# Patient Record
Sex: FEMALE | Race: WHITE | NOT HISPANIC OR LATINO | Employment: OTHER | URBAN - METROPOLITAN AREA
[De-identification: names, ages, dates, MRNs, and addresses within clinical notes are randomized per-mention and may not be internally consistent; named-entity substitution may affect disease eponyms.]

---

## 2017-02-03 ENCOUNTER — TELEPHONE (OUTPATIENT)
Dept: FAMILY MEDICINE | Facility: OTHER | Age: 72
End: 2017-02-03

## 2017-02-03 DIAGNOSIS — Z79.899 ON STATIN THERAPY: ICD-10-CM

## 2017-02-03 DIAGNOSIS — E03.9 ACQUIRED HYPOTHYROIDISM: ICD-10-CM

## 2017-02-03 DIAGNOSIS — E78.5 HYPERLIPIDEMIA LDL GOAL <100: Primary | ICD-10-CM

## 2017-02-03 NOTE — TELEPHONE ENCOUNTER
10:57 AM    Reason for Call: Phone Call    Description: Patient is coming in on 03/14 for follow up for thyroid and hyperlipidemia. Patient would like to get the labs done prior to the appt so she can discuss the results with the provider.    Was an appointment offered for this call? Yes    Preferred method for responding to this message: Telephone Call    If we cannot reach you directly, may we leave a detailed response at the number you provided? Yes    Can this message wait until your PCP/provider returns, if available today? Not applicable,     Hien Johnson

## 2017-03-09 DIAGNOSIS — E78.5 HYPERLIPIDEMIA LDL GOAL <100: ICD-10-CM

## 2017-03-09 DIAGNOSIS — Z79.899 ON STATIN THERAPY: ICD-10-CM

## 2017-03-09 DIAGNOSIS — E03.9 ACQUIRED HYPOTHYROIDISM: ICD-10-CM

## 2017-03-09 LAB
ALBUMIN SERPL-MCNC: 3.8 G/DL (ref 3.4–5)
ALP SERPL-CCNC: 81 U/L (ref 40–150)
ALT SERPL W P-5'-P-CCNC: 34 U/L (ref 0–50)
ANION GAP SERPL CALCULATED.3IONS-SCNC: 8 MMOL/L (ref 3–14)
AST SERPL W P-5'-P-CCNC: 28 U/L (ref 0–45)
BILIRUB DIRECT SERPL-MCNC: <0.1 MG/DL (ref 0–0.2)
BILIRUB SERPL-MCNC: 0.4 MG/DL (ref 0.2–1.3)
BUN SERPL-MCNC: 18 MG/DL (ref 7–30)
CALCIUM SERPL-MCNC: 9.5 MG/DL (ref 8.5–10.1)
CHLORIDE SERPL-SCNC: 109 MMOL/L (ref 94–109)
CHOLEST SERPL-MCNC: 176 MG/DL
CO2 SERPL-SCNC: 26 MMOL/L (ref 20–32)
CREAT SERPL-MCNC: 0.96 MG/DL (ref 0.52–1.04)
GFR SERPL CREATININE-BSD FRML MDRD: 57 ML/MIN/1.7M2
GLUCOSE SERPL-MCNC: 87 MG/DL (ref 70–99)
HDLC SERPL-MCNC: 64 MG/DL
LDLC SERPL CALC-MCNC: 96 MG/DL
NONHDLC SERPL-MCNC: 112 MG/DL
POTASSIUM SERPL-SCNC: 4.1 MMOL/L (ref 3.4–5.3)
PROT SERPL-MCNC: 7.1 G/DL (ref 6.8–8.8)
SODIUM SERPL-SCNC: 143 MMOL/L (ref 133–144)
TRIGL SERPL-MCNC: 79 MG/DL
TSH SERPL DL<=0.005 MIU/L-ACNC: 2.51 MU/L (ref 0.4–4)

## 2017-03-09 PROCEDURE — 80061 LIPID PANEL: CPT | Performed by: NURSE PRACTITIONER

## 2017-03-09 PROCEDURE — 36415 COLL VENOUS BLD VENIPUNCTURE: CPT | Performed by: NURSE PRACTITIONER

## 2017-03-09 PROCEDURE — 80076 HEPATIC FUNCTION PANEL: CPT | Performed by: NURSE PRACTITIONER

## 2017-03-09 PROCEDURE — 84443 ASSAY THYROID STIM HORMONE: CPT | Performed by: NURSE PRACTITIONER

## 2017-03-09 PROCEDURE — 80048 BASIC METABOLIC PNL TOTAL CA: CPT | Performed by: NURSE PRACTITIONER

## 2017-03-14 ENCOUNTER — OFFICE VISIT (OUTPATIENT)
Dept: FAMILY MEDICINE | Facility: OTHER | Age: 72
End: 2017-03-14
Attending: NURSE PRACTITIONER
Payer: MEDICARE

## 2017-03-14 VITALS
TEMPERATURE: 97.3 F | SYSTOLIC BLOOD PRESSURE: 138 MMHG | HEIGHT: 65 IN | HEART RATE: 68 BPM | DIASTOLIC BLOOD PRESSURE: 78 MMHG | WEIGHT: 142 LBS | BODY MASS INDEX: 23.66 KG/M2

## 2017-03-14 DIAGNOSIS — Z79.899 ON STATIN THERAPY: ICD-10-CM

## 2017-03-14 DIAGNOSIS — L81.9 CHANGE IN PIGMENTED LESION OF FACE: ICD-10-CM

## 2017-03-14 DIAGNOSIS — E03.4 HYPOTHYROIDISM DUE TO ACQUIRED ATROPHY OF THYROID: ICD-10-CM

## 2017-03-14 DIAGNOSIS — Z23 NEED FOR VACCINATION: ICD-10-CM

## 2017-03-14 DIAGNOSIS — E78.5 HYPERLIPIDEMIA WITH TARGET LDL LESS THAN 130: Primary | ICD-10-CM

## 2017-03-14 PROCEDURE — 90670 PCV13 VACCINE IM: CPT | Performed by: NURSE PRACTITIONER

## 2017-03-14 PROCEDURE — 99213 OFFICE O/P EST LOW 20 MIN: CPT | Mod: 25 | Performed by: NURSE PRACTITIONER

## 2017-03-14 PROCEDURE — 90471 IMMUNIZATION ADMIN: CPT | Performed by: NURSE PRACTITIONER

## 2017-03-14 PROCEDURE — 17110 DESTRUCTION B9 LES UP TO 14: CPT | Performed by: NURSE PRACTITIONER

## 2017-03-14 PROCEDURE — 99212 OFFICE O/P EST SF 10 MIN: CPT | Mod: 25

## 2017-03-14 PROCEDURE — G0009 ADMIN PNEUMOCOCCAL VACCINE: HCPCS | Performed by: NURSE PRACTITIONER

## 2017-03-14 RX ORDER — ATORVASTATIN CALCIUM 20 MG/1
20 TABLET, FILM COATED ORAL DAILY
Qty: 90 TABLET | Refills: 1 | Status: SHIPPED | OUTPATIENT
Start: 2017-03-14 | End: 2017-06-14

## 2017-03-14 ASSESSMENT — PAIN SCALES - GENERAL: PAINLEVEL: NO PAIN (0)

## 2017-03-14 NOTE — MR AVS SNAPSHOT
After Visit Summary   3/14/2017    Franca Angeles    MRN: [id number]           Patient Information     Date Of Birth          1945        Visit Information        Provider Department      3/14/2017 10:15 AM Brittney Coe NP [facility]        Today's Diagnoses     Hyperlipidemia with target LDL less than 130    -  1    Hypothyroidism due to acquired atrophy of thyroid        On statin therapy        Change in pigmented lesion of face        Need for vaccination          Care Instructions        ASSESSMENT / PLAN:  1. Hyperlipidemia with target LDL less than 130  chronic  - decrease atorvastatin (LIPITOR) 20 MG tablet; Take 1 tablet (20 mg) by mouth daily  Dispense: 90 tablet; Refill: 1  - Lipid Profile; Future  - Basic metabolic panel; Future    2. Hypothyroidism due to acquired atrophy of thyroid  Chronic, continue current plan  - TSH with free T4 reflex; Future    3. On statin therapy  - Hepatic panel; Future    4. Change in pigmented lesion of face  - DESTRUCT BENIGN LESION, UP TO 14  - bacitracin or Vaseline to area    Recommend shinges vaccine - wait at least one month  prevnar 13 updated today  Follow-up in 3 months or as needed for acute concerns    Brittney Coe,   Certified Adult Nurse Practitioner  [phone]        Follow-ups after your visit        Follow-up notes from your care team     Return in about 3 months (around 6/14/2017), or if symptoms worsen or fail to improve.      Your next 10 appointments already scheduled     Jun 14, 2017 11:15 AM CDT   (Arrive by 11:00 AM)   SHORT with Brittney Coe NP   [facility] ([facility] )    [address]  [facility] [address]   [phone]            Jul 25, 2017  9:00 AM CDT   (Arrive by 8:45 AM)   Office Visit with Khloe Lei MD   [facility]    [address] [place] [address]   [phone]           Bring a  current list of meds and any records pertaining to this visit.  For Physicals, please bring immunization records and any forms needing to be filled out.  Please arrive 10 minutes early to complete paperwork.              Future tests that were ordered for you today     Open Future Orders        Priority Expected Expires Ordered    Lipid Profile Routine 6/14/2017 7/14/2017 3/14/2017    TSH with free T4 reflex Routine 6/14/2017 7/14/2017 3/14/2017    Basic metabolic panel Routine 6/14/2017 7/14/2017 3/14/2017    Hepatic panel Routine 6/14/2017 7/14/2017 3/14/2017            Who to contact     If you have questions or need follow up information about today's clinic visit or your schedule please contact [facility] directly at [phone].  Normal or non-critical lab and imaging results will be communicated to you by MyChart, letter or phone within 4 business days after the clinic has received the results. If you do not hear from us within 7 days, please contact the clinic through [employer] or phone. If you have a critical or abnormal lab result, we will notify you by phone as soon as possible.  Submit refill requests through "[employer] or call your pharmacy and they will forward the refill request to us. Please allow 3 business days for your refill to be completed.          Additional Information About Your Visit        [employer] Information     "[employer] gives you secure access to your electronic health record. If you see a primary care provider, you can also send messages to your care team and make appointments. If you have questions, please call your primary care clinic.  If you do not have a primary care provider, please call [phone] and they will assist you.        Care EveryWhere ID     This is your Care EveryWhere ID. This could be used by other organizations to access your [facility] medical records  [id number]        Your Vitals Were     Pulse Temperature Height BMI (Body Mass Index)          68 97.3  " F (36.3  C) (Tympanic) 5' 4.5\" (1.638 m) 24 kg/m2         Blood Pressure from Last 3 Encounters:   03/14/17 138/78   10/05/16 174/97   09/12/16 124/70    Weight from Last 3 Encounters:   03/14/17 142 lb (64.4 kg)   09/12/16 141 lb (64 kg)   07/13/16 141 lb (64 kg)              We Performed the Following     1st  Administration  [88153]     DESTRUCT BENIGN LESION, UP TO 14     Pneumococcal vaccine 13 valent PCV13 IM (Prevnar) [03505]          Today's Medication Changes          These changes are accurate as of: 3/14/17  1:26 PM.  If you have any questions, ask your nurse or doctor.               These medicines have changed or have updated prescriptions.        Dose/Directions    atorvastatin 20 MG tablet   Commonly known as:  LIPITOR   This may have changed:    - medication strength  - how much to take   Used for:  Hyperlipidemia with target LDL less than 130   Changed by:  Brittney Coe NP        Dose:  20 mg   Take 1 tablet (20 mg) by mouth daily   Quantity:  90 tablet   Refills:  1            Where to get your medicines      These medications were sent to [facility] [address] [facility] [address] AT [facility] [place] [facility] Replaced by [facility] [address], [place] [address]     Phone:  [phone]     atorvastatin 20 MG tablet                Primary Care Provider Office Phone # Fax #    Brittney Coe [phone]       [facility] [address] [place]  [facility] [address]        Thank you!     Thank you for choosing [facility]  for your care. Our goal is always to provide you with excellent care. Hearing back from our patients is one way we can continue to improve our services. Please take a few minutes to complete the written survey that you may receive in the mail after your visit with us. Thank you!             Your Updated Medication List - Protect others around you: Learn how to safely use, store and throw away your medicines at " www.disposemymeds.org.          This list is accurate as of: 3/14/17  1:26 PM.  Always use your most recent med list.                   Brand Name Dispense Instructions for use    aspirin 81 MG tablet      Take 81 mg by mouth daily       atorvastatin 20 MG tablet    LIPITOR    90 tablet    Take 1 tablet (20 mg) by mouth daily       CALCIUM 500 PO      Take by mouth daily       cinnamon 500 MG Caps      One daily       CO-ENZYME Q-10 PO      Take by mouth daily       Fish Oil 1200 MG Caps      Take by mouth daily       GLUCOSAMINE-CHONDROITIN PO      Take  by mouth. GLUCOSAMINE HCL/CHONDR SUA NA One daily       levothyroxine 50 MCG tablet    SYNTHROID/LEVOTHROID    90 tablet    Take 1 tablet (50 mcg) by mouth daily       MULTIVITAMIN PO          Turmeric Curcumin 500 MG Caps      Take 1 capsule by mouth daily

## 2017-03-14 NOTE — PATIENT INSTRUCTIONS
ASSESSMENT / PLAN:  1. Hyperlipidemia with target LDL less than 130  chronic  - decrease atorvastatin (LIPITOR) 20 MG tablet; Take 1 tablet (20 mg) by mouth daily  Dispense: 90 tablet; Refill: 1  - Lipid Profile; Future  - Basic metabolic panel; Future    2. Hypothyroidism due to acquired atrophy of thyroid  Chronic, continue current plan  - TSH with free T4 reflex; Future    3. On statin therapy  - Hepatic panel; Future    4. Change in pigmented lesion of face  - DESTRUCT BENIGN LESION, UP TO 14  - bacitracin or Vaseline to area    Recommend shinges vaccine - wait at least one month  prevnar 13 updated today  Follow-up in 3 months or as needed for acute concerns    Brittney Coe,   Certified Adult Nurse Practitioner  [phone]

## 2017-03-14 NOTE — PROGRESS NOTES
SUBJECTIVE:  Franca Angeles, 71 year old, female presents with the following Chief Complaint(s) with HPI to follow:  Chief Complaint   Patient presents with     Lipids     Thyroid Problem          HPI:  Franca presents today with the above concern.        Hyperlipidemia Follow-Up      Watching cholesterol in diet?: Yes    Any muscle aches?: No     Migraine Follow-Up    Headaches symptoms:  Worsening in the past month    Frequency (per week or month): 4 per week    Duration of headaches: minutes to hours     Able to do normal daily activities/work with migraines: Yes    Rescue/Relief medication:massage, took aspirin once, does see the chiropractor weekly with relief.  Feels they are in part due to posture, working on Ipad and phone with head looking down              Effectiveness: moderate relief    Preventative medication: None    Neurologic complications: No new stroke-like symptoms, loss of vision or speech, numbness or weakness       Hypothyroidism Follow-up      Since last visit, patient describes the following symptoms: Weight stable, no hair loss, no skin changes, no constipation, no loose stools         Patient Active Problem List   Diagnosis     Menopause     Joint pain     Disorder of bone and cartilage     Advanced care planning/counseling discussion     Hyperlipidemia with target LDL less than 130     Routine general medical examination at a health care facility (ANNUAL)     Colon cancer screening     Hypothyroidism due to acquired atrophy of thyroid       Past Medical History   Diagnosis Date     Disorder of bone and cartilage, unspecified 5/10/2011     Esophageal reflux 5/10/2011     Pure hypercholesterolemia 5/28/2002       Past Surgical History   Procedure Laterality Date     Cystoscopy       micro hematuria neg     Wrist fx rt       Uterine fibroids       Tubal sterilization       Arthrodesis knee  [id number]     left knee, paritla medial meniscectomy, debridement medial femoral condyle and  patellofemoral debridement     Dilation and curettage       Colonoscopy  2004     [facility]     Colonoscopy  7/23/2014     Procedure: COLONOSCOPY;  Surgeon: Nghia Hernandez DO;  Location: [facility] OR       Family History   Problem Relation Age of Onset     C.A.D. Mother      Other - See Comments Mother      high platelets     Hypertension Mother      C.A.D. Paternal Aunt      DIABETES Other      aunt     Cardiovascular Father      cardiovascular disease     Colon Cancer Other      family history-paternal side     CEREBROVASCULAR DISEASE Maternal Grandmother      CEREBROVASCULAR DISEASE Maternal Grandfather        Social History   Substance Use Topics     Smoking status: Never Smoker     Smokeless tobacco: Never Used     Alcohol use 0.0 oz/week      Comment: occasionally       Current Outpatient Prescriptions   Medication Sig Dispense Refill     atorvastatin (LIPITOR) 20 MG tablet Take 1 tablet (20 mg) by mouth daily 90 tablet 1     levothyroxine (SYNTHROID, LEVOTHROID) 50 MCG tablet Take 1 tablet (50 mcg) by mouth daily 90 tablet 1     Turmeric Curcumin 500 MG CAPS Take 1 capsule by mouth daily       aspirin 81 MG tablet Take 81 mg by mouth daily       CO-ENZYME Q-10 PO Take by mouth daily       Multiple Vitamins-Minerals (MULTIVITAMIN OR)        Omega-3 Fatty Acids (FISH OIL) 1200 MG CAPS Take by mouth daily       Calcium-Magnesium-Vitamin D (CALCIUM 500 PO) Take by mouth daily       GLUCOSAMINE-CHONDROITIN PO Take  by mouth. GLUCOSAMINE HCL/CHONDR SUA NA  One daily       cinnamon 500 MG CAPS One daily       [DISCONTINUED] atorvastatin (LIPITOR) 40 MG tablet Take 1 tablet (40 mg) by mouth daily 90 tablet 1       Allergies   Allergen Reactions     Pseudoephedrine Hcl Other (See Comments)     Heart racing  Sudafed       Recent Labs   Lab Test  03/09/17   0913  09/13/16   0928  11/16/15   0823  05/13/15   1026   A1C   --    --    --   5.7   LDL  96  113*  102  134*   HDL  64  57  59  59   TRIG  79  193*  146  170*   ALT  34   28   --    --    CR  0.96  0.90   --    --    GFRESTIMATED  57*  62   --    --    GFRESTBLACK  69  75   --    --    POTASSIUM  4.1  4.4   --    --    TSH  2.51  2.33   --   3.75      BP Readings from Last 3 Encounters:   03/14/17 138/78   10/05/16 174/97   09/12/16 124/70    Wt Readings from Last 3 Encounters:   03/14/17 142 lb (64.4 kg)   09/12/16 141 lb (64 kg)   07/13/16 141 lb (64 kg)                    REVIEW OF SYSTEMS  Skin: left temple - brown scaly patch - new and getting larger.   She has appt with derm in July.  History of several lesions treated with cryotherapy.    Eyes: negative  Ears/Nose/Throat: negative  Respiratory: No shortness of breath, dyspnea on exertion, cough, or hemoptysis  Cardiovascular: negative  Gastrointestinal: negative  Genitourinary: negative  Musculoskeletal: neck pain  Neurologic: negative and headaches  Psychiatric: negative  Hematologic/Lymphatic/Immunologic: negative  Endocrine: negative and thyroid disorder    OBJECTIVE:    B/P: 138/78, T: 97.3, P: 68, R: Data Unavailable, W: 142 lbs 0 oz, BMI: Body mass index is 24 kg/(m^2).  Constitutional: healthy, alert and no distress  Head: Normocephalic. No masses, lesions, tenderness or abnormalities  ENT: ENT exam normal, no neck nodes or sinus tenderness  Neck: neck supple, no lymphadenopathy, trachea midline, thyroid symmetrical with out nodules noted.  Carotid arteries without bruit  Cardiovascular: RRR. No murmurs, clicks gallops or rub  Respiratory:  Good diaphragmatic excursion. Lungs clear  Psychiatric: mentation appears normal and affect normal/bright  Skin:  Left temple:  Cryotherapy x3 freeze thaw cycles to brown scaly patch that is oval shaped with sharp borders that mesasures 6mm.  Tolerated procedure well.     ASSESSMENT / PLAN:  1. Hyperlipidemia with target LDL less than 130  chronic  - decrease atorvastatin (LIPITOR) 20 MG tablet; Take 1 tablet (20 mg) by mouth daily  Dispense: 90 tablet; Refill: 1  - Lipid Profile;  Future  - Basic metabolic panel; Future    2. Hypothyroidism due to acquired atrophy of thyroid  Chronic, continue current plan  - TSH with free T4 reflex; Future    3. On statin therapy  - Hepatic panel; Future    4. Change in pigmented lesion of face  - DESTRUCT BENIGN LESION, UP TO 14  - bacitracin or Vaseline to area    Recommend shinges vaccine - wait at least one month  prevnar 13 updated today  Follow-up in 3 months or as needed for acute concerns    Brittney Coe,   Certified Adult Nurse Practitioner  [phone]

## 2017-03-14 NOTE — NURSING NOTE
"Chief Complaint   Patient presents with     Lipids     Thyroid Problem       Initial /78 (BP Location: Right arm, Patient Position: Chair, Cuff Size: Adult Regular)  Pulse 68  Temp 97.3  F (36.3  C) (Tympanic)  Ht 5' 4.5\" (1.638 m)  Wt 142 lb (64.4 kg)  BMI 24 kg/m2 Estimated body mass index is 24 kg/(m^2) as calculated from the following:    Height as of this encounter: 5' 4.5\" (1.638 m).    Weight as of this encounter: 142 lb (64.4 kg).  Medication Reconciliation: jacques TORRES      "

## 2017-03-22 ENCOUNTER — TELEPHONE (OUTPATIENT)
Dept: FAMILY MEDICINE | Facility: OTHER | Age: 72
End: 2017-03-22

## 2017-03-22 DIAGNOSIS — E03.4 HYPOTHYROIDISM DUE TO ACQUIRED ATROPHY OF THYROID: ICD-10-CM

## 2017-03-22 RX ORDER — LEVOTHYROXINE SODIUM 50 UG/1
50 TABLET ORAL DAILY
Qty: 90 TABLET | Refills: 3 | Status: SHIPPED | OUTPATIENT
Start: 2017-03-22 | End: 2018-03-20

## 2017-03-22 NOTE — TELEPHONE ENCOUNTER
Reason for call:  Medication    1. Medication Name? levothyroxine (SYNTHROID, LEVOTHROID) 50 MCG tablet  2. Is this request for a refill? Yes  3. What Pharmacy do you use? [facility]  4. Have you contacted your pharmacy? No    5. If yes, when?  (Please note that the turn-around-time for prescriptions is 72 business hours; I am sending your request at this time. SEND TO  [facility]  )  Description: Pt called about a medication refill. Last seen last week by PCP 03/14/2017. Nurse please advise.  Was an appointment offered for this a call? No   Preferred method for responding to this messageTelephone Call: [phone] pt's cell phone  If we cannot reach you directly, may we leave a detailed response at the number you provided? Yes  Can this message wait until your PCP/Provider returns if not available today? Not applicable, PCP Liang is in today.

## 2017-06-13 DIAGNOSIS — Z79.899 ON STATIN THERAPY: ICD-10-CM

## 2017-06-13 DIAGNOSIS — E78.5 HYPERLIPIDEMIA WITH TARGET LDL LESS THAN 130: ICD-10-CM

## 2017-06-13 DIAGNOSIS — E03.4 HYPOTHYROIDISM DUE TO ACQUIRED ATROPHY OF THYROID: ICD-10-CM

## 2017-06-13 LAB
ALBUMIN SERPL-MCNC: 4 G/DL (ref 3.4–5)
ALP SERPL-CCNC: 78 U/L (ref 40–150)
ALT SERPL W P-5'-P-CCNC: 32 U/L (ref 0–50)
ANION GAP SERPL CALCULATED.3IONS-SCNC: 8 MMOL/L (ref 3–14)
AST SERPL W P-5'-P-CCNC: 25 U/L (ref 0–45)
BILIRUB DIRECT SERPL-MCNC: <0.1 MG/DL (ref 0–0.2)
BILIRUB SERPL-MCNC: 0.3 MG/DL (ref 0.2–1.3)
BUN SERPL-MCNC: 17 MG/DL (ref 7–30)
CALCIUM SERPL-MCNC: 9.7 MG/DL (ref 8.5–10.1)
CHLORIDE SERPL-SCNC: 106 MMOL/L (ref 94–109)
CHOLEST SERPL-MCNC: 198 MG/DL
CO2 SERPL-SCNC: 28 MMOL/L (ref 20–32)
CREAT SERPL-MCNC: 0.9 MG/DL (ref 0.52–1.04)
GFR SERPL CREATININE-BSD FRML MDRD: 62 ML/MIN/1.7M2
GLUCOSE SERPL-MCNC: 94 MG/DL (ref 70–99)
HDLC SERPL-MCNC: 58 MG/DL
LDLC SERPL CALC-MCNC: 112 MG/DL
NONHDLC SERPL-MCNC: 140 MG/DL
POTASSIUM SERPL-SCNC: 4.2 MMOL/L (ref 3.4–5.3)
PROT SERPL-MCNC: 7.7 G/DL (ref 6.8–8.8)
SODIUM SERPL-SCNC: 142 MMOL/L (ref 133–144)
T4 FREE SERPL-MCNC: 0.91 NG/DL (ref 0.76–1.46)
TRIGL SERPL-MCNC: 139 MG/DL
TSH SERPL DL<=0.005 MIU/L-ACNC: 4.02 MU/L (ref 0.4–4)

## 2017-06-13 PROCEDURE — 84443 ASSAY THYROID STIM HORMONE: CPT | Mod: ZL | Performed by: NURSE PRACTITIONER

## 2017-06-13 PROCEDURE — 80048 BASIC METABOLIC PNL TOTAL CA: CPT | Mod: ZL | Performed by: NURSE PRACTITIONER

## 2017-06-13 PROCEDURE — 36415 COLL VENOUS BLD VENIPUNCTURE: CPT | Mod: ZL | Performed by: NURSE PRACTITIONER

## 2017-06-13 PROCEDURE — 84439 ASSAY OF FREE THYROXINE: CPT | Mod: ZL | Performed by: NURSE PRACTITIONER

## 2017-06-13 PROCEDURE — 80061 LIPID PANEL: CPT | Mod: ZL | Performed by: NURSE PRACTITIONER

## 2017-06-13 PROCEDURE — 80076 HEPATIC FUNCTION PANEL: CPT | Mod: ZL | Performed by: NURSE PRACTITIONER

## 2017-06-14 ENCOUNTER — OFFICE VISIT (OUTPATIENT)
Dept: FAMILY MEDICINE | Facility: OTHER | Age: 72
End: 2017-06-14
Attending: NURSE PRACTITIONER
Payer: COMMERCIAL

## 2017-06-14 VITALS
RESPIRATION RATE: 15 BRPM | SYSTOLIC BLOOD PRESSURE: 146 MMHG | WEIGHT: 141 LBS | HEIGHT: 65 IN | TEMPERATURE: 97.2 F | HEART RATE: 53 BPM | BODY MASS INDEX: 23.49 KG/M2 | DIASTOLIC BLOOD PRESSURE: 84 MMHG | OXYGEN SATURATION: 99 %

## 2017-06-14 DIAGNOSIS — I10 BENIGN ESSENTIAL HYPERTENSION: ICD-10-CM

## 2017-06-14 DIAGNOSIS — R51.9 NEW ONSET HEADACHE: ICD-10-CM

## 2017-06-14 DIAGNOSIS — Z79.899 ON STATIN THERAPY: ICD-10-CM

## 2017-06-14 DIAGNOSIS — E03.4 HYPOTHYROIDISM DUE TO ACQUIRED ATROPHY OF THYROID: ICD-10-CM

## 2017-06-14 DIAGNOSIS — E78.5 HYPERLIPIDEMIA WITH TARGET LDL LESS THAN 130: Primary | ICD-10-CM

## 2017-06-14 PROCEDURE — 99214 OFFICE O/P EST MOD 30 MIN: CPT | Performed by: NURSE PRACTITIONER

## 2017-06-14 PROCEDURE — 99212 OFFICE O/P EST SF 10 MIN: CPT

## 2017-06-14 RX ORDER — ATORVASTATIN CALCIUM 40 MG/1
40 TABLET, FILM COATED ORAL DAILY
Qty: 90 TABLET | Refills: 1 | Status: SHIPPED | OUTPATIENT
Start: 2017-06-14 | End: 2017-06-14 | Stop reason: DRUGHIGH

## 2017-06-14 RX ORDER — ATORVASTATIN CALCIUM 40 MG/1
40 TABLET, FILM COATED ORAL DAILY
Qty: 90 TABLET | Refills: 1 | Status: SHIPPED | OUTPATIENT
Start: 2017-06-14 | End: 2017-09-15

## 2017-06-14 RX ORDER — LISINOPRIL 5 MG/1
5 TABLET ORAL DAILY
Qty: 30 TABLET | Refills: 1 | Status: SHIPPED | OUTPATIENT
Start: 2017-06-14 | End: 2017-06-28

## 2017-06-14 ASSESSMENT — PAIN SCALES - GENERAL: PAINLEVEL: NO PAIN (0)

## 2017-06-14 NOTE — PROGRESS NOTES
SUBJECTIVE:                                                    Franca Angeles is a 71 year old female who presents to clinic today for the following health issues:  Chief Complaint   Patient presents with     Lipids     due for labwork     Thyroid Problem     due for labwork     Headache     sporatic pains in head, seen by chiropractor with no relief he suspects pinch nerve         Hyperlipidemia Follow-Up      Rate your low fat/cholesterol diet?: tries to    Taking statin?  Last march we decreased lipitor to 20mg, readings jumped back up with the decreased dose    Other lipid medications/supplements?:  Fish oil     Hypothyroidism Follow-up      Since last visit, patient describes the following symptoms: Weight stable, no hair loss, no skin changes, no constipation, no loose stools       Headache     Onset: 6 months    Description:   Location: right side   Character: finds it difficult to describe, worse with exertion  Frequency:  6 months ago was a few times a week now Several times a day  Duration:  Seconds to minutes    Intensity: 5/10    Progression of Symptoms:  happening more often    Accompanying Signs & Symptoms:  Stiff neck: YES  Neck or upper back pain: YES  Fever: no  Sinus pressure: no  Nausea or vomiting: no  Dizziness: no  Numbness: no  Weakness: no  Visual changes: no   History:   Head trauma: no  Family history of migraines: YES - mother  Previous tests for headaches: no  Neurologist evaluations: no  Able to do daily activities: no  Wake with a headaches: no  Do headaches wake you up: no  Daily pain medication use: no  Work/school stressors/changes: no    Precipitating factors:   Does light make it worse: no  Does sound make it worse: talking loud    Alleviating factors:  Does sleep help: no    Pain is worse with exercise, coughing and sneezing.       Therapies Tried and outcome: advil once, did help.          Problem list and histories reviewed & adjusted, as indicated.  Additional history: as  documented    Patient Active Problem List   Diagnosis     Menopause     Joint pain     Disorder of bone and cartilage     Advanced care planning/counseling discussion     Hyperlipidemia with target LDL less than 130     Routine general medical examination at a health care facility (ANNUAL)     Colon cancer screening     Hypothyroidism due to acquired atrophy of thyroid     Past Surgical History:   Procedure Laterality Date     ARTHRODESIS KNEE  [id number]    left knee, paritla medial meniscectomy, debridement medial femoral condyle and patellofemoral debridement     COLONOSCOPY  2004    [facility]     COLONOSCOPY  7/23/2014    Procedure: COLONOSCOPY;  Surgeon: Nghia Hernandez DO;  Location: [facility]     CYSTOSCOPY      micro hematuria neg     DILATION AND CURETTAGE       tubal sterilization       uterine fibroids       wrist fx RT         Social History   Substance Use Topics     Smoking status: Never Smoker     Smokeless tobacco: Never Used     Alcohol use 0.0 oz/week      Comment: occasionally     Family History   Problem Relation Age of Onset     C.A.D. Mother      Other - See Comments Mother      high platelets     Hypertension Mother      C.A.D. Paternal Aunt      DIABETES Other      aunt     Cardiovascular Father      cardiovascular disease     Colon Cancer Other      family history-paternal side     CEREBROVASCULAR DISEASE Maternal Grandmother      CEREBROVASCULAR DISEASE Maternal Grandfather          Current Outpatient Prescriptions   Medication Sig Dispense Refill     Atorvastatin Calcium (LIPITOR PO) Take 20 mg by mouth daily       levothyroxine (SYNTHROID/LEVOTHROID) 50 MCG tablet Take 1 tablet (50 mcg) by mouth daily 90 tablet 3     Turmeric Curcumin 500 MG CAPS Take 1 capsule by mouth daily       aspirin 81 MG tablet Take 81 mg by mouth daily       CO-ENZYME Q-10 PO Take by mouth daily       Multiple Vitamins-Minerals (MULTIVITAMIN OR)        Omega-3 Fatty Acids (FISH OIL) 1200 MG CAPS Take by mouth daily        "Calcium-Magnesium-Vitamin D (CALCIUM 500 PO) Take by mouth daily       GLUCOSAMINE-CHONDROITIN PO Take  by mouth. GLUCOSAMINE HCL/CHONDR SUA NA  One daily       cinnamon 500 MG CAPS One daily       [DISCONTINUED] atorvastatin (LIPITOR) 40 MG tablet Take 1 tablet (40 mg) by mouth daily 90 tablet 1     [DISCONTINUED] atorvastatin (LIPITOR) 20 MG tablet Take 1 tablet (20 mg) by mouth daily 90 tablet 1     Allergies   Allergen Reactions     Pseudoephedrine Hcl Other (See Comments)     Heart racing  Sudafed     Recent Labs   Lab Test  06/13/17   0811  03/09/17   0913  09/13/16   0928   05/13/15   1026   A1C   --    --    --    --   5.7   LDL  112*  96  113*   < >  134*   HDL  58  64  57   < >  59   TRIG  139  79  193*   < >  170*   ALT  32  34  28   --    --    CR  0.90  0.96  0.90   < >   --    GFRESTIMATED  62  57*  62   < >   --    GFRESTBLACK  74  69  75   < >   --    POTASSIUM  4.2  4.1  4.4   < >   --    TSH  4.02*  2.51  2.33   --   3.75    < > = values in this interval not displayed.      BP Readings from Last 3 Encounters:   06/14/17 146/84   03/14/17 138/78   10/05/16 174/97    Wt Readings from Last 3 Encounters:   06/14/17 141 lb (64 kg)   03/14/17 142 lb (64.4 kg)   09/12/16 141 lb (64 kg)                    Reviewed and updated as needed this visit by clinical staff  Tobacco  Allergies  Meds  Problems  Med Hx  Surg Hx  Fam Hx  Soc Hx        Reviewed and updated as needed this visit by Provider         ROS:  Constitutional, HEENT, cardiovascular, pulmonary, gi and gu systems are negative, except as otherwise noted.    OBJECTIVE:                                                    /84 (BP Location: Left arm, Patient Position: Sitting, Cuff Size: Adult Regular)  Pulse 53  Temp 97.2  F (36.2  C) (Tympanic)  Resp 15  Ht 5' 4.5\" (1.638 m)  Wt 141 lb (64 kg)  SpO2 99%  BMI 23.83 kg/m2  Body mass index is 23.83 kg/(m^2).  GENERAL: healthy, alert and no distress  NECK: no adenopathy, no asymmetry, " masses, or scars, thyroid normal to palpation and no carotid bruits  RESP: lungs clear to auscultation - no rales, rhonchi or wheezes  CV: regular rate and rhythm, normal S1 S2, no S3 or S4, no murmur, click or rub, no peripheral edema and peripheral pulses strong  MS: no gross musculoskeletal defects noted, no edema  PSYCH: mentation appears normal, affect normal/bright       ASSESSMENT/PLAN:                                                        1. Hyperlipidemia with target LDL less than 130  - increase atorvastatin (LIPITOR) 40 MG tablet; Take 1 tablet (40 mg) by mouth daily  Dispense: 90 tablet; Refill: 1    2. Hypothyroidism due to acquired atrophy of thyroid  Continue current dose, continue to take separately from all other medications    3. Benign essential hypertension  New onset  - start lisinopril (PRINIVIL/ZESTRIL) 5 MG tablet; Take 1 tablet (5 mg) by mouth daily  Dispense: 30 tablet; Refill: 1  - continue 81mg aspirin    4. New onset headache  - CT Head angio w/o & w Contrast; Future      FUTURE APPOINTMENTS:       - Follow-up visit with nurse only blood pressure check in 2 weeks, follow-up visit for chronic conditions in 3 months or as needed for acute concerns    Brittney Coe, NP  [facility]

## 2017-06-14 NOTE — PATIENT INSTRUCTIONS
ASSESSMENT/PLAN:                                                        1. Hyperlipidemia with target LDL less than 130  - increase atorvastatin (LIPITOR) 40 MG tablet; Take 1 tablet (40 mg) by mouth daily  Dispense: 90 tablet; Refill: 1    2. Hypothyroidism due to acquired atrophy of thyroid  Continue current dose, continue to take separately from all other medications    3. Benign essential hypertension  New onset  - start lisinopril (PRINIVIL/ZESTRIL) 5 MG tablet; Take 1 tablet (5 mg) by mouth daily  Dispense: 30 tablet; Refill: 1  - continue 81mg aspirin    4. New onset headache  - CT Head w/o & w Contrast; Future      FUTURE APPOINTMENTS:       - Follow-up visit with nurse only blood pressure check in 2 weeks, follow-up visit for chronic conditions in 3 months or as needed for acute concerns    Brittney Coe, NP  [facility]

## 2017-06-14 NOTE — MR AVS SNAPSHOT
After Visit Summary   6/14/2017    Franca Angeles    MRN: [id number]           Patient Information     Date Of Birth          1945        Visit Information        Provider Department      6/14/2017 11:15 AM Brittney Coe NP [facility]        Today's Diagnoses     Hyperlipidemia with target LDL less than 130    -  1    Hypothyroidism due to acquired atrophy of thyroid        Benign essential hypertension        New onset headache        On statin therapy          Care Instructions      ASSESSMENT/PLAN:                                                        1. Hyperlipidemia with target LDL less than 130  - increase atorvastatin (LIPITOR) 40 MG tablet; Take 1 tablet (40 mg) by mouth daily  Dispense: 90 tablet; Refill: 1    2. Hypothyroidism due to acquired atrophy of thyroid  Continue current dose, continue to take separately from all other medications    3. Benign essential hypertension  New onset  - start lisinopril (PRINIVIL/ZESTRIL) 5 MG tablet; Take 1 tablet (5 mg) by mouth daily  Dispense: 30 tablet; Refill: 1  - continue 81mg aspirin    4. New onset headache  - CT Head w/o & w Contrast; Future      FUTURE APPOINTMENTS:       - Follow-up visit with nurse only blood pressure check in 2 weeks, follow-up visit for chronic conditions in 3 months or as needed for acute concerns    Brittney Coe NP  [facility]            Follow-ups after your visit        Your next 10 appointments already scheduled     Jun 28, 2017  9:30 AM CDT   (Arrive by 9:15 AM)   Nurse Only with [facility] NURSE   [facility]    [address] [facility] [address]   [phone]            Jul 25, 2017  9:00 AM CDT   (Arrive by 8:45 AM)   PHYSICAL with Khloe Lei MD   [facility] ([facility] )    [address] [place] [address]   [phone]            Sep 14, 2017 10:00  AM CDT   (Arrive by 9:45 AM)   SHORT with Brittney Coe NP   [facility] ([facility] )    [address]  [place] [address]   [phone]              Future tests that were ordered for you today     Open Future Orders        Priority Expected Expires Ordered    Lipid Profile Routine 9/14/2017 11/14/2017 6/14/2017    TSH with free T4 reflex Routine 9/14/2017 11/14/2017 6/14/2017    Basic metabolic panel Routine 9/14/2017 11/14/2017 6/14/2017    Hepatic panel Routine 9/14/2017 11/14/2017 6/14/2017            Who to contact     If you have questions or need follow up information about today's clinic visit or your schedule please contact [facility] directly at [phone].  Normal or non-critical lab and imaging results will be communicated to you by MyChart, letter or phone within 4 business days after the clinic has received the results. If you do not hear from us within 7 days, please contact the clinic through ZenSuitehart or phone. If you have a critical or abnormal lab result, we will notify you by phone as soon as possible.  Submit refill requests through Pyreg or call your pharmacy and they will forward the refill request to us. Please allow 3 business days for your refill to be completed.          Additional Information About Your Visit        MyChart Information     Pyreg gives you secure access to your electronic health record. If you see a primary care provider, you can also send messages to your care team and make appointments. If you have questions, please call your primary care clinic.  If you do not have a primary care provider, please call [phone] and they will assist you.        Care EveryWhere ID     This is your Care EveryWhere ID. This could be used by other organizations to access your [place] medical records  [id number]        Your Vitals Were     Pulse Temperature Respirations Height Pulse Oximetry BMI (Body  "Mass Index)    53 97.2  F (36.2  C) (Tympanic) 15 5' 4.5\" (1.638 m) 99% 23.83 kg/m2       Blood Pressure from Last 3 Encounters:   06/14/17 146/84   03/14/17 138/78   10/05/16 174/97    Weight from Last 3 Encounters:   06/14/17 141 lb (64 kg)   03/14/17 142 lb (64.4 kg)   09/12/16 141 lb (64 kg)                 Today's Medication Changes          These changes are accurate as of: 6/14/17 11:33 AM.  If you have any questions, ask your nurse or doctor.               Start taking these medicines.        Dose/Directions    lisinopril 5 MG tablet   Commonly known as:  PRINIVIL/ZESTRIL   Used for:  Benign essential hypertension   Started by:  Brittney Coe NP        Dose:  5 mg   Take 1 tablet (5 mg) by mouth daily   Quantity:  30 tablet   Refills:  1         These medicines have changed or have updated prescriptions.        Dose/Directions    atorvastatin 40 MG tablet   Commonly known as:  LIPITOR   This may have changed:  Another medication with the same name was removed. Continue taking this medication, and follow the directions you see here.   Used for:  Hyperlipidemia with target LDL less than 130   Changed by:  Brittney Coe NP        Dose:  40 mg   Take 1 tablet (40 mg) by mouth daily   Quantity:  90 tablet   Refills:  1            Where to get your medicines      These medications were sent to [facility] [id number] [place] [address] [address] AT [facility] [address] [place] [address]     Phone:  [phone]     atorvastatin 40 MG tablet    lisinopril 5 MG tablet                Primary Care Provider Office Phone # Fax #    Brittney Coe [phone]       [facility] [address] [place] [address]        Thank you!     Thank you for choosing [facility]  for your care. Our goal is always to provide you with excellent care. Hearing back from our patients is one way we can continue to improve our " services. Please take a few minutes to complete the written survey that you may receive in the mail after your visit with us. Thank you!             Your Updated Medication List - Protect others around you: Learn how to safely use, store and throw away your medicines at www.disposemymeds.org.          This list is accurate as of: 6/14/17 11:33 AM.  Always use your most recent med list.                   Brand Name Dispense Instructions for use    aspirin 81 MG tablet      Take 81 mg by mouth daily       atorvastatin 40 MG tablet    LIPITOR    90 tablet    Take 1 tablet (40 mg) by mouth daily       CALCIUM 500 PO      Take by mouth daily       cinnamon 500 MG Caps      One daily       CO-ENZYME Q-10 PO      Take by mouth daily       Fish Oil 1200 MG Caps      Take by mouth daily       GLUCOSAMINE-CHONDROITIN PO      Take  by mouth. GLUCOSAMINE HCL/CHONDR SUA NA One daily       levothyroxine 50 MCG tablet    SYNTHROID/LEVOTHROID    90 tablet    Take 1 tablet (50 mcg) by mouth daily       lisinopril 5 MG tablet    PRINIVIL/ZESTRIL    30 tablet    Take 1 tablet (5 mg) by mouth daily       MULTIVITAMIN PO          Turmeric Curcumin 500 MG Caps      Take 1 capsule by mouth daily

## 2017-06-15 DIAGNOSIS — G44.84 EXERTIONAL HEADACHE: Primary | ICD-10-CM

## 2017-06-16 ENCOUNTER — HOSPITAL ENCOUNTER (OUTPATIENT)
Dept: CT IMAGING | Facility: HOSPITAL | Age: 72
Discharge: HOME | End: 2017-06-16
Attending: NURSE PRACTITIONER | Admitting: NURSE PRACTITIONER
Payer: MEDICARE

## 2017-06-16 PROCEDURE — 70496 CT ANGIOGRAPHY HEAD: CPT | Mod: TC | Performed by: RADIOLOGY

## 2017-06-16 PROCEDURE — 70498 CT ANGIOGRAPHY NECK: CPT | Mod: TC | Performed by: RADIOLOGY

## 2017-06-16 RX ORDER — IOPAMIDOL 755 MG/ML
50 INJECTION, SOLUTION INTRAVASCULAR ONCE
Status: COMPLETED | OUTPATIENT
Start: 2017-06-16 | End: 2017-06-16

## 2017-06-16 RX ADMIN — IOPAMIDOL 50 ML: 755 INJECTION, SOLUTION INTRAVASCULAR at 09:08

## 2017-06-21 ENCOUNTER — HOSPITAL ENCOUNTER (EMERGENCY)
Facility: HOSPITAL | Age: 72
Discharge: HOME | End: 2017-06-21
Attending: NURSE PRACTITIONER | Admitting: NURSE PRACTITIONER
Payer: MEDICARE

## 2017-06-21 VITALS
OXYGEN SATURATION: 99 % | SYSTOLIC BLOOD PRESSURE: 159 MMHG | RESPIRATION RATE: 16 BRPM | TEMPERATURE: 97.4 F | DIASTOLIC BLOOD PRESSURE: 87 MMHG
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
## 2017-06-21 PROCEDURE — 99213 OFFICE O/P EST LOW 20 MIN: CPT

## 2017-06-21 PROCEDURE — 99213 OFFICE O/P EST LOW 20 MIN: CPT | Performed by: NURSE PRACTITIONER

## 2017-06-21 RX ORDER — DOXYCYCLINE 100 MG/1
200 CAPSULE ORAL ONCE
Qty: 2 CAPSULE | Refills: 0 | Status: SHIPPED | OUTPATIENT
Start: 2017-06-21 | End: 2017-06-21

## 2017-06-21 NOTE — ED AVS SNAPSHOT
[facility]    [address] [address]    Phone:  [phone]                                       Franca Angeles   MRN: [id number]    Department:  [facility]   Date of Visit:  6/21/2017           Patient Information     Date Of Birth          1945        Your diagnoses for this visit were:     Tick bite, initial encounter        You were seen by Ilda Escamilla NP.      Discharge References/Attachments     BITES, TICK (ENGLISH)    LYME DISEASE, PREVENTING (ENGLISH)      Future Appointments        Provider Department Dept Phone Center    6/28/2017 9:30 AM [facility] NURSE [facility] [phone] [facility]    7/25/2017 9:00 AM Khloe Lei MD, MD [facility] [phone] [facility]    9/14/2017 10:00 AM Brittney Coe NP [facility] [phone] [facility]         Review of your medicines      START taking        Dose / Directions Last dose taken    doxycycline 100 MG capsule   Commonly known as:  VIBRAMYCIN   Dose:  200 mg   Quantity:  2 capsule        Take 2 capsules (200 mg) by mouth once for 1 dose   Refills:  0          Our records show that you are taking the medicines listed below. If these are incorrect, please call your family doctor or clinic.        Dose / Directions Last dose taken    aspirin 81 MG tablet   Dose:  81 mg        Take 81 mg by mouth daily   Refills:  0        atorvastatin 40 MG tablet   Commonly known as:  LIPITOR   Dose:  40 mg   Quantity:  90 tablet        Take 1 tablet (40 mg) by mouth daily   Refills:  1        CALCIUM 500 PO        Take by mouth daily   Refills:  0        cinnamon 500 MG Caps        One daily   Refills:  0        CO-ENZYME Q-10 PO        Take by mouth daily   Refills:  0        Fish Oil 1200 MG Caps        Take by mouth daily   Refills:  0        GLUCOSAMINE-CHONDROITIN PO        Take  by mouth. GLUCOSAMINE HCL/CHONDR SUA NA One daily   Refills:  0         levothyroxine 50 MCG tablet   Commonly known as:  SYNTHROID/LEVOTHROID   Dose:  50 mcg   Quantity:  90 tablet        Take 1 tablet (50 mcg) by mouth daily   Refills:  3        lisinopril 5 MG tablet   Commonly known as:  PRINIVIL/ZESTRIL   Dose:  5 mg   Quantity:  30 tablet        Take 1 tablet (5 mg) by mouth daily   Refills:  1        MULTIVITAMIN PO        Refills:  0        Turmeric Curcumin 500 MG Caps   Dose:  1 capsule        Take 1 capsule by mouth daily   Refills:  0                Prescriptions were sent or printed at these locations (1 Prescription)                   [id number] [place] [place] [facility] [address] AT [facility] [address]   [address], [place] [place] [address]    Telephone:  [phone]   Fax:  [phone]   Hours:                  E-Prescribed (1 of 1)         doxycycline (VIBRAMYCIN) 100 MG capsule                Orders Needing Specimen Collection     None      Pending Results     No orders found from 6/19/2017 to 6/22/2017.            Pending Culture Results     No orders found from 6/19/2017 to 6/22/2017.            Thank you for choosing [facility]       Thank you for choosing [facility] for your care. Our goal is always to provide you with excellent care. Hearing back from our patients is one way we can continue to improve our services. Please take a few minutes to complete the written survey that you may receive in the mail after you visit with us. Thank you!        Ooshothart Information     CapLinked gives you secure access to your electronic health record. If you see a primary care provider, you can also send messages to your care team and make appointments. If you have questions, please call your primary care clinic.  If you do not have a primary care provider, please call [phone] and they will assist you.        Care EveryWhere ID     This is your Care EveryWhere ID. This could be used by other organizations to access your [place] medical records  [id number]         Equal Access to Services     BRAD PIERCE : Leonie Durán, jonathan stevenson, ti henriquez. So [facility] [phone].    ATENCIÓN: Si habla español, tiene a marx disposición servicios gratuitos de asistencia lingüística. Llame al [phone].    We comply with applicable federal civil rights laws and [place] laws. We do not discriminate on the basis of race, color, national origin, age, disability sex, sexual orientation or gender identity.            After Visit Summary       This is your record. Keep this with you and show to your community pharmacist(s) and doctor(s) at your next visit.

## 2017-06-21 NOTE — ED PROVIDER NOTES
"  History     Chief Complaint   Patient presents with     Tick Removal     requesting tick removal     The history is provided by the patient. No  was used.     Franca Angeles is a 71 year old female who has a tick stuck under her right breast. She is uncertain how long it has been there however she does have a very small amount of erythema and irritation where the tick is so must have been for potentially a day or so.  Tick appears minimally filled with blood and it is a deer tick.  She does not believe it has been longer than 72 hours. She is\" quite concerned with getting the tick out and fearful that if she and her  tried they might leave the head in\" Very pleasant and well groomed.  I have reviewed the Medications, Allergies, Past Medical and Surgical History, and Social History in the Epic system.    Allergies:   Allergies   Allergen Reactions     Pseudoephedrine Hcl Other (See Comments)     Heart racing  Sudafed         No current facility-administered medications on file prior to encounter.   Current Outpatient Prescriptions on File Prior to Encounter:  atorvastatin (LIPITOR) 40 MG tablet Take 1 tablet (40 mg) by mouth daily   lisinopril (PRINIVIL/ZESTRIL) 5 MG tablet Take 1 tablet (5 mg) by mouth daily   levothyroxine (SYNTHROID/LEVOTHROID) 50 MCG tablet Take 1 tablet (50 mcg) by mouth daily   Turmeric Curcumin 500 MG CAPS Take 1 capsule by mouth daily   aspirin 81 MG tablet Take 81 mg by mouth daily   CO-ENZYME Q-10 PO Take by mouth daily   Multiple Vitamins-Minerals (MULTIVITAMIN OR)    Omega-3 Fatty Acids (FISH OIL) 1200 MG CAPS Take by mouth daily   Calcium-Magnesium-Vitamin D (CALCIUM 500 PO) Take by mouth daily   GLUCOSAMINE-CHONDROITIN PO Take  by mouth. GLUCOSAMINE HCL/CHONDR SUA NAOne daily   cinnamon 500 MG CAPS One daily       Patient Active Problem List   Diagnosis     Menopause     Joint pain     Disorder of bone and cartilage     Advanced care planning/counseling " "discussion     Hyperlipidemia with target LDL less than 130     Routine general medical examination at a health care facility (ANNUAL)     Colon cancer screening     Hypothyroidism due to acquired atrophy of thyroid     Benign essential hypertension       Past Surgical History:   Procedure Laterality Date     ARTHRODESIS KNEE  [id number]    left knee, paritla medial meniscectomy, debridement medial femoral condyle and patellofemoral debridement     COLONOSCOPY  2004    nml     COLONOSCOPY  7/23/2014    Procedure: COLONOSCOPY;  Surgeon: Nghia Hernandez DO;  Location: [facility]     CYSTOSCOPY      micro hematuria neg     DILATION AND CURETTAGE       tubal sterilization       uterine fibroids       wrist fx RT         Social History   Substance Use Topics     Smoking status: Never Smoker     Smokeless tobacco: Never Used     Alcohol use 0.0 oz/week      Comment: occasionally       Most Recent Immunizations   Administered Date(s) Administered     Hepatitis A Vac Ped/Adol-2 Dose 01/31/2005     Influenza (High Dose) 3 valent vaccine 11/21/2013     Influenza (IIV3) 11/29/2016     Pneumococcal (PCV 13) 03/14/2017     Pneumococcal 23 valent 02/07/2011     TD (ADULT, 7+) 07/29/2002     TDAP Vaccine (Boostrix) 04/30/2014     Tdap (Adacel,Boostrix) 04/02/2012       BMI: Estimated body mass index is 23.83 kg/(m^2) as calculated from the following:    Height as of 6/14/17: 1.638 m (5' 4.5\").    Weight as of 6/14/17: 64 kg (141 lb).      Review of Systems   Constitutional: Negative.  Negative for chills, fatigue and fever.   HENT: Negative.    Eyes: Negative.    Respiratory: Negative.    Cardiovascular: Negative.  Negative for chest pain.   Gastrointestinal: Negative.    Genitourinary: Negative.    Musculoskeletal: Negative.    Skin: Positive for rash (deer tick stuck under right breast and erythema r/t that).   Neurological: Negative.    Hematological: Negative.        Physical Exam   BP: 159/87  Heart Rate: 55  Temp: 97.4  F (36.3 "  C)  Resp: 16  SpO2: 99 %  Physical Exam   Constitutional: She is oriented to person, place, and time. She appears well-developed and well-nourished.   HENT:   Head: Normocephalic and atraumatic.   Right Ear: External ear normal.   Left Ear: External ear normal.   Nose: Nose normal.   Mouth/Throat: Oropharynx is clear and moist.   Eyes: Conjunctivae are normal. Right eye exhibits no discharge. Left eye exhibits no discharge.   Neck: Normal range of motion. Neck supple.   Cardiovascular: Normal rate, regular rhythm and normal heart sounds.  Exam reveals no gallop and no friction rub.    No murmur heard.  Pulmonary/Chest: Effort normal and breath sounds normal. She has no wheezes. She has no rales.   Abdominal: Soft. Bowel sounds are normal. She exhibits no mass. There is no tenderness. There is no rebound and no guarding.   No CVA tenderness   Musculoskeletal: Normal range of motion. She exhibits no edema or tenderness.   Lymphadenopathy:     She has no cervical adenopathy.   Neurological: She is alert and oriented to person, place, and time.   Skin: Skin is warm and dry.   Under her right breast and her right lateral side, at the anterior ribcage area, she has a small deer tick stuck on her skin.  Betadine is applied to the head of the tick for 3 swabs and waited for it to back out, it did back out some however remianed stuck so I grasped it with a pick ups just behind it 's head and pulled it straight out of her skin.  Most of the tick came out .  2 leg pieces appeared to remain stuck and I was able to get one of these easily.  The other remained.   She does have about 1.5 cm of flat erythema noted with    .5 cm of darker erythema and a tiny purple pinpoint center. All likely reaction of the skin to the tick being on    Nursing note and vitals reviewed.      ED Course     ED Course     Procedures        {     Tick removal with a suture removal set / tick removed.  PT tolerated the procedure well.  No bleeding  Labs  Ordered and Resulted from Time of ED Arrival Up to the Time of Departure from the ED - No data to display    Assessments & Plan (with Medical Decision Making)     I have reviewed the nursing notes.  She was provided with lyme's disease prophylaxis .  Keep the skin clean and may put a minimal amount of bacitracin to the center. No concerns about the tiny piece left in .  It will make it's way out or be reabsorbed.   Monitor for erythema migrans or other flu like sx, swollen joints.  Informayion sheet provided with d/c instructions.  Those sx most likely 3-7 days from now. F/u immediately for the EM.  Prophylaxis should prevent this.     Pathophysiology, possible etiology and treatment with potential outcomes, risks, benefits, and alternatives discussed to the best of my ability      Pt verbalizes understanding and agreement with plan.  Follow up for worsening symptoms     I have reviewed the findings, diagnosis, plan and need for follow up with the patient.      Discharge Medication List as of 6/21/2017 12:13 PM      START taking these medications    Details   doxycycline (VIBRAMYCIN) 100 MG capsule Take 2 capsules (200 mg) by mouth once for 1 dose, Disp-2 capsule, R-0, E-Prescribe             Final diagnoses:   Tick bite, initial encounter       6/21/2017   [facility]     Ilda Escamilla NP  06/25/17 [id number]

## 2017-06-21 NOTE — ED AVS SNAPSHOT
[facility]    [address] [address]    Phone:  [phone]                                       Franca Angeles   MRN: [id number]    Department:  [facility]   Date of Visit:  6/21/2017           After Visit Summary Signature Page     I have received my discharge instructions, and my questions have been answered. I have discussed any challenges I see with this plan with the nurse or doctor.    ..........................................................................................................................................  Patient/Patient Representative Signature      ..........................................................................................................................................  Patient Representative Print Name and Relationship to Patient    ..................................................               ................................................  Date                                            Time    ..........................................................................................................................................  Reviewed by Signature/Title    ...................................................              ..............................................  Date                                                            Time

## 2017-06-25 ASSESSMENT — ENCOUNTER SYMPTOMS
FATIGUE: 0
NEUROLOGICAL NEGATIVE: 1
CHILLS: 0
EYES NEGATIVE: 1
FEVER: 0
GASTROINTESTINAL NEGATIVE: 1
HEMATOLOGIC/LYMPHATIC NEGATIVE: 1
MUSCULOSKELETAL NEGATIVE: 1
CONSTITUTIONAL NEGATIVE: 1
CARDIOVASCULAR NEGATIVE: 1
RESPIRATORY NEGATIVE: 1

## 2017-06-28 ENCOUNTER — DOCUMENTATION ONLY (OUTPATIENT)
Dept: FAMILY MEDICINE | Facility: OTHER | Age: 72
End: 2017-06-28

## 2017-06-28 ENCOUNTER — ALLIED HEALTH/NURSE VISIT (OUTPATIENT)
Dept: FAMILY MEDICINE | Facility: OTHER | Age: 72
End: 2017-06-28
Attending: NURSE PRACTITIONER
Payer: MEDICARE

## 2017-06-28 VITALS — DIASTOLIC BLOOD PRESSURE: 72 MMHG | SYSTOLIC BLOOD PRESSURE: 124 MMHG | HEART RATE: 64 BPM

## 2017-06-28 DIAGNOSIS — I10 BENIGN ESSENTIAL HYPERTENSION: ICD-10-CM

## 2017-06-28 DIAGNOSIS — I10 BENIGN ESSENTIAL HYPERTENSION: Primary | ICD-10-CM

## 2017-06-28 PROCEDURE — 99207 ZZC NO CHARGE LOS: CPT

## 2017-06-28 RX ORDER — LISINOPRIL 5 MG/1
5 TABLET ORAL DAILY
Qty: 90 TABLET | Refills: 3 | Status: SHIPPED | OUTPATIENT
Start: 2017-06-28 | End: 2018-04-06

## 2017-06-28 NOTE — MR AVS SNAPSHOT
After Visit Summary   6/28/2017    Franca Angeles    MRN: [id number]           Patient Information     Date Of Birth          1945        Visit Information        Provider Department      6/28/2017 9:30 AM MT FP NURSE [facility]        Today's Diagnoses     Benign essential hypertension    -  1       Follow-ups after your visit        Your next 10 appointments already scheduled     Jun 28, 2017  9:30 AM CDT   (Arrive by 9:15 AM)   Nurse Only with MT FP NURSE   [facility] ([facility] )    [address]  [facility] [address]   [phone]            Jul 25, 2017  9:00 AM CDT   (Arrive by 8:45 AM)   PHYSICAL with Khloe Lei MD   [facility] ([facility] )    [address]  [place] [address]   [phone]            Sep 14, 2017 10:00 AM CDT   (Arrive by 9:45 AM)   SHORT with Brittney Coe NP   [facility] ([facility] )    [address]  [place] [address]   [phone]              Who to contact     If you have questions or need follow up information about today's clinic visit or your schedule please contact [facility] directly at [phone].  Normal or non-critical lab and imaging results will be communicated to you by MyChart, letter or phone within 4 business days after the clinic has received the results. If you do not hear from us within 7 days, please contact the clinic through MyChart or phone. If you have a critical or abnormal lab result, we will notify you by phone as soon as possible.  Submit refill requests through [employer] [facility] [employer] or call your pharmacy and they will forward the refill request to us. Please allow 3 business days for your refill to be completed.          Additional Information About Your Visit        [employer] Information     [facility] gives you secure access to your electronic health record. If  you see a primary care provider, you can also send messages to your care team and make appointments. If you have questions, please call your primary care clinic.  If you do not have a primary care provider, please call [phone] and they will assist you.        Care EveryWhere ID     This is your Care EveryWhere ID. This could be used by other organizations to access your [place] medical records  [id number]        Your Vitals Were     Pulse                   64            Blood Pressure from Last 3 Encounters:   06/28/17 124/72   06/21/17 159/87   06/14/17 146/84    Weight from Last 3 Encounters:   06/14/17 141 lb (64 kg)   03/14/17 142 lb (64.4 kg)   09/12/16 141 lb (64 kg)              Today, you had the following     No orders found for display       Primary Care Provider Office Phone # Fax #    Brittney COLONOrville Coe [phone]       [facility] [address] [place]  [facility] [address]        Equal Access to Services     BRAD PIERCE : Hadii aad ku hadasho Soomaali, waaxda luqadaha, qaybta kaalmada adeegyada, waxay idiin hayaan fariba khroberto hickey . So [facility] [phone].    ATENCIÓN: Si habla español, tiene a marx disposición servicios gratuitos de asistencia lingüística. Llame al [phone].    We comply with applicable federal civil rights laws and [place] laws. We do not discriminate on the basis of race, color, national origin, age, disability sex, sexual orientation or gender identity.            Thank you!     Thank you for choosing [facility]  for your care. Our goal is always to provide you with excellent care. Hearing back from our patients is one way we can continue to improve our services. Please take a few minutes to complete the written survey that you may receive in the mail after your visit with us. Thank you!             Your Updated Medication List - Protect others around you: Learn how to safely use, store and throw away your medicines at  www.disposemymeds.org.          This list is accurate as of: 6/28/17  9:11 AM.  Always use your most recent med list.                   Brand Name Dispense Instructions for use Diagnosis    aspirin 81 MG tablet      Take 81 mg by mouth daily        atorvastatin 40 MG tablet    LIPITOR    90 tablet    Take 1 tablet (40 mg) by mouth daily    Hyperlipidemia with target LDL less than 130       CALCIUM 500 PO      Take by mouth daily        cinnamon 500 MG Caps      One daily        CO-ENZYME Q-10 PO      Take by mouth daily        Fish Oil 1200 MG Caps      Take by mouth daily        GLUCOSAMINE-CHONDROITIN PO      Take  by mouth. GLUCOSAMINE HCL/CHONDR SUA NA One daily        levothyroxine 50 MCG tablet    SYNTHROID/LEVOTHROID    90 tablet    Take 1 tablet (50 mcg) by mouth daily    Hypothyroidism due to acquired atrophy of thyroid       lisinopril 5 MG tablet    PRINIVIL/ZESTRIL    30 tablet    Take 1 tablet (5 mg) by mouth daily    Benign essential hypertension       MULTIVITAMIN PO           Turmeric Curcumin 500 MG Caps      Take 1 capsule by mouth daily

## 2017-06-28 NOTE — PROGRESS NOTES
BP Readings from Last 6 Encounters:   06/28/17 124/72   06/21/17 159/87   06/14/17 146/84   03/14/17 138/78   10/05/16 174/97   09/12/16 124/70     Continue lisinopril 5mg daily which is refilled today.

## 2017-06-28 NOTE — PROGRESS NOTES
Patient was in for a blood pressure check. B/P 124 72. Patient would like her Lisinopril refilled. [facility] in [place].

## 2017-07-14 DIAGNOSIS — Z12.31 VISIT FOR SCREENING MAMMOGRAM: Primary | ICD-10-CM

## 2017-07-25 ENCOUNTER — OFFICE VISIT (OUTPATIENT)
Dept: OBGYN | Facility: OTHER | Age: 72
End: 2017-07-25
Attending: OBSTETRICS & GYNECOLOGY
Payer: MEDICARE

## 2017-07-25 VITALS
DIASTOLIC BLOOD PRESSURE: 78 MMHG | WEIGHT: 140 LBS | HEIGHT: 65 IN | SYSTOLIC BLOOD PRESSURE: 118 MMHG | BODY MASS INDEX: 23.32 KG/M2 | HEART RATE: 60 BPM

## 2017-07-25 DIAGNOSIS — Z00.00 ROUTINE GENERAL MEDICAL EXAMINATION AT A HEALTH CARE FACILITY: Primary | ICD-10-CM

## 2017-07-25 DIAGNOSIS — Z12.11 ENCOUNTER FOR SCREENING FOR MALIGNANT NEOPLASM OF COLON: ICD-10-CM

## 2017-07-25 DIAGNOSIS — Z01.419 ENCOUNTER FOR GYNECOLOGICAL EXAMINATION WITHOUT ABNORMAL FINDING: ICD-10-CM

## 2017-07-25 LAB — HEMOCCULT SP1 STL QL: NEGATIVE

## 2017-07-25 PROCEDURE — 87624 HPV HI-RISK TYP POOLED RSLT: CPT | Mod: ZL | Performed by: OBSTETRICS & GYNECOLOGY

## 2017-07-25 PROCEDURE — 99397 PER PM REEVAL EST PAT 65+ YR: CPT | Performed by: OBSTETRICS & GYNECOLOGY

## 2017-07-25 PROCEDURE — G0202 SCR MAMMO BI INCL CAD: HCPCS | Mod: TC

## 2017-07-25 PROCEDURE — 82274 ASSAY TEST FOR BLOOD FECAL: CPT | Mod: ZL | Performed by: OBSTETRICS & GYNECOLOGY

## 2017-07-25 PROCEDURE — 77063 BREAST TOMOSYNTHESIS BI: CPT | Mod: TC

## 2017-07-25 PROCEDURE — 99000 SPECIMEN HANDLING OFFICE-LAB: CPT | Mod: ZL | Performed by: OBSTETRICS & GYNECOLOGY

## 2017-07-25 PROCEDURE — G0476 HPV COMBO ASSAY CA SCREEN: HCPCS | Mod: ZL | Performed by: OBSTETRICS & GYNECOLOGY

## 2017-07-25 PROCEDURE — G0123 SCREEN CERV/VAG THIN LAYER: HCPCS | Mod: ZL | Performed by: OBSTETRICS & GYNECOLOGY

## 2017-07-25 ASSESSMENT — ANXIETY QUESTIONNAIRES
2. NOT BEING ABLE TO STOP OR CONTROL WORRYING: NOT AT ALL
1. FEELING NERVOUS, ANXIOUS, OR ON EDGE: SEVERAL DAYS
IF YOU CHECKED OFF ANY PROBLEMS ON THIS QUESTIONNAIRE, HOW DIFFICULT HAVE THESE PROBLEMS MADE IT FOR YOU TO DO YOUR WORK, TAKE CARE OF THINGS AT HOME, OR GET ALONG WITH OTHER PEOPLE: NOT DIFFICULT AT ALL
7. FEELING AFRAID AS IF SOMETHING AWFUL MIGHT HAPPEN: NOT AT ALL
3. WORRYING TOO MUCH ABOUT DIFFERENT THINGS: NOT AT ALL
5. BEING SO RESTLESS THAT IT IS HARD TO SIT STILL: NOT AT ALL
6. BECOMING EASILY ANNOYED OR IRRITABLE: NOT AT ALL
GAD7 TOTAL SCORE: 1

## 2017-07-25 ASSESSMENT — PATIENT HEALTH QUESTIONNAIRE - PHQ9: 5. POOR APPETITE OR OVEREATING: NOT AT ALL

## 2017-07-25 NOTE — NURSING NOTE
"Chief Complaint   Patient presents with     Gyn Exam       Initial /78  Pulse 60  Ht 5' 4.5\" (1.638 m)  Wt 140 lb (63.5 kg)  BMI 23.66 kg/m2 Estimated body mass index is 23.66 kg/(m^2) as calculated from the following:    Height as of this encounter: 5' 4.5\" (1.638 m).    Weight as of this encounter: 140 lb (63.5 kg).  Medication Reconciliation: complete   Vandana Erwin      "

## 2017-07-25 NOTE — PROGRESS NOTES
ANNUAL    Franca is a 71 year old   who presents for annual exam.   Postmenopausal since many years.  She is having occasional hot flashes. No vaginal bleeding noted.     Concerns other than routine annual and health maintenance:  n.  GYNECOLOGIC HISTORY:    She is not sexually active  Problems with sex: na  Safe: y   Wanting std testing? n  Estrogen replacement therapy:  n  History of abnormal Pap smear: n  Family history of breast ca n, ovarian ca n, uterine n, colon CA:  n       HEALTH MAINTENANCE:  Cholesterol: (No components found for: CHOL2 ) History of abnormal lipids: by JK   Last Mammo: current . History of abnormal Mammo: n   Regular Self Breast Exams: y  Last Colonoscopy: 2 years History of abnormal Colonoscopy n  Calcium or vitamins; y   Exercise: y     TSH: (No components found for: TSH1 )  Pap; (  Lab Results   Component Value Date    PAP NIL 2014    PAP NIL 2013    )    HISTORY:  Obstetric History       T0      L3     SAB0   TAB0   Ectopic0   Multiple0   Live Births3       # Outcome Date GA Lbr Parish/2nd Weight Sex Delivery Anes PTL Lv   3 Para     M Vag-Spont   DEEPALI   2 Para     M Vag-Spont   DEEPALI   1 Para     M Vag-Spont   DEEPALI        Past Medical History:   Diagnosis Date     Disorder of bone and cartilage, unspecified 5/10/2011     Esophageal reflux 5/10/2011     Pure hypercholesterolemia 2002     Past Surgical History:   Procedure Laterality Date     ARTHRODESIS KNEE  [id number]    left knee, paritla medial meniscectomy, debridement medial femoral condyle and patellofemoral debridement     COLONOSCOPY  2004    nml     COLONOSCOPY  2014    Procedure: COLONOSCOPY;  Surgeon: Nghia Hernandez, DO;  Location: [facility]     CYSTOSCOPY      micro hematuria neg     DILATION AND CURETTAGE       tubal sterilization       uterine fibroids       wrist fx RT       Family History   Problem Relation Age of Onset     C.A.D. Mother      Other - See Comments Mother      high  platelets     Hypertension Mother      C.A.D. Paternal Aunt      DIABETES Other      aunt     Cardiovascular Father      cardiovascular disease     Colon Cancer Other      family history-paternal side     CEREBROVASCULAR DISEASE Maternal Grandmother      CEREBROVASCULAR DISEASE Maternal Grandfather      Social History     Social History     Marital status:      Spouse name: N/A     Number of children: N/A     Years of education: N/A     Occupational History     retired manager      Social History Main Topics     Smoking status: Never Smoker     Smokeless tobacco: Never Used     Alcohol use 0.0 oz/week      Comment: occasionally     Drug use: No     Sexual activity: Not Asked     Other Topics Concern     Caffeine Concern Yes     coffee - 5 cups     Parent/Sibling W/ Cabg, Mi Or Angioplasty Before 65f 55m? No     Social History Narrative       Current Outpatient Prescriptions:      lisinopril (PRINIVIL/ZESTRIL) 5 MG tablet, Take 1 tablet (5 mg) by mouth daily, Disp: 90 tablet, Rfl: 3     atorvastatin (LIPITOR) 40 MG tablet, Take 1 tablet (40 mg) by mouth daily, Disp: 90 tablet, Rfl: 1     levothyroxine (SYNTHROID/LEVOTHROID) 50 MCG tablet, Take 1 tablet (50 mcg) by mouth daily, Disp: 90 tablet, Rfl: 3     Turmeric Curcumin 500 MG CAPS, Take 1 capsule by mouth daily, Disp: , Rfl:      aspirin 81 MG tablet, Take 81 mg by mouth daily, Disp: , Rfl:      CO-ENZYME Q-10 PO, Take by mouth daily, Disp: , Rfl:      Multiple Vitamins-Minerals (MULTIVITAMIN OR), , Disp: , Rfl:      Omega-3 Fatty Acids (FISH OIL) 1200 MG CAPS, Take by mouth daily, Disp: , Rfl:      Calcium-Magnesium-Vitamin D (CALCIUM 500 PO), Take by mouth daily, Disp: , Rfl:      GLUCOSAMINE-CHONDROITIN PO, Take  by mouth. GLUCOSAMINE HCL/CHONDR SUA NA One daily, Disp: , Rfl:      cinnamon 500 MG CAPS, One daily, Disp: , Rfl:      Allergies   Allergen Reactions     Pseudoephedrine Hcl Other (See Comments)     Heart racing  Sudafed       Past medical,  "surgical, social and family history were reviewed and updated in EPIC.     ROS: headachesC:       NEGATIVE for fever, chills, change in weight   I:         NEGATIVE for worrisome rashes, moles or lesions  E:       NEGATIVE for vision changes or irritation  E/M:   NEGATIVE for ear, mouth and throat problems  R:       NEGATIVE for significant cough or SOB  CV:     NEGATIVE for chest pain, palpitations or peripheral edema  GI:      NEGATIVE for nausea, abdominal pain, heartburn, or change in bowel habits  :    NEGATIVE for frequency, dysuria, hematuria, vaginal discharge, or bleeding, incontinence   M:       NEGATIVE for significant arthralgias or myalgia  N:       NEGATIVE for weakness, dizziness or paresthesias  E:       NEGATIVE for temperature intolerance, skin/hair changes  P:       NEGATIVE for changes in mood or affect     EXAM:  /78  Pulse 60  Ht 5' 4.5\" (1.638 m)  Wt 140 lb (63.5 kg)  BMI 23.66 kg/m2   BMI: Body mass index is 23.66 kg/(m^2).  Constitutional: healthy, alert and no distress  Head: Normocephalic. No masses, lesions, tenderness or abnormalities  Neck: Neck supple. Trachea midline. No adenopathy. Thyroid symmetric, normal size.   Cardiovascular: RRR.   Respiratory: lungs clear  Breast: Breasts reveal mild symmetric fibrocystic densities, but there are no dominant, discrete, fixed or suspicious masses found.   Gastrointestinal: Abdomen soft, non-tender, non-distended. No masses, organomegaly  Pelvic:  Vulva:  No external lesions, normal female hair distribution, no inguinal adenopathy.    Urethra:  Midline, non-tender, well supported, no discharge  Vagina:  Atrophic, no abnormal discharge, no lesions  Uterus:  Normal size,  , non-tender, freely mobile  Cervix: clean  Ovaries:  No masses appreciated, non-tender, mobile  Rectal Exam: neg for heme or mass    Musculoskeletal: extremities normal  Skin: no suspicious lesions or rashes  Psychiatric: Affect appropriate, cooperative,mentation " appears normal.     COUNSELING:     regular exercise   healthy diet/nutrition   Immunizations:   Folic Acid Counseling  Osteoporosis Prevention/Bone Health   reports that she has never smoked. She has never used smokeless tobacco.  Tobacco Cessation Action Plan: na  Body mass index is 23.66 kg/(m^2).  Weight management plan: Current exercise routine: . Diet regimen was discussed and plan is na.     FRAX Risk Assessment  ASSESSMENT:  71 year old  with satisfactory annual exam    PLAN:  ifob  Mammogram, Check MIIC  Colonoscopy due age 79  Pap with HR HPV one more time then wants to stop  Counseling regarding current recommendation on paps done  rto 1 yr      Greater than  0 minutes were spent on issues other than annual          Khloe Lei MD

## 2017-07-25 NOTE — LETTER
July 28, 2017        Franca Angeles  [address]   [facility] [address]        Dear Franca,       Thank you for coming to the [facility]. This letter is to inform you that your test results from your recent appointment were normal. Your results are listed below. Please call the nurse at [phone] if you have questions pertaining to these results.      ifob (screen for blood in stool): negative for blood in stool        Sincerely,        Khloe Lei MD/Hannah COLLINS LPN

## 2017-07-25 NOTE — MR AVS SNAPSHOT
After Visit Summary   7/25/2017    Franca Angeles    MRN: [id number]           Patient Information     Date Of Birth          1945        Visit Information        Provider Department      7/25/2017 9:00 AM [facility]        Today's Diagnoses     Routine general medical examination at a health care facility    -  1    Encounter for screening for malignant neoplasm of colon         Encounter for gynecological examination without abnormal finding           Care Instructions    IFob testing done today. (stool screening)  Pap test done today.  Return for annual exam in 1 year            Follow-ups after your visit        Your next 10 appointments already scheduled     Sep 14, 2017 10:00 AM CDT   (Arrive by 9:45 AM)   SHORT with Brittney Coe NP   [facility] )    [address] [facility] [address]   [phone]              Future tests that were ordered for you today     Open Future Orders        Priority Expected Expires Ordered    Immunos occult blood Routine 7/25/2017 7/25/2018 7/25/2017            Who to contact     If you have questions or need follow up information about today's clinic visit or your schedule please contact [facility] directly at [phone].  Normal or non-critical lab and imaging results will be communicated to you by MyChart, letter or phone within 4 business days after the clinic has received the results. If you do not hear from us within 7 days, please contact the clinic through MyChart or phone. If you have a critical or abnormal lab result, we will notify you by phone as soon as possible.  Submit refill requests through [employer] or call your pharmacy and they will forward the refill request to us. Please allow 3 business days for your refill to be completed.          Additional Information About Your Visit        MyChart Information     [employer]  "gives you secure access to your electronic health record. If you see a primary care provider, you can also send messages to your care team and make appointments. If you have questions, please call your primary care clinic.  If you do not have a primary care provider, please call [phone] and they will assist you.        Care EveryWhere ID     This is your Care EveryWhere ID. This could be used by other organizations to access your [facility] medical records  [id number]        Your Vitals Were     Pulse Height BMI (Body Mass Index)             60 5' 4.5\" (1.638 m) 23.66 kg/m2          Blood Pressure from Last 3 Encounters:   07/25/17 118/78   06/28/17 124/72   06/21/17 159/87    Weight from Last 3 Encounters:   07/25/17 140 lb (63.5 kg)   06/14/17 141 lb (64 kg)   03/14/17 142 lb (64.4 kg)              We Performed the Following     A pap thin layer screen with  HPV - recommended age 30 - 65 years (select HPV order below)     HPV High Risk Types DNA Cervical        Primary Care Provider Office Phone # Fax #    Brittney Coe [phone]       [facility] [address]  [facility] [address]        Equal Access to Services     BRAD PIERCE : Hadii tonia ku hadasho Soomaali, waaxda luqadaha, qaybta kaalmada adeegyada, waxay juliain haynohemy deluca. So [facility] [phone].    ATENCIÓN: Si habla español, tiene a marx disposición servicios gratuitos de asistencia lingüística. Llame al [phone].    We comply with applicable federal civil rights laws and [place] laws. We do not discriminate on the basis of race, color, national origin, age, disability sex, sexual orientation or gender identity.            Thank you!     Thank you for choosing [facility]  for your care. Our goal is always to provide you with excellent care. Hearing back from our patients is one way we can continue to improve our services. Please take a few minutes to complete the " written survey that you may receive in the mail after your visit with us. Thank you!             Your Updated Medication List - Protect others around you: Learn how to safely use, store and throw away your medicines at www.disposemymeds.org.          This list is accurate as of: 7/25/17  9:55 AM.  Always use your most recent med list.                   Brand Name Dispense Instructions for use Diagnosis    aspirin 81 MG tablet      Take 81 mg by mouth daily        atorvastatin 40 MG tablet    LIPITOR    90 tablet    Take 1 tablet (40 mg) by mouth daily    Hyperlipidemia with target LDL less than 130       CALCIUM 500 PO      Take by mouth daily        cinnamon 500 MG Caps      One daily        CO-ENZYME Q-10 PO      Take by mouth daily        Fish Oil 1200 MG Caps      Take by mouth daily        GLUCOSAMINE-CHONDROITIN PO      Take  by mouth. GLUCOSAMINE HCL/CHONDR SUA NA One daily        levothyroxine 50 MCG tablet    SYNTHROID/LEVOTHROID    90 tablet    Take 1 tablet (50 mcg) by mouth daily    Hypothyroidism due to acquired atrophy of thyroid       lisinopril 5 MG tablet    PRINIVIL/ZESTRIL    90 tablet    Take 1 tablet (5 mg) by mouth daily    Benign essential hypertension       MULTIVITAMIN PO           Turmeric Curcumin 500 MG Caps      Take 1 capsule by mouth daily

## 2017-07-25 NOTE — PATIENT INSTRUCTIONS
IFob testing done today. (stool screening)  Pap test done today.  Return for annual exam in 1 year

## 2017-07-26 ASSESSMENT — ANXIETY QUESTIONNAIRES: GAD7 TOTAL SCORE: 1

## 2017-07-26 ASSESSMENT — PATIENT HEALTH QUESTIONNAIRE - PHQ9: SUM OF ALL RESPONSES TO PHQ QUESTIONS 1-9: 1

## 2017-08-01 ENCOUNTER — TRANSFERRED RECORDS (OUTPATIENT)
Dept: HEALTH INFORMATION MANAGEMENT | Facility: HOSPITAL | Age: 72
End: 2017-08-01

## 2017-08-01 LAB
COPATH REPORT: NORMAL
PAP: NORMAL

## 2017-08-02 LAB
FINAL DIAGNOSIS: NORMAL
HPV HR 12 DNA CVX QL NAA+PROBE: NEGATIVE
HPV16 DNA SPEC QL NAA+PROBE: NEGATIVE
HPV18 DNA SPEC QL NAA+PROBE: NEGATIVE
SPECIMEN DESCRIPTION: NORMAL

## 2017-08-24 ENCOUNTER — OFFICE VISIT (OUTPATIENT)
Dept: SURGERY | Facility: OTHER | Age: 72
End: 2017-08-24
Attending: SURGERY
Payer: COMMERCIAL

## 2017-08-24 VITALS
OXYGEN SATURATION: 98 % | BODY MASS INDEX: 23.32 KG/M2 | TEMPERATURE: 97.8 F | RESPIRATION RATE: 18 BRPM | HEART RATE: 57 BPM | DIASTOLIC BLOOD PRESSURE: 75 MMHG | WEIGHT: 140 LBS | SYSTOLIC BLOOD PRESSURE: 125 MMHG | HEIGHT: 65 IN

## 2017-08-24 DIAGNOSIS — D17.1 BENIGN LIPOMATOUS NEOPLASM OF SKIN AND SUBCUTANEOUS TISSUE OF TRUNK: ICD-10-CM

## 2017-08-24 DIAGNOSIS — I10 ESSENTIAL HYPERTENSION: Primary | ICD-10-CM

## 2017-08-24 PROCEDURE — 93010 ELECTROCARDIOGRAM REPORT: CPT | Performed by: INTERNAL MEDICINE

## 2017-08-24 PROCEDURE — 99212 OFFICE O/P EST SF 10 MIN: CPT

## 2017-08-24 PROCEDURE — 99203 OFFICE O/P NEW LOW 30 MIN: CPT | Performed by: SURGERY

## 2017-08-24 PROCEDURE — 93005 ELECTROCARDIOGRAM TRACING: CPT | Performed by: INTERNAL MEDICINE

## 2017-08-24 ASSESSMENT — PAIN SCALES - GENERAL: PAINLEVEL: NO PAIN (0)

## 2017-08-24 NOTE — MR AVS SNAPSHOT
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 Hernandez and our surgical team to participate in your care.  If you have a scheduling or an appointment question please contact our Health Unit Coordinator, Laney,  at her direct line [phone].   ALL nursing questions or concerns can be directed to your surgical nurse at: [phone]-Zayra    You are scheduled for a: Excisional biopsy of forearm lesions x's 2  Your procedure date is:         HOW TO PREPARE-        You need a friend or family member available to drive you home AND stay with you for 24 hours after you leave the hospital. You will not be allowed to drive yourself. IF you need to take a taxi or the bus you MUST have a responsible person to ride with you. YOUR PROCEDURE WILL BE CANCELLED IF YOU DO NOT HAVE A RESPONSIBLE ADULT TO DRIVE YOU HOME.       You CANNOT have anything to eat or drink after midnight the night before your surgery, ncluding water and coffee. Your stomach needs to be completely empty. Do NOT chew gum, suck on hard candy, or smoke. You can brush your teeth the morning of surgery.       You need to call our Surgery Education Nurses 1-2 weeks prior to your surgery date at  [phone] or toll free [phone]. Please have you medication and allergy lists ready.      Stop your aspirin or other NSAIDs(Ibuprofen, Motrin, Aleve, Celebrex, Naproxen, etc...) 7 days before your surgery.      Hospital admitting will call you the day before your surgery with your arrival time. If you are scheduled on a Monday admitting will call you  the Friday before.      Please call your primary care physician if you should become ill within 24 hours of scheduled surgery. (ex.vomiting, diarrhea, fever)          You will need to wash the night before AND the morning of you procedure with the supplied Hibiclens. Wash your Surgical area with your bare hands, apply friction and rinse. KEEP IT AWAY FROM YOUR EYES, EARS, NOSE AND MOUTH.             Follow-ups after your visit        Your next 10 appointments already scheduled     Sep 14, 2017 10:00 AM CDT   (Arrive by 9:45 AM)   SHORT with Brittney Coe NP   [facility] ([facility] )    [address]  [facility] [address]   [phone]            Aug 15, 2018 10:00 AM CDT   (Arrive by 9:45 AM)   PHYSICAL with [facility]hloe Lei MD   [facility]    [address]  [facility] [address]   [phone]              Who to contact     If you have questions or need follow up information about today's clinic visit or your schedule please contact [facility] directly at [phone].  Normal or non-critical lab and imaging results will be communicated to you by MyChart, letter or phone within 4 business days after the clinic has received the results. If you do not hear from us within 7 days, please contact the clinic through MyChart or phone. If you have a critical or abnormal lab result, we will notify you by phone as soon as possible.  Submit refill requests through Inkive or call your pharmacy and they will forward the refill request to us. Please allow 3 business days for your refill to be completed.          Additional Information About Your Visit        JournalDochart Information     Inkive gives you secure access to your electronic health record. If you see a primary care provider, you can also send messages to your care team and make appointments. If you have questions, please call your primary care  "clinic.  If you do not have a primary care provider, please call [phone] and they will assist you.        Care EveryWhere ID     This is your Care EveryWhere ID. This could be used by other organizations to access your [place] medical records  [id number]        Your Vitals Were     Pulse Temperature Respirations Height Pulse Oximetry BMI (Body Mass Index)    57 97.8  F (36.6  C) (Tympanic) 18 5' 4.5\" (1.638 m) 98% 23.66 kg/m2       Blood Pressure from Last 3 Encounters:   08/24/17 125/75   07/25/17 118/78   06/28/17 124/72    Weight from Last 3 Encounters:   08/24/17 140 lb (63.5 kg)   07/25/17 140 lb (63.5 kg)   06/14/17 141 lb (64 kg)              We Performed the Following     EKG 12-lead complete w/read - Clinics        Primary Care Provider Office Phone # Fax #    Brittney Coe -[phone]       [facility] [address]  [facility] [address]        Equal Access to Services     [facility] French CenterCHANTAL : Hadii aad ku hadasho Soomaali, waaxda luqadaha, qaybta kaalmada adeegyada, ti hickey . So [facility] [phone].    ATENCIÓN: Si habla español, tiene a marx disposición servicios gratuitos de asistencia lingüística. Llame al [phone].    We comply with applicable federal civil rights laws and [place] laws. We do not discriminate on the basis of race, color, national origin, age, disability sex, sexual orientation or gender identity.            Thank you!     Thank you for choosing [facility]  for your care. Our goal is always to provide you with excellent care. Hearing back from our patients is one way we can continue to improve our services. Please take a few minutes to complete the written survey that you may receive in the mail after your visit with us. Thank you!             Your Updated Medication List - Protect others around you: Learn how to safely use, store and throw away your medicines at www.[email or web address].org.       "    This list is accurate as of: 8/24/17  9:51 AM.  Always use your most recent med list.                   Brand Name Dispense Instructions for use Diagnosis    aspirin 81 MG tablet      Take 81 mg by mouth daily        atorvastatin 40 MG tablet    LIPITOR    90 tablet    Take 1 tablet (40 mg) by mouth daily    Hyperlipidemia with target LDL less than 130       CALCIUM 500 PO      Take by mouth daily        cinnamon 500 MG Caps      One daily        CO-ENZYME Q-10 PO      Take by mouth daily        Fish Oil 1200 MG Caps      Take by mouth daily        GLUCOSAMINE-CHONDROITIN PO      Take  by mouth. GLUCOSAMINE HCL/CHONDR SUA NA One daily        levothyroxine 50 MCG tablet    SYNTHROID/LEVOTHROID    90 tablet    Take 1 tablet (50 mcg) by mouth daily    Hypothyroidism due to acquired atrophy of thyroid       lisinopril 5 MG tablet    PRINIVIL/ZESTRIL    90 tablet    Take 1 tablet (5 mg) by mouth daily    Benign essential hypertension       MULTIVITAMIN PO           Turmeric Curcumin 500 MG Caps      Take 1 capsule by mouth daily

## 2017-08-24 NOTE — PROGRESS NOTES
Surgery Consult Clinic Note      RE: Franca Angeles  : 1945  ALBA: 2017      Chief Complaint:  lipomas    History of Present Illness:  Mrs. Angeles is a very pleasant 72 year old year old female who I am seeing at the request of Brittney Coe for evaluation of two masses on her right arm.  Noticed over 5 years ago.  Since that time they've gown and are now causing her pain.  There's also discomfort wearing tops with sleeves and her grandchildren continuously ask her what they are.  She's requesting excision.  They don't change color, they don't bleed. She specifically denies fever, chills, nausea, vomiting, chest pain, shortness of breath or palpitations. She was recently started on HTN meds.      Medical history:  Past Medical History:   Diagnosis Date     Disorder of bone and cartilage, unspecified 5/10/2011     Esophageal reflux 5/10/2011     Pure hypercholesterolemia 2002       Surgical history:  Past Surgical History:   Procedure Laterality Date     ARTHRODESIS KNEE  [id number]    left knee, paritla medial meniscectomy, debridement medial femoral condyle and patellofemoral debridement     COLONOSCOPY      nml     COLONOSCOPY  2014    Procedure: COLONOSCOPY;  Surgeon: Nghia Hernandez DO;  Location: [facility]     CYSTOSCOPY      micro hematuria neg     DILATION AND CURETTAGE       tubal sterilization       uterine fibroids       wrist fx RT         Family history:  Family History   Problem Relation Age of Onset     C.A.D. Mother      Other - See Comments Mother      high platelets     Hypertension Mother      C.A.D. Paternal Aunt      DIABETES Other      aunt     Cardiovascular Father      cardiovascular disease     Colon Cancer Other      family history-paternal side     CEREBROVASCULAR DISEASE Maternal Grandmother      CEREBROVASCULAR DISEASE Maternal Grandfather        Medications:  Current Outpatient Prescriptions   Medication Sig Dispense Refill     lisinopril  (PRINIVIL/ZESTRIL) 5 MG tablet Take 1 tablet (5 mg) by mouth daily 90 tablet 3     atorvastatin (LIPITOR) 40 MG tablet Take 1 tablet (40 mg) by mouth daily 90 tablet 1     levothyroxine (SYNTHROID/LEVOTHROID) 50 MCG tablet Take 1 tablet (50 mcg) by mouth daily 90 tablet 3     Turmeric Curcumin 500 MG CAPS Take 1 capsule by mouth daily       aspirin 81 MG tablet Take 81 mg by mouth daily       CO-ENZYME Q-10 PO Take by mouth daily       Multiple Vitamins-Minerals (MULTIVITAMIN OR)        Omega-3 Fatty Acids (FISH OIL) 1200 MG CAPS Take by mouth daily       Calcium-Magnesium-Vitamin D (CALCIUM 500 PO) Take by mouth daily       GLUCOSAMINE-CHONDROITIN PO Take  by mouth. GLUCOSAMINE HCL/CHONDR SUA NA  One daily       cinnamon 500 MG CAPS One daily       Allergies:  The patientis allergic to pseudoephedrine hcl.  .  Social history:  Social History   Substance Use Topics     Smoking status: Never Smoker     Smokeless tobacco: Never Used     Alcohol use 0.0 oz/week      Comment: occasionally     Marital status: .    Review of Systems:    Constitutional: Negative for fever, chills and weight loss.   HENT: Negative for ear pain, nosebleeds, congestion, sore throat, tinnitus and ear discharge.    Eyes: Negative for blurred vision, double vision, photophobia and pain.   Respiratory: Negative for cough, hemoptysis, shortness of breath, wheezing and stridor.    Cardiovascular: Negative for chest pain, palpitations and orthopnea.   Gastrointestinal: Negative for heartburn, nausea, vomiting, abdominal pain and blood in stool.   Genitourinary: Negative for urgency, frequency and hematuria.   Musculoskeletal: Negative for myalgias, back pain and joint pain.   Neurological: Negative for tingling, speech change and headaches.   Endo/Heme/Allergies: Does not bruise/bleed easily.   Psychiatric/Behavioral: Negative for depression, suicidal ideas and hallucinations. The patient is not nervous/anxious.    Physical Examination:  BP  "125/75 (BP Location: Left arm, Patient Position: Chair, Cuff Size: Adult Regular)  Pulse 57  Temp 97.8  F (36.6  C) (Tympanic)  Resp 18  Ht 1.638 m (5' 4.5\")  Wt 63.5 kg (140 lb)  SpO2 98%  BMI 23.66 kg/m2  General: AAOx4, NAD, WN/WD, ambulating without assistance  HEENT:NCAT, EOMI, PERRL Sclerae anicteric; Trachea mideline, no JVD  Chest:   Clear to auscultation bilaterally.  Cardiac: S1S2 , regular rate and rhythm without additional sounds  Abdomen: Soft, ND/NT no rebound, no guarding  Extremities: Cursory exam unremarkable. Right upper extremity ulnar side 1 3cm soft mobile mass and 1 2cm soft mobile mass just proximal  Skin: Warm, dry, < 2 sec cap refill  Neuro: CN 2-12 grossly intact, no focal deficit, GCS 15  Psych: happy, calm, asks appropriate questions      Assessment/Plan:  #1 Lipoma x 2  #2 HTN    These are likely lipomas, but they are growing and causing her pain, both physical and emotional.  I don't  think its unreasonable to remove this mass and confirm diagnosis.  The risks, including but not limited to, bleeding, infection, recurrence, need for further resection, wound healing, scar formation and chronic pain have all been discussed with the patient. He voiced understanding of all these risks and there were no barriers to patient education.      Dr Hernandez  [facility]  [address], [address]  [place]    [address]    Referring Provider:  No referring provider defined for this encounter.     Primary Care Provider:  Brittney Coe  "

## 2017-08-24 NOTE — NURSING NOTE
"Chief Complaint   Patient presents with     Consult     lipoma on right arm       Initial /75 (BP Location: Left arm, Patient Position: Chair, Cuff Size: Adult Regular)  Pulse 57  Temp 97.8  F (36.6  C) (Tympanic)  Resp 18  Ht 5' 4.5\" (1.638 m)  Wt 140 lb (63.5 kg)  SpO2 98%  BMI 23.66 kg/m2 Estimated body mass index is 23.66 kg/(m^2) as calculated from the following:    Height as of this encounter: 5' 4.5\" (1.638 m).    Weight as of this encounter: 140 lb (63.5 kg).  Medication Reconciliation: complete   Loraine Peck    "

## 2017-08-24 NOTE — PATIENT INSTRUCTIONS
Thank you for allowing Dr. Hernandez and our surgical team to participate in your care.  If you have a scheduling or an appointment question please contact our Health Unit Coordinator, Laney,  at her direct line [phone].   ALL nursing questions or concerns can be directed to your surgical nurse at: [phone]    You are scheduled for a: Excisional biopsy of forearm lesions x's 2  Your procedure date is: October 11, 2017        HOW TO PREPARE-        You need a friend or family member available to drive you home AND stay with you for 24 hours after you leave the hospital. You will not be allowed to drive yourself. IF you need to take a taxi or the bus you MUST have a responsible person to ride with you. YOUR PROCEDURE WILL BE CANCELLED IF YOU DO NOT HAVE A RESPONSIBLE ADULT TO DRIVE YOU HOME.       You CANNOT have anything to eat or drink after midnight the night before your surgery, ncluding water and coffee. Your stomach needs to be completely empty. Do NOT chew gum, suck on hard candy, or smoke. You can brush your teeth the morning of surgery.       You need to call our Surgery Education Nurses 1-2 weeks prior to your surgery date at  [phone] or toll free [phone]. Please have you medication and allergy lists ready.      Stop your aspirin or other NSAIDs(Ibuprofen, Motrin, Aleve, Celebrex, Naproxen, etc...) 7 days before your surgery.      Hospital admitting will call you the day before your surgery with your arrival time. If you are scheduled on a Monday admitting will call you the Friday before.      Please call your primary care physician if you should become ill within 24 hours of scheduled surgery. (ex.vomiting, diarrhea, fever)          You will need to wash the night before AND the morning of you procedure with the supplied Hibiclens. Wash your Surgical area with your bare hands, apply friction and rinse. KEEP IT AWAY FROM YOUR EYES, EARS, NOSE AND MOUTH.

## 2017-09-13 DIAGNOSIS — E78.5 HYPERLIPIDEMIA WITH TARGET LDL LESS THAN 130: ICD-10-CM

## 2017-09-13 DIAGNOSIS — E03.4 HYPOTHYROIDISM DUE TO ACQUIRED ATROPHY OF THYROID: ICD-10-CM

## 2017-09-13 DIAGNOSIS — Z79.899 ON STATIN THERAPY: ICD-10-CM

## 2017-09-13 DIAGNOSIS — I10 BENIGN ESSENTIAL HYPERTENSION: ICD-10-CM

## 2017-09-13 LAB
ALBUMIN SERPL-MCNC: 3.9 G/DL (ref 3.4–5)
ALP SERPL-CCNC: 76 U/L (ref 40–150)
ALT SERPL W P-5'-P-CCNC: 34 U/L (ref 0–50)
ANION GAP SERPL CALCULATED.3IONS-SCNC: 7 MMOL/L (ref 3–14)
AST SERPL W P-5'-P-CCNC: 24 U/L (ref 0–45)
BILIRUB DIRECT SERPL-MCNC: 0.1 MG/DL (ref 0–0.2)
BILIRUB SERPL-MCNC: 0.4 MG/DL (ref 0.2–1.3)
BUN SERPL-MCNC: 18 MG/DL (ref 7–30)
CALCIUM SERPL-MCNC: 9.3 MG/DL (ref 8.5–10.1)
CHLORIDE SERPL-SCNC: 109 MMOL/L (ref 94–109)
CHOLEST SERPL-MCNC: 191 MG/DL
CO2 SERPL-SCNC: 26 MMOL/L (ref 20–32)
CREAT SERPL-MCNC: 0.93 MG/DL (ref 0.52–1.04)
GFR SERPL CREATININE-BSD FRML MDRD: 59 ML/MIN/1.7M2
GLUCOSE SERPL-MCNC: 88 MG/DL (ref 70–99)
HDLC SERPL-MCNC: 55 MG/DL
LDLC SERPL CALC-MCNC: 110 MG/DL
NONHDLC SERPL-MCNC: 136 MG/DL
POTASSIUM SERPL-SCNC: 4.2 MMOL/L (ref 3.4–5.3)
PROT SERPL-MCNC: 7.4 G/DL (ref 6.8–8.8)
SODIUM SERPL-SCNC: 142 MMOL/L (ref 133–144)
TRIGL SERPL-MCNC: 129 MG/DL
TSH SERPL DL<=0.005 MIU/L-ACNC: 3.4 MU/L (ref 0.4–4)

## 2017-09-13 PROCEDURE — 80061 LIPID PANEL: CPT | Mod: ZL | Performed by: NURSE PRACTITIONER

## 2017-09-13 PROCEDURE — 36415 COLL VENOUS BLD VENIPUNCTURE: CPT | Mod: ZL | Performed by: NURSE PRACTITIONER

## 2017-09-13 PROCEDURE — 80048 BASIC METABOLIC PNL TOTAL CA: CPT | Mod: ZL | Performed by: NURSE PRACTITIONER

## 2017-09-13 PROCEDURE — 80076 HEPATIC FUNCTION PANEL: CPT | Mod: ZL | Performed by: NURSE PRACTITIONER

## 2017-09-13 PROCEDURE — 84443 ASSAY THYROID STIM HORMONE: CPT | Mod: ZL | Performed by: NURSE PRACTITIONER

## 2017-09-14 ENCOUNTER — OFFICE VISIT (OUTPATIENT)
Dept: FAMILY MEDICINE | Facility: OTHER | Age: 72
End: 2017-09-14
Attending: NURSE PRACTITIONER
Payer: COMMERCIAL

## 2017-09-14 VITALS
WEIGHT: 141 LBS | TEMPERATURE: 96.9 F | DIASTOLIC BLOOD PRESSURE: 72 MMHG | SYSTOLIC BLOOD PRESSURE: 122 MMHG | BODY MASS INDEX: 23.49 KG/M2 | OXYGEN SATURATION: 99 % | HEIGHT: 65 IN | HEART RATE: 49 BPM | RESPIRATION RATE: 16 BRPM

## 2017-09-14 DIAGNOSIS — H61.23 EXCESSIVE CERUMEN IN BOTH EAR CANALS: ICD-10-CM

## 2017-09-14 DIAGNOSIS — Z79.899 ON STATIN THERAPY: ICD-10-CM

## 2017-09-14 DIAGNOSIS — I10 BENIGN ESSENTIAL HYPERTENSION: ICD-10-CM

## 2017-09-14 DIAGNOSIS — E78.5 HYPERLIPIDEMIA WITH TARGET LDL LESS THAN 130: Primary | ICD-10-CM

## 2017-09-14 DIAGNOSIS — H93.8X1 IRRITATION OF EAR, RIGHT: ICD-10-CM

## 2017-09-14 DIAGNOSIS — E03.4 HYPOTHYROIDISM DUE TO ACQUIRED ATROPHY OF THYROID: ICD-10-CM

## 2017-09-14 PROCEDURE — 99214 OFFICE O/P EST MOD 30 MIN: CPT | Performed by: NURSE PRACTITIONER

## 2017-09-14 PROCEDURE — 99212 OFFICE O/P EST SF 10 MIN: CPT

## 2017-09-14 PROCEDURE — 69209 REMOVE IMPACTED EAR WAX UNI: CPT | Performed by: NURSE PRACTITIONER

## 2017-09-14 RX ORDER — NEOMYCIN SULFATE, POLYMYXIN B SULFATE AND HYDROCORTISONE 10; 3.5; 1 MG/ML; MG/ML; [USP'U]/ML
4 SUSPENSION/ DROPS AURICULAR (OTIC) 3 TIMES DAILY
Qty: 3 ML | Refills: 0 | Status: SHIPPED | OUTPATIENT
Start: 2017-09-14 | End: 2017-09-19

## 2017-09-14 ASSESSMENT — ANXIETY QUESTIONNAIRES
4. TROUBLE RELAXING: NOT AT ALL
7. FEELING AFRAID AS IF SOMETHING AWFUL MIGHT HAPPEN: NOT AT ALL
IF YOU CHECKED OFF ANY PROBLEMS ON THIS QUESTIONNAIRE, HOW DIFFICULT HAVE THESE PROBLEMS MADE IT FOR YOU TO DO YOUR WORK, TAKE CARE OF THINGS AT HOME, OR GET ALONG WITH OTHER PEOPLE: NOT DIFFICULT AT ALL
6. BECOMING EASILY ANNOYED OR IRRITABLE: NOT AT ALL
1. FEELING NERVOUS, ANXIOUS, OR ON EDGE: NOT AT ALL
2. NOT BEING ABLE TO STOP OR CONTROL WORRYING: NOT AT ALL
3. WORRYING TOO MUCH ABOUT DIFFERENT THINGS: NOT AT ALL
5. BEING SO RESTLESS THAT IT IS HARD TO SIT STILL: NOT AT ALL
GAD7 TOTAL SCORE: 0

## 2017-09-14 ASSESSMENT — PAIN SCALES - GENERAL: PAINLEVEL: NO PAIN (0)

## 2017-09-14 ASSESSMENT — PATIENT HEALTH QUESTIONNAIRE - PHQ9: SUM OF ALL RESPONSES TO PHQ QUESTIONS 1-9: 2

## 2017-09-14 NOTE — PATIENT INSTRUCTIONS
ASSESSMENT/PLAN:       1. Hyperlipidemia with target LDL less than 130  Chronic, continue lipitor 40mg daily  - Lipid Profile; Future    2. Hypothyroidism due to acquired atrophy of thyroid  Chronic, continue current plan    3. Benign essential hypertension  chronic  - TSH with free T4 reflex; Future  - Basic metabolic panel; Future    4. Excessive cerumen in both ear canals  Excessive cerumen, tolerated well, hearing slightly improved     5. On statin therapy  - Hepatic panel; Future    6. Irritation of ear, right  symptomatic  - neomycin-polymyxin-hydrocortisone (CORTISPORIN) 3.5-86647-6 otic suspension; Place 4 drops into the right ear 3 times daily for 5 days  Dispense: 3 mL; Refill: 0  d    FUTURE APPOINTMENTS:       - Follow-up visit in 3 months or as needed for acute concerns.     Brittney Coe NP  [facility]

## 2017-09-14 NOTE — MR AVS SNAPSHOT
After Visit Summary   9/14/2017    Franca Angeles    MRN: [id number]           Patient Information     Date Of Birth          1945        Visit Information        Provider Department      9/14/2017 10:00 AM Brittney Coe NP [facility]        Today's Diagnoses     Hyperlipidemia with target LDL less than 130    -  1    Hypothyroidism due to acquired atrophy of thyroid        Benign essential hypertension        Excessive cerumen in both ear canals        On statin therapy        Irritation of ear, right          Care Instructions      ASSESSMENT/PLAN:       1. Hyperlipidemia with target LDL less than 130  Chronic, continue lipitor 40mg daily  - Lipid Profile; Future    2. Hypothyroidism due to acquired atrophy of thyroid  Chronic, continue current plan    3. Benign essential hypertension  chronic  - TSH with free T4 reflex; Future  - Basic metabolic panel; Future    4. Excessive cerumen in both ear canals  Excessive cerumen, tolerated well, hearing slightly improved     5. On statin therapy  - Hepatic panel; Future    6. Irritation of ear, right  symptomatic  - neomycin-polymyxin-hydrocortisone (CORTISPORIN) 3.5-47182-6 otic suspension; Place 4 drops into the right ear 3 times daily for 5 days  Dispense: 3 mL; Refill: 0  d    FUTURE APPOINTMENTS:       - Follow-up visit in 3 months or as needed for acute concerns.     Brittney Coe NP  [facility]          Follow-ups after your visit        Your next 10 appointments already scheduled     Oct 11, 2017   Procedure with Nghia Hernandez DO   [facility] ([facility] )    [address] [place] [address]   [phone]            Jan 08, 2018 10:00 AM CST   (Arrive by 9:45 AM)   SHORT with Brittney Coe NP   [facility] ([facility] )    [address]  [facility] [address]   [phone]            Aug 15, 2018  10:00 AM CDT   (Arrive by 9:45 AM)   PHYSICAL with Khloe Lei MD   [facility] ([facility] )    [address] [place] [address]   [phone]              Future tests that were ordered for you today     Open Future Orders        Priority Expected Expires Ordered    Lipid Profile Routine 12/14/2017 1/31/2018 9/14/2017    TSH with free T4 reflex Routine 12/14/2017 1/31/2018 9/14/2017    Basic metabolic panel Routine 12/14/2017 1/31/2018 9/14/2017    Hepatic panel Routine 12/14/2017 1/31/2018 9/14/2017            Who to contact     If you have questions or need follow up information about today's clinic visit or your schedule please contact [facility] directly at [phone].  Normal or non-critical lab and imaging results will be communicated to you by MyChart, letter or phone within 4 business days after the clinic has received the results. If you do not hear from us within 7 days, please contact the clinic through panOpenhart or phone. If you have a critical or abnormal lab result, we will notify you by phone as soon as possible.  Submit refill requests through Infor or call your pharmacy and they will forward the refill request to us. Please allow 3 business days for your refill to be completed.          Additional Information About Your Visit        MyChart Information     Infor gives you secure access to your electronic health record. If you see a primary care provider, you can also send messages to your care team and make appointments. If you have questions, please call your primary care clinic.  If you do not have a primary care provider, please call [phone] and they will assist you.        Care EveryWhere ID     This is your Care EveryWhere ID. This could be used by other organizations to access your [place] medical records  [id number]        Your Vitals Were     Pulse Temperature Respirations Height Pulse Oximetry BMI (Body Mass Index)     "49 96.9  F (36.1  C) (Tympanic) 16 5' 4.5\" (1.638 m) 99% 23.83 kg/m2       Blood Pressure from Last 3 Encounters:   09/14/17 122/72   08/24/17 125/75   07/25/17 118/78    Weight from Last 3 Encounters:   09/14/17 141 lb (64 kg)   08/24/17 140 lb (63.5 kg)   07/25/17 140 lb (63.5 kg)              We Performed the Following     REMOVE IMPACTED CERUMEN          Today's Medication Changes          These changes are accurate as of: 9/14/17 10:52 AM.  If you have any questions, ask your nurse or doctor.               Start taking these medicines.        Dose/Directions    neomycin-polymyxin-hydrocortisone 3.5-10164-0 otic suspension   Commonly known as:  CORTISPORIN   Used for:  Irritation of ear, right   Started by:  Brittney Coe, NP        Dose:  4 drop   Place 4 drops into the right ear 3 times daily for 5 days   Quantity:  3 mL   Refills:  0            Where to get your medicines      These medications were sent to [facility] [id number] [place] [address]  AT [facility] [address]  [address] [facility] [address]     Phone:  [phone]     neomycin-polymyxin-hydrocortisone 3.5-16486-1 otic suspension                Primary Care Provider Office Phone # Fax #    Brittney Coe -[phone]       [facility] [address] [place]  [facility] [address]        Equal Access to Services     SAMANTHA PIERCE AH: Hadii tonia buck hadasho Soomaali, waaxda luqadaha, qaybta kaalmada adeegyaprabhakar, waxay idilima deluca. So [facility] [phone].    ATENCIÓN: Si habla español, tiene a marx disposición servicios gratuitos de asistencia lingüística. Llame al [phone].    We comply with applicable federal civil rights laws and [place] laws. We do not discriminate on the basis of race, color, national origin, age, disability sex, sexual orientation or gender identity.            Thank you!     Thank you for choosing [facility]  " for your care. Our goal is always to provide you with excellent care. Hearing back from our patients is one way we can continue to improve our services. Please take a few minutes to complete the written survey that you may receive in the mail after your visit with us. Thank you!             Your Updated Medication List - Protect others around you: Learn how to safely use, store and throw away your medicines at www.disposemymeds.org.          This list is accurate as of: 9/14/17 10:52 AM.  Always use your most recent med list.                   Brand Name Dispense Instructions for use Diagnosis    aspirin 81 MG tablet      Take 81 mg by mouth daily        atorvastatin 40 MG tablet    LIPITOR    90 tablet    Take 1 tablet (40 mg) by mouth daily    Hyperlipidemia with target LDL less than 130       CALCIUM 500 PO      Take by mouth daily        cinnamon 500 MG Caps      One daily        CO-ENZYME Q-10 PO      Take by mouth daily        Fish Oil 1200 MG Caps      Take by mouth daily        GLUCOSAMINE-CHONDROITIN PO      Take  by mouth. GLUCOSAMINE HCL/CHONDR SUA NA One daily        levothyroxine 50 MCG tablet    SYNTHROID/LEVOTHROID    90 tablet    Take 1 tablet (50 mcg) by mouth daily    Hypothyroidism due to acquired atrophy of thyroid       lisinopril 5 MG tablet    PRINIVIL/ZESTRIL    90 tablet    Take 1 tablet (5 mg) by mouth daily    Benign essential hypertension       MULTIVITAMIN PO           neomycin-polymyxin-hydrocortisone 3.5-42029-8 otic suspension    CORTISPORIN    3 mL    Place 4 drops into the right ear 3 times daily for 5 days    Irritation of ear, right       Turmeric Curcumin 500 MG Caps      Take 1 capsule by mouth daily

## 2017-09-14 NOTE — PROGRESS NOTES
SUBJECTIVE:   Franca Angeles is a 72 year old female who presents to clinic today for the following health issues:  Hypertension Lipids and thyroid     Hyperlipidemia Follow-Up      Rate your low fat/cholesterol diet?: good    Taking statin?  Yes, no muscle aches from statin    Other lipid medications/supplements?:  Fish oil/Omega 3, dose 1000 mg  without side effects    Hypertension Follow-up      Outpatient blood pressures are not being checked.    Low Salt Diet: low salt    Hypothyroidism Follow-up      Since last visit, patient describes the following symptoms: Weight stable, no hair loss, no skin changes, no constipation, no loose stools and hair loss    Declined Dexa; had in the past has osteopenia; is taking calcium and vit D.  Participates in regular exercise.  Does not wish to pursue medications at this time.        Amount of exercise or physical activity: 4-5 days/week for an average of 45-60 minutes    Problems taking medications regularly: No    Medication side effects: none  Diet: regular (no restrictions)    Notes decreased hearing, feels it is from excessive cerumen again.  She has had them flushed in the past with relief and improvement in hearing.  Reviewed risks, including discomfort and perforated TM.  Verbalized understanding and agreed to proceed.     Problem list and histories reviewed & adjusted, as indicated.  Additional history: as documented    Patient Active Problem List   Diagnosis     Menopause     Joint pain     Disorder of bone and cartilage     Advanced care planning/counseling discussion     Hyperlipidemia with target LDL less than 130     Routine general medical examination at a health care facility (ANNUAL)     Colon cancer screening     Hypothyroidism due to acquired atrophy of thyroid     Benign essential hypertension     Past Surgical History:   Procedure Laterality Date     ARTHRODESIS KNEE  [id number]    left knee, paritla medial meniscectomy, debridement medial femoral  condyle and patellofemoral debridement     COLONOSCOPY  2004    nml     COLONOSCOPY  7/23/2014    Procedure: COLONOSCOPY;  Surgeon: Nghia Hernandez DO;  Location: [facility]     CYSTOSCOPY      micro hematuria neg     DILATION AND CURETTAGE       tubal sterilization       uterine fibroids       wrist fx RT         Social History   Substance Use Topics     Smoking status: Never Smoker     Smokeless tobacco: Never Used     Alcohol use 0.0 oz/week      Comment: occasionally     Family History   Problem Relation Age of Onset     C.A.D. Mother      Other - See Comments Mother      high platelets     Hypertension Mother      C.A.D. Paternal Aunt      DIABETES Other      aunt     Cardiovascular Father      cardiovascular disease     Colon Cancer Other      family history-paternal side     CEREBROVASCULAR DISEASE Maternal Grandmother      CEREBROVASCULAR DISEASE Maternal Grandfather          Current Outpatient Prescriptions   Medication Sig Dispense Refill     lisinopril (PRINIVIL/ZESTRIL) 5 MG tablet Take 1 tablet (5 mg) by mouth daily 90 tablet 3     atorvastatin (LIPITOR) 40 MG tablet Take 1 tablet (40 mg) by mouth daily 90 tablet 1     levothyroxine (SYNTHROID/LEVOTHROID) 50 MCG tablet Take 1 tablet (50 mcg) by mouth daily 90 tablet 3     Turmeric Curcumin 500 MG CAPS Take 1 capsule by mouth daily       aspirin 81 MG tablet Take 81 mg by mouth daily       CO-ENZYME Q-10 PO Take by mouth daily       Multiple Vitamins-Minerals (MULTIVITAMIN OR)        Omega-3 Fatty Acids (FISH OIL) 1200 MG CAPS Take by mouth daily       Calcium-Magnesium-Vitamin D (CALCIUM 500 PO) Take by mouth daily       GLUCOSAMINE-CHONDROITIN PO Take  by mouth. GLUCOSAMINE HCL/CHONDR SUA NA  One daily       cinnamon 500 MG CAPS One daily       Allergies   Allergen Reactions     Pseudoephedrine Hcl Other (See Comments)     Heart racing  Sudafed     Recent Labs   Lab Test  09/13/17   0903  06/13/17   0811  03/09/17   0913   05/13/15   1026   A1C   --     "--    --    --   5.7   LDL  110*  112*  96   < >  134*   HDL  55  58  64   < >  59   TRIG  129  139  79   < >  170*   ALT  34  32  34   < >   --    CR  0.93  0.90  0.96   < >   --    GFRESTIMATED  59*  62  57*   < >   --    GFRESTBLACK  71  74  69   < >   --    POTASSIUM  4.2  4.2  4.1   < >   --    TSH  3.40  4.02*  2.51   < >  3.75    < > = values in this interval not displayed.      BP Readings from Last 3 Encounters:   09/14/17 122/72   08/24/17 125/75   07/25/17 118/78    Wt Readings from Last 3 Encounters:   09/14/17 141 lb (64 kg)   08/24/17 140 lb (63.5 kg)   07/25/17 140 lb (63.5 kg)            Reviewed and updated as needed this visit by clinical staffTobacco  Allergies       Reviewed and updated as needed this visit by Provider         ROS:  Constitutional, HEENT, cardiovascular, pulmonary, gi and gu systems are negative, except as otherwise noted.      OBJECTIVE:   /72 (BP Location: Right arm, Patient Position: Chair, Cuff Size: Adult Regular)  Pulse (!) 49  Temp 96.9  F (36.1  C) (Tympanic)  Resp 16  Ht 5' 4.5\" (1.638 m)  Wt 141 lb (64 kg)  SpO2 99%  BMI 23.83 kg/m2  Body mass index is 23.83 kg/(m^2).  GENERAL: healthy, alert and no distress  HENT: normal cephalic/atraumatic, nose and mouth without ulcers or lesions, oropharynx clear and oral mucous membranes moist.  Both TM are occluded with cerumen, after flush, right canal is red and irritated with normal TM, left normal.    NECK: no adenopathy, no asymmetry, masses, or scars, thyroid normal to palpation and no carotid bruits  RESP: lungs clear to auscultation - no rales, rhonchi or wheezes  CV: regular rate and rhythm, normal S1 S2, no S3 or S4, no murmur, click or rub, no peripheral edema and peripheral pulses strong  MS: no gross musculoskeletal defects noted, no edema  PSYCH: mentation appears normal, affect normal/bright        ASSESSMENT/PLAN:       1. Hyperlipidemia with target LDL less than 130  Chronic, continue lipitor 40mg " daily  - Lipid Profile; Future    2. Hypothyroidism due to acquired atrophy of thyroid  Chronic, continue current plan    3. Benign essential hypertension  chronic  - TSH with free T4 reflex; Future  - Basic metabolic panel; Future    4. Excessive cerumen in both ear canals  Excessive cerumen, tolerated well, hearing slightly improved     5. On statin therapy  - Hepatic panel; Future    6. Irritation of ear, right  symptomatic  - neomycin-polymyxin-hydrocortisone (CORTISPORIN) 3.5-92535-6 otic suspension; Place 4 drops into the right ear 3 times daily for 5 days  Dispense: 3 mL; Refill: 0  d    FUTURE APPOINTMENTS:       - Follow-up visit in 3 months or as needed for acute concerns.     Brittney Coe, NP  [facility]

## 2017-09-14 NOTE — NURSING NOTE
"Chief Complaint   Patient presents with     Hypertension     Lipids     Thyroid Problem       Initial /72 (BP Location: Right arm, Patient Position: Chair, Cuff Size: Adult Regular)  Pulse (!) 49  Temp 96.9  F (36.1  C) (Tympanic)  Resp 16  Ht 5' 4.5\" (1.638 m)  Wt 141 lb (64 kg)  SpO2 99%  BMI 23.83 kg/m2 Estimated body mass index is 23.83 kg/(m^2) as calculated from the following:    Height as of this encounter: 5' 4.5\" (1.638 m).    Weight as of this encounter: 141 lb (64 kg).  Medication Reconciliation: complete   Pamela M Lechevalier LPN      "

## 2017-09-15 RX ORDER — ATORVASTATIN CALCIUM 80 MG/1
80 TABLET, FILM COATED ORAL DAILY
Qty: 90 TABLET | Refills: 1 | Status: SHIPPED | OUTPATIENT
Start: 2017-09-15 | End: 2017-12-11

## 2017-09-15 ASSESSMENT — ANXIETY QUESTIONNAIRES: GAD7 TOTAL SCORE: 0

## 2017-09-19 ENCOUNTER — TELEPHONE (OUTPATIENT)
Dept: SURGERY | Facility: OTHER | Age: 72
End: 2017-09-19

## 2017-09-19 NOTE — TELEPHONE ENCOUNTER
Pt left message to call back regarding rescheduling her surgery on 10/11/17 because she will be gone. She would like to move to 10/25/17, will call OR tomorrow to move her. Loraine Peck

## 2017-10-25 ENCOUNTER — ANESTHESIA (OUTPATIENT)
Dept: SURGERY | Facility: HOSPITAL | Age: 72
End: 2017-10-25
Payer: MEDICARE

## 2017-10-25 ENCOUNTER — ANESTHESIA EVENT (OUTPATIENT)
Dept: SURGERY | Facility: HOSPITAL | Age: 72
End: 2017-10-25
Payer: MEDICARE

## 2017-10-25 ENCOUNTER — SURGERY (OUTPATIENT)
Age: 72
End: 2017-10-25

## 2017-10-25 ENCOUNTER — HOSPITAL ENCOUNTER (OUTPATIENT)
Facility: HOSPITAL | Age: 72
Discharge: HOME | End: 2017-10-25
Attending: SURGERY | Admitting: SURGERY
Payer: MEDICARE

## 2017-10-25 VITALS
WEIGHT: 140 LBS | OXYGEN SATURATION: 95 % | TEMPERATURE: 96.9 F | SYSTOLIC BLOOD PRESSURE: 111 MMHG | BODY MASS INDEX: 23.9 KG/M2 | RESPIRATION RATE: 18 BRPM | DIASTOLIC BLOOD PRESSURE: 72 MMHG | HEIGHT: 64 IN

## 2017-10-25 DIAGNOSIS — Z98.890 S/P BIOPSY: Primary | ICD-10-CM

## 2017-10-25 PROCEDURE — 25000125 ZZHC RX 250: Performed by: NURSE ANESTHETIST, CERTIFIED REGISTERED

## 2017-10-25 PROCEDURE — 25000128 H RX IP 250 OP 636: Performed by: NURSE ANESTHETIST, CERTIFIED REGISTERED

## 2017-10-25 PROCEDURE — 40000305 ZZH STATISTIC PRE PROC ASSESS I: Performed by: SURGERY

## 2017-10-25 PROCEDURE — 25000128 H RX IP 250 OP 636: Performed by: ANESTHESIOLOGY

## 2017-10-25 PROCEDURE — S0020 INJECTION, BUPIVICAINE HYDRO: HCPCS | Performed by: SURGERY

## 2017-10-25 PROCEDURE — 27210794 ZZH OR GENERAL SUPPLY STERILE: Performed by: SURGERY

## 2017-10-25 PROCEDURE — 71000027 ZZH RECOVERY PHASE 2 EACH 15 MINS: Performed by: SURGERY

## 2017-10-25 PROCEDURE — 88304 TISSUE EXAM BY PATHOLOGIST: CPT | Mod: TC | Performed by: SURGERY

## 2017-10-25 PROCEDURE — 25000125 ZZHC RX 250: Performed by: SURGERY

## 2017-10-25 PROCEDURE — 25065 BIOPSY FOREARM SOFT TISSUES: CPT | Performed by: NURSE ANESTHETIST, CERTIFIED REGISTERED

## 2017-10-25 PROCEDURE — 36000050 ZZH SURGERY LEVEL 2 1ST 30 MIN: Performed by: SURGERY

## 2017-10-25 PROCEDURE — 37000008 ZZH ANESTHESIA TECHNICAL FEE, 1ST 30 MIN: Performed by: SURGERY

## 2017-10-25 PROCEDURE — 99100 ANES PT EXTEME AGE<1 YR&>70: CPT | Performed by: NURSE ANESTHETIST, CERTIFIED REGISTERED

## 2017-10-25 PROCEDURE — 37000009 ZZH ANESTHESIA TECHNICAL FEE, EACH ADDTL 15 MIN: Performed by: SURGERY

## 2017-10-25 PROCEDURE — 25075 EXC FOREARM LES SC < 3 CM: CPT | Mod: RT | Performed by: SURGERY

## 2017-10-25 RX ORDER — ALBUTEROL SULFATE 0.83 MG/ML
2.5 SOLUTION RESPIRATORY (INHALATION) EVERY 4 HOURS PRN
Status: DISCONTINUED | OUTPATIENT
Start: 2017-10-25 | End: 2017-10-25 | Stop reason: HOSPADM

## 2017-10-25 RX ORDER — ONDANSETRON 4 MG/1
4 TABLET, ORALLY DISINTEGRATING ORAL EVERY 30 MIN PRN
Status: DISCONTINUED | OUTPATIENT
Start: 2017-10-25 | End: 2017-10-25 | Stop reason: HOSPADM

## 2017-10-25 RX ORDER — HYDROCODONE BITARTRATE AND ACETAMINOPHEN 5; 325 MG/1; MG/1
1-2 TABLET ORAL
Status: CANCELLED | OUTPATIENT
Start: 2017-10-25

## 2017-10-25 RX ORDER — HYDRALAZINE HYDROCHLORIDE 20 MG/ML
2.5-5 INJECTION INTRAMUSCULAR; INTRAVENOUS EVERY 10 MIN PRN
Status: DISCONTINUED | OUTPATIENT
Start: 2017-10-25 | End: 2017-10-25 | Stop reason: HOSPADM

## 2017-10-25 RX ORDER — HYDROCODONE BITARTRATE AND ACETAMINOPHEN 5; 325 MG/1; MG/1
1-2 TABLET ORAL EVERY 6 HOURS PRN
Qty: 10 TABLET | Refills: 0 | Status: SHIPPED | OUTPATIENT
Start: 2017-10-25 | End: 2018-01-08

## 2017-10-25 RX ORDER — SODIUM CHLORIDE, SODIUM LACTATE, POTASSIUM CHLORIDE, CALCIUM CHLORIDE 600; 310; 30; 20 MG/100ML; MG/100ML; MG/100ML; MG/100ML
INJECTION, SOLUTION INTRAVENOUS CONTINUOUS
Status: DISCONTINUED | OUTPATIENT
Start: 2017-10-25 | End: 2017-10-25 | Stop reason: HOSPADM

## 2017-10-25 RX ORDER — LIDOCAINE HYDROCHLORIDE 20 MG/ML
INJECTION, SOLUTION INFILTRATION; PERINEURAL PRN
Status: DISCONTINUED | OUTPATIENT
Start: 2017-10-25 | End: 2017-10-25

## 2017-10-25 RX ORDER — GLYCOPYRROLATE 0.2 MG/ML
INJECTION, SOLUTION INTRAMUSCULAR; INTRAVENOUS PRN
Status: DISCONTINUED | OUTPATIENT
Start: 2017-10-25 | End: 2017-10-25

## 2017-10-25 RX ORDER — FENTANYL CITRATE 50 UG/ML
25-50 INJECTION, SOLUTION INTRAMUSCULAR; INTRAVENOUS
Status: DISCONTINUED | OUTPATIENT
Start: 2017-10-25 | End: 2017-10-25 | Stop reason: HOSPADM

## 2017-10-25 RX ORDER — ONDANSETRON 2 MG/ML
INJECTION INTRAMUSCULAR; INTRAVENOUS PRN
Status: DISCONTINUED | OUTPATIENT
Start: 2017-10-25 | End: 2017-10-25

## 2017-10-25 RX ORDER — BUPIVACAINE HYDROCHLORIDE 2.5 MG/ML
INJECTION, SOLUTION INFILTRATION; PERINEURAL PRN
Status: DISCONTINUED | OUTPATIENT
Start: 2017-10-25 | End: 2017-10-25 | Stop reason: HOSPADM

## 2017-10-25 RX ORDER — PROPOFOL 10 MG/ML
INJECTION, EMULSION INTRAVENOUS CONTINUOUS PRN
Status: DISCONTINUED | OUTPATIENT
Start: 2017-10-25 | End: 2017-10-25

## 2017-10-25 RX ORDER — PROMETHAZINE HYDROCHLORIDE 25 MG/ML
12.5 INJECTION, SOLUTION INTRAMUSCULAR; INTRAVENOUS
Status: DISCONTINUED | OUTPATIENT
Start: 2017-10-25 | End: 2017-10-25 | Stop reason: HOSPADM

## 2017-10-25 RX ORDER — FENTANYL CITRATE 50 UG/ML
INJECTION, SOLUTION INTRAMUSCULAR; INTRAVENOUS PRN
Status: DISCONTINUED | OUTPATIENT
Start: 2017-10-25 | End: 2017-10-25

## 2017-10-25 RX ORDER — DEXAMETHASONE SODIUM PHOSPHATE 4 MG/ML
4 INJECTION, SOLUTION INTRA-ARTICULAR; INTRALESIONAL; INTRAMUSCULAR; INTRAVENOUS; SOFT TISSUE EVERY 10 MIN PRN
Status: DISCONTINUED | OUTPATIENT
Start: 2017-10-25 | End: 2017-10-25 | Stop reason: HOSPADM

## 2017-10-25 RX ORDER — LABETALOL HYDROCHLORIDE 5 MG/ML
10 INJECTION, SOLUTION INTRAVENOUS
Status: DISCONTINUED | OUTPATIENT
Start: 2017-10-25 | End: 2017-10-25 | Stop reason: HOSPADM

## 2017-10-25 RX ORDER — KETAMINE HYDROCHLORIDE 10 MG/ML
INJECTION, SOLUTION INTRAMUSCULAR; INTRAVENOUS PRN
Status: DISCONTINUED | OUTPATIENT
Start: 2017-10-25 | End: 2017-10-25

## 2017-10-25 RX ORDER — ONDANSETRON 2 MG/ML
4 INJECTION INTRAMUSCULAR; INTRAVENOUS EVERY 30 MIN PRN
Status: DISCONTINUED | OUTPATIENT
Start: 2017-10-25 | End: 2017-10-25 | Stop reason: HOSPADM

## 2017-10-25 RX ORDER — DEXAMETHASONE SODIUM PHOSPHATE 10 MG/ML
INJECTION, SOLUTION INTRAMUSCULAR; INTRAVENOUS PRN
Status: DISCONTINUED | OUTPATIENT
Start: 2017-10-25 | End: 2017-10-25

## 2017-10-25 RX ORDER — MEPERIDINE HYDROCHLORIDE 25 MG/ML
12.5 INJECTION INTRAMUSCULAR; INTRAVENOUS; SUBCUTANEOUS
Status: DISCONTINUED | OUTPATIENT
Start: 2017-10-25 | End: 2017-10-25 | Stop reason: HOSPADM

## 2017-10-25 RX ORDER — NALOXONE HYDROCHLORIDE 0.4 MG/ML
.1-.4 INJECTION, SOLUTION INTRAMUSCULAR; INTRAVENOUS; SUBCUTANEOUS
Status: DISCONTINUED | OUTPATIENT
Start: 2017-10-25 | End: 2017-10-25 | Stop reason: HOSPADM

## 2017-10-25 RX ORDER — PROPOFOL 10 MG/ML
INJECTION, EMULSION INTRAVENOUS PRN
Status: DISCONTINUED | OUTPATIENT
Start: 2017-10-25 | End: 2017-10-25

## 2017-10-25 RX ADMIN — KETAMINE HYDROCHLORIDE 10 MG: 10 INJECTION, SOLUTION INTRAMUSCULAR; INTRAVENOUS at 13:21

## 2017-10-25 RX ADMIN — KETAMINE HYDROCHLORIDE 10 MG: 10 INJECTION, SOLUTION INTRAMUSCULAR; INTRAVENOUS at 13:18

## 2017-10-25 RX ADMIN — DEXAMETHASONE SODIUM PHOSPHATE 4 MG: 10 INJECTION, SOLUTION INTRAMUSCULAR; INTRAVENOUS at 13:32

## 2017-10-25 RX ADMIN — PROPOFOL 100 MCG/KG/MIN: 10 INJECTION, EMULSION INTRAVENOUS at 13:20

## 2017-10-25 RX ADMIN — PROPOFOL 10 MG: 10 INJECTION, EMULSION INTRAVENOUS at 13:24

## 2017-10-25 RX ADMIN — LIDOCAINE HYDROCHLORIDE 40 MG: 20 INJECTION, SOLUTION INFILTRATION; PERINEURAL at 13:16

## 2017-10-25 RX ADMIN — FENTANYL CITRATE 50 MCG: 50 INJECTION, SOLUTION INTRAMUSCULAR; INTRAVENOUS at 13:16

## 2017-10-25 RX ADMIN — GLYCOPYRROLATE 0.2 MG: 0.2 INJECTION, SOLUTION INTRAMUSCULAR; INTRAVENOUS at 13:22

## 2017-10-25 RX ADMIN — ONDANSETRON 4 MG: 2 INJECTION INTRAMUSCULAR; INTRAVENOUS at 13:32

## 2017-10-25 RX ADMIN — MIDAZOLAM HYDROCHLORIDE 2 MG: 1 INJECTION, SOLUTION INTRAMUSCULAR; INTRAVENOUS at 13:13

## 2017-10-25 RX ADMIN — PROPOFOL 30 MG: 10 INJECTION, EMULSION INTRAVENOUS at 13:16

## 2017-10-25 RX ADMIN — SODIUM CHLORIDE, POTASSIUM CHLORIDE, SODIUM LACTATE AND CALCIUM CHLORIDE: 600; 310; 30; 20 INJECTION, SOLUTION INTRAVENOUS at 11:40

## 2017-10-25 RX ADMIN — SODIUM CHLORIDE, POTASSIUM CHLORIDE, SODIUM LACTATE AND CALCIUM CHLORIDE: 600; 310; 30; 20 INJECTION, SOLUTION INTRAVENOUS at 11:58

## 2017-10-25 RX ADMIN — BUPIVACAINE HYDROCHLORIDE 6 ML: 2.5 INJECTION, SOLUTION INFILTRATION; PERINEURAL at 13:37

## 2017-10-25 NOTE — OR NURSING
Patient and responsible adult given discharge instructions with no questions regarding instructions. Maisha score 20. Pain level 0/10.  Discharged from unit via ambualtory. Patient discharged to home.

## 2017-10-25 NOTE — IP AVS SNAPSHOT
[facility]/Phase II    [address] [address]    Phone:  [phone]                                       After Visit Summary   10/25/2017    Franca Angeles    MRN: [id number]           After Visit Summary Signature Page     I have received my discharge instructions, and my questions have been answered. I have discussed any challenges I see with this plan with the nurse or doctor.    ..........................................................................................................................................  Patient/Patient Representative Signature      ..........................................................................................................................................  Patient Representative Print Name and Relationship to Patient    ..................................................               ................................................  Date                                            Time    ..........................................................................................................................................  Reviewed by Signature/Title    ...................................................              ..............................................  Date                                                            Time

## 2017-10-25 NOTE — BRIEF OP NOTE
[facility] - Brief Operative Note    Pre-operative diagnosis: Painful growing masses   Post-operative diagnosis Lipomas x 2   Procedure: Excisional biopsies x 2   Surgeon: Nghia Hernandez DO   Anesthesia: Monitor Anesthesia Care    Estimated blood loss: 1 mL   Blood transfusion: No transfusion was given during surgery   Drains: 0   Specimens: Right forearm proximal, right forearm distal   Findings: Well encapsulated lipomas x 2   Complications: None   Condition: Stable   Comments: Details included in dictated operative note.

## 2017-10-25 NOTE — ANESTHESIA PREPROCEDURE EVALUATION
Anesthesia Evaluation     . Pt has had prior anesthetic.     No history of anesthetic complications          ROS/MED HX    ENT/Pulmonary:  - neg pulmonary ROS     Neurologic:  - neg neurologic ROS     Cardiovascular:     (+) Dyslipidemia, hypertension-range: not on beta blocker, ---. : . . . :. . Previous cardiac testing date:results:date: results:ECG reviewed date:8/24/2017 results:Marked SB@41 date: results:          METS/Exercise Tolerance:     Hematologic:  - neg hematologic  ROS       Musculoskeletal:   (+) arthritis, , , other musculoskeletal- GROWING PAINFUL MASSES (RT FOREARM), DJD      GI/Hepatic:     (+) GERD       Renal/Genitourinary:     (+) chronic renal disease (Stage 3 (moderate)), type: CRI,       Endo:     (+) thyroid problem hypothyroidism, .      Psychiatric:  - neg psychiatric ROS       Infectious Disease:  - neg infectious disease ROS       Malignancy:      - no malignancy   Other:    - neg other ROS                 Physical Exam      Airway   Mallampati: III  TM distance: <3 FB  Neck ROM: full    Dental   Comment: bridgework    Cardiovascular   Rhythm and rate: regular and normal      Pulmonary    breath sounds clear to auscultation                    Anesthesia Plan      History & Physical Review  History and physical reviewed and following examination; no interval change.    ASA Status:  3 .    NPO Status:  > 8 hours    Plan for MAC with Intravenous and Propofol induction. Maintenance will be TIVA.  Reason for MAC:  Difficulty with conscious sedation (QS) and Deep or markedly invasive procedure (G8)  PONV prophylaxis:  Ondansetron (or other 5HT-3) and Dexamethasone or Solumedrol       Postoperative Care  Postoperative pain management:  IV analgesics and Oral pain medications.      Consents  Anesthetic plan, risks, benefits and alternatives discussed with:  Patient..                          .

## 2017-10-25 NOTE — OP NOTE
DATE OF SERVICE:  10/25/2017       PREOPERATIVE DIAGNOSIS:  Painful growing masses in the right upper extremity.      POSTOPERATIVE DIAGNOSIS:  Lipoma x2.      PROCEDURE:  Excisional biopsy x2.      INDICATION:  Heri Angeles is a 72-year-old female with 5-year history of soft, mobile masses in the right forearm over time that has now tripled in size and causing her pain, here for diagnostic and therapeutic treatment.      WOUND TYPE:  1,  clean.      DRAINS:  Zero.      SURGEON:  Nghia Hernandez DO      DESCRIPTION OF PROCEDURE:  The patient was brought into the operating room and placed supine on the operating table.  After monitored attended anesthesia was administered, the right upper extremity was prepped and draped in the usual sterile fashion.  After preprocedural pause identifying the patient, procedure and position, local anesthetic was infiltrated around these palpable masses in the right upper extremity that were marked preoperatively with the cooperation of the patient, a small incision was made over both of these and the entire mass was able to be expressed through the incision.  The hilum then was ligated and divided in continuity with interrupted 3-0 Vicryl on both of them.  Hemostasis was excellent.  All skin incisions were closed with a running 4-0 Monocryl.  All counts were correct.  Steri-Strips were applied.  The patient tolerated the procedure well and was taken to postanesthesia care unit.         NGHIA HERNANDEZ DO             D: 10/25/2017 13:45   T: 10/25/2017 14:07   MT: ALLYN      Name:     HERI ANGELES   MRN:      [id number]        Account:        [id number]   :      1945           Procedure Date: 10/25/2017      Document: [id number]

## 2017-10-25 NOTE — ANESTHESIA CARE TRANSFER NOTE
Patient: Franca Angeles    Procedure(s):  EXCISIONAL BIOPSY RIGHT FOREARM TIMES 2 - Wound Class: II-Clean Contaminated    Diagnosis: GROWING PAINFUL MASSES(RT FOREARM)  Diagnosis Additional Information: No value filed.    Anesthesia Type:   MAC     Note:  Airway :Nasal Cannula  Patient transferred to:Phase II  Handoff Report: Identifed the Patient, Identified the Reponsible Provider, Reviewed the pertinent medical history, Discussed the surgical course, Reviewed Intra-OP anesthesia mangement and issues during anesthesia, Set expectations for post-procedure period and Allowed opportunity for questions and acknowledgement of understanding      Vitals: (Last set prior to Anesthesia Care Transfer)    CRNA VITALS  10/25/2017 1309 - 10/25/2017 1346      10/25/2017             Resp Rate (observed): (!)  1    Resp Rate (set): 8                Electronically Signed By: WILLEM Garrison CRNA  October 25, 2017  1:46 PM

## 2017-10-25 NOTE — H&P
Surgery Consult Clinic Note      RE: Franca Angeles  : 1945      Chief Complaint:  Update H&P    History of Present Illness:  I originally saw Mrs. Angeles on 17 for growing painful masses on the right arm and we scheduled excisional biopsy x 2.  Please refer to that note for further detail.  Sh's here today to update her H&P.  She's scheduled for today, which is outside the 30 day anesthesia clearance guidelines.  This was done because of scheduling availability.  She has no questions.  She specifically denies fevers, chills, nausea, vomiting, chest pain, shortness of breath, palpitations, sore throat, cough, generalized feeling ill.        Medical history:  Past Medical History:   Diagnosis Date     Disorder of bone and cartilage, unspecified 5/10/2011     Esophageal reflux 5/10/2011     Pure hypercholesterolemia 2002       Surgical history:  Past Surgical History:   Procedure Laterality Date     ARTHRODESIS KNEE  [id number]    left knee, paritla medial meniscectomy, debridement medial femoral condyle and patellofemoral debridement     COLONOSCOPY      nml     COLONOSCOPY  2014    Procedure: COLONOSCOPY;  Surgeon: Nghia Hernandez DO;  Location: [facility]     CYSTOSCOPY      micro hematuria neg     DILATION AND CURETTAGE       tubal sterilization       uterine fibroids       wrist fx RT         Family history:  Family History   Problem Relation Age of Onset     C.A.D. Mother      Other - See Comments Mother      high platelets     Hypertension Mother      C.A.D. Paternal Aunt      DIABETES Other      aunt     Cardiovascular Father      cardiovascular disease     Colon Cancer Other      family history-paternal side     CEREBROVASCULAR DISEASE Maternal Grandmother      CEREBROVASCULAR DISEASE Maternal Grandfather        Medications:  No current outpatient prescriptions on file.     Allergies:  The patientis allergic to pseudoephedrine hcl.  .  Social history:  Social History   Substance Use  "Topics     Smoking status: Never Smoker     Smokeless tobacco: Never Used     Alcohol use 0.0 oz/week      Comment: occasionally     Marital status: .    Review of Systems:    Constitutional: Negative for fever, chills and weight loss.   HENT: Negative for ear pain, nosebleeds, congestion, sore throat, tinnitus and ear discharge.    Eyes: Negative for blurred vision, double vision, photophobia and pain.   Respiratory: Negative for cough, hemoptysis, shortness of breath, wheezing and stridor.    Cardiovascular: Negative for chest pain, palpitations and orthopnea.   Gastrointestinal: Negative for heartburn, nausea, vomiting, abdominal pain and blood in stool.   Genitourinary: Negative for urgency, frequency and hematuria.   Musculoskeletal: Negative for myalgias, back pain and joint pain.   Neurological: Negative for tingling, speech change and headaches.   Endo/Heme/Allergies: Does not bruise/bleed easily.   Psychiatric/Behavioral: Negative for depression, suicidal ideas and hallucinations. The patient is not nervous/anxious.    Physical Examination:  /86  Temp 96.9  F (36.1  C) (Oral)  Resp 16  Ht 1.626 m (5' 4\")  Wt 63.5 kg (140 lb)  SpO2 97%  BMI 24.03 kg/m2  General: AAOx4, NAD, WN/WD, ambulating without assistance  HEENT:NCAT, EOMI, PERRL Sclerae anicteric; Trachea mideline, no JVD  Chest:   Clear to auscultation bilaterally.  Cardiac: S1S2 , regular rate and rhythm without additional sounds  Abdomen: Soft, ND/NT no rebound, no guarding  Extremities: Cursory exam unremarkable. 2 soft mobile masses on right forearm  Skin: Warm, dry, < 2 sec cap refill  Neuro: CN 2-12 grossly intact, no focal deficit, GCS 15  Psych: happy, calm, asks appropriate questions      Assessment/Plan:  #1 update H&P      Proceed with excisional biopsy with MAC sedation           [facility] and [address], [address]  [place]    [address]    Referring " Provider:  No referring provider defined for this encounter.     Primary Care Provider:  Brittney Coe

## 2017-10-25 NOTE — IP AVS SNAPSHOT
MRN:[id number]                      After Visit Summary   10/25/2017    Franca Angeles    MRN: [id number]           Thank you!     Thank you for choosing [facility] for your care. Our goal is always to provide you with excellent care. Hearing back from our patients is one way we can continue to improve our services. Please take a few minutes to complete the written survey that you may receive in the mail after you visit with us. Thank you!        Patient Information     Date Of Birth          1945        About your hospital stay     You were admitted on:  October 25, 2017 You last received care in the:  [facility] Preop/Phase II    You were discharged on:  October 25, 2017       Who to Call     For medical emergencies, please call 911.  For non-urgent questions about your medical care, please call your primary care provider or clinic, [phone]  For questions related to your surgery, please call your surgery clinic        Attending Provider     Provider Specialty    Nghia Hernandez, DO Surgery       Primary Care Provider Office Phone # Fax #    Brittney COLONOrville Coe [phone]      After Care Instructions     Diet Instructions       Resume pre-procedure diet            Discharge Instructions       Follow up appointment with Dr. Hernandez in 2 weeks for wound check.  Please don't hesitate to call my office with any questions or concerns.  Things to watch for are fevers greater than 101.3, pain uncontrolled by pain narcotics, deep red skin around the incision and puss coming out of the incision.            Do not - immerse incision in water until sutures removed       Do not immerse incision in water for two weeks.  No baths, hot tubs, pools, rivers, lakes, oceans, etc.            Dressing       Keep dressing clean and dry.  Dressing / incisional care as instructed by RN and or Surgeon            No driving or operating machinery        While taking pain narcotics.  Must be  off pain narcotics for 24 hours and not be limited by pain before driving a vehicle or operating heavy equipment.            Shower       No shower for 48 hours post procedure. May shower Postoperative Day (POD)  #2.  At that time, the bandages may come off.  There are steri-stirps over the wounds.  Its okay to shower with these on, do not scrub.  Just let the soapy water run over the incisions and pat dry.  The steri-strips will fall off on their own in approximately 2 weeks.                  Your next 10 appointments already scheduled     Jan 08, 2018 10:00 AM CST   (Arrive by 9:45 AM)   SHORT with Brittney Coe NP   [facility] ([facility] )    [address] [facility] [address]   [phone]            Aug 15, 2018 10:00 AM CDT   (Arrive by 9:45 AM)   PHYSICAL with Khloe Lei MD   [facility] ([facility] )    [address]  [place] [address]   [phone]              Further instructions from your care team           Post-Anesthesia Patient Instructions    IMMEDIATELY FOLLOWING SURGERY:  Do not drive or operate machinery for the first twenty four hours after surgery.  Do not make any important decisions for twenty four hours after surgery or while taking narcotic pain medications or sedatives.  If you develop intractable nausea and vomiting or a severe headache please notify your doctor immediately.    FOLLOW-UP:  Please make an appointment with your surgeon as instructed. You do not need to follow up with anesthesia unless specifically instructed to do so.    WOUND CARE INSTRUCTIONS (if applicable):  Keep a dry clean dressing on the anesthesia/puncture wound site if there is drainage.  Once the wound has quit draining you may leave it open to air.  Generally you should leave the bandage intact for twenty four hours unless there is drainage.  If the epidural site drains for more than 36-48 hours please  "call the anesthesia department.    QUESTIONS?:  Please feel free to call your physician or the hospital  if you have any questions, and they will be happy to assist you.       Pending Results     No orders found from 10/23/2017 to 10/26/2017.            Admission Information     Date & Time Provider Department Dept. Phone    10/25/2017 Nghia Hernandez,  [facility] Preop/Phase [phone]      Your Vitals Were     Blood Pressure Temperature Respirations Height Weight Pulse Oximetry    109/77 96.9  F (36.1  C) (Oral) 16 1.626 m (5' 4\") 63.5 kg (140 lb) 94%    BMI (Body Mass Index)                   24.03 kg/m2           [employer] Information     [employer] gives you secure access to your electronic health record. If you see a primary care provider, you can also send messages to your care team and make appointments. If you have questions, please call your primary care clinic.  If you do not have a primary care provider, please call [phone] and they will assist you.        Care EveryWhere ID     This is your Care EveryWhere ID. This could be used by other organizations to access your [facility] medical records  [id number]        Equal Access to Services     BRAD PIERCE : Hadii tonia Durán, jonathan stevenson, frankie su, ti deluca. So [facility] [phone].    ATENCIÓN: Si habla español, tiene a marx disposición servicios gratuitos de asistencia lingüística. Llame al [phone].    We comply with applicable federal civil rights laws and [place] laws. We do not discriminate on the basis of race, color, national origin, age, disability, sex, sexual orientation, or gender identity.               Review of your medicines      START taking        Dose / Directions    HYDROcodone-acetaminophen 5-325 MG per tablet   Commonly known as:  NORCO   Used for:  S/P biopsy        Dose:  1-2 tablet   Take 1-2 tablets by mouth every 6 hours as needed for other (Moderate to " Severe Pain)   Quantity:  10 tablet   Refills:  0         CONTINUE these medicines which have NOT CHANGED        Dose / Directions    aspirin 81 MG tablet        Dose:  81 mg   Take 81 mg by mouth daily   Refills:  0       atorvastatin 80 MG tablet   Commonly known as:  LIPITOR   Used for:  Hyperlipidemia with target LDL less than 130        Dose:  80 mg   Take 1 tablet (80 mg) by mouth daily   Quantity:  90 tablet   Refills:  1       CALCIUM 500 PO        Take by mouth daily   Refills:  0       cinnamon 500 MG Caps        One daily   Refills:  0       CO-ENZYME Q-10 PO        Take by mouth daily   Refills:  0       fish Oil 1200 MG capsule        Take by mouth daily   Refills:  0       GLUCOSAMINE-CHONDROITIN PO        Take  by mouth. GLUCOSAMINE HCL/CHONDR SUA NA One daily   Refills:  0       levothyroxine 50 MCG tablet   Commonly known as:  SYNTHROID/LEVOTHROID   Used for:  Hypothyroidism due to acquired atrophy of thyroid        Dose:  50 mcg   Take 1 tablet (50 mcg) by mouth daily   Quantity:  90 tablet   Refills:  3       lisinopril 5 MG tablet   Commonly known as:  PRINIVIL/ZESTRIL   Used for:  Benign essential hypertension        Dose:  5 mg   Take 1 tablet (5 mg) by mouth daily   Quantity:  90 tablet   Refills:  3       MULTIVITAMIN PO        Refills:  0       Turmeric Curcumin 500 MG Caps        Dose:  1 capsule   Take 1 capsule by mouth daily   Refills:  0            Where to get your medicines      Some of these will need a paper prescription and others can be bought over the counter. Ask your nurse if you have questions.     Bring a paper prescription for each of these medications     HYDROcodone-acetaminophen 5-325 MG per tablet                Protect others around you: Learn how to safely use, store and throw away your medicines at www.disposemymeds.org.             Medication List: This is a list of all your medications and when to take them. Check marks below indicate your daily home schedule. Keep  this list as a reference.      Medications           Morning Afternoon Evening Bedtime As Needed    aspirin 81 MG tablet   Take 81 mg by mouth daily                                atorvastatin 80 MG tablet   Commonly known as:  LIPITOR   Take 1 tablet (80 mg) by mouth daily                                CALCIUM 500 PO   Take by mouth daily                                cinnamon 500 MG Caps   One daily                                CO-ENZYME Q-10 PO   Take by mouth daily                                fish Oil 1200 MG capsule   Take by mouth daily                                GLUCOSAMINE-CHONDROITIN PO   Take  by mouth. GLUCOSAMINE HCL/CHONDR SUA NA One daily                                HYDROcodone-acetaminophen 5-325 MG per tablet   Commonly known as:  NORCO   Take 1-2 tablets by mouth every 6 hours as needed for other (Moderate to Severe Pain)                                levothyroxine 50 MCG tablet   Commonly known as:  SYNTHROID/LEVOTHROID   Take 1 tablet (50 mcg) by mouth daily                                lisinopril 5 MG tablet   Commonly known as:  PRINIVIL/ZESTRIL   Take 1 tablet (5 mg) by mouth daily                                MULTIVITAMIN PO                                Turmeric Curcumin 500 MG Caps   Take 1 capsule by mouth daily

## 2017-10-26 NOTE — ANESTHESIA POSTPROCEDURE EVALUATION
Patient: Franca Angeles    Procedure(s):  EXCISIONAL BIOPSY RIGHT FOREARM TIMES 2 - Wound Class: II-Clean Contaminated    Diagnosis:GROWING PAINFUL MASSES(RT FOREARM)  Diagnosis Additional Information: No value filed.    Anesthesia Type:  MAC    Note:  Anesthesia Post Evaluation    Patient location during evaluation: Phase 2 and Bedside  Patient participation: Able to fully participate in evaluation  Level of consciousness: awake and alert  Pain management: adequate  Airway patency: patent  Cardiovascular status: acceptable  Respiratory status: acceptable  Hydration status: stable  PONV: none     Anesthetic complications: None          Last vitals:  Vitals:    10/25/17 1405 10/25/17 1410 10/25/17 1415   BP: 123/78 111/72    Resp:      Temp:      SpO2: 95% 94% 95%         Electronically Signed By: Jorge Watkins MD  October 26, 2017  6:27 AM

## 2017-10-27 ENCOUNTER — TELEPHONE (OUTPATIENT)
Dept: SURGERY | Facility: OTHER | Age: 72
End: 2017-10-27

## 2017-10-27 LAB — COPATH REPORT: NORMAL

## 2017-10-31 ENCOUNTER — ALLIED HEALTH/NURSE VISIT (OUTPATIENT)
Dept: FAMILY MEDICINE | Facility: OTHER | Age: 72
End: 2017-10-31
Attending: SURGERY
Payer: MEDICARE

## 2017-10-31 DIAGNOSIS — Z51.89 VISIT FOR WOUND CHECK: Primary | ICD-10-CM

## 2017-10-31 NOTE — MR AVS SNAPSHOT
After Visit Summary   10/31/2017    Franca Angeles    MRN: [id number]           Patient Information     Date Of Birth          1945        Visit Information        Provider Department      10/31/2017 2:45 PM [facility] NURSE [facility]        Today's Diagnoses     Visit for wound check    -  1      Care Instructions    Thank you for allowing Dr. Hernandez and our surgical team to participate in your care. Please call with any scheduling questions or concerns to [facility] at [phone] or for nursing questions Zayra [phone].            Follow-ups after your visit        Your next 10 appointments already scheduled     Oct 31, 2017  2:45 PM CDT   (Arrive by 2:30 PM)   Nurse Only with [facility] NURSE   [facility] ([facility] )    [address]  [facility] [address]   [phone]            Jan 08, 2018 10:00 AM CST   (Arrive by 9:45 AM)   SHORT with Brittney Coe NP   [facility] ([facility] )    [address]  [place] [address]   [phone]            Aug 15, 2018 10:00 AM CDT   (Arrive by 9:45 AM)   PHYSICAL with Khloe Lei MD   [facility] ([facility] )    [address]  [facility] [address] [phone]              Who to contact     If you have questions or need follow up information about today's clinic visit or your schedule please contact [facility] directly at [phone].  Normal or non-critical lab and imaging results will be communicated to you by MyChart, letter or phone within 4 business days after the clinic has received the results. If you do not hear from us within 7 days, please contact the clinic through MyChart or phone. If you have a critical or abnormal lab result, we will notify you by phone as soon as possible.  Submit refill requests through [employer] or call your pharmacy and they will forward  the refill request to us. Please allow 3 business days for your refill to be completed.          Additional Information About Your Visit        [employer] Information     [employer] gives you secure access to your electronic health record. If you see a primary care provider, you can also send messages to your care team and make appointments. If you have questions, please call your primary care clinic.  If you do not have a primary care provider, please call [phone] and they will assist you.        Care EveryWhere ID     This is your Care EveryWhere ID. This could be used by other organizations to access your [facility] medical records  [id number]         Blood Pressure from Last 3 Encounters:   10/25/17 111/72   09/14/17 122/72   08/24/17 125/75    Weight from Last 3 Encounters:   10/25/17 140 lb (63.5 kg)   09/14/17 141 lb (64 kg)   08/24/17 140 lb (63.5 kg)              Today, you had the following     No orders found for display       Primary Care Provider Office Phone # Fax #    Brittney Coe [phone] [address] [place] [address]        Equal Access to Services     SAMANTHA PIERCE : Hadii aad ku hadasho Soomaali, waaxda luqadaha, qaybta kaalmada adeegyada, ti dumontin hayelsien fariba deluca. So [facility] [phone].    ATENCIÓN: Si habla español, tiene a marx disposición servicios gratuitos de asistencia lingüística. Guillermina al [phone].    We comply with applicable federal civil rights laws and [place] laws. We do not discriminate on the basis of race, color, national origin, age, disability, sex, sexual orientation, or gender identity.            Thank you!     Thank you for choosing [facility]  for your care. Our goal is always to provide you with excellent care. Hearing back from our patients is one way we can continue to improve our services. Please take a few minutes to complete the written survey that you may receive in the mail after  your visit with us. Thank you!             Your Updated Medication List - Protect others around you: Learn how to safely use, store and throw away your medicines at www.disposemymeds.org.          This list is accurate as of: 10/31/17  2:33 PM.  Always use your most recent med list.                   Brand Name Dispense Instructions for use Diagnosis    aspirin 81 MG tablet      Take 81 mg by mouth daily        atorvastatin 80 MG tablet    LIPITOR    90 tablet    Take 1 tablet (80 mg) by mouth daily    Hyperlipidemia with target LDL less than 130       CALCIUM 500 PO      Take by mouth daily        cinnamon 500 MG Caps      One daily        CO-ENZYME Q-10 PO      Take by mouth daily        fish Oil 1200 MG capsule      Take by mouth daily        GLUCOSAMINE-CHONDROITIN PO      Take  by mouth. GLUCOSAMINE HCL/CHONDR SUA NA One daily        HYDROcodone-acetaminophen 5-325 MG per tablet    NORCO    10 tablet    Take 1-2 tablets by mouth every 6 hours as needed for other (Moderate to Severe Pain)    S/P biopsy       levothyroxine 50 MCG tablet    SYNTHROID/LEVOTHROID    90 tablet    Take 1 tablet (50 mcg) by mouth daily    Hypothyroidism due to acquired atrophy of thyroid       lisinopril 5 MG tablet    PRINIVIL/ZESTRIL    90 tablet    Take 1 tablet (5 mg) by mouth daily    Benign essential hypertension       MULTIVITAMIN PO           Turmeric Curcumin 500 MG Caps      Take 1 capsule by mouth daily

## 2017-10-31 NOTE — PATIENT INSTRUCTIONS
Thank you for allowing Dr. Hernandez and our surgical team to participate in your care. Please call with any scheduling questions or concerns to [facility] at [phone] or for nursing questions Zayra [phone].

## 2017-12-11 ENCOUNTER — OFFICE VISIT (OUTPATIENT)
Dept: FAMILY MEDICINE | Facility: OTHER | Age: 72
End: 2017-12-11
Attending: NURSE PRACTITIONER
Payer: COMMERCIAL

## 2017-12-11 VITALS
RESPIRATION RATE: 14 BRPM | BODY MASS INDEX: 23.73 KG/M2 | HEART RATE: 60 BPM | SYSTOLIC BLOOD PRESSURE: 122 MMHG | OXYGEN SATURATION: 98 % | DIASTOLIC BLOOD PRESSURE: 72 MMHG | HEIGHT: 64 IN | WEIGHT: 139 LBS | TEMPERATURE: 98 F

## 2017-12-11 DIAGNOSIS — E78.5 HYPERLIPIDEMIA WITH TARGET LDL LESS THAN 130: ICD-10-CM

## 2017-12-11 DIAGNOSIS — J01.00 ACUTE MAXILLARY SINUSITIS, RECURRENCE NOT SPECIFIED: Primary | ICD-10-CM

## 2017-12-11 PROCEDURE — 99212 OFFICE O/P EST SF 10 MIN: CPT

## 2017-12-11 PROCEDURE — 99214 OFFICE O/P EST MOD 30 MIN: CPT | Performed by: NURSE PRACTITIONER

## 2017-12-11 RX ORDER — FLUTICASONE PROPIONATE 50 MCG
1-2 SPRAY, SUSPENSION (ML) NASAL DAILY
Qty: 16 G | Refills: 0 | Status: SHIPPED | OUTPATIENT
Start: 2017-12-11 | End: 2018-01-08

## 2017-12-11 RX ORDER — ATORVASTATIN CALCIUM 40 MG/1
40 TABLET, FILM COATED ORAL DAILY
Qty: 90 TABLET | Refills: 3 | COMMUNITY
Start: 2017-12-11 | End: 2018-04-06

## 2017-12-11 ASSESSMENT — ANXIETY QUESTIONNAIRES
GAD7 TOTAL SCORE: 0
5. BEING SO RESTLESS THAT IT IS HARD TO SIT STILL: NOT AT ALL
2. NOT BEING ABLE TO STOP OR CONTROL WORRYING: NOT AT ALL
6. BECOMING EASILY ANNOYED OR IRRITABLE: NOT AT ALL
1. FEELING NERVOUS, ANXIOUS, OR ON EDGE: NOT AT ALL
3. WORRYING TOO MUCH ABOUT DIFFERENT THINGS: NOT AT ALL
7. FEELING AFRAID AS IF SOMETHING AWFUL MIGHT HAPPEN: NOT AT ALL

## 2017-12-11 ASSESSMENT — PATIENT HEALTH QUESTIONNAIRE - PHQ9
5. POOR APPETITE OR OVEREATING: NOT AT ALL
SUM OF ALL RESPONSES TO PHQ QUESTIONS 1-9: 1

## 2017-12-11 NOTE — PROGRESS NOTES
SUBJECTIVE:   Franca Angeles is a 72 year old female who presents to clinic today for the following health issues:      Acute Illness   Acute illness concerns: ear pain, cough, sneeze  Onset: 1 week ago    Fever: no     Chills/Sweats: YES    Headache (location?): YES    Sinus Pressure:YES    Conjunctivitis:  YES: bilateral    Ear Pain: YES: bilateral    Rhinorrhea: YES    Congestion: YES    Sore Throat: YES     Cough: YES    Wheeze: no     Decreased Appetite: YES    Nausea: no     Vomiting: no     Diarrhea:  no     Dysuria/Freq.: no     Fatigue/Achiness: YES    Sick/Strep Exposure: YES- URI, no strep     Therapies Tried and outcome: Airbornne, cough ease, honey, cough medicine, ibuprofen      I increased her lipitor to 80mg last visit due to increased cardiovascular risk.  She did not increase as she was worried about side effects.  Her mom developed muscle weakness and she is afraid the same will happen to her.  She is taking 40mg daily.    The 10-year ASCVD risk score (Otis KIAN Jr, et al., 2013) is: 15.6%    Values used to calculate the score:      Age: 72 years      Sex: Female      Is Non- : No      Diabetic: No      Tobacco smoker: No      Systolic Blood Pressure: 122 mmHg      Is BP treated: Yes      HDL Cholesterol: 55 mg/dL      Total Cholesterol: 191 mg/dL      Problem list and histories reviewed & adjusted, as indicated.  Additional history: as documented    Patient Active Problem List   Diagnosis     Menopause     Joint pain     Disorder of bone and cartilage     Advanced care planning/counseling discussion     Hyperlipidemia with target LDL less than 130     Routine general medical examination at a health care facility (ANNUAL)     Colon cancer screening     Hypothyroidism due to acquired atrophy of thyroid     Benign essential hypertension     Past Surgical History:   Procedure Laterality Date     ARTHRODESIS KNEE  010313    left knee, paritla medial meniscectomy, debridement  medial femoral condyle and patellofemoral debridement     COLONOSCOPY  2004    nml     COLONOSCOPY  7/23/2014    Procedure: COLONOSCOPY;  Surgeon: Nghia Hernandez DO;  Location: [facility]     CYSTOSCOPY      micro hematuria neg     DILATION AND CURETTAGE       EXCISE LESION UPPER EXTREMITY Right 10/25/2017    Procedure: EXCISE LESION UPPER EXTREMITY;  EXCISIONAL BIOPSY RIGHT FOREARM TIMES 2;  Surgeon: Nghia Hernandez DO;  Location: [facility] OR     tubal sterilization       uterine fibroids       wrist fx RT         Social History   Substance Use Topics     Smoking status: Never Smoker     Smokeless tobacco: Never Used     Alcohol use 0.0 oz/week      Comment: occasionally     Family History   Problem Relation Age of Onset     C.A.D. Mother      Other - See Comments Mother      high platelets     Hypertension Mother      C.A.D. Paternal Aunt      DIABETES Other      aunt     Cardiovascular Father      cardiovascular disease     Colon Cancer Other      family history-paternal side     CEREBROVASCULAR DISEASE Maternal Grandmother      CEREBROVASCULAR DISEASE Maternal Grandfather          Current Outpatient Prescriptions   Medication Sig Dispense Refill     HYDROcodone-acetaminophen (NORCO) 5-325 MG per tablet Take 1-2 tablets by mouth every 6 hours as needed for other (Moderate to Severe Pain) 10 tablet 0     atorvastatin (LIPITOR) 80 MG tablet Take 1 tablet (80 mg) by mouth daily (Patient taking differently: Take 40 mg by mouth daily ) 90 tablet 1     lisinopril (PRINIVIL/ZESTRIL) 5 MG tablet Take 1 tablet (5 mg) by mouth daily 90 tablet 3     levothyroxine (SYNTHROID/LEVOTHROID) 50 MCG tablet Take 1 tablet (50 mcg) by mouth daily 90 tablet 3     Turmeric Curcumin 500 MG CAPS Take 1 capsule by mouth daily       aspirin 81 MG tablet Take 81 mg by mouth daily       CO-ENZYME Q-10 PO Take by mouth daily       Multiple Vitamins-Minerals (MULTIVITAMIN OR)        Omega-3 Fatty Acids (FISH OIL) 1200 MG CAPS Take by mouth  "daily       Calcium-Magnesium-Vitamin D (CALCIUM 500 PO) Take by mouth daily       GLUCOSAMINE-CHONDROITIN PO Take  by mouth. GLUCOSAMINE HCL/CHONDR SUA NA  One daily       cinnamon 500 MG CAPS One daily       Allergies   Allergen Reactions     Pseudoephedrine Hcl Other (See Comments)     Heart racing  Sudafed     Recent Labs   Lab Test  09/13/17   0903  06/13/17   0811  03/09/17   0913   05/13/15   1026   A1C   --    --    --    --   5.7   LDL  110*  112*  96   < >  134*   HDL  55  58  64   < >  59   TRIG  129  139  79   < >  170*   ALT  34  32  34   < >   --    CR  0.93  0.90  0.96   < >   --    GFRESTIMATED  59*  62  57*   < >   --    GFRESTBLACK  71  74  69   < >   --    POTASSIUM  4.2  4.2  4.1   < >   --    TSH  3.40  4.02*  2.51   < >  3.75    < > = values in this interval not displayed.      BP Readings from Last 3 Encounters:   12/11/17 122/72   10/25/17 111/72   09/14/17 122/72    Wt Readings from Last 3 Encounters:   12/11/17 139 lb (63 kg)   10/25/17 140 lb (63.5 kg)   09/14/17 141 lb (64 kg)              Reviewed and updated as needed this visit by clinical staffTobacco  Allergies  Meds       Reviewed and updated as needed this visit by Provider         ROS:  Constitutional, HEENT, cardiovascular, pulmonary, gi and gu systems are negative, except as otherwise noted.      OBJECTIVE:   /72 (BP Location: Left arm, Patient Position: Sitting, Cuff Size: Adult Regular)  Pulse 60  Temp 98  F (36.7  C) (Tympanic)  Resp 14  Ht 5' 4\" (1.626 m)  Wt 139 lb (63 kg)  SpO2 98%  BMI 23.86 kg/m2  Body mass index is 23.86 kg/(m^2).  GENERAL: healthy, alert and no distress  EYES: Eyes grossly normal to inspection, PERRL and conjunctivae and sclerae normal  HENT: normal cephalic/atraumatic, right ear: occluded with wax, unable to visualize TM, left ear: erythematous and bulging membrane, nose and mouth without ulcers or lesions, nasal mucosa edematous , rhinorrhea yellow, thick and purulent, oral mucous " membranes moist with post nasal drainage; sinuses: tenderness of maxillary sinus  NECK: cervical adenopathy, no asymmetry, masses, or scars and thyroid normal to palpation  RESP: lungs clear to auscultation - no rales, rhonchi or wheezes  CV: regular rate and rhythm, normal S1 S2, no S3 or S4, no murmur, click or rub, no peripheral edema and peripheral pulses strong  MS: no gross musculoskeletal defects noted, no edema  PSYCH: mentation appears normal, affect normal/bright        ASSESSMENT/PLAN:       1. Acute maxillary sinusitis, recurrence not specified  symptomatic  - amoxicillin-clavulanate (AUGMENTIN) 875-125 MG per tablet; Take 1 tablet by mouth 2 times daily for 14 days  Dispense: 28 tablet; Refill: 0  - fluticasone (FLONASE) 50 MCG/ACT spray; Spray 1-2 sprays into both nostrils daily  Dispense: 16 g; Refill: 0    2. Hyperlipidemia with target LDL less than 130  chronic  - continue atorvastatin (LIPITOR) 40 MG tablet; Take 1 tablet (40 mg) by mouth daily  Dispense: 90 tablet; Refill: 3      FUTURE APPOINTMENTS:       - Follow-up visit if no improvement or any worsening    Brittney Coe, NP  [facility]

## 2017-12-11 NOTE — PATIENT INSTRUCTIONS
ASSESSMENT/PLAN:       1. Acute maxillary sinusitis, recurrence not specified  symptomatic  - amoxicillin-clavulanate (AUGMENTIN) 875-125 MG per tablet; Take 1 tablet by mouth 2 times daily for 14 days  Dispense: 28 tablet; Refill: 0  - fluticasone (FLONASE) 50 MCG/ACT spray; Spray 1-2 sprays into both nostrils daily  Dispense: 16 g; Refill: 0    2. Hyperlipidemia with target LDL less than 130  chronic  - continue atorvastatin (LIPITOR) 40 MG tablet; Take 1 tablet (40 mg) by mouth daily  Dispense: 90 tablet; Refill: 3      FUTURE APPOINTMENTS:       - Follow-up visit if no improvement or any worsening    Brittney Coe NP  [facility]

## 2017-12-11 NOTE — MR AVS SNAPSHOT
After Visit Summary   12/11/2017    Franca Angeles    MRN: [id number]           Patient Information     Date Of Birth          1945        Visit Information        Provider Department      12/11/2017 9:45 AM Brittney Coe NP [facility]        Today's Diagnoses     Acute maxillary sinusitis, recurrence not specified    -  1    Hyperlipidemia with target LDL less than 130          Care Instructions          ASSESSMENT/PLAN:       1. Acute maxillary sinusitis, recurrence not specified  symptomatic  - amoxicillin-clavulanate (AUGMENTIN) 875-125 MG per tablet; Take 1 tablet by mouth 2 times daily for 14 days  Dispense: 28 tablet; Refill: 0  - fluticasone (FLONASE) 50 MCG/ACT spray; Spray 1-2 sprays into both nostrils daily  Dispense: 16 g; Refill: 0    2. Hyperlipidemia with target LDL less than 130  chronic  - continue atorvastatin (LIPITOR) 40 MG tablet; Take 1 tablet (40 mg) by mouth daily  Dispense: 90 tablet; Refill: 3      FUTURE APPOINTMENTS:       - Follow-up visit if no improvement or any worsening    Brittney Coe NP  [facility]            Follow-ups after your visit        Your next 10 appointments already scheduled     Jan 08, 2018 10:00 AM CST   (Arrive by 9:45 AM)   SHORT with Brittney Coe NP   [facility] ([facility] )    [address] [facility] [address]   [phone]            Aug 15, 2018 10:00 AM CDT   (Arrive by 9:45 AM)   PHYSICAL with Khloe Lei MD   [facility] [place] ([facility] [place] )    [facility] [place] [address]   [phone]              Who to contact     If you have questions or need follow up information about today's clinic visit or your schedule please contact [facility] directly at [phone].  Normal or non-critical lab and imaging results will be communicated to you by MyChart, letter or phone  "within 4 business days after the clinic has received the results. If you do not hear from us within 7 days, please contact the clinic through [employer] or phone. If you have a critical or abnormal lab result, we will notify you by phone as soon as possible.  Submit refill requests through [employer] or call your pharmacy and they will forward the refill request to us. Please allow 3 business days for your refill to be completed.          Additional Information About Your Visit        [employer] Information     [employer] gives you secure access to your electronic health record. If you see a primary care provider, you can also send messages to your care team and make appointments. If you have questions, please call your primary care clinic.  If you do not have a primary care provider, please call [phone] and they will assist you.        Care EveryWhere ID     This is your Care EveryWhere ID. This could be used by other organizations to access your [place] medical records  [id number]        Your Vitals Were     Pulse Temperature Respirations Height Pulse Oximetry BMI (Body Mass Index)    60 98  F (36.7  C) (Tympanic) 14 5' 4\" (1.626 m) 98% 23.86 kg/m2       Blood Pressure from Last 3 Encounters:   12/11/17 122/72   10/25/17 111/72   09/14/17 122/72    Weight from Last 3 Encounters:   12/11/17 139 lb (63 kg)   10/25/17 140 lb (63.5 kg)   09/14/17 141 lb (64 kg)              Today, you had the following     No orders found for display         Today's Medication Changes          These changes are accurate as of: 12/11/17  9:57 AM.  If you have any questions, ask your nurse or doctor.               Start taking these medicines.        Dose/Directions    amoxicillin-clavulanate 875-125 MG per tablet   Commonly known as:  AUGMENTIN   Used for:  Acute maxillary sinusitis, recurrence not specified   Started by:  Brittney Coe NP        Dose:  1 tablet   Take 1 tablet by mouth 2 times daily for 14 days   Quantity:  28 " tablet   Refills:  0       fluticasone 50 MCG/ACT spray   Commonly known as:  FLONASE   Used for:  Acute maxillary sinusitis, recurrence not specified   Started by:  Brittney Coe NP        Dose:  1-2 spray   Spray 1-2 sprays into both nostrils daily   Quantity:  16 g   Refills:  0         These medicines have changed or have updated prescriptions.        Dose/Directions    atorvastatin 40 MG tablet   Commonly known as:  LIPITOR   This may have changed:    - medication strength  - how much to take   Used for:  Hyperlipidemia with target LDL less than 130   Changed by:  Brittney Coe NP        Dose:  40 mg   Take 1 tablet (40 mg) by mouth daily   Quantity:  90 tablet   Refills:  3            Where to get your medicines      These medications were sent to [facility] [address] [place] [address]  AT [facility] OF [facility] [address]  [address] [facility] [address]     Phone:  [phone]     amoxicillin-clavulanate 875-125 MG per tablet    fluticasone 50 MCG/ACT spray                Primary Care Provider Office Phone # Fax #    Brittney Coe [phone] [address] [facility] [address]        Equal Access to Services     BRAD PIERCE AH: Hadii tonia buck hadasho Soomaali, waaxda luqadaha, qaybta kaalmada adeegyada, waxay idalmis deluca. So [facility] [phone].    ATENCIÓN: Si habla español, tiene a marx disposición servicios gratuitos de asistencia lingüística. Llame al [phone].    We comply with applicable federal civil rights laws and [place] laws. We do not discriminate on the basis of race, color, national origin, age, disability, sex, sexual orientation, or gender identity.            Thank you!     Thank you for choosing [facility]  for your care. Our goal is always to provide you with excellent care. Hearing back from our patients is one way we can continue to improve our services.  Please take a few minutes to complete the written survey that you may receive in the mail after your visit with us. Thank you!             Your Updated Medication List - Protect others around you: Learn how to safely use, store and throw away your medicines at www.disposemymeds.org.          This list is accurate as of: 12/11/17  9:57 AM.  Always use your most recent med list.                   Brand Name Dispense Instructions for use Diagnosis    amoxicillin-clavulanate 875-125 MG per tablet    AUGMENTIN    28 tablet    Take 1 tablet by mouth 2 times daily for 14 days    Acute maxillary sinusitis, recurrence not specified       aspirin 81 MG tablet      Take 81 mg by mouth daily        atorvastatin 40 MG tablet    LIPITOR    90 tablet    Take 1 tablet (40 mg) by mouth daily    Hyperlipidemia with target LDL less than 130       CALCIUM 500 PO      Take by mouth daily        cinnamon 500 MG Caps      One daily        CO-ENZYME Q-10 PO      Take by mouth daily        fish Oil 1200 MG capsule      Take by mouth daily        fluticasone 50 MCG/ACT spray    FLONASE    16 g    Spray 1-2 sprays into both nostrils daily    Acute maxillary sinusitis, recurrence not specified       GLUCOSAMINE-CHONDROITIN PO      Take  by mouth. GLUCOSAMINE HCL/CHONDR SUA NA One daily        HYDROcodone-acetaminophen 5-325 MG per tablet    NORCO    10 tablet    Take 1-2 tablets by mouth every 6 hours as needed for other (Moderate to Severe Pain)    S/P biopsy       levothyroxine 50 MCG tablet    SYNTHROID/LEVOTHROID    90 tablet    Take 1 tablet (50 mcg) by mouth daily    Hypothyroidism due to acquired atrophy of thyroid       lisinopril 5 MG tablet    PRINIVIL/ZESTRIL    90 tablet    Take 1 tablet (5 mg) by mouth daily    Benign essential hypertension       MULTIVITAMIN PO           Turmeric Curcumin 500 MG Caps      Take 1 capsule by mouth daily

## 2017-12-12 ASSESSMENT — ANXIETY QUESTIONNAIRES: GAD7 TOTAL SCORE: 0

## 2017-12-24 ENCOUNTER — TRANSFERRED RECORDS (OUTPATIENT)
Dept: HEALTH INFORMATION MANAGEMENT | Facility: HOSPITAL | Age: 72
End: 2017-12-24

## 2018-01-03 ENCOUNTER — ALLIED HEALTH/NURSE VISIT (OUTPATIENT)
Dept: FAMILY MEDICINE | Facility: OTHER | Age: 73
End: 2018-01-03
Attending: NURSE PRACTITIONER
Payer: MEDICARE

## 2018-01-03 DIAGNOSIS — I10 BENIGN ESSENTIAL HYPERTENSION: ICD-10-CM

## 2018-01-03 DIAGNOSIS — Z48.02 VISIT FOR SUTURE REMOVAL: Primary | ICD-10-CM

## 2018-01-03 DIAGNOSIS — E78.5 HYPERLIPIDEMIA WITH TARGET LDL LESS THAN 130: ICD-10-CM

## 2018-01-03 DIAGNOSIS — Z79.899 ON STATIN THERAPY: ICD-10-CM

## 2018-01-03 LAB
ALBUMIN SERPL-MCNC: 4 G/DL (ref 3.4–5)
ALP SERPL-CCNC: 85 U/L (ref 40–150)
ALT SERPL W P-5'-P-CCNC: 47 U/L (ref 0–50)
ANION GAP SERPL CALCULATED.3IONS-SCNC: 6 MMOL/L (ref 3–14)
AST SERPL W P-5'-P-CCNC: 39 U/L (ref 0–45)
BILIRUB DIRECT SERPL-MCNC: 0.1 MG/DL (ref 0–0.2)
BILIRUB SERPL-MCNC: 0.5 MG/DL (ref 0.2–1.3)
BUN SERPL-MCNC: 17 MG/DL (ref 7–30)
CALCIUM SERPL-MCNC: 9.5 MG/DL (ref 8.5–10.1)
CHLORIDE SERPL-SCNC: 107 MMOL/L (ref 94–109)
CHOLEST SERPL-MCNC: 213 MG/DL
CO2 SERPL-SCNC: 27 MMOL/L (ref 20–32)
CREAT SERPL-MCNC: 0.88 MG/DL (ref 0.52–1.04)
GFR SERPL CREATININE-BSD FRML MDRD: 63 ML/MIN/1.7M2
GLUCOSE SERPL-MCNC: 93 MG/DL (ref 70–99)
HDLC SERPL-MCNC: 59 MG/DL
LDLC SERPL CALC-MCNC: 118 MG/DL
NONHDLC SERPL-MCNC: 154 MG/DL
POTASSIUM SERPL-SCNC: 3.9 MMOL/L (ref 3.4–5.3)
PROT SERPL-MCNC: 7.5 G/DL (ref 6.8–8.8)
SODIUM SERPL-SCNC: 140 MMOL/L (ref 133–144)
T4 FREE SERPL-MCNC: 0.89 NG/DL (ref 0.76–1.46)
TRIGL SERPL-MCNC: 180 MG/DL
TSH SERPL DL<=0.005 MIU/L-ACNC: 5.16 MU/L (ref 0.4–4)

## 2018-01-03 PROCEDURE — 80076 HEPATIC FUNCTION PANEL: CPT | Mod: ZL | Performed by: NURSE PRACTITIONER

## 2018-01-03 PROCEDURE — 84439 ASSAY OF FREE THYROXINE: CPT | Mod: ZL | Performed by: NURSE PRACTITIONER

## 2018-01-03 PROCEDURE — 84443 ASSAY THYROID STIM HORMONE: CPT | Mod: ZL | Performed by: NURSE PRACTITIONER

## 2018-01-03 PROCEDURE — 36415 COLL VENOUS BLD VENIPUNCTURE: CPT | Mod: ZL | Performed by: NURSE PRACTITIONER

## 2018-01-03 PROCEDURE — 80048 BASIC METABOLIC PNL TOTAL CA: CPT | Mod: ZL | Performed by: NURSE PRACTITIONER

## 2018-01-03 PROCEDURE — 80061 LIPID PANEL: CPT | Mod: ZL | Performed by: NURSE PRACTITIONER

## 2018-01-03 NOTE — MR AVS SNAPSHOT
After Visit Summary   1/3/2018    Franca Angeles    MRN: [id number]           Patient Information     Date Of Birth          1945        Visit Information        Provider Department      1/3/2018 9:30 AM [facility] NURSE [facility]        Today's Diagnoses     Visit for suture removal    -  1       Follow-ups after your visit        Your next 10 appointments already scheduled     Jan 08, 2018 10:00 AM CST   (Arrive by 9:45 AM)   SHORT with Brittney Coe NP   [facility] ([facility] )    [address]  [facility] [address]   [phone]            Aug 15, 2018 10:00 AM CDT   (Arrive by 9:45 AM)   PHYSICAL with Khloe Lei MD   [facility] [place] ([facility] )    [address]  [place] [address]   [phone]              Who to contact     If you have questions or need follow up information about today's clinic visit or your schedule please contact [facility] directly at [phone].  Normal or non-critical lab and imaging results will be communicated to you by [employer], letter or phone within 4 business days after the clinic has received the results. If you do not hear from us within 7 days, please contact the clinic through [employer] or phone. If you have a critical or abnormal lab result, we will notify you by phone as soon as possible.  Submit refill requests through [employer] or call your pharmacy and they will forward the refill request to us. Please allow 3 business days for your refill to be completed.          Additional Information About Your Visit        [employer] Information     [employer] gives you secure access to your electronic health record. If you see a primary care provider, you can also send messages to your care team and make appointments. If you have questions, please call your primary care clinic.  If you do not have a primary care provider, please call  [phone] and they will assist you.        Care EveryWhere ID     This is your Care EveryWhere ID. This could be used by other organizations to access your [place] medical records  [id number]         Blood Pressure from Last 3 Encounters:   12/11/17 122/72   10/25/17 111/72   09/14/17 122/72    Weight from Last 3 Encounters:   12/11/17 139 lb (63 kg)   10/25/17 140 lb (63.5 kg)   09/14/17 141 lb (64 kg)              Today, you had the following     No orders found for display       Primary Care Provider Office Phone # Fax #    Brittney CASTILLO NEVAEH Coe [phone] [address] [facility] [address]        Equal Access to Services     BRAD PIERCE : Hadii aad ku hadasho Soomaali, waaxda luqadaha, qaybta kaalmada adeegyada, ti hickey . So [facility] [phone].    ATENCIÓN: Si habla español, tiene a marx disposición servicios gratuitos de asistencia lingüística. Llame al [phone].    We comply with applicable federal civil rights laws and [place] laws. We do not discriminate on the basis of race, color, national origin, age, disability, sex, sexual orientation, or gender identity.            Thank you!     Thank you for choosing [facility]  for your care. Our goal is always to provide you with excellent care. Hearing back from our patients is one way we can continue to improve our services. Please take a few minutes to complete the written survey that you may receive in the mail after your visit with us. Thank you!             Your Updated Medication List - Protect others around you: Learn how to safely use, store and throw away your medicines at www.disposemymeds.org.          This list is accurate as of: 1/3/18 11:59 PM.  Always use your most recent med list.                   Brand Name Dispense Instructions for use Diagnosis    aspirin 81 MG tablet      Take 81 mg by mouth daily        atorvastatin 40 MG tablet    LIPITOR    90  tablet    Take 1 tablet (40 mg) by mouth daily    Hyperlipidemia with target LDL less than 130       CALCIUM 500 PO      Take by mouth daily        cinnamon 500 MG Caps      One daily        CO-ENZYME Q-10 PO      Take by mouth daily        fish Oil 1200 MG capsule      Take by mouth daily        fluticasone 50 MCG/ACT spray    FLONASE    16 g    Spray 1-2 sprays into both nostrils daily    Acute maxillary sinusitis, recurrence not specified       GLUCOSAMINE-CHONDROITIN PO      Take  by mouth. GLUCOSAMINE HCL/CHONDR SUA NA One daily        HYDROcodone-acetaminophen 5-325 MG per tablet    NORCO    10 tablet    Take 1-2 tablets by mouth every 6 hours as needed for other (Moderate to Severe Pain)    S/P biopsy       levothyroxine 50 MCG tablet    SYNTHROID/LEVOTHROID    90 tablet    Take 1 tablet (50 mcg) by mouth daily    Hypothyroidism due to acquired atrophy of thyroid       lisinopril 5 MG tablet    PRINIVIL/ZESTRIL    90 tablet    Take 1 tablet (5 mg) by mouth daily    Benign essential hypertension       MULTIVITAMIN PO           Turmeric Curcumin 500 MG Caps      Take 1 capsule by mouth daily

## 2018-01-03 NOTE — PROGRESS NOTES
Pt here for suture removal, placed 12-25-17 at [facility].  Right hand between 3rd and 4th finger, 3 sutures removed, no sx of infection, temp 97, , however suture line is open, no redness, or drainage, clean and dry  Unable to place steri strips due to location. , Jameson Tavera notified.    Trinity Monteiro

## 2018-01-05 RX ORDER — ATORVASTATIN CALCIUM 80 MG/1
80 TABLET, FILM COATED ORAL DAILY
Qty: 90 TABLET | Refills: 1 | Status: SHIPPED | OUTPATIENT
Start: 2018-01-05 | End: 2018-01-08

## 2018-01-08 ENCOUNTER — OFFICE VISIT (OUTPATIENT)
Dept: FAMILY MEDICINE | Facility: OTHER | Age: 73
End: 2018-01-08
Attending: NURSE PRACTITIONER
Payer: COMMERCIAL

## 2018-01-08 VITALS
RESPIRATION RATE: 18 BRPM | OXYGEN SATURATION: 96 % | BODY MASS INDEX: 23.87 KG/M2 | HEART RATE: 63 BPM | TEMPERATURE: 97.6 F | HEIGHT: 64 IN | WEIGHT: 139.8 LBS | DIASTOLIC BLOOD PRESSURE: 78 MMHG | SYSTOLIC BLOOD PRESSURE: 124 MMHG

## 2018-01-08 DIAGNOSIS — Z79.899 ON STATIN THERAPY: ICD-10-CM

## 2018-01-08 DIAGNOSIS — E03.4 HYPOTHYROIDISM DUE TO ACQUIRED ATROPHY OF THYROID: ICD-10-CM

## 2018-01-08 DIAGNOSIS — I10 BENIGN ESSENTIAL HYPERTENSION: ICD-10-CM

## 2018-01-08 DIAGNOSIS — E78.5 HYPERLIPIDEMIA WITH TARGET LDL LESS THAN 130: Primary | ICD-10-CM

## 2018-01-08 PROCEDURE — 99214 OFFICE O/P EST MOD 30 MIN: CPT | Performed by: NURSE PRACTITIONER

## 2018-01-08 PROCEDURE — G0463 HOSPITAL OUTPT CLINIC VISIT: HCPCS

## 2018-01-08 RX ORDER — AMPICILLIN TRIHYDRATE 250 MG
CAPSULE ORAL
COMMUNITY
Start: 2013-07-15

## 2018-01-08 RX ORDER — ATORVASTATIN CALCIUM 40 MG/1
40 TABLET, FILM COATED ORAL
COMMUNITY
End: 2018-01-08

## 2018-01-08 RX ORDER — UBIDECARENONE 100 MG
100 CAPSULE ORAL
COMMUNITY
Start: 2014-07-16

## 2018-01-08 ASSESSMENT — ANXIETY QUESTIONNAIRES
4. TROUBLE RELAXING: NOT AT ALL
2. NOT BEING ABLE TO STOP OR CONTROL WORRYING: NOT AT ALL
5. BEING SO RESTLESS THAT IT IS HARD TO SIT STILL: NOT AT ALL
1. FEELING NERVOUS, ANXIOUS, OR ON EDGE: NOT AT ALL
GAD7 TOTAL SCORE: 0
7. FEELING AFRAID AS IF SOMETHING AWFUL MIGHT HAPPEN: NOT AT ALL
3. WORRYING TOO MUCH ABOUT DIFFERENT THINGS: NOT AT ALL
6. BECOMING EASILY ANNOYED OR IRRITABLE: NOT AT ALL

## 2018-01-08 ASSESSMENT — PATIENT HEALTH QUESTIONNAIRE - PHQ9: SUM OF ALL RESPONSES TO PHQ QUESTIONS 1-9: 2

## 2018-01-08 NOTE — NURSING NOTE
"Chief Complaint   Patient presents with     Thyroid Disease     hyperlipidemia/tension       Initial /78 (BP Location: Right arm, Patient Position: Chair, Cuff Size: Adult Regular)  Pulse 63  Temp 97.6  F (36.4  C) (Tympanic)  Resp 18  Ht 5' 4\" (1.626 m)  Wt 139 lb 12.8 oz (63.4 kg)  SpO2 96%  BMI 24 kg/m2 Estimated body mass index is 24 kg/(m^2) as calculated from the following:    Height as of this encounter: 5' 4\" (1.626 m).    Weight as of this encounter: 139 lb 12.8 oz (63.4 kg).  Medication Reconciliation: complete     Loraine Peck LPN     "

## 2018-01-08 NOTE — MR AVS SNAPSHOT
After Visit Summary   1/8/2018    Franca Angeles    MRN: [id number]           Patient Information     Date Of Birth          1945        Visit Information        Provider Department      1/8/2018 10:00 AM Brittney Coe NP [facility]        Today's Diagnoses     Hyperlipidemia with target LDL less than 130    -  1    Hypothyroidism due to acquired atrophy of thyroid        Benign essential hypertension        On statin therapy          Care Instructions      ASSESSMENT/PLAN:       1. Hyperlipidemia with target LDL less than 130  Chronic, continue lipitor 40mg daily  - Lipid Profile; Future  - start garlic    2. Hypothyroidism due to acquired atrophy of thyroid  chronic  - TSH with free T4 reflex; Future    3. Benign essential hypertension  chronic  - Basic metabolic panel; Future    4. On statin therapy  - Hepatic panel; Future    FUTURE APPOINTMENTS:       - Follow-up visit in 3 months or as needed for acute concerns    Brittney Coe NP  [facility]              Follow-ups after your visit        Follow-up notes from your care team     Return in about 3 months (around 4/8/2018), or if symptoms worsen or fail to improve.      Your next 10 appointments already scheduled     Apr 06, 2018 10:00 AM CDT   (Arrive by 9:45 AM)   SHORT with Brittney Coe NP   [facility] ([facility] )    [address]  [facility] [address]   [phone]            Aug 15, 2018 10:00 AM CDT   (Arrive by 9:45 AM)   PHYSICAL with Khloe Lei MD   [facility] [place] ([facility] )    [address] [place] [address]   [phone]              Future tests that were ordered for you today     Open Future Orders        Priority Expected Expires Ordered    Lipid Profile Routine 4/8/2018 5/8/2018 1/8/2018    TSH with free T4 reflex Routine 4/8/2018 5/8/2018 1/8/2018    Basic metabolic  "panel Routine 4/8/2018 5/8/2018 1/8/2018    Hepatic panel Routine 4/8/2018 5/8/2018 1/8/2018            Who to contact     If you have questions or need follow up information about today's clinic visit or your schedule please contact [facility] directly at [phone].  Normal or non-critical lab and imaging results will be communicated to you by MyChart, letter or phone within 4 business days after the clinic has received the results. If you do not hear from us within 7 days, please contact the clinic through [employer] or phone. If you have a critical or abnormal lab result, we will notify you by phone as soon as possible.  Submit refill requests through [employer] or call your pharmacy and they will forward the refill request to us. Please allow 3 business days for your refill to be completed.          Additional Information About Your Visit        [employer] Information     [employer] gives you secure access to your electronic health record. If you see a primary care provider, you can also send messages to your care team and make appointments. If you have questions, please call your primary care clinic.  If you do not have a primary care provider, please call [phone] and they will assist you.        Care EveryWhere ID     This is your Care EveryWhere ID. This could be used by other organizations to access your [place] medical records  [id number]        Your Vitals Were     Pulse Temperature Respirations Height Pulse Oximetry BMI (Body Mass Index)    63 97.6  F (36.4  C) (Tympanic) 18 5' 4\" (1.626 m) 96% 24 kg/m2       Blood Pressure from Last 3 Encounters:   01/08/18 124/78   12/11/17 122/72   10/25/17 111/72    Weight from Last 3 Encounters:   01/08/18 139 lb 12.8 oz (63.4 kg)   12/11/17 139 lb (63 kg)   10/25/17 140 lb (63.5 kg)                 Today's Medication Changes          These changes are accurate as of: 1/8/18  1:07 PM.  If you have any questions, ask your nurse or doctor.             "   These medicines have changed or have updated prescriptions.        Dose/Directions    atorvastatin 40 MG tablet   Commonly known as:  LIPITOR   This may have changed:  Another medication with the same name was removed. Continue taking this medication, and follow the directions you see here.   Used for:  Hyperlipidemia with target LDL less than 130   Changed by:  Brittney Coe NP        Dose:  40 mg   Take 1 tablet (40 mg) by mouth daily   Quantity:  90 tablet   Refills:  3         Stop taking these medicines if you haven't already. Please contact your care team if you have questions.     fluticasone 50 MCG/ACT spray   Commonly known as:  FLONASE   Stopped by:  Brittney Coe NP           HYDROcodone-acetaminophen 5-325 MG per tablet   Commonly known as:  NORCO   Stopped by:  Brittney Coe NP                    Primary Care Provider Office Phone # Fax #    Brittney Coe [phone] [address] [facility] [address]        Equal Access to Services     [facility]: Hadii aad ku hadasho Soomaali, waaxda luqadaha, qaybta kaalmada adeegyada, waxay idiin hayelsien fariba hickey . So [facility] [phone].    ATENCIÓN: Si habla español, tiene a marx disposición servicios gratuitos de asistencia lingüística. [facility] [phone].    We comply with applicable federal civil rights laws and [place] laws. We do not discriminate on the basis of race, color, national origin, age, disability, sex, sexual orientation, or gender identity.            Thank you!     Thank you for choosing [facility]  for your care. Our goal is always to provide you with excellent care. Hearing back from our patients is one way we can continue to improve our services. Please take a few minutes to complete the written survey that you may receive in the mail after your visit with us. Thank you!             Your Updated Medication List - Protect others  around you: Learn how to safely use, store and throw away your medicines at www.disposemymeds.org.          This list is accurate as of: 1/8/18  1:07 PM.  Always use your most recent med list.                   Brand Name Dispense Instructions for use Diagnosis    aspirin 81 MG tablet      Take 81 mg by mouth daily        atorvastatin 40 MG tablet    LIPITOR    90 tablet    Take 1 tablet (40 mg) by mouth daily    Hyperlipidemia with target LDL less than 130       CALCIUM 500 PO      Take by mouth daily        cinnamon 500 MG Caps           co-enzyme Q-10 100 MG Caps capsule      Take 200 mg by mouth        fish Oil 1200 MG capsule      Take by mouth daily        GLUCOSAMINE-CHONDROITIN PO      Take  by mouth. GLUCOSAMINE HCL/CHONDR SUA NA One daily        levothyroxine 50 MCG tablet    SYNTHROID/LEVOTHROID    90 tablet    Take 1 tablet (50 mcg) by mouth daily    Hypothyroidism due to acquired atrophy of thyroid       lisinopril 5 MG tablet    PRINIVIL/ZESTRIL    90 tablet    Take 1 tablet (5 mg) by mouth daily    Benign essential hypertension       MULTIVITAMIN PO           Turmeric Curcumin 500 MG Caps      Take 1 capsule by mouth daily

## 2018-01-08 NOTE — PROGRESS NOTES
SUBJECTIVE:   Franca Angeles is a 72 year old female who presents to clinic today for the following health issues:  Chief Complaint   Patient presents with     Thyroid Disease     hyperlipidemia/tension     She had labs completed last week; all results are reviewed with her today.      Hypertension Follow-up      Outpatient blood pressures are being checked at home.  Results are Normal.    Low Salt Diet: no added salt        Amount of exercise or physical activity: 6-7 days/week for an average of greater than 60 minutes    Problems taking medications regularly: No    Medication side effects: none    Diet: regular (no restrictions)        Hyperlipidemia Follow-Up      Rate your low fat/cholesterol diet?: good    Taking statin?  Yes, no muscle aches from statin    Other lipid medications/supplements?:  None  The 10-year ASCVD risk score (Willardestrella LANGSTON Jr, et al., 2013) is: 16.3%    Values used to calculate the score:      Age: 72 years      Sex: Female      Is Non- : No      Diabetic: No      Tobacco smoker: No      Systolic Blood Pressure: 124 mmHg      Is BP treated: Yes      HDL Cholesterol: 59 mg/dL        Total Cholesterol: 213 mg/dL    Was encouraged to increase lipitor to 80mg last visit, was not comfortable with the increase stayed at 40mg daily and is tolerating well.  She would like to try garlic supplement to determine effect on cholesterol levels.     Thyroid: well controlled.        Problem list and histories reviewed & adjusted, as indicated.  Additional history: as documented    Patient Active Problem List   Diagnosis     Menopause     Joint pain     Disorder of bone and cartilage     Advanced care planning/counseling discussion     Hyperlipidemia with target LDL less than 130     Routine general medical examination at a health care facility (ANNUAL)     Colon cancer screening     Hypothyroidism due to acquired atrophy of thyroid     Benign essential hypertension     Past Surgical  History:   Procedure Laterality Date     ARTHRODESIS KNEE  [id number]    left knee, paritla medial meniscectomy, debridement medial femoral condyle and patellofemoral debridement     COLONOSCOPY  2004    nml     COLONOSCOPY  7/23/2014    Procedure: COLONOSCOPY;  Surgeon: Nghia Hernandez DO;  Location: [facility]     CYSTOSCOPY      micro hematuria neg     DILATION AND CURETTAGE       EXCISE LESION UPPER EXTREMITY Right 10/25/2017    Procedure: EXCISE LESION UPPER EXTREMITY;  EXCISIONAL BIOPSY RIGHT FOREARM TIMES 2;  Surgeon: Nghia Hernandez DO;  Location: [facility]     tubal sterilization       uterine fibroids       wrist fx RT         Social History   Substance Use Topics     Smoking status: Never Smoker     Smokeless tobacco: Never Used     Alcohol use 0.0 oz/week      Comment: occasionally     Family History   Problem Relation Age of Onset     C.A.D. Mother      Other - See Comments Mother      high platelets     Hypertension Mother      C.A.D. Paternal Aunt      DIABETES Other      aunt     Cardiovascular Father      cardiovascular disease     Colon Cancer Other      family history-paternal side     CEREBROVASCULAR DISEASE Maternal Grandmother      CEREBROVASCULAR DISEASE Maternal Grandfather          Current Outpatient Prescriptions   Medication Sig Dispense Refill     cinnamon 500 MG CAPS        co-enzyme Q-10 100 MG CAPS capsule Take 200 mg by mouth       atorvastatin (LIPITOR) 40 MG tablet Take 1 tablet (40 mg) by mouth daily 90 tablet 3     lisinopril (PRINIVIL/ZESTRIL) 5 MG tablet Take 1 tablet (5 mg) by mouth daily 90 tablet 3     levothyroxine (SYNTHROID/LEVOTHROID) 50 MCG tablet Take 1 tablet (50 mcg) by mouth daily 90 tablet 3     Turmeric Curcumin 500 MG CAPS Take 1 capsule by mouth daily       aspirin 81 MG tablet Take 81 mg by mouth daily       CO-ENZYME Q-10 PO Take by mouth daily       Multiple Vitamins-Minerals (MULTIVITAMIN OR)        Omega-3 Fatty Acids (FISH OIL) 1200 MG CAPS Take by mouth daily  "      Calcium-Magnesium-Vitamin D (CALCIUM 500 PO) Take by mouth daily       GLUCOSAMINE-CHONDROITIN PO Take  by mouth. GLUCOSAMINE HCL/CHONDR SUA NA  One daily       cinnamon 500 MG CAPS One daily       [DISCONTINUED] atorvastatin (LIPITOR) 40 MG tablet Take 40 mg by mouth       [DISCONTINUED] atorvastatin (LIPITOR) 80 MG tablet Take 1 tablet (80 mg) by mouth daily 90 tablet 1     Allergies   Allergen Reactions     Pseudoephedrine Hcl Other (See Comments)     Heart racing  Sudafed     Recent Labs   Lab Test  01/03/18   0911  09/13/17   0903  06/13/17   0811   05/13/15   1026   A1C   --    --    --    --   5.7   LDL  118*  110*  112*   < >  134*   HDL  59  55  58   < >  59   TRIG  180*  129  139   < >  170*   ALT  47  34  32   < >   --    CR  0.88  0.93  0.90   < >   --    GFRESTIMATED  63  59*  62   < >   --    GFRESTBLACK  77  71  74   < >   --    POTASSIUM  3.9  4.2  4.2   < >   --    TSH  5.16*  3.40  4.02*   < >  3.75    < > = values in this interval not displayed.      BP Readings from Last 3 Encounters:   01/08/18 124/78   12/11/17 122/72   10/25/17 111/72    Wt Readings from Last 3 Encounters:   01/08/18 139 lb 12.8 oz (63.4 kg)   12/11/17 139 lb (63 kg)   10/25/17 140 lb (63.5 kg)            Reviewed and updated as needed this visit by clinical staffTobacco  Allergies  Meds  Problems       Reviewed and updated as needed this visit by Provider         ROS:  Constitutional, HEENT, cardiovascular, pulmonary, gi and gu systems are negative, except as otherwise noted.      OBJECTIVE:   /78 (BP Location: Right arm, Patient Position: Chair, Cuff Size: Adult Regular)  Pulse 63  Temp 97.6  F (36.4  C) (Tympanic)  Resp 18  Ht 5' 4\" (1.626 m)  Wt 139 lb 12.8 oz (63.4 kg)  SpO2 96%  BMI 24 kg/m2  Body mass index is 24 kg/(m^2).  GENERAL: healthy, alert and no distress  NECK: no adenopathy, no asymmetry, masses, or scars, thyroid normal to palpation and no carotid bruits  RESP: lungs clear to " auscultation - no rales, rhonchi or wheezes  CV: regular rate and rhythm, normal S1 S2, no S3 or S4, no murmur, click or rub, no peripheral edema and peripheral pulses strong  MS: no gross musculoskeletal defects noted, no edema  PSYCH: mentation appears normal, affect normal/bright      ASSESSMENT/PLAN:       1. Hyperlipidemia with target LDL less than 130  Chronic, continue lipitor 40mg daily  - Lipid Profile; Future  - start garlic    2. Hypothyroidism due to acquired atrophy of thyroid  chronic  - TSH with free T4 reflex; Future    3. Benign essential hypertension  chronic  - Basic metabolic panel; Future    4. On statin therapy  - Hepatic panel; Future    FUTURE APPOINTMENTS:       - Follow-up visit in 3 months or as needed for acute concerns    Brittney Coe NP  [facility]

## 2018-01-08 NOTE — PATIENT INSTRUCTIONS
ASSESSMENT/PLAN:       1. Hyperlipidemia with target LDL less than 130  Chronic, continue lipitor 40mg daily  - Lipid Profile; Future  - start garlic    2. Hypothyroidism due to acquired atrophy of thyroid  chronic  - TSH with free T4 reflex; Future    3. Benign essential hypertension  chronic  - Basic metabolic panel; Future    4. On statin therapy  - Hepatic panel; Future    FUTURE APPOINTMENTS:       - Follow-up visit in 3 months or as needed for acute concerns    Brittney Coe, NP  [facility]

## 2018-01-09 ASSESSMENT — ANXIETY QUESTIONNAIRES: GAD7 TOTAL SCORE: 0

## 2018-03-20 DIAGNOSIS — E03.4 HYPOTHYROIDISM DUE TO ACQUIRED ATROPHY OF THYROID: ICD-10-CM

## 2018-03-21 NOTE — TELEPHONE ENCOUNTER
levothyroxine (SYNTHROID/LEVOTHROID) 50 MCG tablet      Last Written Prescription Date:  03/22/2017  Last Fill Quantity: 90,   # refills: 3  Last Office Visit: 01/08/2018  Future Office visit:    Next 5 appointments (look out 90 days)     Apr 06, 2018 10:00 AM CDT   (Arrive by 9:45 AM)   SHORT with Brittney Coe NP   [facility] ([facility] )    [address] [address]   [phone]                 TSH 5.16 (H) 0.40 - 4.00 mU/L Final 01/03/2018  9:11 AM [facility]

## 2018-03-22 RX ORDER — LEVOTHYROXINE SODIUM 50 UG/1
TABLET ORAL
Qty: 90 TABLET | Refills: 0 | Status: SHIPPED | OUTPATIENT
Start: 2018-03-22 | End: 2018-04-06

## 2018-04-05 DIAGNOSIS — Z79.899 ON STATIN THERAPY: ICD-10-CM

## 2018-04-05 DIAGNOSIS — E03.4 HYPOTHYROIDISM DUE TO ACQUIRED ATROPHY OF THYROID: ICD-10-CM

## 2018-04-05 DIAGNOSIS — I10 BENIGN ESSENTIAL HYPERTENSION: ICD-10-CM

## 2018-04-05 DIAGNOSIS — E78.5 HYPERLIPIDEMIA WITH TARGET LDL LESS THAN 130: ICD-10-CM

## 2018-04-05 LAB
ALBUMIN SERPL-MCNC: 4.1 G/DL (ref 3.4–5)
ALP SERPL-CCNC: 75 U/L (ref 40–150)
ALT SERPL W P-5'-P-CCNC: 37 U/L (ref 0–50)
ANION GAP SERPL CALCULATED.3IONS-SCNC: 5 MMOL/L (ref 3–14)
AST SERPL W P-5'-P-CCNC: 33 U/L (ref 0–45)
BILIRUB DIRECT SERPL-MCNC: 0.1 MG/DL (ref 0–0.2)
BILIRUB SERPL-MCNC: 0.5 MG/DL (ref 0.2–1.3)
BUN SERPL-MCNC: 18 MG/DL (ref 7–30)
CALCIUM SERPL-MCNC: 9.4 MG/DL (ref 8.5–10.1)
CHLORIDE SERPL-SCNC: 107 MMOL/L (ref 94–109)
CHOLEST SERPL-MCNC: 219 MG/DL
CO2 SERPL-SCNC: 28 MMOL/L (ref 20–32)
CREAT SERPL-MCNC: 1 MG/DL (ref 0.52–1.04)
GFR SERPL CREATININE-BSD FRML MDRD: 54 ML/MIN/1.7M2
GLUCOSE SERPL-MCNC: 86 MG/DL (ref 70–99)
HDLC SERPL-MCNC: 62 MG/DL
LDLC SERPL CALC-MCNC: 125 MG/DL
NONHDLC SERPL-MCNC: 157 MG/DL
POTASSIUM SERPL-SCNC: 3.9 MMOL/L (ref 3.4–5.3)
PROT SERPL-MCNC: 7.7 G/DL (ref 6.8–8.8)
SODIUM SERPL-SCNC: 140 MMOL/L (ref 133–144)
T4 FREE SERPL-MCNC: 1 NG/DL (ref 0.76–1.46)
TRIGL SERPL-MCNC: 159 MG/DL
TSH SERPL DL<=0.005 MIU/L-ACNC: 4.92 MU/L (ref 0.4–4)

## 2018-04-05 PROCEDURE — 80076 HEPATIC FUNCTION PANEL: CPT | Mod: ZL | Performed by: NURSE PRACTITIONER

## 2018-04-05 PROCEDURE — 80048 BASIC METABOLIC PNL TOTAL CA: CPT | Mod: ZL | Performed by: NURSE PRACTITIONER

## 2018-04-05 PROCEDURE — 84443 ASSAY THYROID STIM HORMONE: CPT | Mod: ZL | Performed by: NURSE PRACTITIONER

## 2018-04-05 PROCEDURE — 36415 COLL VENOUS BLD VENIPUNCTURE: CPT | Mod: ZL | Performed by: NURSE PRACTITIONER

## 2018-04-05 PROCEDURE — 84439 ASSAY OF FREE THYROXINE: CPT | Mod: ZL | Performed by: NURSE PRACTITIONER

## 2018-04-05 PROCEDURE — 80061 LIPID PANEL: CPT | Mod: ZL | Performed by: NURSE PRACTITIONER

## 2018-04-06 ENCOUNTER — OFFICE VISIT (OUTPATIENT)
Dept: FAMILY MEDICINE | Facility: OTHER | Age: 73
End: 2018-04-06
Attending: NURSE PRACTITIONER
Payer: COMMERCIAL

## 2018-04-06 VITALS
RESPIRATION RATE: 14 BRPM | SYSTOLIC BLOOD PRESSURE: 126 MMHG | OXYGEN SATURATION: 96 % | TEMPERATURE: 96.8 F | HEIGHT: 64 IN | WEIGHT: 140 LBS | BODY MASS INDEX: 23.9 KG/M2 | DIASTOLIC BLOOD PRESSURE: 72 MMHG | HEART RATE: 78 BPM

## 2018-04-06 DIAGNOSIS — H61.23 EXCESSIVE CERUMEN IN BOTH EAR CANALS: ICD-10-CM

## 2018-04-06 DIAGNOSIS — E78.5 HYPERLIPIDEMIA WITH TARGET LDL LESS THAN 130: Primary | ICD-10-CM

## 2018-04-06 DIAGNOSIS — I10 BENIGN ESSENTIAL HYPERTENSION: ICD-10-CM

## 2018-04-06 DIAGNOSIS — E03.4 HYPOTHYROIDISM DUE TO ACQUIRED ATROPHY OF THYROID: ICD-10-CM

## 2018-04-06 DIAGNOSIS — Z79.899 ON STATIN THERAPY: ICD-10-CM

## 2018-04-06 PROCEDURE — G0463 HOSPITAL OUTPT CLINIC VISIT: HCPCS

## 2018-04-06 PROCEDURE — 99214 OFFICE O/P EST MOD 30 MIN: CPT | Performed by: NURSE PRACTITIONER

## 2018-04-06 RX ORDER — ATORVASTATIN CALCIUM 40 MG/1
40 TABLET, FILM COATED ORAL DAILY
Qty: 90 TABLET | Refills: 3 | Status: SHIPPED | OUTPATIENT
Start: 2018-04-06 | End: 2019-01-30

## 2018-04-06 RX ORDER — LEVOTHYROXINE SODIUM 50 UG/1
TABLET ORAL
Qty: 90 TABLET | Refills: 3 | Status: SHIPPED | OUTPATIENT
Start: 2018-04-06 | End: 2019-06-17

## 2018-04-06 RX ORDER — LISINOPRIL 5 MG/1
5 TABLET ORAL DAILY
Qty: 90 TABLET | Refills: 3 | Status: SHIPPED | OUTPATIENT
Start: 2018-04-06 | End: 2019-06-17

## 2018-04-06 ASSESSMENT — ANXIETY QUESTIONNAIRES
3. WORRYING TOO MUCH ABOUT DIFFERENT THINGS: NOT AT ALL
GAD7 TOTAL SCORE: 0
2. NOT BEING ABLE TO STOP OR CONTROL WORRYING: NOT AT ALL
7. FEELING AFRAID AS IF SOMETHING AWFUL MIGHT HAPPEN: NOT AT ALL
1. FEELING NERVOUS, ANXIOUS, OR ON EDGE: NOT AT ALL
5. BEING SO RESTLESS THAT IT IS HARD TO SIT STILL: NOT AT ALL
6. BECOMING EASILY ANNOYED OR IRRITABLE: NOT AT ALL

## 2018-04-06 ASSESSMENT — PATIENT HEALTH QUESTIONNAIRE - PHQ9: 5. POOR APPETITE OR OVEREATING: NOT AT ALL

## 2018-04-06 NOTE — NURSING NOTE
"Chief Complaint   Patient presents with     Hypertension     No concerns today     Hyperlipidemia     Thyroid Problem       Initial /72 (BP Location: Right arm, Patient Position: Sitting, Cuff Size: Adult Regular)  Pulse 78  Temp 96.8  F (36  C) (Tympanic)  Resp 14  Ht 5' 4\" (1.626 m)  Wt 140 lb (63.5 kg)  SpO2 96%  BMI 24.03 kg/m2 Estimated body mass index is 24.03 kg/(m^2) as calculated from the following:    Height as of this encounter: 5' 4\" (1.626 m).    Weight as of this encounter: 140 lb (63.5 kg).  Medication Reconciliation: complete   Suzanne Longoria      "

## 2018-04-06 NOTE — PROGRESS NOTES
"  SUBJECTIVE:   Franca Angeles is a 72 year old female who presents to clinic today for the following health issues:      Hyperlipidemia Follow-Up      Rate your low fat/cholesterol diet?: good    Taking statin?  Yes, no muscle aches from statin    Other lipid medications/supplements?:  Fish oil/Omega 3, dose  without side effects  The 10-year ASCVD risk score (Otis LANGSTON Jr, et al., 2013) is: 16.8%    Values used to calculate the score:      Age: 72 years      Sex: Female      Is Non- : No      Diabetic: No      Tobacco smoker: No      Systolic Blood Pressure: 126 mmHg      Is BP treated: Yes      HDL Cholesterol: 62 mg/dL      Total Cholesterol: 219 mg/dL    Hypertension Follow-up      Outpatient blood pressures are being checked at home.  Results are questionable due to cuff.    Low Salt Diet: no added salt        Amount of exercise or physical activity: 6-7 days/week for an average of 45-60 minutes    Problems taking medications regularly: No    Medication side effects: none    Diet: low salt and low fat/cholesterol        Hypothyroidism Follow-up      Since last visit, patient describes the following symptoms: Weight stable, no hair loss, no skin changes, no constipation, no loose stools    She thinks her ears are \"plugged\" again.  Notes decreased hearing, crackling and pressure bilateral.      Problem list and histories reviewed & adjusted, as indicated.  Additional history: as documented    Patient Active Problem List   Diagnosis     Menopause     Joint pain     Disorder of bone and cartilage     Advanced care planning/counseling discussion     Hyperlipidemia with target LDL less than 130     Routine general medical examination at a health care facility (ANNUAL)     Colon cancer screening     Hypothyroidism due to acquired atrophy of thyroid     Benign essential hypertension     Past Surgical History:   Procedure Laterality Date     ARTHRODESIS KNEE  [id number]    left knee, paritla medial " meniscectomy, debridement medial femoral condyle and patellofemoral debridement     COLONOSCOPY  2004    nml     COLONOSCOPY  7/23/2014    Procedure: COLONOSCOPY;  Surgeon: Nghia Hernandez DO;  Location: [facility]     CYSTOSCOPY      micro hematuria neg     DILATION AND CURETTAGE       EXCISE LESION UPPER EXTREMITY Right 10/25/2017    Procedure: EXCISE LESION UPPER EXTREMITY;  EXCISIONAL BIOPSY RIGHT FOREARM TIMES 2;  Surgeon: Nghia Hernandez DO;  Location: [facility]     tubal sterilization       uterine fibroids       wrist fx RT         Social History   Substance Use Topics     Smoking status: Never Smoker     Smokeless tobacco: Never Used     Alcohol use 0.0 oz/week      Comment: occasionally     Family History   Problem Relation Age of Onset     C.A.D. Mother      Other - See Comments Mother      high platelets     Hypertension Mother      C.A.D. Paternal Aunt      DIABETES Other      aunt     Cardiovascular Father      cardiovascular disease     Colon Cancer Other      family history-paternal side     CEREBROVASCULAR DISEASE Maternal Grandmother      CEREBROVASCULAR DISEASE Maternal Grandfather          Current Outpatient Prescriptions   Medication Sig Dispense Refill     levothyroxine (SYNTHROID/LEVOTHROID) 50 MCG tablet TAKE 1 TABLET(50 MCG) BY MOUTH DAILY 90 tablet 0     cinnamon 500 MG CAPS        co-enzyme Q-10 100 MG CAPS capsule Take 200 mg by mouth       atorvastatin (LIPITOR) 40 MG tablet Take 1 tablet (40 mg) by mouth daily 90 tablet 3     lisinopril (PRINIVIL/ZESTRIL) 5 MG tablet Take 1 tablet (5 mg) by mouth daily 90 tablet 3     Turmeric Curcumin 500 MG CAPS Take 1 capsule by mouth daily       aspirin 81 MG tablet Take 81 mg by mouth daily       Multiple Vitamins-Minerals (MULTIVITAMIN OR) Alive 1 tablet       Omega-3 Fatty Acids (FISH OIL) 1200 MG CAPS Take by mouth daily       Calcium-Magnesium-Vitamin D (CALCIUM 500 PO) Take by mouth daily       GLUCOSAMINE-CHONDROITIN PO Take  by mouth.  "GLUCOSAMINE HCL/CHONDR SUA NA  One daily       Allergies   Allergen Reactions     Pseudoephedrine Hcl Other (See Comments)     Heart racing  Sudafed     Recent Labs   Lab Test  04/05/18   0805  01/03/18   0911  09/13/17   0903   05/13/15   1026   A1C   --    --    --    --   5.7   LDL  125*  118*  110*   < >  134*   HDL  62  59  55   < >  59   TRIG  159*  180*  129   < >  170*   ALT  37  47  34   < >   --    CR  1.00  0.88  0.93   < >   --    GFRESTIMATED  54*  63  59*   < >   --    GFRESTBLACK  66  77  71   < >   --    POTASSIUM  3.9  3.9  4.2   < >   --    TSH  4.92*  5.16*  3.40   < >  3.75    < > = values in this interval not displayed.      BP Readings from Last 3 Encounters:   04/06/18 126/72   01/08/18 124/78   12/11/17 122/72    Wt Readings from Last 3 Encounters:   04/06/18 140 lb (63.5 kg)   01/08/18 139 lb 12.8 oz (63.4 kg)   12/11/17 139 lb (63 kg)                    Reviewed and updated as needed this visit by clinical staff  Tobacco  Meds       Reviewed and updated as needed this visit by Provider         ROS:  Constitutional, HEENT, cardiovascular, pulmonary, gi and gu systems are negative, except as otherwise noted.    OBJECTIVE:     /72 (BP Location: Right arm, Patient Position: Sitting, Cuff Size: Adult Regular)  Pulse 78  Temp 96.8  F (36  C) (Tympanic)  Resp 14  Ht 5' 4\" (1.626 m)  Wt 140 lb (63.5 kg)  SpO2 96%  BMI 24.03 kg/m2  Body mass index is 24.03 kg/(m^2).  GENERAL: healthy, alert and no distress  HENT: normal cephalic/atraumatic, both ears: occluded with wax, nose and mouth without ulcers or lesions and oropharynx clear  NECK: no adenopathy, no asymmetry, masses, or scars, thyroid normal to palpation and no carotid bruits  RESP: lungs clear to auscultation - no rales, rhonchi or wheezes  CV: regular rate and rhythm, normal S1 S2, no S3 or S4, no murmur, click or rub, no peripheral edema and peripheral pulses strong  MS: no gross musculoskeletal defects noted, no " edema  PSYCH: mentation appears normal, affect normal/bright        ASSESSMENT/PLAN:       1. Hyperlipidemia with target LDL less than 130  chronic  - Lipid Profile; Future    2. Hypothyroidism due to acquired atrophy of thyroid  chronic  - TSH with free T4 reflex; Future    3. Benign essential hypertension  chornic  - order for DME; Equipment being ordered: blood pressure cuff  Dispense: 1 each; Refill: 0  - Basic metabolic panel; Future    4. On statin therapy  - Hepatic panel; Future    5. Excessive cerumen in both ear canals  symptomatic  - OTOLARYNGOLOGY REFERRAL - Jaky Stanley NP [facility]    Lab results are reviewed, future orders are also placed.     FUTURE APPOINTMENTS:       - Follow-up visit in July for comprehensive exam    Brittney Coe NP  [facility]

## 2018-04-06 NOTE — PATIENT INSTRUCTIONS
ASSESSMENT/PLAN:       1. Hyperlipidemia with target LDL less than 130  chronic  - Lipid Profile; Future    2. Hypothyroidism due to acquired atrophy of thyroid  chronic  - TSH with free T4 reflex; Future    3. Benign essential hypertension  chornic  - order for DME; Equipment being ordered: blood pressure cuff  Dispense: 1 each; Refill: 0  - Basic metabolic panel; Future    4. On statin therapy  - Hepatic panel; Future    5. Excessive cerumen in both ear canals  symptomatic  - OTOLARYNGOLOGY REFERRAL - Jaky Stanley NP [facility]    FUTURE APPOINTMENTS:       - Follow-up visit in July for comprehensive exam    Brittney Coe NP  [facility]

## 2018-04-06 NOTE — MR AVS SNAPSHOT
After Visit Summary   4/6/2018    Franca Angeles    MRN: [id number]           Patient Information     Date Of Birth          1945        Visit Information        Provider Department      4/6/2018 10:00 AM Brittney Coe NP [facility]        Today's Diagnoses     Hyperlipidemia with target LDL less than 130    -  1    Hypothyroidism due to acquired atrophy of thyroid        Benign essential hypertension        On statin therapy        Excessive cerumen in both ear canals          Care Instructions      ASSESSMENT/PLAN:       1. Hyperlipidemia with target LDL less than 130  chronic  - Lipid Profile; Future    2. Hypothyroidism due to acquired atrophy of thyroid  chronic  - TSH with free T4 reflex; Future    3. Benign essential hypertension  chornic  - order for DME; Equipment being ordered: blood pressure cuff  Dispense: 1 each; Refill: 0  - Basic metabolic panel; Future    4. On statin therapy  - Hepatic panel; Future    5. Excessive cerumen in both ear canals  symptomatic  - OTOLARYNGOLOGY REFERRAL - Jaky Stanley NP [facility]    FUTURE APPOINTMENTS:       - Follow-up visit in July for comprehensive exam    Brittney Coe NP  [facility]              Follow-ups after your visit        Additional Services     OTOLARYNGOLOGY REFERRAL       Your provider has referred you to: Ant Moore    Please be aware that coverage of these services is subject to the terms and limitations of your health insurance plan.  Call member services at your health plan with any benefit or coverage questions.      Please bring the following with you to your appointment:    (1) Any X-Rays, CTs or MRIs which have been performed.  Contact the facility where they were done to arrange for  prior to your scheduled appointment.   (2) List of current medications  (3) This referral request   (4) Any documents/labs given to you for this referral                   Follow-up notes from your care team     Return in about 3 months (around 7/6/2018).      Your next 10 appointments already scheduled     Jul 30, 2018  9:00 AM CDT   (Arrive by 8:45 AM)   PHYSICAL with Brittney Coe NP   [facility] ([facility] )    [address]  [place] [address]   [phone]              Future tests that were ordered for you today     Open Future Orders        Priority Expected Expires Ordered    TSH with free T4 reflex Routine 7/25/2018 8/8/2018 4/6/2018    Lipid Profile Routine 7/25/2018 8/8/2018 4/6/2018    Hepatic panel Routine 7/25/2018 8/8/2018 4/6/2018    Basic metabolic panel Routine 7/25/2018 8/8/2018 4/6/2018            Who to contact     If you have questions or need follow up information about today's clinic visit or your schedule please contact [facility] directly at [phone].  Normal or non-critical lab and imaging results will be communicated to you by Prenovahart, letter or phone within 4 business days after the clinic has received the results. If you do not hear from us within 7 days, please contact the clinic through Prenovahart or phone. If you have a critical or abnormal lab result, we will notify you by phone as soon as possible.  Submit refill requests through Book&Table or call your pharmacy and they will forward the refill request to us. Please allow 3 business days for your refill to be completed.          Additional Information About Your Visit        Book&Table Information     Book&Table gives you secure access to your electronic health record. If you see a primary care provider, you can also send messages to your care team and make appointments. If you have questions, please call your primary care clinic.  If you do not have a primary care provider, please call [phone] and they will assist you.        Care EveryWhere ID     This is your Care EveryWhere ID. This could be used by other  "organizations to access your [place] medical records  [id number]        Your Vitals Were     Pulse Temperature Respirations Height Pulse Oximetry BMI (Body Mass Index)    78 96.8  F (36  C) (Tympanic) 14 5' 4\" (1.626 m) 96% 24.03 kg/m2       Blood Pressure from Last 3 Encounters:   04/06/18 126/72   01/08/18 124/78   12/11/17 122/72    Weight from Last 3 Encounters:   04/06/18 140 lb (63.5 kg)   01/08/18 139 lb 12.8 oz (63.4 kg)   12/11/17 139 lb (63 kg)              We Performed the Following     OTOLARYNGOLOGY REFERRAL          Today's Medication Changes          These changes are accurate as of 4/6/18 10:59 AM.  If you have any questions, ask your nurse or doctor.               Start taking these medicines.        Dose/Directions    order for DME   Used for:  Benign essential hypertension   Started by:  Brittney Coe NP        Equipment being ordered: blood pressure cuff   Quantity:  1 each   Refills:  0         These medicines have changed or have updated prescriptions.        Dose/Directions    levothyroxine 50 MCG tablet   Commonly known as:  SYNTHROID/LEVOTHROID   This may have changed:  See the new instructions.   Used for:  Hypothyroidism due to acquired atrophy of thyroid   Changed by:  Brittney Coe NP        TAKE 1 TABLET(50 MCG) BY MOUTH DAILY   Quantity:  90 tablet   Refills:  3            Where to get your medicines      These medications were sent to [facility] [address] [place] [address] [address] [facility] [address]  [address], [place] [address]     Phone:  [phone]     atorvastatin 40 MG tablet    levothyroxine 50 MCG tablet    lisinopril 5 MG tablet         Some of these will need a paper prescription and others can be bought over the counter.  Ask your nurse if you have questions.     Bring a paper prescription for each of these medications     order for DME                Primary Care Provider Office Phone # Fax #    Brittney CASTILLO " NEVAEH Coe [phone]       [address] [facility] [address]        Equal Access to Services     BRAD PIERCE : Hadii aad ku hadloveliv Durán, bevprabhakar carlislenolaha, frankie kasudeepda sharlene, ti juliain hayaaliz bartholomewmaximus nataleemargaritaroddy deluca. So [facility] [phone].    ATENCIÓN: Si habla español, tiene a marx disposición servicios gratuitos de asistencia lingüística. Llame al [phone].    We comply with applicable federal civil rights laws and [place] laws. We do not discriminate on the basis of race, color, national origin, age, disability, sex, sexual orientation, or gender identity.            Thank you!     Thank you for choosing [facility]  for your care. Our goal is always to provide you with excellent care. Hearing back from our patients is one way we can continue to improve our services. Please take a few minutes to complete the written survey that you may receive in the mail after your visit with us. Thank you!             Your Updated Medication List - Protect others around you: Learn how to safely use, store and throw away your medicines at www.disposemymeds.org.          This list is accurate as of 4/6/18 10:59 AM.  Always use your most recent med list.                   Brand Name Dispense Instructions for use Diagnosis    aspirin 81 MG tablet      Take 81 mg by mouth daily        atorvastatin 40 MG tablet    LIPITOR    90 tablet    Take 1 tablet (40 mg) by mouth daily    Hyperlipidemia with target LDL less than 130       CALCIUM 500 PO      Take by mouth daily        cinnamon 500 MG Caps           co-enzyme Q-10 100 MG Caps capsule      Take 200 mg by mouth        fish Oil 1200 MG capsule      Take by mouth daily        GLUCOSAMINE-CHONDROITIN PO      Take  by mouth. GLUCOSAMINE HCL/CHONDR SUA NA One daily        levothyroxine 50 MCG tablet    SYNTHROID/LEVOTHROID    90 tablet    TAKE 1 TABLET(50 MCG) BY MOUTH DAILY    Hypothyroidism due to acquired atrophy  of thyroid       lisinopril 5 MG tablet    PRINIVIL/ZESTRIL    90 tablet    Take 1 tablet (5 mg) by mouth daily    Benign essential hypertension       MULTIVITAMIN PO      Alive 1 tablet        order for DME     1 each    Equipment being ordered: blood pressure cuff    Benign essential hypertension       Turmeric Curcumin 500 MG Caps      Take 1 capsule by mouth daily

## 2018-04-07 ASSESSMENT — PATIENT HEALTH QUESTIONNAIRE - PHQ9: SUM OF ALL RESPONSES TO PHQ QUESTIONS 1-9: 1

## 2018-04-07 ASSESSMENT — ANXIETY QUESTIONNAIRES: GAD7 TOTAL SCORE: 0

## 2018-04-10 ENCOUNTER — OFFICE VISIT (OUTPATIENT)
Dept: FAMILY MEDICINE | Facility: OTHER | Age: 73
End: 2018-04-10
Attending: NURSE PRACTITIONER
Payer: COMMERCIAL

## 2018-04-10 VITALS
SYSTOLIC BLOOD PRESSURE: 122 MMHG | RESPIRATION RATE: 16 BRPM | HEIGHT: 64 IN | HEART RATE: 59 BPM | BODY MASS INDEX: 23.9 KG/M2 | TEMPERATURE: 98.1 F | DIASTOLIC BLOOD PRESSURE: 70 MMHG | WEIGHT: 140 LBS | OXYGEN SATURATION: 98 %

## 2018-04-10 DIAGNOSIS — J01.00 ACUTE MAXILLARY SINUSITIS, RECURRENCE NOT SPECIFIED: Primary | ICD-10-CM

## 2018-04-10 PROCEDURE — G0463 HOSPITAL OUTPT CLINIC VISIT: HCPCS

## 2018-04-10 PROCEDURE — 99213 OFFICE O/P EST LOW 20 MIN: CPT | Performed by: NURSE PRACTITIONER

## 2018-04-10 RX ORDER — AZITHROMYCIN 250 MG/1
TABLET, FILM COATED ORAL
Qty: 11 TABLET | Refills: 0 | Status: SHIPPED | OUTPATIENT
Start: 2018-04-10 | End: 2018-07-30

## 2018-04-10 ASSESSMENT — ANXIETY QUESTIONNAIRES
7. FEELING AFRAID AS IF SOMETHING AWFUL MIGHT HAPPEN: NOT AT ALL
GAD7 TOTAL SCORE: 0
4. TROUBLE RELAXING: NOT AT ALL
1. FEELING NERVOUS, ANXIOUS, OR ON EDGE: NOT AT ALL
IF YOU CHECKED OFF ANY PROBLEMS ON THIS QUESTIONNAIRE, HOW DIFFICULT HAVE THESE PROBLEMS MADE IT FOR YOU TO DO YOUR WORK, TAKE CARE OF THINGS AT HOME, OR GET ALONG WITH OTHER PEOPLE: NOT DIFFICULT AT ALL
2. NOT BEING ABLE TO STOP OR CONTROL WORRYING: NOT AT ALL
5. BEING SO RESTLESS THAT IT IS HARD TO SIT STILL: NOT AT ALL
3. WORRYING TOO MUCH ABOUT DIFFERENT THINGS: NOT AT ALL
6. BECOMING EASILY ANNOYED OR IRRITABLE: NOT AT ALL

## 2018-04-10 ASSESSMENT — PAIN SCALES - GENERAL: PAINLEVEL: EXTREME PAIN (8)

## 2018-04-10 NOTE — PROGRESS NOTES
SUBJECTIVE:   Franca Angeles is a 72 year old female who presents to clinic today for the following health issues:  Sinus Issues    Acute Illness   Acute illness concerns: Sinus Issues   Onset: 4 days    Fever: no     Chills/Sweats: YES    Headache (location?): YES- frontal    Sinus Pressure:YES    Conjunctivitis:  no    Ear Pain: YES: bilateral    Rhinorrhea: YES    Congestion: YES    Sore Throat: YES     Cough: YES-non-productive    Wheeze: no     Decreased Appetite: no     Nausea: YES    Vomiting: no     Diarrhea:  no     Dysuria/Freq.: no     Fatigue/Achiness: YES    Sick/Strep Exposure: no      Therapies Tried and outcome: musinex         Problem list and histories reviewed & adjusted, as indicated.  Additional history: as documented    Patient Active Problem List   Diagnosis     Menopause     Joint pain     Disorder of bone and cartilage     Advanced care planning/counseling discussion     Hyperlipidemia with target LDL less than 130     Routine general medical examination at a health care facility (ANNUAL)     Colon cancer screening     Hypothyroidism due to acquired atrophy of thyroid     Benign essential hypertension     Past Surgical History:   Procedure Laterality Date     ARTHRODESIS KNEE  [id number]    left knee, paritla medial meniscectomy, debridement medial femoral condyle and patellofemoral debridement     COLONOSCOPY  2004    nml     COLONOSCOPY  7/23/2014    Procedure: COLONOSCOPY;  Surgeon: Nghia Hernandez DO;  Location: [facility] OR     CYSTOSCOPY      micro hematuria neg     DILATION AND CURETTAGE       EXCISE LESION UPPER EXTREMITY Right 10/25/2017    Procedure: EXCISE LESION UPPER EXTREMITY;  EXCISIONAL BIOPSY RIGHT FOREARM TIMES 2;  Surgeon: Nghia Hernandez DO;  Location: [facility] OR     tubal sterilization       uterine fibroids       wrist fx RT         Social History   Substance Use Topics     Smoking status: Never Smoker     Smokeless tobacco: Never Used     Alcohol use 0.0 oz/week       Comment: occasionally     Family History   Problem Relation Age of Onset     C.A.D. Mother      Other - See Comments Mother      high platelets     Hypertension Mother      C.A.D. Paternal Aunt      DIABETES Other      aunt     Cardiovascular Father      cardiovascular disease     Colon Cancer Other      family history-paternal side     CEREBROVASCULAR DISEASE Maternal Grandmother      CEREBROVASCULAR DISEASE Maternal Grandfather          Current Outpatient Prescriptions   Medication Sig Dispense Refill     order for DME Equipment being ordered: blood pressure cuff 1 each 0     levothyroxine (SYNTHROID/LEVOTHROID) 50 MCG tablet TAKE 1 TABLET(50 MCG) BY MOUTH DAILY 90 tablet 3     atorvastatin (LIPITOR) 40 MG tablet Take 1 tablet (40 mg) by mouth daily 90 tablet 3     lisinopril (PRINIVIL/ZESTRIL) 5 MG tablet Take 1 tablet (5 mg) by mouth daily 90 tablet 3     cinnamon 500 MG CAPS        co-enzyme Q-10 100 MG CAPS capsule Take 200 mg by mouth       Turmeric Curcumin 500 MG CAPS Take 1 capsule by mouth daily       aspirin 81 MG tablet Take 81 mg by mouth daily       Multiple Vitamins-Minerals (MULTIVITAMIN OR) Alive 1 tablet       Omega-3 Fatty Acids (FISH OIL) 1200 MG CAPS Take by mouth daily       Calcium-Magnesium-Vitamin D (CALCIUM 500 PO) Take by mouth daily       GLUCOSAMINE-CHONDROITIN PO Take  by mouth. GLUCOSAMINE HCL/CHONDR SUA NA  One daily       Allergies   Allergen Reactions     Pseudoephedrine Hcl Other (See Comments)     Heart racing  Sudafed     Recent Labs   Lab Test  04/05/18   0805  01/03/18   0911  09/13/17   0903   05/13/15   1026   A1C   --    --    --    --   5.7   LDL  125*  118*  110*   < >  134*   HDL  62  59  55   < >  59   TRIG  159*  180*  129   < >  170*   ALT  37  47  34   < >   --    CR  1.00  0.88  0.93   < >   --    GFRESTIMATED  54*  63  59*   < >   --    GFRESTBLACK  66  77  71   < >   --    POTASSIUM  3.9  3.9  4.2   < >   --    TSH  4.92*  5.16*  3.40   < >  3.75    < > = values  "in this interval not displayed.      BP Readings from Last 3 Encounters:   04/10/18 122/70   04/06/18 126/72   01/08/18 124/78    Wt Readings from Last 3 Encounters:   04/10/18 140 lb (63.5 kg)   04/06/18 140 lb (63.5 kg)   01/08/18 139 lb 12.8 oz (63.4 kg)                  Labs reviewed in EPIC    Reviewed and updated as needed this visit by clinical staff  Tobacco  Allergies       Reviewed and updated as needed this visit by Provider         ROS:  Constitutional, HEENT, cardiovascular, pulmonary, gi and gu systems are negative, except as otherwise noted.    OBJECTIVE:     /70 (BP Location: Right arm, Patient Position: Chair, Cuff Size: Adult Regular)  Pulse 59  Temp 98.1  F (36.7  C) (Tympanic)  Resp 16  Ht 5' 4\" (1.626 m)  Wt 140 lb (63.5 kg)  SpO2 98%  BMI 24.03 kg/m2  Body mass index is 24.03 kg/(m^2).       GENERAL: healthy, alert and no distress  EYES: Eyes grossly normal to inspection, PERRL and conjunctivae and sclerae normal  HENT: sinuses: maxillary, frontal tenderness on both sides - PND - yellow  NECK: no adenopathy, no asymmetry, masses, or scars and thyroid normal to palpation  RESP: lungs clear to auscultation - no rales, rhonchi or wheezes  CV: regular rate and rhythm, normal S1 S2, no S3 or S4, no murmur, click or rub, no peripheral edema and peripheral pulses strong  MS: no gross musculoskeletal defects noted, no edema        ASSESSMENT/PLAN:     1. Acute maxillary sinusitis, recurrence not specified  - azithromycin (ZITHROMAX) 250 MG tablet; Two tablets first day, then one tablet daily for 9 days.  Dispense: 11 tablet; Refill: 0      Fluids  Rest  Humidity at home - add bacteriostatic solution to humidifier  OTC Mucinex liquid cough and cold  Add Zicam or other zinc daily until you feel better  Monitor for temp, treat as needed  Zyrtec OTC - 10mg - plain, not with D  Saline nasal spray PRN  Flonase nasal spray OTC  Please go to the ER or UC if your symptoms worsen   Please return " to clinic if unimproved        Sis Bailey NP  [facility]

## 2018-04-10 NOTE — PATIENT INSTRUCTIONS
ASSESSMENT/PLAN:     1. Acute maxillary sinusitis, recurrence not specified  - azithromycin (ZITHROMAX) 250 MG tablet; Two tablets first day, then one tablet daily for 9 days.  Dispense: 11 tablet; Refill: 0      Fluids  Rest  Humidity at home - add bacteriostatic solution to humidifier  OTC Mucinex liquid cough and cold  Add Zicam or other zinc daily until you feel better  Monitor for temp, treat as needed  Zyrtec OTC - 10mg - plain, not with D  Saline nasal spray PRN  Flonase nasal spray OTC  Please go to the ER or UC if your symptoms worsen   Please return to clinic if unimproved        Sis Bailey NP  [facility]

## 2018-04-10 NOTE — MR AVS SNAPSHOT
After Visit Summary   4/10/2018    Franca Angeles    MRN: [id number]           Patient Information     Date Of Birth          1945        Visit Information        Provider Department      4/10/2018 9:30 AM Sis Bailey NP [facility]        Today's Diagnoses     Acute maxillary sinusitis, recurrence not specified    -  1      Care Instructions        ASSESSMENT/PLAN:     1. Acute maxillary sinusitis, recurrence not specified  - azithromycin (ZITHROMAX) 250 MG tablet; Two tablets first day, then one tablet daily for 9 days.  Dispense: 11 tablet; Refill: 0      Fluids  Rest  Humidity at home - add bacteriostatic solution to humidifier  OTC Mucinex liquid cough and cold  Add Zicam or other zinc daily until you feel better  Monitor for temp, treat as needed  Zyrtec OTC - 10mg - plain, not with D  Saline nasal spray PRN  Flonase nasal spray OTC  Please go to the ER or UC if your symptoms worsen   Please return to clinic if unimproved        Sis Bailey NP  [facility]          Follow-ups after your visit        Your next 10 appointments already scheduled     Apr 13, 2018 11:00 AM CDT   (Arrive by 10:45 AM)   New Visit with WILLEM Mark CNP   [facility] ([facility] )    [address]  [place] [address]   [phone]            Jul 30, 2018  9:00 AM CDT   (Arrive by 8:45 AM)   PHYSICAL with Brittney Coe NP   [facility] ([facility] )    [address]  [place] [address]   [phone]              Who to contact     If you have questions or need follow up information about today's clinic visit or your schedule please contact [facility] directly at [phone].  Normal or non-critical lab and imaging results will be communicated to you by MyChart, letter or phone within 4 business days after the clinic has received the results. If you  "do not hear from us within 7 days, please contact the clinic through PhotoThera or phone. If you have a critical or abnormal lab result, we will notify you by phone as soon as possible.  Submit refill requests through PhotoThera or call your pharmacy and they will forward the refill request to us. Please allow 3 business days for your refill to be completed.          Additional Information About Your Visit        [email or web address] Information     PhotoThera gives you secure access to your electronic health record. If you see a primary care provider, you can also send messages to your care team and make appointments. If you have questions, please call your primary care clinic.  If you do not have a primary care provider, please call [phone] and they will assist you.        Care EveryWhere ID     This is your Care EveryWhere ID. This could be used by other organizations to access your [place] medical records  [id number]        Your Vitals Were     Pulse Temperature Respirations Height Pulse Oximetry BMI (Body Mass Index)    59 98.1  F (36.7  C) (Tympanic) 16 5' 4\" (1.626 m) 98% 24.03 kg/m2       Blood Pressure from Last 3 Encounters:   04/10/18 122/70   04/06/18 126/72   01/08/18 124/78    Weight from Last 3 Encounters:   04/10/18 140 lb (63.5 kg)   04/06/18 140 lb (63.5 kg)   01/08/18 139 lb 12.8 oz (63.4 kg)              Today, you had the following     No orders found for display         Today's Medication Changes          These changes are accurate as of 4/10/18 10:07 AM.  If you have any questions, ask your nurse or doctor.               Start taking these medicines.        Dose/Directions    azithromycin 250 MG tablet   Commonly known as:  ZITHROMAX   Used for:  Acute maxillary sinusitis, recurrence not specified   Started by:  Sis Bailey NP        Two tablets first day, then one tablet daily for 9 days.   Quantity:  11 tablet   Refills:  0            Where to get your medicines      [facility] [facility] [address]  AT [facility] [address]  [address] [place] [address]     Phone:  [phone]     azithromycin 250 MG tablet                Primary Care Provider Office Phone # Fax #    Brittney CoeNEVAEH [phone] [address] [facility] [facility] [address]        Equal Access to Services     BRAD PIERCE : Hadii aad ku hadasho Soomaali, waaxda luqadaha, qaybta kaalmada adeegyada, waxay idiin hayaan adeeg kharash la'aan . So [facility] [phone].    ATENCIÓN: Si habla español, tiene a marx disposición servicios gratuitos de asistencia lingüística. [facility] [phone].    We comply with applicable federal civil rights laws and [place] laws. We do not discriminate on the basis of race, color, national origin, age, disability, sex, sexual orientation, or gender identity.            Thank you!     Thank you for choosing [facility]  for your care. Our goal is always to provide you with excellent care. Hearing back from our patients is one way we can continue to improve our services. Please take a few minutes to complete the written survey that you may receive in the mail after your visit with us. Thank you!             Your Updated Medication List - Protect others around you: Learn how to safely use, store and throw away your medicines at www.disposemymeds.org.          This list is accurate as of 4/10/18 10:07 AM.  Always use your most recent med list.                   Brand Name Dispense Instructions for use Diagnosis    aspirin 81 MG tablet      Take 81 mg by mouth daily        atorvastatin 40 MG tablet    LIPITOR    90 tablet    Take 1 tablet (40 mg) by mouth daily    Hyperlipidemia with target LDL less than 130       azithromycin 250 MG tablet    ZITHROMAX    11 tablet    Two tablets first day, then one tablet daily for 9 days.    Acute maxillary sinusitis, recurrence not specified        CALCIUM 500 PO      Take by mouth daily        cinnamon 500 MG Caps           co-enzyme Q-10 100 MG Caps capsule      Take 200 mg by mouth        fish Oil 1200 MG capsule      Take by mouth daily        GLUCOSAMINE-CHONDROITIN PO      Take  by mouth. GLUCOSAMINE HCL/CHONDR SUA NA One daily        levothyroxine 50 MCG tablet    SYNTHROID/LEVOTHROID    90 tablet    TAKE 1 TABLET(50 MCG) BY MOUTH DAILY    Hypothyroidism due to acquired atrophy of thyroid       lisinopril 5 MG tablet    PRINIVIL/ZESTRIL    90 tablet    Take 1 tablet (5 mg) by mouth daily    Benign essential hypertension       MULTIVITAMIN PO      Alive 1 tablet        order for DME     1 each    Equipment being ordered: blood pressure cuff    Benign essential hypertension       Turmeric Curcumin 500 MG Caps      Take 1 capsule by mouth daily

## 2018-04-10 NOTE — NURSING NOTE
"Chief Complaint   Patient presents with     Sinus Problem       Initial /70 (BP Location: Right arm, Patient Position: Chair, Cuff Size: Adult Regular)  Pulse 59  Temp 98.1  F (36.7  C) (Tympanic)  Resp 16  Ht 5' 4\" (1.626 m)  Wt 140 lb (63.5 kg)  SpO2 98%  BMI 24.03 kg/m2 Estimated body mass index is 24.03 kg/(m^2) as calculated from the following:    Height as of this encounter: 5' 4\" (1.626 m).    Weight as of this encounter: 140 lb (63.5 kg).  Medication Reconciliation: complete   Pamela M Lechevalier LPN        "

## 2018-04-11 ASSESSMENT — ANXIETY QUESTIONNAIRES: GAD7 TOTAL SCORE: 0

## 2018-04-11 ASSESSMENT — PATIENT HEALTH QUESTIONNAIRE - PHQ9: SUM OF ALL RESPONSES TO PHQ QUESTIONS 1-9: 3

## 2018-04-13 ENCOUNTER — OFFICE VISIT (OUTPATIENT)
Dept: OTOLARYNGOLOGY | Facility: OTHER | Age: 73
End: 2018-04-13
Attending: NURSE PRACTITIONER
Payer: MEDICARE

## 2018-04-13 VITALS
TEMPERATURE: 97.6 F | WEIGHT: 140 LBS | SYSTOLIC BLOOD PRESSURE: 120 MMHG | HEART RATE: 56 BPM | BODY MASS INDEX: 23.9 KG/M2 | HEIGHT: 64 IN | OXYGEN SATURATION: 99 % | DIASTOLIC BLOOD PRESSURE: 68 MMHG

## 2018-04-13 DIAGNOSIS — L72.0 EPIDERMOID CYST OF EAR: Primary | ICD-10-CM

## 2018-04-13 DIAGNOSIS — H73.892 TYMPANIC MEMBRANE RETRACTION, LEFT: ICD-10-CM

## 2018-04-13 DIAGNOSIS — H91.90 HEARING LOSS, UNSPECIFIED HEARING LOSS TYPE, UNSPECIFIED LATERALITY: ICD-10-CM

## 2018-04-13 DIAGNOSIS — Z87.898 HISTORY OF EPISTAXIS: ICD-10-CM

## 2018-04-13 DIAGNOSIS — H61.23 EXCESSIVE CERUMEN IN BOTH EAR CANALS: ICD-10-CM

## 2018-04-13 PROCEDURE — G0463 HOSPITAL OUTPT CLINIC VISIT: HCPCS | Mod: 25

## 2018-04-13 PROCEDURE — 30901 CONTROL OF NOSEBLEED: CPT | Performed by: NURSE PRACTITIONER

## 2018-04-13 PROCEDURE — 69210 REMOVE IMPACTED EAR WAX UNI: CPT | Performed by: NURSE PRACTITIONER

## 2018-04-13 PROCEDURE — 92504 EAR MICROSCOPY EXAMINATION: CPT | Performed by: NURSE PRACTITIONER

## 2018-04-13 PROCEDURE — 99213 OFFICE O/P EST LOW 20 MIN: CPT | Mod: 25 | Performed by: NURSE PRACTITIONER

## 2018-04-13 ASSESSMENT — PAIN SCALES - GENERAL: PAINLEVEL: MILD PAIN (2)

## 2018-04-13 NOTE — LETTER
2018         RE: Franca Angeles  [address]   [facility] [address]        Dear Colleague,    Thank you for referring your patient, Franca Angeles, to the [facility] (formerly [facility]). Please see a copy of my visit note below.    Otolaryngology Note    Patient: Franca Angeles  : 1945         Chief Complaint:     Patient presents with:  Cerumen Impaction: ear cleaning          History of Present Illness:     Franca Angeles is a 72 year old female seen today for ear cleaning. She is referred by Jameson Tavera.    Franca was seen in office by PCP 18 who noted excessive cerumen build up of both ears.     Today Franca tells me that she has a noted gradual bilateral hearing loss over the years. No recent audiogram.   No otalgia, otorrhea or aura fullness.  Vertigo: denies  Tinnitus: denies  There is no facial weakness, facial numbness or dysphagia.  No COM, otologic surgeries or trauma. One doctor told her a long time ago she has something different in her right ear.     Currently being treated with azithromycin for acute sinusitis. Noting improvement in her sinus symptoms. Denies history of chronic sinusitis.    In the past week, 2 nose bleeds (front/left side), lasting just minutes. Never experiencing blood down the back of her throat. She was recently using Flonase, but has since stopped. She is also on daily baby aspirin. Long distant history of needing to be cauterized.          Medications:     Current Outpatient Rx   Medication Sig Dispense Refill     azithromycin (ZITHROMAX) 250 MG tablet Two tablets first day, then one tablet daily for 9 days. 11 tablet 0     levothyroxine (SYNTHROID/LEVOTHROID) 50 MCG tablet TAKE 1 TABLET(50 MCG) BY MOUTH DAILY 90 tablet 3     atorvastatin (LIPITOR) 40 MG tablet Take 1 tablet (40 mg) by mouth daily 90 tablet 3     lisinopril (PRINIVIL/ZESTRIL) 5 MG tablet Take 1 tablet (5 mg) by mouth daily 90 tablet 3     cinnamon 500 MG CAPS        co-enzyme Q-10  100 MG CAPS capsule Take 200 mg by mouth       Turmeric Curcumin 500 MG CAPS Take 1 capsule by mouth daily       aspirin 81 MG tablet Take 81 mg by mouth daily       Multiple Vitamins-Minerals (MULTIVITAMIN OR) Alive 1 tablet       Omega-3 Fatty Acids (FISH OIL) 1200 MG CAPS Take by mouth daily       Calcium-Magnesium-Vitamin D (CALCIUM 500 PO) Take by mouth daily       GLUCOSAMINE-CHONDROITIN PO Take  by mouth. GLUCOSAMINE HCL/CHONDR SUA NA  One daily       order for DME Equipment being ordered: blood pressure cuff 1 each 0            Allergies:     Allergies: Pseudoephedrine hcl          Past Medical History:     Past Medical History:   Diagnosis Date     Disorder of bone and cartilage, unspecified 5/10/2011     Esophageal reflux 5/10/2011     History of adverse reaction to anesthesia     had sneezing and runny nose post anesthesia     Pure hypercholesterolemia 5/28/2002            Past Surgical History:     Past Surgical History:   Procedure Laterality Date     ARTHRODESIS KNEE  [id number]    left knee, paritla medial meniscectomy, debridement medial femoral condyle and patellofemoral debridement     COLONOSCOPY  2004    nml     COLONOSCOPY  7/23/2014    Procedure: COLONOSCOPY;  Surgeon: Nghia Hernandez DO;  Location: [facility] OR     CYSTOSCOPY      micro hematuria neg     DILATION AND CURETTAGE       EXCISE LESION UPPER EXTREMITY Right 10/25/2017    Procedure: EXCISE LESION UPPER EXTREMITY;  EXCISIONAL BIOPSY RIGHT FOREARM TIMES 2;  Surgeon: Nghia Hernandez DO;  Location: [facility] OR     tubal sterilization       uterine fibroids       wrist fx RT         ENT family history reviewed         Social History:     Social History   Substance Use Topics     Smoking status: Never Smoker     Smokeless tobacco: Never Used     Alcohol use 0.0 oz/week      Comment: occasionally            Review of Systems:     ROS: See HPI         Physical Exam:     /68 (BP Location: Right arm, Patient Position: Chair, Cuff Size: Adult  "Regular)  Pulse 56  Temp 97.6  F (36.4  C) (Tympanic)  Ht 5' 4\" (1.626 m)  Wt 140 lb (63.5 kg)  SpO2 99%  BMI 24.03 kg/m2  General - The patient is well nourished and well developed, and appears to have good nutritional status.  Alert and oriented to person and place, answers questions and cooperates with examination appropriately.   Head and Face - Normocephalic and atraumatic, with no gross asymmetry noted.  The facial nerve is intact, with strong symmetric movements.  Voice and Breathing - The patient was breathing comfortably without the use of accessory muscles. There was no wheezing, stridor. The patients voice was clear and strong, and had appropriate pitch and quality.  Ears - External ear normal. No mastoid tenderness. Ears examined under microscope. Right EAC with cerumen, debrided with cupped forceps. Small epidermal inclusion cyst noted superior to TM (proximal/superior canal), no ulcerations. TM otherwise intact. Left EAC with cerumen, debrided with cupped forceps. TM with mild attic retraction, otherwise no effusion noted (likely due to current cold symptoms).   Eyes - Extraocular movements intact, and the pupils were reactive to light. Sclera were not icteric or injected, conjunctiva were pink and moist.  Mouth - Examination of the oral cavity showed pink, healthy oral mucosa. Dentition in good condition. No lesions or ulcerations noted. The tongue was mobile and midline.   Throat - The walls of the oropharynx were smooth, pink, moist, symmetric, and had no lesions or ulcerations.  The tonsillar pillars and soft palate were symmetric. The uvula was midline on elevation.    Neck - Normal midline excursion of the laryngotracheal complex during swallowing.  Full range of motion on passive movement.  Palpation of the occipital, submental, submandibular, internal jugular chain, and supraclavicular nodes did not demonstrate any abnormal lymph nodes or masses.  Palpation of the thyroid was soft and " smooth, with no nodules or goiter appreciated.  The trachea was mobile and midline.  Nose - External contour is symmetric, no gross deflection or scars.  Nasal mucosa is pink and moist with no abnormal mucus.  The septum and turbinates were evaluated: prominent vessels noted at the left anterior septum, no active bleedin. No polyps, masses, or purulence noted on examination.    PROCEDURE:  Options were explained to the patient regarding conservative measures versus nasal cautery in the clinic today.  The patient wished to proceed with cautery and packing.  Risks including infection, further bleeding, possible need for surgery, septal perforation were discussed and the patient consented.  I anesthetized the nose with topical lidocaine and neosynepherine.  I then applied silver nitrate to the vessels, starting distally, and working my way back to the vessels  point of entry onto the nasal mucosa.  After completion, I placed a piece of Surgicel which was coated in aquaphor in the onto the left anterior septum with a bayonette. The patient tolerated the procedure well.         Assessment and Plan:     (L72.0) Epidermoid cyst of ear  (primary encounter diagnosis)  Comment: Benight epidermal inclusion cyst right EAC  Plan: Ongoing surveillance.    (H61.23) Excessive cerumen in both ear canals  The ears were cleaned today. Aural hygiene for the ear canals was discussed.  Avoidance of Q-tips was highly encouraged. The patient was told to avoid flushing the ear canal as there is a risk of perforating the ear drum.  Recommend annual audiograms, sooner with any acute changes in hearing. The patient may return here as needed.    (H73.892) Tympanic membrane retraction, left  Comment: Mild, likely due to current cold symptoms  Plan: Ongoing surveillance, f/u if symptoms worsen. Do not recommend nasal steroid given recent epistaxis and cautery    (H91.90) Hearing loss, unspecified hearing loss type, unspecified  laterality  Comment: Chronic, gradual hearing loss reported by patient.   Plan: Recommend yearly audiogram, sooner with any acute hearing changes    (Z87.898) History of epistaxis  Comment: 2 times, short term in last week. Stopped flonase  Plan: Continue to hold off on nasal steroids.  The patient has been cauterized today for epistaxis.  I counseled them on keeping the head above the level of the heart at all times for the next week.  No picking or rubbing at the cauterized side of the nose, or blowing for 1 week.   No lifting bending or straining for 2 weeks, 10 pound weight limit.  Sneeze with mouth open.  The patient was counseled that in the event of heavy bleeding, to apply pressure to front of nose, lean forward and spit out blood.  Apply afrin nasal spray to side of bleed until is slows or stops.  If bleeding persists or is worrisome, present to the emergency room.    To prevent epistaxis:   Use over the counter nasal saline spray (ocean nasal spray)  3-4 x daily to both nostrils and before bed, then apply aquaphor ointment.     Follow up in ENT with any ongoing epistaxis, otherwise follow up as needed.    Jaky Stanley NP  ENT  [facility], [place]  [phone]      Again, thank you for allowing me to participate in the care of your patient.        Sincerely,        Jaky Stanley, WILLEM CNP

## 2018-04-13 NOTE — MR AVS SNAPSHOT
After Visit Summary   4/13/2018    Franca Angeles    MRN: [id number]           Patient Information     Date Of Birth          1945        Visit Information        Provider Department      4/13/2018 11:00 AM Jaky Stanley APRN CNP [facility]        Today's Diagnoses     Excessive cerumen in both ear canals          Care Instructions    Ears cleaned.    Benign epidermal cyst left ear canal. Ongoing surveillance.     Recommend yearly audiogram    The patient has been cauterized today for epistaxis.  I counseled them on keeping the head above the level of the heart at all times for the next week.  No picking or rubbing at the cauterized side of the nose, or blowing for 1 week.   No lifting bending or straining for 2 weeks, 10 pound weight limit.  Sneeze with mouth open.  The patient was counseled that in the event of heavy bleeding, to apply pressure to front of nose, lean forward and spit out blood.  Apply afrin nasal spray to side of bleed until is slows or stops.  If bleeding persists or is worrisome, present to the emergency room.    To prevent epistaxis:   Use over the counter nasal saline spray (ocean nasal spray)  4 x daily to both nostrils and before bed, then apply aquaphor ointment.    Follow up as needed      Thank you for allowing Jaky Stanley CNP and our ENT team to participate in your care.  If your medications are too expensive, please give the nurse a call.  We can possibly change this medication.  If you have a scheduling or an appointment question please contact Kaur our Health Unit Coordinator at their direct line [phone].   ALL nursing questions or concerns can be directed to your ENT nurse at: [phone]- Abhi                Follow-ups after your visit        Follow-up notes from your care team     Return if symptoms worsen or fail to improve.      Your next 10 appointments already scheduled     Jul 30, 2018  9:00 AM CDT   (Arrive by 8:45 AM)  "  PHYSICAL with Brittney Coe NP   [facility] ([facility] )    [address]  [place]  [facility] [address]   [phone]              Who to contact     If you have questions or need follow up information about today's clinic visit or your schedule please contact [facility] directly at [phone].  Normal or non-critical lab and imaging results will be communicated to you by Host Committeehart, letter or phone within 4 business days after the clinic has received the results. If you do not hear from us within 7 days, please contact the clinic through Quanterixt or phone. If you have a critical or abnormal lab result, we will notify you by phone as soon as possible.  Submit refill requests through TaskEasy or call your pharmacy and they will forward the refill request to us. Please allow 3 business days for your refill to be completed.          Additional Information About Your Visit        Host Committeehar[employer] Information     TaskEasy gives you secure access to your electronic health record. If you see a primary care provider, you can also send messages to your care team and make appointments. If you have questions, please call your primary care clinic.  If you do not have a primary care provider, please call [phone] and they will assist you.        Care EveryWhere ID     This is your Care EveryWhere ID. This could be used by other organizations to access your [facility] medical records  [id number]        Your Vitals Were     Pulse Temperature Height Pulse Oximetry BMI (Body Mass Index)       56 97.6  F (36.4  C) (Tympanic) 5' 4\" (1.626 m) 99% 24.03 kg/m2        Blood Pressure from Last 3 Encounters:   04/13/18 120/68   04/10/18 122/70   04/06/18 126/72    Weight from Last 3 Encounters:   04/13/18 140 lb (63.5 kg)   04/10/18 140 lb (63.5 kg)   04/06/18 140 lb (63.5 kg)              Today, you had the following     No orders found for display       Primary Care Provider " Office Phone # Fax #    Brittney CoeNEVAEH [phone] [address] [facility] [address]        Equal Access to Services     BRAD PIERCE : Hadii aad ku hadloveo Soambroseali, waaxda luqadaha, qaybta kaalmada adetherese, ti juliain hayaaliz albrightmargaritaroddy deluca. So [facility] [phone].    ATENCIÓN: Si habla español, tiene a marx disposición servicios gratuitos de asistencia lingüística. Llame al [phone].    We comply with applicable federal civil rights laws and [place] laws. We do not discriminate on the basis of race, color, national origin, age, disability, sex, sexual orientation, or gender identity.            Thank you!     Thank you for choosing [facility]  for your care. Our goal is always to provide you with excellent care. Hearing back from our patients is one way we can continue to improve our services. Please take a few minutes to complete the written survey that you may receive in the mail after your visit with us. Thank you!             Your Updated Medication List - Protect others around you: Learn how to safely use, store and throw away your medicines at www.disposemymeds.org.          This list is accurate as of 4/13/18 11:25 AM.  Always use your most recent med list.                   Brand Name Dispense Instructions for use Diagnosis    aspirin 81 MG tablet      Take 81 mg by mouth daily        atorvastatin 40 MG tablet    LIPITOR    90 tablet    Take 1 tablet (40 mg) by mouth daily    Hyperlipidemia with target LDL less than 130       azithromycin 250 MG tablet    ZITHROMAX    11 tablet    Two tablets first day, then one tablet daily for 9 days.    Acute maxillary sinusitis, recurrence not specified       CALCIUM 500 PO      Take by mouth daily        cinnamon 500 MG Caps           co-enzyme Q-10 100 MG Caps capsule      Take 200 mg by mouth        fish Oil 1200 MG capsule      Take by mouth daily        GLUCOSAMINE-CHONDROITIN PO       Take  by mouth. GLUCOSAMINE HCL/CHONDR SUA NA One daily        levothyroxine 50 MCG tablet    SYNTHROID/LEVOTHROID    90 tablet    TAKE 1 TABLET(50 MCG) BY MOUTH DAILY    Hypothyroidism due to acquired atrophy of thyroid       lisinopril 5 MG tablet    PRINIVIL/ZESTRIL    90 tablet    Take 1 tablet (5 mg) by mouth daily    Benign essential hypertension       MULTIVITAMIN PO      Alive 1 tablet        order for DME     1 each    Equipment being ordered: blood pressure cuff    Benign essential hypertension       Turmeric Curcumin 500 MG Caps      Take 1 capsule by mouth daily

## 2018-04-13 NOTE — NURSING NOTE
"Chief Complaint   Patient presents with     Cerumen Impaction     ear cleaning        Initial /68 (BP Location: Right arm, Patient Position: Chair, Cuff Size: Adult Regular)  Pulse 56  Temp 97.6  F (36.4  C) (Tympanic)  Ht 5' 4\" (1.626 m)  Wt 140 lb (63.5 kg)  SpO2 99%  BMI 24.03 kg/m2 Estimated body mass index is 24.03 kg/(m^2) as calculated from the following:    Height as of this encounter: 5' 4\" (1.626 m).    Weight as of this encounter: 140 lb (63.5 kg).  Medication Reconciliation: complete   Jo Thompson LPN      "

## 2018-04-13 NOTE — PROGRESS NOTES
Otolaryngology Note    Patient: Franca Angeles  : 1945         Chief Complaint:     Patient presents with:  Cerumen Impaction: ear cleaning          History of Present Illness:     Franca Angeles is a 72 year old female seen today for ear cleaning. She is referred by Jameson Tavera.    Franca was seen in office by PCP 18 who noted excessive cerumen build up of both ears.     Today Franca tells me that she has a noted gradual bilateral hearing loss over the years. No recent audiogram.   No otalgia, otorrhea or aura fullness.  Vertigo: denies  Tinnitus: denies  There is no facial weakness, facial numbness or dysphagia.  No COM, otologic surgeries or trauma. One doctor told her a long time ago she has something different in her right ear.     Currently being treated with azithromycin for acute sinusitis. Noting improvement in her sinus symptoms. Denies history of chronic sinusitis.    In the past week, 2 nose bleeds (front/left side), lasting just minutes. Never experiencing blood down the back of her throat. She was recently using Flonase, but has since stopped. She is also on daily baby aspirin. Long distant history of needing to be cauterized.          Medications:     Current Outpatient Rx   Medication Sig Dispense Refill     azithromycin (ZITHROMAX) 250 MG tablet Two tablets first day, then one tablet daily for 9 days. 11 tablet 0     levothyroxine (SYNTHROID/LEVOTHROID) 50 MCG tablet TAKE 1 TABLET(50 MCG) BY MOUTH DAILY 90 tablet 3     atorvastatin (LIPITOR) 40 MG tablet Take 1 tablet (40 mg) by mouth daily 90 tablet 3     lisinopril (PRINIVIL/ZESTRIL) 5 MG tablet Take 1 tablet (5 mg) by mouth daily 90 tablet 3     cinnamon 500 MG CAPS        co-enzyme Q-10 100 MG CAPS capsule Take 200 mg by mouth       Turmeric Curcumin 500 MG CAPS Take 1 capsule by mouth daily       aspirin 81 MG tablet Take 81 mg by mouth daily       Multiple Vitamins-Minerals (MULTIVITAMIN OR) Alive 1 tablet       Omega-3 Fatty  "Acids (FISH OIL) 1200 MG CAPS Take by mouth daily       Calcium-Magnesium-Vitamin D (CALCIUM 500 PO) Take by mouth daily       GLUCOSAMINE-CHONDROITIN PO Take  by mouth. GLUCOSAMINE HCL/CHONDR SUA NA  One daily       order for DME Equipment being ordered: blood pressure cuff 1 each 0            Allergies:     Allergies: Pseudoephedrine hcl          Past Medical History:     Past Medical History:   Diagnosis Date     Disorder of bone and cartilage, unspecified 5/10/2011     Esophageal reflux 5/10/2011     History of adverse reaction to anesthesia     had sneezing and runny nose post anesthesia     Pure hypercholesterolemia 5/28/2002            Past Surgical History:     Past Surgical History:   Procedure Laterality Date     ARTHRODESIS KNEE  [id number]    left knee, paritla medial meniscectomy, debridement medial femoral condyle and patellofemoral debridement     COLONOSCOPY  2004    nml     COLONOSCOPY  7/23/2014    Procedure: COLONOSCOPY;  Surgeon: Nghia Hernandez DO;  Location: [facility]     CYSTOSCOPY      micro hematuria neg     DILATION AND CURETTAGE       EXCISE LESION UPPER EXTREMITY Right 10/25/2017    Procedure: EXCISE LESION UPPER EXTREMITY;  EXCISIONAL BIOPSY RIGHT FOREARM TIMES 2;  Surgeon: Nghia Hernandez DO;  Location: [facility]     tubal sterilization       uterine fibroids       wrist fx RT         ENT family history reviewed         Social History:     Social History   Substance Use Topics     Smoking status: Never Smoker     Smokeless tobacco: Never Used     Alcohol use 0.0 oz/week      Comment: occasionally            Review of Systems:     ROS: See HPI         Physical Exam:     /68 (BP Location: Right arm, Patient Position: Chair, Cuff Size: Adult Regular)  Pulse 56  Temp 97.6  F (36.4  C) (Tympanic)  Ht 5' 4\" (1.626 m)  Wt 140 lb (63.5 kg)  SpO2 99%  BMI 24.03 kg/m2  General - The patient is well nourished and well developed, and appears to have good nutritional status.  Alert and " oriented to person and place, answers questions and cooperates with examination appropriately.   Head and Face - Normocephalic and atraumatic, with no gross asymmetry noted.  The facial nerve is intact, with strong symmetric movements.  Voice and Breathing - The patient was breathing comfortably without the use of accessory muscles. There was no wheezing, stridor. The patients voice was clear and strong, and had appropriate pitch and quality.  Ears - External ear normal. No mastoid tenderness. Ears examined under microscope. Right EAC with cerumen, debrided with cupped forceps. Small epidermal inclusion cyst noted superior to TM (proximal/superior canal), no ulcerations. TM otherwise intact. Left EAC with cerumen, debrided with cupped forceps. TM with mild attic retraction, otherwise no effusion noted (likely due to current cold symptoms).   Eyes - Extraocular movements intact, and the pupils were reactive to light. Sclera were not icteric or injected, conjunctiva were pink and moist.  Mouth - Examination of the oral cavity showed pink, healthy oral mucosa. Dentition in good condition. No lesions or ulcerations noted. The tongue was mobile and midline.   Throat - The walls of the oropharynx were smooth, pink, moist, symmetric, and had no lesions or ulcerations.  The tonsillar pillars and soft palate were symmetric. The uvula was midline on elevation.    Neck - Normal midline excursion of the laryngotracheal complex during swallowing.  Full range of motion on passive movement.  Palpation of the occipital, submental, submandibular, internal jugular chain, and supraclavicular nodes did not demonstrate any abnormal lymph nodes or masses.  Palpation of the thyroid was soft and smooth, with no nodules or goiter appreciated.  The trachea was mobile and midline.  Nose - External contour is symmetric, no gross deflection or scars.  Nasal mucosa is pink and moist with no abnormal mucus.  The septum and turbinates were  evaluated: prominent vessels noted at the left anterior septum, no active bleedin. No polyps, masses, or purulence noted on examination.    PROCEDURE:  Options were explained to the patient regarding conservative measures versus nasal cautery in the clinic today.  The patient wished to proceed with cautery and packing.  Risks including infection, further bleeding, possible need for surgery, septal perforation were discussed and the patient consented.  I anesthetized the nose with topical lidocaine and neosynepherine.  I then applied silver nitrate to the vessels, starting distally, and working my way back to the vessels  point of entry onto the nasal mucosa.  After completion, I placed a piece of Surgicel which was coated in aquaphor in the onto the left anterior septum with a bayonette. The patient tolerated the procedure well.         Assessment and Plan:     (L72.0) Epidermoid cyst of ear  (primary encounter diagnosis)  Comment: Benight epidermal inclusion cyst right EAC  Plan: Ongoing surveillance.    (H61.23) Excessive cerumen in both ear canals  The ears were cleaned today. Aural hygiene for the ear canals was discussed.  Avoidance of Q-tips was highly encouraged. The patient was told to avoid flushing the ear canal as there is a risk of perforating the ear drum.  Recommend annual audiograms, sooner with any acute changes in hearing. The patient may return here as needed.    (H73.892) Tympanic membrane retraction, left  Comment: Mild, likely due to current cold symptoms  Plan: Ongoing surveillance, f/u if symptoms worsen. Do not recommend nasal steroid given recent epistaxis and cautery    (H91.90) Hearing loss, unspecified hearing loss type, unspecified laterality  Comment: Chronic, gradual hearing loss reported by patient.   Plan: Recommend yearly audiogram, sooner with any acute hearing changes    (Z87.898) History of epistaxis  Comment: 2 times, short term in last week. Stopped flonase  Plan: Continue to  hold off on nasal steroids.  The patient has been cauterized today for epistaxis.  I counseled them on keeping the head above the level of the heart at all times for the next week.  No picking or rubbing at the cauterized side of the nose, or blowing for 1 week.   No lifting bending or straining for 2 weeks, 10 pound weight limit.  Sneeze with mouth open.  The patient was counseled that in the event of heavy bleeding, to apply pressure to front of nose, lean forward and spit out blood.  Apply afrin nasal spray to side of bleed until is slows or stops.  If bleeding persists or is worrisome, present to the emergency room.    To prevent epistaxis:   Use over the counter nasal saline spray (ocean nasal spray)  3-4 x daily to both nostrils and before bed, then apply aquaphor ointment.     Follow up in ENT with any ongoing epistaxis, otherwise follow up as needed.    Jaky Stanley NP  ENT  [facility], [place]  [phone]

## 2018-04-13 NOTE — PATIENT INSTRUCTIONS
Ears cleaned.    Benign epidermal cyst left ear canal. Ongoing surveillance.     Recommend yearly audiogram    The patient has been cauterized today for epistaxis.  I counseled them on keeping the head above the level of the heart at all times for the next week.  No picking or rubbing at the cauterized side of the nose, or blowing for 1 week.   No lifting bending or straining for 2 weeks, 10 pound weight limit.  Sneeze with mouth open.  The patient was counseled that in the event of heavy bleeding, to apply pressure to front of nose, lean forward and spit out blood.  Apply afrin nasal spray to side of bleed until is slows or stops.  If bleeding persists or is worrisome, present to the emergency room.    To prevent epistaxis:   Use over the counter nasal saline spray (ocean nasal spray)  4 x daily to both nostrils and before bed, then apply aquaphor ointment.    Follow up as needed      Thank you for allowing Jaky Stanley CNP and our ENT team to participate in your care.  If your medications are too expensive, please give the nurse a call.  We can possibly change this medication.  If you have a scheduling or an appointment question please contact [place] our Health Unit Coordinator at their direct line [phone].   ALL nursing questions or concerns can be directed to your ENT nurse at: [phone]- Abhi

## 2018-06-26 ENCOUNTER — HEALTH MAINTENANCE LETTER (OUTPATIENT)
Age: 73
End: 2018-06-26

## 2018-07-26 ENCOUNTER — RADIANT APPOINTMENT (OUTPATIENT)
Dept: MAMMOGRAPHY | Facility: OTHER | Age: 73
End: 2018-07-26
Attending: NURSE PRACTITIONER
Payer: MEDICARE

## 2018-07-26 DIAGNOSIS — E78.5 HYPERLIPIDEMIA WITH TARGET LDL LESS THAN 130: ICD-10-CM

## 2018-07-26 DIAGNOSIS — Z12.31 VISIT FOR SCREENING MAMMOGRAM: ICD-10-CM

## 2018-07-26 DIAGNOSIS — E03.4 HYPOTHYROIDISM DUE TO ACQUIRED ATROPHY OF THYROID: ICD-10-CM

## 2018-07-26 DIAGNOSIS — I10 BENIGN ESSENTIAL HYPERTENSION: ICD-10-CM

## 2018-07-26 DIAGNOSIS — Z79.899 ON STATIN THERAPY: ICD-10-CM

## 2018-07-26 LAB
ALBUMIN SERPL-MCNC: 4 G/DL (ref 3.4–5)
ALP SERPL-CCNC: 73 U/L (ref 40–150)
ALT SERPL W P-5'-P-CCNC: 38 U/L (ref 0–50)
ANION GAP SERPL CALCULATED.3IONS-SCNC: 8 MMOL/L (ref 3–14)
AST SERPL W P-5'-P-CCNC: 33 U/L (ref 0–45)
BILIRUB DIRECT SERPL-MCNC: <0.1 MG/DL (ref 0–0.2)
BILIRUB SERPL-MCNC: 0.4 MG/DL (ref 0.2–1.3)
BUN SERPL-MCNC: 25 MG/DL (ref 7–30)
CALCIUM SERPL-MCNC: 9.2 MG/DL (ref 8.5–10.1)
CHLORIDE SERPL-SCNC: 108 MMOL/L (ref 94–109)
CHOLEST SERPL-MCNC: 197 MG/DL
CO2 SERPL-SCNC: 27 MMOL/L (ref 20–32)
CREAT SERPL-MCNC: 1.11 MG/DL (ref 0.52–1.04)
GFR SERPL CREATININE-BSD FRML MDRD: 48 ML/MIN/1.7M2
GLUCOSE SERPL-MCNC: 87 MG/DL (ref 70–99)
HDLC SERPL-MCNC: 59 MG/DL
LDLC SERPL CALC-MCNC: 117 MG/DL
NONHDLC SERPL-MCNC: 138 MG/DL
POTASSIUM SERPL-SCNC: 4.1 MMOL/L (ref 3.4–5.3)
PROT SERPL-MCNC: 7.3 G/DL (ref 6.8–8.8)
SODIUM SERPL-SCNC: 143 MMOL/L (ref 133–144)
TRIGL SERPL-MCNC: 107 MG/DL
TSH SERPL DL<=0.005 MIU/L-ACNC: 3.27 MU/L (ref 0.4–4)

## 2018-07-26 PROCEDURE — 80061 LIPID PANEL: CPT | Mod: ZL | Performed by: NURSE PRACTITIONER

## 2018-07-26 PROCEDURE — 36415 COLL VENOUS BLD VENIPUNCTURE: CPT | Mod: ZL | Performed by: NURSE PRACTITIONER

## 2018-07-26 PROCEDURE — 84443 ASSAY THYROID STIM HORMONE: CPT | Mod: ZL | Performed by: NURSE PRACTITIONER

## 2018-07-26 PROCEDURE — 80076 HEPATIC FUNCTION PANEL: CPT | Mod: ZL | Performed by: NURSE PRACTITIONER

## 2018-07-26 PROCEDURE — 77063 BREAST TOMOSYNTHESIS BI: CPT | Mod: TC

## 2018-07-26 PROCEDURE — 80048 BASIC METABOLIC PNL TOTAL CA: CPT | Mod: ZL | Performed by: NURSE PRACTITIONER

## 2018-07-26 NOTE — PATIENT INSTRUCTIONS
ASSESSMENT / PLAN:   1. Routine general medical examination at a health care facility  Exam completed    2. Hyperlipidemia with target LDL less than 100  Chronic, continue current plan    3. Hypothyroidism due to acquired atrophy of thyroid  Chronic, continue current plan    4. Benign essential hypertension  Chronic, continue current plan    Future orders are entered         Preventive Health Recommendations  Female Ages 65 +    Yearly exam:     See your health care provider every year in order to  o Review health changes.   o Discuss preventive care.    o Review your medicines if your doctor has prescribed any.      You no longer need a yearly Pap test unless you've had an abnormal Pap test in the past 10 years. If you have vaginal symptoms, such as bleeding or discharge, be sure to talk with your provider about a Pap test.      Every 1 to 2 years, have a mammogram.  If you are over 69, talk with your health care provider about whether or not you want to continue having screening mammograms.      Every 10 years, have a colonoscopy. Or, have a yearly FIT test (stool test). These exams will check for colon cancer.       Have a cholesterol test every 5 years, or more often if your doctor advises it.       Have a diabetes test (fasting glucose) every three years. If you are at risk for diabetes, you should have this test more often.       At age 65, have a bone density scan (DEXA) to check for osteoporosis (brittle bone disease).    Shots:    Get a flu shot each year.    Get a tetanus shot every 10 years.    Talk to your doctor about your pneumonia vaccines. There are now two you should receive - Pneumovax (PPSV 23) and Prevnar (PCV 13).    Talk to your pharmacist about the shingles vaccine.    Talk to your doctor about the hepatitis B vaccine.    Nutrition:     Eat at least 5 servings of fruits and vegetables each day.      Eat whole-grain bread, whole-wheat pasta and brown rice instead of white grains and  rice.      Get adequate about Calcium and Vitamin D.     Lifestyle    Exercise at least 150 minutes a week (30 minutes a day, 5 days a week). This will help you control your weight and prevent disease.      Limit alcohol to one drink per day.      No smoking.       Wear sunscreen to prevent skin cancer.       See your dentist twice a year for an exam and cleaning.      See your eye doctor every 1 to 2 years to screen for conditions such as glaucoma, macular degeneration, cataracts, etc

## 2018-07-26 NOTE — PROGRESS NOTES
SUBJECTIVE:   Franca Angeles is a 72 year old female who presents for Preventive Visit.    Are you in the first 12 months of your Medicare Part B coverage?  No    Healthy Habits:    Do you get at least three servings of calcium containing foods daily (dairy, green leafy vegetables, etc.)? yes    Amount of exercise or daily activities, outside of work: 7 day(s) per week    Problems taking medications regularly No    Medication side effects: No    Have you had an eye exam in the past two years? yes    Do you see a dentist twice per year? yes    Do you have sleep apnea, excessive snoring or daytime drowsiness?yes      Ability to successfully perform activities of daily living: Yes, no assistance needed    Home safety:  none identified     Hearing impairment: No    Fall risk:  Fallen 2 or more times in the past year?: No  Any fall with injury in the past year?: No        COGNITIVE SCREEN  1) Repeat 3 items (Leader, Season, Table)    2) Clock draw: NORMAL  3) 3 item recall: Recalls 3 objects  Results: 3 items recalled: COGNITIVE IMPAIRMENT LESS LIKELY    Mini-CogTM Copyright S Symone. Licensed by the author for use in [employer]; reprinted with permission ([email or web address]). All rights reserved.            Hyperlipidemia Follow-Up      Rate your low fat/cholesterol diet?: fair    Taking statin?  Yes, no muscle aches from statin    Other lipid medications/supplements?:  Fish oil/Omega 3,  without side effects  The 10-year ASCVD risk score (Otisestrella LANGSTON Jr, et al., 2013) is: 12.9%    Values used to calculate the score:      Age: 72 years      Sex: Female      Is Non- : No      Diabetic: No      Tobacco smoker: No      Systolic Blood Pressure: 110 mmHg      Is BP treated: Yes      HDL Cholesterol: 59 mg/dL      Total Cholesterol: 197 mg/dL        Hypertension Follow-up      Outpatient blood pressures are being checked at home.  Results are normal range.    Low Salt Diet: no added  salt    Hypothyroidism Follow-up      Since last visit, patient describes the following symptoms: loose stools      Reviewed and updated as needed this visit by clinical staff  Tobacco  Allergies  Meds  Med Hx  Surg Hx  Fam Hx  Soc Hx        Reviewed and updated as needed this visit by Provider        Social History   Substance Use Topics     Smoking status: Never Smoker     Smokeless tobacco: Never Used     Alcohol use 0.0 oz/week      Comment: occasionally       If you drink alcohol do you typically have >3 drinks per day or >7 drinks per week? No                        PHQ-9 SCORE 4/6/2018 4/10/2018 7/30/2018   Total Score - - -   Total Score 1 3 2     MCKINLEY-7 SCORE 4/6/2018 4/10/2018 7/30/2018   Total Score 0 0 0       Do you feel safe in your environment - Yes    Do you have a Health Care Directive?: No: Advance care planning was reviewed with patient; patient declined at this time.    Current providers sharing in care for this patient include:   Patient Care Team:  Brittney Coe, NP as PCP - General (Family Practice)    The following health maintenance items are reviewed in Epic and correct as of today:  Health Maintenance   Topic Date Due     DEXA SCAN SCREENING (SYSTEM ASSIGNED)  08/17/2009     INFLUENZA VACCINE (1) 09/01/2018     MCKINLEY QUESTIONNAIRE 6 MONTHS  10/10/2018     PHQ-9 Q6 MONTHS  10/10/2018     FALL RISK ASSESSMENT  04/13/2019     MAMMO SCREEN Q2 YR (SYSTEM ASSIGNED)  07/26/2020     ADVANCE DIRECTIVE PLANNING Q5 YRS  04/10/2023     LIPID SCREEN Q5 YR FEMALE (SYSTEM ASSIGNED)  07/26/2023     TETANUS IMMUNIZATION (SYSTEM ASSIGNED)  04/30/2024     COLONOSCOPY Q10 YR  08/14/2024     PNEUMOCOCCAL  Completed     BP Readings from Last 3 Encounters:   07/30/18 110/72   04/13/18 120/68   04/10/18 122/70    Wt Readings from Last 3 Encounters:   07/30/18 141 lb (64 kg)   04/13/18 140 lb (63.5 kg)   04/10/18 140 lb (63.5 kg)                  Patient Active Problem List   Diagnosis     Menopause      Joint pain     Disorder of bone and cartilage     Advanced care planning/counseling discussion     Hyperlipidemia with target LDL less than 130     Routine general medical examination at a health care facility (ANNUAL)     Colon cancer screening     Hypothyroidism due to acquired atrophy of thyroid     Benign essential hypertension     Past Surgical History:   Procedure Laterality Date     ARTHRODESIS KNEE  [id number]    left knee, paritla medial meniscectomy, debridement medial femoral condyle and patellofemoral debridement     COLONOSCOPY  2004    nml     COLONOSCOPY  7/23/2014    Procedure: COLONOSCOPY;  Surgeon: Nghia Hernandez DO;  Location: [facility]     CYSTOSCOPY      micro hematuria neg     DILATION AND CURETTAGE       EXCISE LESION UPPER EXTREMITY Right 10/25/2017    Procedure: EXCISE LESION UPPER EXTREMITY;  EXCISIONAL BIOPSY RIGHT FOREARM TIMES 2;  Surgeon: Nghia Hernandez DO;  Location: [facility]     tubal sterilization       uterine fibroids       wrist fx RT         Social History   Substance Use Topics     Smoking status: Never Smoker     Smokeless tobacco: Never Used     Alcohol use 0.0 oz/week      Comment: occasionally     Family History   Problem Relation Age of Onset     C.A.D. Mother      Other - See Comments Mother      high platelets     Hypertension Mother      C.A.D. Paternal Aunt      Diabetes Other      aunt     Cardiovascular Father      cardiovascular disease     Colon Cancer Other      family history-paternal side     Cerebrovascular Disease Maternal Grandmother      Cerebrovascular Disease Maternal Grandfather          Current Outpatient Prescriptions   Medication Sig Dispense Refill     aspirin 81 MG tablet Take 81 mg by mouth daily       atorvastatin (LIPITOR) 40 MG tablet Take 1 tablet (40 mg) by mouth daily 90 tablet 3     Calcium-Magnesium-Vitamin D (CALCIUM 500 PO) Take by mouth daily       cinnamon 500 MG CAPS        co-enzyme Q-10 100 MG CAPS capsule Take 200 mg by mouth        Garlic 2000 MG CAPS Take 2,000 mg by mouth daily       GLUCOSAMINE-CHONDROITIN PO Take  by mouth. GLUCOSAMINE HCL/CHONDR SUA NA  One daily       levothyroxine (SYNTHROID/LEVOTHROID) 50 MCG tablet TAKE 1 TABLET(50 MCG) BY MOUTH DAILY 90 tablet 3     lisinopril (PRINIVIL/ZESTRIL) 5 MG tablet Take 1 tablet (5 mg) by mouth daily 90 tablet 3     Multiple Vitamins-Minerals (MULTIVITAMIN OR) Alive 1 tablet       Omega-3 Fatty Acids (FISH OIL) 1200 MG CAPS Take by mouth daily       order for DME Equipment being ordered: blood pressure cuff 1 each 0     Turmeric Curcumin 500 MG CAPS Take 1 capsule by mouth daily       Allergies   Allergen Reactions     Pseudoephedrine Hcl Other (See Comments)     Heart racing  Sudafed     Recent Labs   Lab Test  07/26/18   0800  04/05/18   0805  01/03/18   0911   05/13/15   1026   A1C   --    --    --    --   5.7   LDL  117*  125*  118*   < >  134*   HDL  59  62  59   < >  59   TRIG  107  159*  180*   < >  170*   ALT  38  37  47   < >   --    CR  1.11*  1.00  0.88   < >   --    GFRESTIMATED  48*  54*  63   < >   --    GFRESTBLACK  58*  66  77   < >   --    POTASSIUM  4.1  3.9  3.9   < >   --    TSH  3.27  4.92*  5.16*   < >  3.75    < > = values in this interval not displayed.        Pneumonia Vaccine: up to date  Mammogram Screening: mammogram is current.   Dexa scan - not interested, declined.      ROS:  CONSTITUTIONAL: NEGATIVE for fever, chills, change in weight  ENT/MOUTH: NEGATIVE for ear, mouth and throat problems  RESP: NEGATIVE for significant cough or SOB  CV: NEGATIVE for chest pain, palpitations or peripheral edema  GI: NEGATIVE for nausea, abdominal pain, heartburn, or change in bowel habits  : dysparunia, hematuria, hesitancy and incontinence  MUSCULOSKELETAL: NEGATIVE for significant arthralgias or myalgia  NEURO: NEGATIVE for weakness, dizziness or paresthesias, behavior changes, dysarthria, gait disturbance and memory problems  ENDOCRINE: NEGATIVE for temperature  "intolerance, skin/hair changes  HEME/ALLERGY/IMMUNE: NEGATIVE for bleeding problems  PSYCHIATRIC: NEGATIVE for changes in mood or affect    OBJECTIVE:   /72 (BP Location: Left arm, Patient Position: Chair, Cuff Size: Adult Regular)  Pulse 63  Temp 97.1  F (36.2  C)  Ht 5' 4\" (1.626 m)  Wt 141 lb (64 kg)  SpO2 98%  BMI 24.2 kg/m2 Estimated body mass index is 24.2 kg/(m^2) as calculated from the following:    Height as of this encounter: 5' 4\" (1.626 m).    Weight as of this encounter: 141 lb (64 kg).  EXAM:   GENERAL: healthy, alert and no distress  EYES: Eyes grossly normal to inspection, PERRL and conjunctivae and sclerae normal  HENT: ear canals and TM's normal, nose and mouth without ulcers or lesions  NECK: no adenopathy, no asymmetry, masses, or scars, thyroid normal to palpation and no carotid bruits  RESP: lungs clear to auscultation - no rales, rhonchi or wheezes  BREAST: normal without masses, tenderness or nipple discharge and no palpable axillary masses or adenopathy  CV: regular rate and rhythm, normal S1 S2, no S3 or S4, no murmur, click or rub, no peripheral edema and peripheral pulses strong  ABDOMEN: soft, nontender, no hepatosplenomegaly, no masses and bowel sounds normal  MS: no gross musculoskeletal defects noted, no edema  SKIN: no suspicious lesions or rashes  NEURO: Normal strength and tone, mentation intact and speech normal  PSYCH: mentation appears normal, affect normal/bright      ASSESSMENT / PLAN:   1. Routine general medical examination at a health care facility  Exam completed    2. Hyperlipidemia with target LDL less than 100  Chronic, continue current plan    3. Hypothyroidism due to acquired atrophy of thyroid  Chronic, continue current plan    4. Benign essential hypertension  Chronic, continue current plan    Future orders are entered       COUNSELING:  Reviewed preventive health counseling, as reflected in patient instructions       Regular exercise       Healthy " "diet/nutrition       Vision screening       Hearing screening       Dental care       Osteoporosis Prevention/Bone Health    BP Readings from Last 1 Encounters:   07/30/18 110/72     Estimated body mass index is 24.2 kg/(m^2) as calculated from the following:    Height as of this encounter: 5' 4\" (1.626 m).    Weight as of this encounter: 141 lb (64 kg).       reports that she has never smoked. She has never used smokeless tobacco.      Appropriate preventive services were discussed with this patient, including applicable screening as appropriate for cardiovascular disease, diabetes, osteopenia/osteoporosis, and glaucoma.  As appropriate for age/gender, discussed screening for colorectal cancer, prostate cancer, breast cancer, and cervical cancer. Checklist reviewing preventive services available has been given to the patient.    Reviewed patients plan of care and provided an AVS. The Intermediate Care Plan ( asthma action plan, low back pain action plan, and migraine action plan) for Franca meets the Care Plan requirement. This Care Plan has been established and reviewed with the Patient.    Counseling Resources:  ATP IV Guidelines  Pooled Cohorts Equation Calculator  Breast Cancer Risk Calculator  FRAX Risk Assessment  ICSI Preventive Guidelines  Dietary Guidelines for Americans, 2010  [employer] MyPlate  ASA Prophylaxis  Lung CA Screening    Brittney Coe, NP  [facility]  "

## 2018-07-30 ENCOUNTER — OFFICE VISIT (OUTPATIENT)
Dept: FAMILY MEDICINE | Facility: OTHER | Age: 73
End: 2018-07-30
Attending: NURSE PRACTITIONER
Payer: COMMERCIAL

## 2018-07-30 VITALS
HEART RATE: 63 BPM | TEMPERATURE: 97.1 F | BODY MASS INDEX: 24.07 KG/M2 | SYSTOLIC BLOOD PRESSURE: 110 MMHG | HEIGHT: 64 IN | WEIGHT: 141 LBS | OXYGEN SATURATION: 98 % | DIASTOLIC BLOOD PRESSURE: 72 MMHG

## 2018-07-30 DIAGNOSIS — Z00.00 ROUTINE GENERAL MEDICAL EXAMINATION AT A HEALTH CARE FACILITY: Primary | ICD-10-CM

## 2018-07-30 DIAGNOSIS — E03.4 HYPOTHYROIDISM DUE TO ACQUIRED ATROPHY OF THYROID: ICD-10-CM

## 2018-07-30 DIAGNOSIS — E78.5 HYPERLIPIDEMIA WITH TARGET LDL LESS THAN 130: ICD-10-CM

## 2018-07-30 DIAGNOSIS — I10 BENIGN ESSENTIAL HYPERTENSION: ICD-10-CM

## 2018-07-30 DIAGNOSIS — Z79.899 ON STATIN THERAPY: ICD-10-CM

## 2018-07-30 PROCEDURE — 99397 PER PM REEVAL EST PAT 65+ YR: CPT | Performed by: NURSE PRACTITIONER

## 2018-07-30 PROCEDURE — G0463 HOSPITAL OUTPT CLINIC VISIT: HCPCS

## 2018-07-30 ASSESSMENT — ANXIETY QUESTIONNAIRES
7. FEELING AFRAID AS IF SOMETHING AWFUL MIGHT HAPPEN: NOT AT ALL
5. BEING SO RESTLESS THAT IT IS HARD TO SIT STILL: NOT AT ALL
3. WORRYING TOO MUCH ABOUT DIFFERENT THINGS: NOT AT ALL
2. NOT BEING ABLE TO STOP OR CONTROL WORRYING: NOT AT ALL
6. BECOMING EASILY ANNOYED OR IRRITABLE: NOT AT ALL
4. TROUBLE RELAXING: NOT AT ALL
1. FEELING NERVOUS, ANXIOUS, OR ON EDGE: NOT AT ALL
GAD7 TOTAL SCORE: 0

## 2018-07-30 ASSESSMENT — PAIN SCALES - GENERAL: PAINLEVEL: NO PAIN (0)

## 2018-07-30 NOTE — MR AVS SNAPSHOT
After Visit Summary   7/30/2018    Franca Angeles    MRN: [id number]           Patient Information     Date Of Birth          1945        Visit Information        Provider Department      7/30/2018 9:00 AM Brittney Coe NP [facility]        Today's Diagnoses     Routine general medical examination at a health care facility    -  1    Hyperlipidemia with target LDL less than 130        Hypothyroidism due to acquired atrophy of thyroid        Benign essential hypertension        On statin therapy          Care Instructions      ASSESSMENT / PLAN:   1. Routine general medical examination at a health care facility  Exam completed    2. Hyperlipidemia with target LDL less than 100  Chronic, continue current plan    3. Hypothyroidism due to acquired atrophy of thyroid  Chronic, continue current plan    4. Benign essential hypertension  Chronic, continue current plan    Future orders are entered         Preventive Health Recommendations  Female Ages 65 +    Yearly exam:     See your health care provider every year in order to  o Review health changes.   o Discuss preventive care.    o Review your medicines if your doctor has prescribed any.      You no longer need a yearly Pap test unless you've had an abnormal Pap test in the past 10 years. If you have vaginal symptoms, such as bleeding or discharge, be sure to talk with your provider about a Pap test.      Every 1 to 2 years, have a mammogram.  If you are over 69, talk with your health care provider about whether or not you want to continue having screening mammograms.      Every 10 years, have a colonoscopy. Or, have a yearly FIT test (stool test). These exams will check for colon cancer.       Have a cholesterol test every 5 years, or more often if your doctor advises it.       Have a diabetes test (fasting glucose) every three years. If you are at risk for diabetes, you should have this test more often.       At age 65,  have a bone density scan (DEXA) to check for osteoporosis (brittle bone disease).    Shots:    Get a flu shot each year.    Get a tetanus shot every 10 years.    Talk to your doctor about your pneumonia vaccines. There are now two you should receive - Pneumovax (PPSV 23) and Prevnar (PCV 13).    Talk to your pharmacist about the shingles vaccine.    Talk to your doctor about the hepatitis B vaccine.    Nutrition:     Eat at least 5 servings of fruits and vegetables each day.      Eat whole-grain bread, whole-wheat pasta and brown rice instead of white grains and rice.      Get adequate about Calcium and Vitamin D.     Lifestyle    Exercise at least 150 minutes a week (30 minutes a day, 5 days a week). This will help you control your weight and prevent disease.      Limit alcohol to one drink per day.      No smoking.       Wear sunscreen to prevent skin cancer.       See your dentist twice a year for an exam and cleaning.      See your eye doctor every 1 to 2 years to screen for conditions such as glaucoma, macular degeneration, cataracts, etc           Follow-ups after your visit        Future tests that were ordered for you today     Open Future Orders        Priority Expected Expires Ordered    Lipid Profile Routine 1/30/2019 2/28/2019 7/30/2018    TSH with free T4 reflex Routine 1/30/2019 2/28/2019 7/30/2018    Comprehensive metabolic panel Routine 1/30/2019 2/28/2019 7/30/2018            Who to contact     If you have questions or need follow up information about today's clinic visit or your schedule please contact [facility] directly at [phone].  Normal or non-critical lab and imaging results will be communicated to you by MyChart, letter or phone within 4 business days after the clinic has received the results. If you do not hear from us within 7 days, please contact the clinic through MyChart or phone. If you have a critical or abnormal lab result, we will notify you by phone as soon as  "possible.  Submit refill requests through [employer] or call your pharmacy and they will forward the refill request to us. Please allow 3 business days for your refill to be completed.          Additional Information About Your Visit        Apps & [employer] Information     [employer] gives you secure access to your electronic health record. If you see a primary care provider, you can also send messages to your care team and make appointments. If you have questions, please call your primary care clinic.  If you do not have a primary care provider, please call [phone] and they will assist you.        Care EveryWhere ID     This is your Care EveryWhere ID. This could be used by other organizations to access your [place] medical records  [id number]        Your Vitals Were     Pulse Temperature Height Pulse Oximetry BMI (Body Mass Index)       63 97.1  F (36.2  C) 5' 4\" (1.626 m) 98% 24.2 kg/m2        Blood Pressure from Last 3 Encounters:   07/30/18 110/72   04/13/18 120/68   04/10/18 122/70    Weight from Last 3 Encounters:   07/30/18 141 lb (64 kg)   04/13/18 140 lb (63.5 kg)   04/10/18 140 lb (63.5 kg)                 Today's Medication Changes          These changes are accurate as of 7/30/18  9:19 AM.  If you have any questions, ask your nurse or doctor.               Stop taking these medicines if you haven't already. Please contact your care team if you have questions.     azithromycin 250 MG tablet   Commonly known as:  ZITHROMAX   Stopped by:  Brittney Coe NP                    Primary Care Provider Office Phone # Fax #    Brittney Coe [phone] [address] [facility] [address]        Equal Access to Services     SAMANTHA PIERCE : Leonie Durán, jonathan stevenson, frankie gaitanalmati matias. So [facility] [phone].    ATENCIÓN: Si habla español, tiene a marx disposición servicios gratuitos de " asistencia lingüística. Guillermina al [phone].    We comply with applicable federal civil rights laws and [place] laws. We do not discriminate on the basis of race, color, national origin, age, disability, sex, sexual orientation, or gender identity.            Thank you!     Thank you for choosing [facility]  for your care. Our goal is always to provide you with excellent care. Hearing back from our patients is one way we can continue to improve our services. Please take a few minutes to complete the written survey that you may receive in the mail after your visit with us. Thank you!             Your Updated Medication List - Protect others around you: Learn how to safely use, store and throw away your medicines at www.disposemymeds.org.          This list is accurate as of 7/30/18  9:19 AM.  Always use your most recent med list.                   Brand Name Dispense Instructions for use Diagnosis    aspirin 81 MG tablet      Take 81 mg by mouth daily        atorvastatin 40 MG tablet    LIPITOR    90 tablet    Take 1 tablet (40 mg) by mouth daily    Hyperlipidemia with target LDL less than 130       CALCIUM 500 PO      Take by mouth daily        cinnamon 500 MG Caps           co-enzyme Q-10 100 MG Caps capsule      Take 200 mg by mouth        fish Oil 1200 MG capsule      Take by mouth daily        Garlic 2000 MG Caps      Take 2,000 mg by mouth daily        GLUCOSAMINE-CHONDROITIN PO      Take  by mouth. GLUCOSAMINE HCL/CHONDR SUA NA One daily        levothyroxine 50 MCG tablet    SYNTHROID/LEVOTHROID    90 tablet    TAKE 1 TABLET(50 MCG) BY MOUTH DAILY    Hypothyroidism due to acquired atrophy of thyroid       lisinopril 5 MG tablet    PRINIVIL/ZESTRIL    90 tablet    Take 1 tablet (5 mg) by mouth daily    Benign essential hypertension       MULTIVITAMIN PO      Alive 1 tablet        order for DME     1 each    Equipment being ordered: blood pressure cuff    Benign essential hypertension        Turmeric Curcumin 500 MG Caps      Take 1 capsule by mouth daily

## 2018-07-30 NOTE — NURSING NOTE
"Chief Complaint   Patient presents with     Physical     Hypertension     Lipids     Thyroid Problem       Initial /72 (BP Location: Left arm, Patient Position: Chair, Cuff Size: Adult Regular)  Pulse 63  Temp 97.1  F (36.2  C)  Ht 5' 4\" (1.626 m)  Wt 141 lb (64 kg)  SpO2 98%  BMI 24.2 kg/m2 Estimated body mass index is 24.2 kg/(m^2) as calculated from the following:    Height as of this encounter: 5' 4\" (1.626 m).    Weight as of this encounter: 141 lb (64 kg).  Medication Reconciliation: complete    MALINDA SPARKS LPN  "

## 2018-07-31 ASSESSMENT — ANXIETY QUESTIONNAIRES: GAD7 TOTAL SCORE: 0

## 2018-07-31 ASSESSMENT — PATIENT HEALTH QUESTIONNAIRE - PHQ9: SUM OF ALL RESPONSES TO PHQ QUESTIONS 1-9: 2

## 2018-08-29 DIAGNOSIS — I10 BENIGN ESSENTIAL HYPERTENSION: ICD-10-CM

## 2018-08-30 NOTE — TELEPHONE ENCOUNTER
Lisinopril       Last Written Prescription Date:  4/06/2018  Last Fill Quantity: 90,   # refills: 3  Last Office Visit: 7/30/2018  Future Office visit:

## 2018-08-31 RX ORDER — LISINOPRIL 5 MG/1
TABLET ORAL
Qty: 90 TABLET | Refills: 0 | Status: SHIPPED | OUTPATIENT
Start: 2018-08-31 | End: 2019-01-30

## 2019-01-23 NOTE — PROGRESS NOTES
SUBJECTIVE:   Franca Angeles is a 73 year old female who presents to clinic today for the following health issues:      Hyperlipidemia Follow-Up      Rate your low fat/cholesterol diet?: fair    Taking statin?  Yes, buttocks cramping    Other lipid medications/supplements?:  None  The 10-year ASCVD risk score (Otis LANGSTON Jr., et al., 2013) is: 14.6%    Values used to calculate the score:      Age: 73 years      Sex: Female      Is Non- : No      Diabetic: No      Tobacco smoker: No      Systolic Blood Pressure: 112 mmHg      Is BP treated: Yes      HDL Cholesterol: 61 mg/dL        Total Cholesterol: 172 mg/dL        Hypertension Follow-up      Outpatient blood pressures are not being checked.    Low Salt Diet: no added salt    Hypothyroidism Follow-up      Since last visit, patient describes the following symptoms: Weight stable, no hair loss, no skin changes, no constipation, no loose stools      Amount of exercise or physical activity: Dance yoga bowling and silver fit    Problems taking medications regularly: No    Medication side effects: cramping in buttocks    Diet: low salt and low fat/cholesterol        Problem list and histories reviewed & adjusted, as indicated.  Additional history: as documented    Patient Active Problem List   Diagnosis     Menopause     Joint pain     Disorder of bone and cartilage     Advanced care planning/counseling discussion     Hyperlipidemia with target LDL less than 130     Routine general medical examination at a health care facility (ANNUAL)     Colon cancer screening     Hypothyroidism due to acquired atrophy of thyroid     Benign essential hypertension     Past Surgical History:   Procedure Laterality Date     ARTHRODESIS KNEE  332326    left knee, paritla medial meniscectomy, debridement medial femoral condyle and patellofemoral debridement     COLONOSCOPY  2004    nml     COLONOSCOPY  7/23/2014    Procedure: COLONOSCOPY;  Surgeon: Nghia Hernandez  DO CLAUDIA;  Location: [facility] OR     CYSTOSCOPY      micro hematuria neg     DILATION AND CURETTAGE       EXCISE LESION UPPER EXTREMITY Right 10/25/2017    Procedure: EXCISE LESION UPPER EXTREMITY;  EXCISIONAL BIOPSY RIGHT FOREARM TIMES 2;  Surgeon: Nghia Hernandez DO;  Location: [facility] OR     tubal sterilization       uterine fibroids       wrist fx RT         Social History     Tobacco Use     Smoking status: Never Smoker     Smokeless tobacco: Never Used   Substance Use Topics     Alcohol use: Yes     Alcohol/week: 0.0 oz     Comment: occasionally     Family History   Problem Relation Age of Onset     C.A.D. Mother      Other - See Comments Mother         high platelets     Hypertension Mother      C.A.D. Paternal Aunt      Diabetes Other         aunt     Cardiovascular Father         cardiovascular disease     Colon Cancer Other         family history-paternal side     Cerebrovascular Disease Maternal Grandmother      Cerebrovascular Disease Maternal Grandfather          Current Outpatient Medications   Medication Sig Dispense Refill     aspirin 81 MG tablet Take 81 mg by mouth daily       atorvastatin (LIPITOR) 40 MG tablet Take 1 tablet (40 mg) by mouth daily 90 tablet 3     Calcium-Magnesium-Vitamin D (CALCIUM 500 PO) Take by mouth daily       cinnamon 500 MG CAPS        co-enzyme Q-10 100 MG CAPS capsule Take 200 mg by mouth       Garlic 2000 MG CAPS Take 2,000 mg by mouth daily       GLUCOSAMINE-CHONDROITIN PO Take  by mouth. GLUCOSAMINE HCL/CHONDR SUA NA  One daily       levothyroxine (SYNTHROID/LEVOTHROID) 50 MCG tablet TAKE 1 TABLET(50 MCG) BY MOUTH DAILY 90 tablet 3     lisinopril (PRINIVIL/ZESTRIL) 5 MG tablet Take 1 tablet (5 mg) by mouth daily 90 tablet 3     Multiple Vitamins-Minerals (MULTIVITAMIN OR) Alive 1 tablet       Omega-3 Fatty Acids (FISH OIL) 1200 MG CAPS Take by mouth daily       order for DME Equipment being ordered: blood pressure cuff 1 each 0     Turmeric Curcumin 500 MG CAPS Take 1 capsule  "by mouth daily       Allergies   Allergen Reactions     Pseudoephedrine Hcl Other (See Comments)     Heart racing  Sudafed     Recent Labs   Lab Test 01/28/19  0959 07/26/18  0800 04/05/18  0805  05/13/15  1026   A1C  --   --   --   --  5.7   LDL 88 117* 125*   < > 134*   HDL 61 59 62   < > 59   TRIG 116 107 159*   < > 170*   ALT 33 38 37   < >  --    CR 0.98 1.11* 1.00   < >  --    GFRESTIMATED 57* 48* 54*   < >  --    GFRESTBLACK 66 58* 66   < >  --    POTASSIUM 4.8 4.1 3.9   < >  --    TSH 2.30 3.27 4.92*   < > 3.75    < > = values in this interval not displayed.      BP Readings from Last 3 Encounters:   01/30/19 112/68   07/30/18 110/72   04/13/18 120/68    Wt Readings from Last 3 Encounters:   01/30/19 64 kg (141 lb)   07/30/18 64 kg (141 lb)   04/13/18 63.5 kg (140 lb)                    Reviewed and updated as needed this visit by clinical staff       Reviewed and updated as needed this visit by Provider         ROS:  Constitutional, HEENT, cardiovascular, pulmonary, gi and gu systems are negative, except as otherwise noted.  Right great toenail fungus that started 3-4 weeks ago.    OBJECTIVE:     /68 (BP Location: Left arm, Patient Position: Sitting, Cuff Size: Adult Regular)   Pulse (!) 49   Temp 98.2  F (36.8  C) (Tympanic)   Resp 14   Ht 1.626 m (5' 4\")   Wt 64 kg (141 lb)   SpO2 98%   BMI 24.20 kg/m    Body mass index is 24.2 kg/m .  GENERAL: healthy, alert and no distress  NECK: no adenopathy, no asymmetry, masses, or scars, thyroid normal to palpation and no carotid bruits  RESP: lungs clear to auscultation - no rales, rhonchi or wheezes  CV: regular rate and rhythm, normal S1 S2, no S3 or S4, no murmur, click or rub, no peripheral edema and peripheral pulses strong  MS: no gross musculoskeletal defects noted, no edema  SKIN: right great toe nail is yellow and  from nail plate at distal end to mid-nail.    PSYCH: mentation appears normal, affect normal/bright    Results for " orders placed or performed in visit on 01/28/19   Comprehensive metabolic panel   Result Value Ref Range    Sodium 139 133 - 144 mmol/L    Potassium 4.8 3.4 - 5.3 mmol/L    Chloride 108 94 - 109 mmol/L    Carbon Dioxide 28 20 - 32 mmol/L    Anion Gap 3 3 - 14 mmol/L    Glucose 88 70 - 99 mg/dL    Urea Nitrogen 21 7 - 30 mg/dL    Creatinine 0.98 0.52 - 1.04 mg/dL    GFR Estimate 57 (L) >60 mL/min/[1.73_m2]    GFR Estimate If Black 66 >60 mL/min/[1.73_m2]    Calcium 9.1 8.5 - 10.1 mg/dL    Bilirubin Total 0.4 0.2 - 1.3 mg/dL    Albumin 4.0 3.4 - 5.0 g/dL    Protein Total 7.3 6.8 - 8.8 g/dL    Alkaline Phosphatase 67 40 - 150 U/L    ALT 33 0 - 50 U/L    AST 26 0 - 45 U/L   Lipid Profile   Result Value Ref Range    Cholesterol 172 <200 mg/dL    Triglycerides 116 <150 mg/dL    HDL Cholesterol 61 >49 mg/dL    LDL Cholesterol Calculated 88 <100 mg/dL    Non HDL Cholesterol 111 <130 mg/dL   TSH with free T4 reflex   Result Value Ref Range    TSH 2.30 0.40 - 4.00 mU/L         ASSESSMENT/PLAN:       1. Hyperlipidemia with target LDL less than 130  Decrease lipitor to 30 mg daily  Continue garlic  - Lipid Profile; Future  - atorvastatin (LIPITOR) 20 MG tablet; Take 1.5 tablets (30 mg) by mouth daily  Dispense: 135 tablet; Refill: 1    2. Hypothyroidism due to acquired atrophy of thyroid  chronic  - TSH with free T4 reflex; Future    3. Benign essential hypertension  chronic  - Comprehensive metabolic panel; Future    4. On statin therapy  - Comprehensive metabolic panel; Future    5. Onychomycosis  New onset  - ciclopirox (PENLAC) 8 % external solution; Apply to adjacent skin and affected nails daily.  Remove with alcohol every 7 days, then repeat.  Dispense: 6.6 mL; Refill: 3    shingrix is recommended    FUTURE APPOINTMENTS:       - Follow-up visit in 3 months     Brittney Ballard-NEVAEH Tavera  [facility]

## 2019-01-28 DIAGNOSIS — Z79.899 ON STATIN THERAPY: ICD-10-CM

## 2019-01-28 DIAGNOSIS — I10 BENIGN ESSENTIAL HYPERTENSION: ICD-10-CM

## 2019-01-28 DIAGNOSIS — E03.4 HYPOTHYROIDISM DUE TO ACQUIRED ATROPHY OF THYROID: ICD-10-CM

## 2019-01-28 DIAGNOSIS — E78.5 HYPERLIPIDEMIA WITH TARGET LDL LESS THAN 130: ICD-10-CM

## 2019-01-28 LAB
ALBUMIN SERPL-MCNC: 4 G/DL (ref 3.4–5)
ALP SERPL-CCNC: 67 U/L (ref 40–150)
ALT SERPL W P-5'-P-CCNC: 33 U/L (ref 0–50)
ANION GAP SERPL CALCULATED.3IONS-SCNC: 3 MMOL/L (ref 3–14)
AST SERPL W P-5'-P-CCNC: 26 U/L (ref 0–45)
BILIRUB SERPL-MCNC: 0.4 MG/DL (ref 0.2–1.3)
BUN SERPL-MCNC: 21 MG/DL (ref 7–30)
CALCIUM SERPL-MCNC: 9.1 MG/DL (ref 8.5–10.1)
CHLORIDE SERPL-SCNC: 108 MMOL/L (ref 94–109)
CHOLEST SERPL-MCNC: 172 MG/DL
CO2 SERPL-SCNC: 28 MMOL/L (ref 20–32)
CREAT SERPL-MCNC: 0.98 MG/DL (ref 0.52–1.04)
GFR SERPL CREATININE-BSD FRML MDRD: 57 ML/MIN/{1.73_M2}
GLUCOSE SERPL-MCNC: 88 MG/DL (ref 70–99)
HDLC SERPL-MCNC: 61 MG/DL
LDLC SERPL CALC-MCNC: 88 MG/DL
NONHDLC SERPL-MCNC: 111 MG/DL
POTASSIUM SERPL-SCNC: 4.8 MMOL/L (ref 3.4–5.3)
PROT SERPL-MCNC: 7.3 G/DL (ref 6.8–8.8)
SODIUM SERPL-SCNC: 139 MMOL/L (ref 133–144)
TRIGL SERPL-MCNC: 116 MG/DL
TSH SERPL DL<=0.005 MIU/L-ACNC: 2.3 MU/L (ref 0.4–4)

## 2019-01-28 PROCEDURE — 36415 COLL VENOUS BLD VENIPUNCTURE: CPT | Mod: ZL | Performed by: NURSE PRACTITIONER

## 2019-01-28 PROCEDURE — 80061 LIPID PANEL: CPT | Mod: ZL | Performed by: NURSE PRACTITIONER

## 2019-01-28 PROCEDURE — 80053 COMPREHEN METABOLIC PANEL: CPT | Mod: ZL | Performed by: NURSE PRACTITIONER

## 2019-01-28 PROCEDURE — 84443 ASSAY THYROID STIM HORMONE: CPT | Mod: ZL | Performed by: NURSE PRACTITIONER

## 2019-01-30 ENCOUNTER — TELEPHONE (OUTPATIENT)
Dept: FAMILY MEDICINE | Facility: OTHER | Age: 74
End: 2019-01-30

## 2019-01-30 ENCOUNTER — OFFICE VISIT (OUTPATIENT)
Dept: FAMILY MEDICINE | Facility: OTHER | Age: 74
End: 2019-01-30
Attending: NURSE PRACTITIONER
Payer: COMMERCIAL

## 2019-01-30 VITALS
OXYGEN SATURATION: 98 % | RESPIRATION RATE: 14 BRPM | BODY MASS INDEX: 24.07 KG/M2 | WEIGHT: 141 LBS | TEMPERATURE: 98.2 F | SYSTOLIC BLOOD PRESSURE: 112 MMHG | HEIGHT: 64 IN | HEART RATE: 49 BPM | DIASTOLIC BLOOD PRESSURE: 68 MMHG

## 2019-01-30 DIAGNOSIS — E78.5 HYPERLIPIDEMIA WITH TARGET LDL LESS THAN 130: Primary | ICD-10-CM

## 2019-01-30 DIAGNOSIS — B35.1 ONYCHOMYCOSIS: ICD-10-CM

## 2019-01-30 DIAGNOSIS — Z79.899 ON STATIN THERAPY: ICD-10-CM

## 2019-01-30 DIAGNOSIS — E03.4 HYPOTHYROIDISM DUE TO ACQUIRED ATROPHY OF THYROID: ICD-10-CM

## 2019-01-30 DIAGNOSIS — I10 BENIGN ESSENTIAL HYPERTENSION: ICD-10-CM

## 2019-01-30 PROCEDURE — G0463 HOSPITAL OUTPT CLINIC VISIT: HCPCS

## 2019-01-30 PROCEDURE — 99214 OFFICE O/P EST MOD 30 MIN: CPT | Performed by: NURSE PRACTITIONER

## 2019-01-30 RX ORDER — ATORVASTATIN CALCIUM 20 MG/1
30 TABLET, FILM COATED ORAL DAILY
Qty: 135 TABLET | Refills: 1 | Status: SHIPPED | OUTPATIENT
Start: 2019-01-30 | End: 2019-08-06

## 2019-01-30 RX ORDER — CICLOPIROX 80 MG/ML
SOLUTION TOPICAL
Qty: 6.6 ML | Refills: 3 | Status: SHIPPED | OUTPATIENT
Start: 2019-01-30 | End: 2019-05-01

## 2019-01-30 ASSESSMENT — ANXIETY QUESTIONNAIRES
5. BEING SO RESTLESS THAT IT IS HARD TO SIT STILL: NOT AT ALL
6. BECOMING EASILY ANNOYED OR IRRITABLE: NOT AT ALL
1. FEELING NERVOUS, ANXIOUS, OR ON EDGE: NOT AT ALL
GAD7 TOTAL SCORE: 0
4. TROUBLE RELAXING: NOT AT ALL
7. FEELING AFRAID AS IF SOMETHING AWFUL MIGHT HAPPEN: NOT AT ALL
3. WORRYING TOO MUCH ABOUT DIFFERENT THINGS: NOT AT ALL
2. NOT BEING ABLE TO STOP OR CONTROL WORRYING: NOT AT ALL
IF YOU CHECKED OFF ANY PROBLEMS ON THIS QUESTIONNAIRE, HOW DIFFICULT HAVE THESE PROBLEMS MADE IT FOR YOU TO DO YOUR WORK, TAKE CARE OF THINGS AT HOME, OR GET ALONG WITH OTHER PEOPLE: NOT DIFFICULT AT ALL

## 2019-01-30 ASSESSMENT — MIFFLIN-ST. JEOR: SCORE: 1124.57

## 2019-01-30 ASSESSMENT — PATIENT HEALTH QUESTIONNAIRE - PHQ9: SUM OF ALL RESPONSES TO PHQ QUESTIONS 1-9: 0

## 2019-01-30 ASSESSMENT — PAIN SCALES - GENERAL: PAINLEVEL: NO PAIN (0)

## 2019-01-30 NOTE — TELEPHONE ENCOUNTER
APPROVAL 1/30/19 Received approval from [employer] for Ciclopirox. Approval dates 11/1/18 thru 1/30/20. Pharmacy advised. Forms scanned to [employer].

## 2019-01-30 NOTE — NURSING NOTE
"Chief Complaint   Patient presents with     Hypertension     Hyperlipidemia     Thyroid Problem     Toenail     Great toe, right       Initial /68 (BP Location: Left arm, Patient Position: Sitting, Cuff Size: Adult Regular)   Pulse (!) 49   Temp 98.2  F (36.8  C) (Tympanic)   Resp 14   Ht 1.626 m (5' 4\")   Wt 64 kg (141 lb)   SpO2 98%   BMI 24.20 kg/m   Estimated body mass index is 24.2 kg/m  as calculated from the following:    Height as of this encounter: 1.626 m (5' 4\").    Weight as of this encounter: 64 kg (141 lb).  Medication Reconciliation: complete    Suzanne Longoria LPN    "

## 2019-01-30 NOTE — TELEPHONE ENCOUNTER
Received signed form back from Liang, faxed completed forms to [facility]. Waiting for response.

## 2019-01-30 NOTE — TELEPHONE ENCOUNTER
1/30/19 Received PA request from [employer] for Ciclopirox 8% solution, attempted to submit request through [facility] but patient plan is not [employer] enabled. Completed paper form, however it needs prescriber's signature prior to submission. Faxed form to Jameson Tavera CNP to sign, I will send to [facility] once I receive back signed.

## 2019-01-31 ASSESSMENT — ANXIETY QUESTIONNAIRES: GAD7 TOTAL SCORE: 0

## 2019-02-23 ENCOUNTER — HOSPITAL ENCOUNTER (EMERGENCY)
Facility: HOSPITAL | Age: 74
Discharge: HOME | End: 2019-02-23
Attending: NURSE PRACTITIONER | Admitting: NURSE PRACTITIONER
Payer: MEDICARE

## 2019-02-23 VITALS
HEART RATE: 73 BPM | SYSTOLIC BLOOD PRESSURE: 137 MMHG | WEIGHT: 140 LBS | BODY MASS INDEX: 24.03 KG/M2 | TEMPERATURE: 99.4 F | DIASTOLIC BLOOD PRESSURE: 78 MMHG | OXYGEN SATURATION: 100 % | RESPIRATION RATE: 18 BRPM

## 2019-02-23 DIAGNOSIS — J06.9 VIRAL URI WITH COUGH: ICD-10-CM

## 2019-02-23 LAB
DEPRECATED S PYO AG THROAT QL EIA: NORMAL
SPECIMEN SOURCE: NORMAL

## 2019-02-23 PROCEDURE — 87880 STREP A ASSAY W/OPTIC: CPT | Performed by: FAMILY MEDICINE

## 2019-02-23 PROCEDURE — 99213 OFFICE O/P EST LOW 20 MIN: CPT | Mod: Z6 | Performed by: NURSE PRACTITIONER

## 2019-02-23 PROCEDURE — 87081 CULTURE SCREEN ONLY: CPT | Performed by: FAMILY MEDICINE

## 2019-02-23 PROCEDURE — G0463 HOSPITAL OUTPT CLINIC VISIT: HCPCS

## 2019-02-23 ASSESSMENT — ENCOUNTER SYMPTOMS
DIARRHEA: 0
SINUS PAIN: 1
SHORTNESS OF BREATH: 0
SINUS PRESSURE: 1
SORE THROAT: 1
MYALGIAS: 1
COUGH: 1
EYE DISCHARGE: 0
ARTHRALGIAS: 1
RHINORRHEA: 0
APPETITE CHANGE: 1
WHEEZING: 0
FEVER: 1
CHILLS: 1
ACTIVITY CHANGE: 1
VOMITING: 0

## 2019-02-23 NOTE — ED AVS SNAPSHOT
[facility]  [address] [address]  Phone:  [phone]                                    Franca Angeles   MRN: [id number]    Department:  [facility]   Date of Visit:  2/23/2019           After Visit Summary Signature Page    I have received my discharge instructions, and my questions have been answered. I have discussed any challenges I see with this plan with the nurse or doctor.    ..........................................................................................................................................  Patient/Patient Representative Signature      ..........................................................................................................................................  Patient Representative Print Name and Relationship to Patient    ..................................................               ................................................  Date                                   Time    ..........................................................................................................................................  Reviewed by Signature/Title    ...................................................              ..............................................  Date                                               Time          22EPIC Rev 08/18

## 2019-02-23 NOTE — DISCHARGE INSTRUCTIONS
You may try plain Mucinex (guaifenesin) which should not cause palpitations.  Salt-water gargle - mix 1/4-1/2 tsp of salt with 8 ounces of warm water to dissolve.

## 2019-02-23 NOTE — ED TRIAGE NOTES
Pt presents today alone for c/o sore throat for three days and took mucinex and was having heart palpitations from it.

## 2019-02-23 NOTE — ED PROVIDER NOTES
"  History     Chief Complaint   Patient presents with     Pharyngitis     \"for 3 days\"      The history is provided by the patient. No  was used.     Franca Angeles is a 73 year old female who has had a sore throat for the past 3 days. Highest temp was 99.4, which she states is high for her. Associated headaches, body aches, ear pain, sinus pain and congestion, cough that started as a tickle, but is now becoming productive. Slept all day yesterday. Appetite is down, drinking fluids.    She tried Mucinex-D at home, which caused heart palpitations (sudafed causes the same). She has been taking honey/lemon for the cough. Ibuprofen for body aches last night, which seemed to help. She has had her influenza vaccine this year.    Allergies:  Allergies   Allergen Reactions     Pseudoephedrine Hcl Other (See Comments)     Heart racing  Sudafed       Problem List:    Patient Active Problem List    Diagnosis Date Noted     Benign essential hypertension 06/14/2017     Priority: Medium     Hypothyroidism due to acquired atrophy of thyroid 05/21/2015     Priority: Medium     Routine general medical examination at a health care facility (ANNUAL) 04/30/2014     Priority: Medium     Colon cancer screening 04/30/2014     Priority: Medium     Done in July. Return is 10 years       Hyperlipidemia with target LDL less than 130 04/18/2014     Priority: Medium     Menopause 04/23/2013     Priority: Medium     Joint pain 04/23/2013     Priority: Medium     Advanced care planning/counseling discussion 10/09/2012     Priority: Medium     Disorder of bone and cartilage 05/10/2011     Priority: Medium        Past Medical History:    Past Medical History:   Diagnosis Date     Esophageal reflux 5/10/2011       Past Surgical History:    Past Surgical History:   Procedure Laterality Date     ARTHRODESIS KNEE  [id number]    left knee, paritla medial meniscectomy, debridement medial femoral condyle and patellofemoral debridement "     COLONOSCOPY  2004    nml     COLONOSCOPY  7/23/2014    Procedure: COLONOSCOPY;  Surgeon: Nghia Hernandez DO;  Location: [facility]     CYSTOSCOPY      micro hematuria neg     DILATION AND CURETTAGE       EXCISE LESION UPPER EXTREMITY Right 10/25/2017    Procedure: EXCISE LESION UPPER EXTREMITY;  EXCISIONAL BIOPSY RIGHT FOREARM TIMES 2;  Surgeon: Nghia Hernandez DO;  Location: [facility]     tubal sterilization       uterine fibroids       wrist fx RT         Family History:    Family History   Problem Relation Age of Onset     C.A.D. Mother      Other - See Comments Mother         high platelets     Hypertension Mother      C.A.D. Paternal Aunt      Diabetes Other         aunt     Cardiovascular Father         cardiovascular disease     Colon Cancer Other         family history-paternal side     Cerebrovascular Disease Maternal Grandmother      Cerebrovascular Disease Maternal Grandfather        Social History:  Marital Status:   [2]  Social History     Tobacco Use     Smoking status: Never Smoker     Smokeless tobacco: Never Used   Substance Use Topics     Alcohol use: Yes     Alcohol/week: 0.0 oz     Comment: occasionally     Drug use: No        Medications:      aspirin 81 MG tablet   atorvastatin (LIPITOR) 20 MG tablet   Calcium-Magnesium-Vitamin D (CALCIUM 500 PO)   cinnamon 500 MG CAPS   co-enzyme Q-10 100 MG CAPS capsule   Garlic 2000 MG CAPS   GLUCOSAMINE-CHONDROITIN PO   levothyroxine (SYNTHROID/LEVOTHROID) 50 MCG tablet   lisinopril (PRINIVIL/ZESTRIL) 5 MG tablet   Multiple Vitamins-Minerals (MULTIVITAMIN OR)   Omega-3 Fatty Acids (FISH OIL) 1200 MG CAPS   Turmeric Curcumin 500 MG CAPS   ciclopirox (PENLAC) 8 % external solution   order for DME         Review of Systems   Constitutional: Positive for activity change, appetite change, chills and fever.   HENT: Positive for congestion, ear pain, sinus pressure, sinus pain and sore throat. Negative for rhinorrhea.    Eyes: Negative for discharge.    Respiratory: Positive for cough. Negative for shortness of breath and wheezing.    Cardiovascular: Negative for chest pain.   Gastrointestinal: Negative for diarrhea and vomiting.   Musculoskeletal: Positive for arthralgias and myalgias.   All other systems reviewed and are negative.      Physical Exam   BP: 137/78  Pulse: 73  Temp: 99.4  F (37.4  C)  Resp: 18  Weight: 63.5 kg (140 lb)  SpO2: 100 %      Physical Exam   Constitutional: She is oriented to person, place, and time. She appears well-developed and well-nourished.  Non-toxic appearance. No distress.   HENT:   Head: Normocephalic and atraumatic.   Right Ear: Tympanic membrane and ear canal normal.   Left Ear: Tympanic membrane and ear canal normal.   Nose: Nose normal. Right sinus exhibits no maxillary sinus tenderness and no frontal sinus tenderness. Left sinus exhibits no maxillary sinus tenderness and no frontal sinus tenderness.   Mouth/Throat: Uvula is midline. Posterior oropharyngeal erythema present. No oropharyngeal exudate. Tonsils are 0 on the right. Tonsils are 0 on the left. No tonsillar exudate.   Eyes: Pupils are equal, round, and reactive to light. No scleral icterus.   Cardiovascular: Normal heart sounds and intact distal pulses.   Pulmonary/Chest: Effort normal and breath sounds normal. No respiratory distress.   Musculoskeletal: She exhibits no edema or tenderness.   Neurological: She is alert and oriented to person, place, and time.   Skin: Skin is warm. No rash noted. She is not diaphoretic.       ED Course        Procedures            Results for orders placed or performed during the hospital encounter of 02/23/19 (from the past 24 hour(s))   Rapid strep screen   Result Value Ref Range    Specimen Description Throat     Rapid Strep A Screen       NEGATIVE: No Group A streptococcal antigen detected by immunoassay, await culture report.       Medications - No data to display    Assessments & Plan (with Medical Decision Making)     I  have reviewed the nursing notes.    I have reviewed the findings, diagnosis, plan and need for follow up with the patient.   Influenza (which is circulating in the community) vs other. Continue symptomatic treatment at home: increase fluid intake, humidification, rest. May try plain Mucinex to see if helpful. Follow up with Jameson Coe CNP if symptoms are not improving within 3-4 days. Return to urgent care or ER if symptoms are worsening. Franca is agreeable to plan and discharged to home in stable condition.         Medication List      There are no discharge medications for this visit.         Final diagnoses:   Viral URI with cough       2/23/2019   [facility]     Sandy Hollis, APRN CNP  02/23/19 [id number]

## 2019-02-25 LAB
BACTERIA SPEC CULT: NORMAL
SPECIMEN SOURCE: NORMAL

## 2019-04-29 DIAGNOSIS — Z79.899 ON STATIN THERAPY: ICD-10-CM

## 2019-04-29 DIAGNOSIS — E03.4 HYPOTHYROIDISM DUE TO ACQUIRED ATROPHY OF THYROID: ICD-10-CM

## 2019-04-29 DIAGNOSIS — I10 BENIGN ESSENTIAL HYPERTENSION: ICD-10-CM

## 2019-04-29 DIAGNOSIS — E78.5 HYPERLIPIDEMIA WITH TARGET LDL LESS THAN 130: ICD-10-CM

## 2019-04-29 LAB
ALBUMIN SERPL-MCNC: 4 G/DL (ref 3.4–5)
ALP SERPL-CCNC: 80 U/L (ref 40–150)
ALT SERPL W P-5'-P-CCNC: 34 U/L (ref 0–50)
ANION GAP SERPL CALCULATED.3IONS-SCNC: 6 MMOL/L (ref 3–14)
AST SERPL W P-5'-P-CCNC: 25 U/L (ref 0–45)
BILIRUB SERPL-MCNC: 0.4 MG/DL (ref 0.2–1.3)
BUN SERPL-MCNC: 20 MG/DL (ref 7–30)
CALCIUM SERPL-MCNC: 9.6 MG/DL (ref 8.5–10.1)
CHLORIDE SERPL-SCNC: 109 MMOL/L (ref 94–109)
CHOLEST SERPL-MCNC: 195 MG/DL
CO2 SERPL-SCNC: 26 MMOL/L (ref 20–32)
CREAT SERPL-MCNC: 0.97 MG/DL (ref 0.52–1.04)
GFR SERPL CREATININE-BSD FRML MDRD: 57 ML/MIN/{1.73_M2}
GLUCOSE SERPL-MCNC: 85 MG/DL (ref 70–99)
HDLC SERPL-MCNC: 59 MG/DL
LDLC SERPL CALC-MCNC: 108 MG/DL
NONHDLC SERPL-MCNC: 136 MG/DL
POTASSIUM SERPL-SCNC: 4.4 MMOL/L (ref 3.4–5.3)
PROT SERPL-MCNC: 7.6 G/DL (ref 6.8–8.8)
SODIUM SERPL-SCNC: 141 MMOL/L (ref 133–144)
TRIGL SERPL-MCNC: 138 MG/DL
TSH SERPL DL<=0.005 MIU/L-ACNC: 3.4 MU/L (ref 0.4–4)

## 2019-04-29 PROCEDURE — 36415 COLL VENOUS BLD VENIPUNCTURE: CPT | Mod: ZL | Performed by: NURSE PRACTITIONER

## 2019-04-29 PROCEDURE — 80061 LIPID PANEL: CPT | Mod: ZL | Performed by: NURSE PRACTITIONER

## 2019-04-29 PROCEDURE — 84443 ASSAY THYROID STIM HORMONE: CPT | Mod: ZL | Performed by: NURSE PRACTITIONER

## 2019-04-29 PROCEDURE — 80053 COMPREHEN METABOLIC PANEL: CPT | Mod: ZL | Performed by: NURSE PRACTITIONER

## 2019-04-30 NOTE — PROGRESS NOTES
SUBJECTIVE:   Franca Angeles is a 73 year old female who presents to clinic today for the following   health issues:      Hyperlipidemia Follow-Up      Rate your low fat/cholesterol diet?: poor    Taking statin?  Yes - tolerating 30mg much better than 40mg daily.     Other lipid medications/supplements?:  Fish Oil  The 10-year ASCVD risk score (Otis LANGSTON Jr., et al., 2013) is: 18.9%    Values used to calculate the score:      Age: 73 years      Sex: Female      Is Non- : No      Diabetic: No      Tobacco smoker: No      Systolic Blood Pressure: 128 mmHg      Is BP treated: Yes      HDL Cholesterol: 59 mg/dL        Total Cholesterol: 195 mg/dL        Hypertension Follow-up      Outpatient blood pressures are being checked at home.  Results are good.    Low Salt Diet: no added salt    Hypothyroidism Follow-up      Since last visit, patient describes the following symptoms: Weight stable, no hair loss, no skin changes, no constipation, no loose stools      Amount of exercise or physical activity: None    Problems taking medications regularly: No    Medication side effects: none, no longer having cramps with decrease in Lipitor    Diet: low salt and low fat/cholesterol    Has been using penlac daily, symptoms are improving but not resolved.      Additional history: as documented    Reviewed  and updated as needed this visit by clinical staff         Reviewed and updated as needed this visit by Provider         Patient Active Problem List   Diagnosis     Menopause     Joint pain     Disorder of bone and cartilage     Advanced care planning/counseling discussion     Hyperlipidemia with target LDL less than 130     Routine general medical examination at a health care facility (ANNUAL)     Colon cancer screening     Hypothyroidism due to acquired atrophy of thyroid     Benign essential hypertension     Past Surgical History:   Procedure Laterality Date     ARTHRODESIS KNEE  [id number]    left knee,  paritla medial meniscectomy, debridement medial femoral condyle and patellofemoral debridement     COLONOSCOPY  2004    nml     COLONOSCOPY  7/23/2014    Procedure: COLONOSCOPY;  Surgeon: Nghia Hernandez DO;  Location: [facility]     CYSTOSCOPY      micro hematuria neg     DILATION AND CURETTAGE       EXCISE LESION UPPER EXTREMITY Right 10/25/2017    Procedure: EXCISE LESION UPPER EXTREMITY;  EXCISIONAL BIOPSY RIGHT FOREARM TIMES 2;  Surgeon: Nghia Hernandez DO;  Location: [facility]     tubal sterilization       uterine fibroids       wrist fx RT         Social History     Tobacco Use     Smoking status: Never Smoker     Smokeless tobacco: Never Used   Substance Use Topics     Alcohol use: Yes     Alcohol/week: 0.0 oz     Comment: occasionally     Family History   Problem Relation Age of Onset     C.A.D. Mother      Other - See Comments Mother         high platelets     Hypertension Mother      C.A.D. Paternal Aunt      Diabetes Other         aunt     Cardiovascular Father         cardiovascular disease     Colon Cancer Other         family history-paternal side     Cerebrovascular Disease Maternal Grandmother      Cerebrovascular Disease Maternal Grandfather          Current Outpatient Medications   Medication Sig Dispense Refill     aspirin 81 MG tablet Take 81 mg by mouth daily       atorvastatin (LIPITOR) 20 MG tablet Take 1.5 tablets (30 mg) by mouth daily 135 tablet 1     Calcium-Magnesium-Vitamin D (CALCIUM 500 PO) Take by mouth daily       ciclopirox (PENLAC) 8 % external solution Apply to adjacent skin and affected nails daily.  Remove with alcohol every 7 days, then repeat. 6.6 mL 3     cinnamon 500 MG CAPS        co-enzyme Q-10 100 MG CAPS capsule Take 200 mg by mouth       Garlic 2000 MG CAPS Take 2,000 mg by mouth daily       GLUCOSAMINE-CHONDROITIN PO Take  by mouth. GLUCOSAMINE HCL/CHONDR SUA NA  One daily       levothyroxine (SYNTHROID/LEVOTHROID) 50 MCG tablet TAKE 1 TABLET(50 MCG) BY MOUTH DAILY  "90 tablet 3     lisinopril (PRINIVIL/ZESTRIL) 5 MG tablet Take 1 tablet (5 mg) by mouth daily 90 tablet 3     Multiple Vitamins-Minerals (MULTIVITAMIN OR) Alive 1 tablet       Omega-3 Fatty Acids (FISH OIL) 1200 MG CAPS Take by mouth daily       order for DME Equipment being ordered: blood pressure cuff 1 each 0     Turmeric Curcumin 500 MG CAPS Take 1 capsule by mouth daily       Allergies   Allergen Reactions     Pseudoephedrine Hcl Other (See Comments)     Heart racing  Sudafed     Mucinex Palpitations     Recent Labs   Lab Test 04/29/19  0957 01/28/19  0959 07/26/18  0800  05/13/15  1026   A1C  --   --   --   --  5.7   * 88 117*   < > 134*   HDL 59 61 59   < > 59   TRIG 138 116 107   < > 170*   ALT 34 33 38   < >  --    CR 0.97 0.98 1.11*   < >  --    GFRESTIMATED 57* 57* 48*   < >  --    GFRESTBLACK 67 66 58*   < >  --    POTASSIUM 4.4 4.8 4.1   < >  --    TSH 3.40 2.30 3.27   < > 3.75    < > = values in this interval not displayed.      BP Readings from Last 3 Encounters:   05/01/19 128/76   02/23/19 137/78   01/30/19 112/68    Wt Readings from Last 3 Encounters:   05/01/19 63.5 kg (140 lb)   02/23/19 63.5 kg (140 lb)   01/30/19 64 kg (141 lb)                ROS:  Constitutional, HEENT, cardiovascular, pulmonary, gi and gu systems are negative, except as otherwise noted.    OBJECTIVE:     /76 (BP Location: Left arm, Patient Position: Sitting, Cuff Size: Adult Regular)   Pulse 58   Temp 96.5  F (35.8  C) (Tympanic)   Resp 14   Ht 1.626 m (5' 4\")   Wt 63.5 kg (140 lb)   SpO2 99%   BMI 24.03 kg/m    Body mass index is 24.03 kg/m .  GENERAL: healthy, alert and no distress  HENT: normal cephalic/atraumatic, both ears: dull, nose and mouth without ulcers or lesions, nasal mucosa edematous , rhinorrhea clear and oral mucous membranes moist  NECK: no adenopathy, no asymmetry, masses, or scars and thyroid normal to palpation  RESP: lungs clear to auscultation - no rales, rhonchi or wheezes  CV: " regular rate and rhythm, normal S1 S2, no S3 or S4, no murmur, click or rub, no peripheral edema and peripheral pulses strong  MS: no gross musculoskeletal defects noted, no edema  PSYCH: mentation appears normal, affect normal/bright    Results for orders placed or performed in visit on 04/29/19   TSH with free T4 reflex   Result Value Ref Range    TSH 3.40 0.40 - 4.00 mU/L   Comprehensive metabolic panel   Result Value Ref Range    Sodium 141 133 - 144 mmol/L    Potassium 4.4 3.4 - 5.3 mmol/L    Chloride 109 94 - 109 mmol/L    Carbon Dioxide 26 20 - 32 mmol/L    Anion Gap 6 3 - 14 mmol/L    Glucose 85 70 - 99 mg/dL    Urea Nitrogen 20 7 - 30 mg/dL    Creatinine 0.97 0.52 - 1.04 mg/dL    GFR Estimate 57 (L) >60 mL/min/[1.73_m2]    GFR Estimate If Black 67 >60 mL/min/[1.73_m2]    Calcium 9.6 8.5 - 10.1 mg/dL    Bilirubin Total 0.4 0.2 - 1.3 mg/dL    Albumin 4.0 3.4 - 5.0 g/dL    Protein Total 7.6 6.8 - 8.8 g/dL    Alkaline Phosphatase 80 40 - 150 U/L    ALT 34 0 - 50 U/L    AST 25 0 - 45 U/L   Lipid Profile   Result Value Ref Range    Cholesterol 195 <200 mg/dL    Triglycerides 138 <150 mg/dL    HDL Cholesterol 59 >49 mg/dL    LDL Cholesterol Calculated 108 (H) <100 mg/dL    Non HDL Cholesterol 136 (H) <130 mg/dL         ASSESSMENT/PLAN:       1. Benign essential hypertension  chronic  - Comprehensive metabolic panel; Future  - TSH with free T4 reflex; Future    2. Hypothyroidism due to acquired atrophy of thyroid  chronic  - Lipid Profile; Future    3. Hyperlipidemia with target LDL less than 130  Continue current plan    4. On statin therapy  - Comprehensive metabolic panel; Future    Mammogram in July     FUTURE APPOINTMENTS:       - Follow-up visit in 3 months or as needed for acute concerns.     Brittney Coe NP  [facility]

## 2019-05-01 ENCOUNTER — OFFICE VISIT (OUTPATIENT)
Dept: FAMILY MEDICINE | Facility: OTHER | Age: 74
End: 2019-05-01
Attending: NURSE PRACTITIONER
Payer: COMMERCIAL

## 2019-05-01 VITALS
RESPIRATION RATE: 14 BRPM | HEART RATE: 58 BPM | OXYGEN SATURATION: 99 % | TEMPERATURE: 96.5 F | WEIGHT: 140 LBS | BODY MASS INDEX: 23.9 KG/M2 | SYSTOLIC BLOOD PRESSURE: 128 MMHG | HEIGHT: 64 IN | DIASTOLIC BLOOD PRESSURE: 76 MMHG

## 2019-05-01 DIAGNOSIS — E78.5 HYPERLIPIDEMIA WITH TARGET LDL LESS THAN 130: ICD-10-CM

## 2019-05-01 DIAGNOSIS — Z79.899 ON STATIN THERAPY: ICD-10-CM

## 2019-05-01 DIAGNOSIS — I10 BENIGN ESSENTIAL HYPERTENSION: Primary | ICD-10-CM

## 2019-05-01 DIAGNOSIS — E03.4 HYPOTHYROIDISM DUE TO ACQUIRED ATROPHY OF THYROID: ICD-10-CM

## 2019-05-01 PROCEDURE — 99214 OFFICE O/P EST MOD 30 MIN: CPT | Performed by: NURSE PRACTITIONER

## 2019-05-01 PROCEDURE — G0463 HOSPITAL OUTPT CLINIC VISIT: HCPCS

## 2019-05-01 ASSESSMENT — MIFFLIN-ST. JEOR: SCORE: 1120.04

## 2019-05-01 ASSESSMENT — PAIN SCALES - GENERAL: PAINLEVEL: NO PAIN (0)

## 2019-05-01 NOTE — NURSING NOTE
"Chief Complaint   Patient presents with     Hypertension     Hyperlipidemia     Thyroid Problem       Initial /76 (BP Location: Left arm, Patient Position: Sitting, Cuff Size: Adult Regular)   Pulse 58   Temp 96.5  F (35.8  C) (Tympanic)   Resp 14   Ht 1.626 m (5' 4\")   Wt 63.5 kg (140 lb)   SpO2 99%   BMI 24.03 kg/m   Estimated body mass index is 24.03 kg/m  as calculated from the following:    Height as of this encounter: 1.626 m (5' 4\").    Weight as of this encounter: 63.5 kg (140 lb).  Medication Reconciliation: complete    Suzanne Longoria LPN    "

## 2019-05-01 NOTE — PATIENT INSTRUCTIONS
ASSESSMENT/PLAN:       1. Benign essential hypertension  chronic  - Comprehensive metabolic panel; Future  - TSH with free T4 reflex; Future    2. Hypothyroidism due to acquired atrophy of thyroid  chronic  - Lipid Profile; Future    3. Hyperlipidemia with target LDL less than 130  Continue current plan    4. On statin therapy  - Comprehensive metabolic panel; Future    Mammogram in July     FUTURE APPOINTMENTS:       - Follow-up visit in 3 months or as needed for acute concerns.     Brittney Coe NP  [facility]

## 2019-07-31 ENCOUNTER — ANCILLARY PROCEDURE (OUTPATIENT)
Dept: MAMMOGRAPHY | Facility: OTHER | Age: 74
End: 2019-07-31
Attending: NURSE PRACTITIONER
Payer: MEDICARE

## 2019-07-31 DIAGNOSIS — Z12.39 BREAST SCREENING, UNSPECIFIED: ICD-10-CM

## 2019-07-31 PROCEDURE — 77063 BREAST TOMOSYNTHESIS BI: CPT | Mod: TC

## 2019-08-01 NOTE — PROGRESS NOTES
Subjective     Franca Angeles is a 73 year old female who presents to clinic today for the following health issues:    HPI   Hyperlipidemia Follow-Up      Are you having any of the following symptoms? (Select all that apply)  No complaints of shortness of breath, chest pain or pressure.  No increased sweating or nausea with activity.  No left-sided neck or arm pain.  No complaints of pain in calves when walking 1-2 blocks.    Are you regularly taking any medication or supplement to lower your cholesterol?   Yes- lipitor    Are you having muscle aches or other side effects that you think could be caused by your cholesterol lowering medication?  No   The 10-year ASCVD risk score (Otis LANGSTON Jr., et al., 2013) is: 17.7%    Values used to calculate the score:      Age: 73 years      Sex: Female      Is Non- : No      Diabetic: No      Tobacco smoker: No      Systolic Blood Pressure: 124 mmHg      Is BP treated: Yes      HDL Cholesterol: 59 mg/dL        Total Cholesterol: 179 mg/dL     She is taking lipitor 30mg daiiy and garlic.            Hypertension Follow-up      Do you check your blood pressure regularly outside of the clinic? No     Are you following a low salt diet? Yes    Are your blood pressures ever more than 140 on the top number (systolic) OR more   than 90 on the bottom number (diastolic), for example 140/90? NA  Hypothyroidism Follow-up      Since last visit, patient describes the following symptoms: Weight stable, no hair loss, no skin changes, no constipation, no loose stools      Amount of exercise or physical activity: 6-7 days/week for an average of 45-60 minutes    Problems taking medications regularly: No    Medication side effects: none    Diet: low salt      Mammogram was completed last week, normal with recommendation to repeat annually.  Due for breast exam today.      Patient Active Problem List   Diagnosis     Menopause     Joint pain     Disorder of bone and cartilage      Advanced care planning/counseling discussion     Hyperlipidemia with target LDL less than 130     Routine general medical examination at a health care facility (ANNUAL)     Colon cancer screening     Hypothyroidism due to acquired atrophy of thyroid     Benign essential hypertension     CKD (chronic kidney disease) stage 3, GFR 30-59 ml/min (H)     Past Surgical History:   Procedure Laterality Date     ARTHRODESIS KNEE  [id number]    left knee, paritla medial meniscectomy, debridement medial femoral condyle and patellofemoral debridement     COLONOSCOPY  2004    nml     COLONOSCOPY  7/23/2014    Procedure: COLONOSCOPY;  Surgeon: Nghia Hernandez DO;  Location: [facility]     CYSTOSCOPY      micro hematuria neg     DILATION AND CURETTAGE       EXCISE LESION UPPER EXTREMITY Right 10/25/2017    Procedure: EXCISE LESION UPPER EXTREMITY;  EXCISIONAL BIOPSY RIGHT FOREARM TIMES 2;  Surgeon: Nghia Hernandez DO;  Location: [facility]     tubal sterilization       uterine fibroids       wrist fx RT         Social History     Tobacco Use     Smoking status: Never Smoker     Smokeless tobacco: Never Used   Substance Use Topics     Alcohol use: Yes     Alcohol/week: 0.0 oz     Comment: occasionally     Family History   Problem Relation Age of Onset     C.A.D. Mother      Other - See Comments Mother         high platelets     Hypertension Mother      C.A.D. Paternal Aunt      Diabetes Other         aunt     Cardiovascular Father         cardiovascular disease     Colon Cancer Other         family history-paternal side     Cerebrovascular Disease Maternal Grandmother      Cerebrovascular Disease Maternal Grandfather          Current Outpatient Medications   Medication Sig Dispense Refill     aspirin 81 MG tablet Take 81 mg by mouth daily       atorvastatin (LIPITOR) 20 MG tablet Take 1.5 tablets (30 mg) by mouth daily 135 tablet 3     Calcium-Magnesium-Vitamin D (CALCIUM 500 PO) Take by mouth daily       cinnamon 500 MG CAPS         co-enzyme Q-10 100 MG CAPS capsule Take 200 mg by mouth       Garlic 2000 MG CAPS Take 2,000 mg by mouth daily       GLUCOSAMINE-CHONDROITIN PO Take  by mouth. GLUCOSAMINE HCL/CHONDR SUA NA  One daily       levothyroxine (SYNTHROID/LEVOTHROID) 50 MCG tablet Take 1 tablet (50 mcg) by mouth daily 90 tablet 3     lisinopril (PRINIVIL/ZESTRIL) 5 MG tablet Take 1 tablet (5 mg) by mouth daily 90 tablet 3     Multiple Vitamins-Minerals (MULTIVITAMIN OR) Alive 1 tablet       Omega-3 Fatty Acids (FISH OIL) 1200 MG CAPS Take by mouth daily       order for DME Equipment being ordered: blood pressure cuff 1 each 0     Turmeric Curcumin 500 MG CAPS Take 1 capsule by mouth daily       Allergies   Allergen Reactions     Pseudoephedrine Hcl Other (See Comments)     Heart racing  Sudafed     Mucinex Palpitations     Recent Labs   Lab Test 08/02/19  0914 04/29/19  0957 01/28/19  0959  05/13/15  1026   A1C  --   --   --   --  5.7   * 108* 88   < > 134*   HDL 59 59 61   < > 59   TRIG 100 138 116   < > 170*   ALT 34 34 33   < >  --    CR 0.96 0.97 0.98   < >  --    GFRESTIMATED 58* 57* 57*   < >  --    GFRESTBLACK 67 67 66   < >  --    POTASSIUM 4.2 4.4 4.8   < >  --    TSH 1.91 3.40 2.30   < > 3.75    < > = values in this interval not displayed.      BP Readings from Last 3 Encounters:   08/07/19 124/72   05/01/19 128/76   02/23/19 137/78    Wt Readings from Last 3 Encounters:   08/07/19 64 kg (141 lb)   05/01/19 63.5 kg (140 lb)   02/23/19 63.5 kg (140 lb)                 Reviewed and updated as needed this visit by Provider         Review of Systems   ROS COMP: Constitutional, HEENT, cardiovascular, pulmonary, gi and gu systems are negative, except as otherwise noted.      Objective    /72 (BP Location: Left arm, Patient Position: Sitting, Cuff Size: Adult Regular)   Pulse 56   Temp 97.4  F (36.3  C) (Tympanic)   Resp 14   Wt 64 kg (141 lb)   SpO2 96%   BMI 24.20 kg/m    Body mass index is 24.2 kg/m .  Physical  Exam   GENERAL: healthy, alert and no distress  NECK: no adenopathy, no asymmetry, masses, or scars, thyroid normal to palpation and no carotid bruits  RESP: lungs clear to auscultation - no rales, rhonchi or wheezes  CV: regular rate and rhythm, normal S1 S2, no S3 or S4, no murmur, click or rub, no peripheral edema and peripheral pulses strong  ABDOMEN: soft, nontender, no hepatosplenomegaly, no masses and bowel sounds normal  PSYCH: mentation appears normal, affect normal/bright          Assessment & Plan     1. Hyperlipidemia with target LDL less than 130  - atorvastatin (LIPITOR) 20 MG tablet; Take 1.5 tablets (30 mg) by mouth daily  Dispense: 135 tablet; Refill: 3  - Lipid Profile; Future    2. Benign essential hypertension  - lisinopril (PRINIVIL/ZESTRIL) 5 MG tablet; Take 1 tablet (5 mg) by mouth daily  Dispense: 90 tablet; Refill: 3  - Comprehensive metabolic panel; Future    3. Hypothyroidism due to acquired atrophy of thyroid  - levothyroxine (SYNTHROID/LEVOTHROID) 50 MCG tablet; Take 1 tablet (50 mcg) by mouth daily  Dispense: 90 tablet; Refill: 3  - TSH with free T4 reflex; Future    4. On statin therapy  - Comprehensive metabolic panel; Future    5. CKD (chronic kidney disease) stage 3, GFR 30-59 ml/min (H)  Limit NSAID use.            Return in about 6 months (around 2/7/2020) for hypertension and lipids, thyroid.    Brittney Coe NP  [facility]

## 2019-08-02 DIAGNOSIS — E03.4 HYPOTHYROIDISM DUE TO ACQUIRED ATROPHY OF THYROID: ICD-10-CM

## 2019-08-02 DIAGNOSIS — I10 BENIGN ESSENTIAL HYPERTENSION: ICD-10-CM

## 2019-08-02 DIAGNOSIS — Z79.899 ON STATIN THERAPY: ICD-10-CM

## 2019-08-02 LAB
ALBUMIN SERPL-MCNC: 3.7 G/DL (ref 3.4–5)
ALP SERPL-CCNC: 88 U/L (ref 40–150)
ALT SERPL W P-5'-P-CCNC: 34 U/L (ref 0–50)
ANION GAP SERPL CALCULATED.3IONS-SCNC: 7 MMOL/L (ref 3–14)
AST SERPL W P-5'-P-CCNC: 31 U/L (ref 0–45)
BILIRUB SERPL-MCNC: 0.4 MG/DL (ref 0.2–1.3)
BUN SERPL-MCNC: 16 MG/DL (ref 7–30)
CALCIUM SERPL-MCNC: 9.3 MG/DL (ref 8.5–10.1)
CHLORIDE SERPL-SCNC: 110 MMOL/L (ref 94–109)
CHOLEST SERPL-MCNC: 179 MG/DL
CO2 SERPL-SCNC: 25 MMOL/L (ref 20–32)
CREAT SERPL-MCNC: 0.96 MG/DL (ref 0.52–1.04)
GFR SERPL CREATININE-BSD FRML MDRD: 58 ML/MIN/{1.73_M2}
GLUCOSE SERPL-MCNC: 92 MG/DL (ref 70–99)
HDLC SERPL-MCNC: 59 MG/DL
LDLC SERPL CALC-MCNC: 100 MG/DL
NONHDLC SERPL-MCNC: 120 MG/DL
POTASSIUM SERPL-SCNC: 4.2 MMOL/L (ref 3.4–5.3)
PROT SERPL-MCNC: 7.2 G/DL (ref 6.8–8.8)
SODIUM SERPL-SCNC: 142 MMOL/L (ref 133–144)
TRIGL SERPL-MCNC: 100 MG/DL
TSH SERPL DL<=0.005 MIU/L-ACNC: 1.91 MU/L (ref 0.4–4)

## 2019-08-02 PROCEDURE — 80053 COMPREHEN METABOLIC PANEL: CPT | Mod: ZL | Performed by: NURSE PRACTITIONER

## 2019-08-02 PROCEDURE — 80061 LIPID PANEL: CPT | Mod: ZL | Performed by: NURSE PRACTITIONER

## 2019-08-02 PROCEDURE — 84443 ASSAY THYROID STIM HORMONE: CPT | Mod: ZL | Performed by: NURSE PRACTITIONER

## 2019-08-02 PROCEDURE — 36415 COLL VENOUS BLD VENIPUNCTURE: CPT | Mod: ZL | Performed by: NURSE PRACTITIONER

## 2019-08-06 DIAGNOSIS — E78.5 HYPERLIPIDEMIA WITH TARGET LDL LESS THAN 130: ICD-10-CM

## 2019-08-06 RX ORDER — ATORVASTATIN CALCIUM 20 MG/1
TABLET, FILM COATED ORAL
Qty: 135 TABLET | Refills: 0 | Status: SHIPPED | OUTPATIENT
Start: 2019-08-06 | End: 2019-08-07

## 2019-08-07 ENCOUNTER — OFFICE VISIT (OUTPATIENT)
Dept: FAMILY MEDICINE | Facility: OTHER | Age: 74
End: 2019-08-07
Attending: NURSE PRACTITIONER
Payer: COMMERCIAL

## 2019-08-07 VITALS
OXYGEN SATURATION: 96 % | DIASTOLIC BLOOD PRESSURE: 72 MMHG | WEIGHT: 141 LBS | RESPIRATION RATE: 14 BRPM | BODY MASS INDEX: 24.2 KG/M2 | TEMPERATURE: 97.4 F | SYSTOLIC BLOOD PRESSURE: 124 MMHG | HEART RATE: 56 BPM

## 2019-08-07 DIAGNOSIS — N18.30 CKD (CHRONIC KIDNEY DISEASE) STAGE 3, GFR 30-59 ML/MIN (H): ICD-10-CM

## 2019-08-07 DIAGNOSIS — E78.5 HYPERLIPIDEMIA WITH TARGET LDL LESS THAN 130: ICD-10-CM

## 2019-08-07 DIAGNOSIS — I10 BENIGN ESSENTIAL HYPERTENSION: ICD-10-CM

## 2019-08-07 DIAGNOSIS — E03.4 HYPOTHYROIDISM DUE TO ACQUIRED ATROPHY OF THYROID: ICD-10-CM

## 2019-08-07 DIAGNOSIS — Z79.899 ON STATIN THERAPY: Primary | ICD-10-CM

## 2019-08-07 PROCEDURE — 99214 OFFICE O/P EST MOD 30 MIN: CPT | Performed by: NURSE PRACTITIONER

## 2019-08-07 PROCEDURE — G0463 HOSPITAL OUTPT CLINIC VISIT: HCPCS

## 2019-08-07 RX ORDER — ATORVASTATIN CALCIUM 20 MG/1
30 TABLET, FILM COATED ORAL DAILY
Qty: 135 TABLET | Refills: 3 | Status: SHIPPED | OUTPATIENT
Start: 2019-08-07 | End: 2020-08-07

## 2019-08-07 RX ORDER — LISINOPRIL 5 MG/1
5 TABLET ORAL DAILY
Qty: 90 TABLET | Refills: 3 | Status: SHIPPED | OUTPATIENT
Start: 2019-08-07 | End: 2020-08-07

## 2019-08-07 RX ORDER — LEVOTHYROXINE SODIUM 50 UG/1
50 TABLET ORAL DAILY
Qty: 90 TABLET | Refills: 3 | Status: SHIPPED | OUTPATIENT
Start: 2019-08-07 | End: 2020-02-07

## 2019-08-07 NOTE — NURSING NOTE
"Chief Complaint   Patient presents with     Hypertension     Hyperlipidemia     Thyroid Problem       Initial /72 (BP Location: Left arm, Patient Position: Sitting, Cuff Size: Adult Regular)   Pulse 56   Temp 97.4  F (36.3  C) (Tympanic)   Resp 14   Wt 64 kg (141 lb)   SpO2 96%   BMI 24.20 kg/m   Estimated body mass index is 24.2 kg/m  as calculated from the following:    Height as of 5/1/19: 1.626 m (5' 4\").    Weight as of this encounter: 64 kg (141 lb).  Medication Reconciliation: complete   Suzanne Longoria      "

## 2019-08-07 NOTE — PATIENT INSTRUCTIONS
Assessment & Plan     1. Hyperlipidemia with target LDL less than 130  - atorvastatin (LIPITOR) 20 MG tablet; Take 1.5 tablets (30 mg) by mouth daily  Dispense: 135 tablet; Refill: 3  - Lipid Profile; Future    2. Benign essential hypertension  - lisinopril (PRINIVIL/ZESTRIL) 5 MG tablet; Take 1 tablet (5 mg) by mouth daily  Dispense: 90 tablet; Refill: 3  - Comprehensive metabolic panel; Future    3. Hypothyroidism due to acquired atrophy of thyroid  - levothyroxine (SYNTHROID/LEVOTHROID) 50 MCG tablet; Take 1 tablet (50 mcg) by mouth daily  Dispense: 90 tablet; Refill: 3  - TSH with free T4 reflex; Future    4. On statin therapy  - Comprehensive metabolic panel; Future    5. CKD (chronic kidney disease) stage 3, GFR 30-59 ml/min (H)  Limit NSAID use.            Return in about 6 months (around 2/7/2020) for hypertension and lipids, thyroid.    Brittney Coe NP  [facility] [place] [facility]

## 2019-09-20 ENCOUNTER — TELEPHONE (OUTPATIENT)
Dept: FAMILY MEDICINE | Facility: OTHER | Age: 74
End: 2019-09-20

## 2019-09-20 DIAGNOSIS — D22.9 CHANGE IN SKIN MOLE: Primary | ICD-10-CM

## 2019-09-20 NOTE — TELEPHONE ENCOUNTER
9:09 AM    Reason for Call: Phone Call    Description: patient called and would like a referral to see Dr Thomas Tello regarding her moles that Jameson Tavera and her have talked about, she asks that you please call her regarding this referral to dermatology. Please call her at [phone]    Was an appointment offered for this call? No  If yes : Appointment type              Date    Preferred method for responding to this message: Telephone Call  What is your phone number ? [phone]    If we cannot reach you directly, may we leave a detailed response at the number you provided? Yes    Can this message wait until your PCP/provider returns, if available today? YES, No, Not applicable    Miguel A Sandoval

## 2019-09-20 NOTE — TELEPHONE ENCOUNTER
I don't see a referral placed for this, can you order this and I will call the pt back.  Thank you.

## 2019-09-26 ENCOUNTER — MYC MEDICAL ADVICE (OUTPATIENT)
Dept: FAMILY MEDICINE | Facility: OTHER | Age: 74
End: 2019-09-26

## 2020-02-04 NOTE — PROGRESS NOTES
Subjective     Franca Angeles is a 74 year old female who presents to clinic today for the following health issues:    HPI   Hyperlipidemia Follow-Up      Are you regularly taking any medication or supplement to lower your cholesterol?   Yes- lipitor 30mg     Are you having muscle aches or other side effects that you think could be caused by your cholesterol lowering medication?  No   The 10-year ASCVD risk score (Otis LANGSTON Jr., et al., 2013) is: 17.1%    Values used to calculate the score:      Age: 74 years      Sex: Female      Is Non- : No      Diabetic: No      Tobacco smoker: No      Systolic Blood Pressure: 114 mmHg      Is BP treated: Yes      HDL Cholesterol: 57 mg/dL      Total Cholesterol: 200 mg/dL        Hypertension Follow-up      Do you check your blood pressure regularly outside of the clinic? Yes     Are you following a low salt diet? No    Are your blood pressures ever more than 140 on the top number (systolic) OR more   than 90 on the bottom number (diastolic), for example 140/90? No    Hypothyroidism Follow-up      Since last visit, patient describes the following symptoms: dry skin feeling cold all the time       How many servings of fruits and vegetables do you eat daily?  0-1    On average, how many sweetened beverages do you drink each day (Examples: soda, juice, sweet tea, etc.  Do NOT count diet or artificially sweetened beverages)?   0    How many days per week do you exercise enough to make your heart beat faster? 4    How many minutes a day do you exercise enough to make your heart beat faster? 60 or more    How many days per week do you miss taking your medication? 0    Chronic Kidney Disease Follow-up      Do you take any over the counter pain medicine?: No     She is due for a full skin exam, she has a new lesion at the base of head right side that is getting darker and increasing in size.  She has a history of several lesion excisions.        Patient Active  Problem List   Diagnosis     Menopause     Joint pain     Disorder of bone and cartilage     Advanced care planning/counseling discussion     Hyperlipidemia with target LDL less than 130     Routine general medical examination at a health care facility (ANNUAL)     Colon cancer screening     Hypothyroidism due to acquired atrophy of thyroid     Benign essential hypertension     CKD (chronic kidney disease) stage 3, GFR 30-59 ml/min (H)     Past Surgical History:   Procedure Laterality Date     ARTHRODESIS KNEE  [id number]    left knee, paritla medial meniscectomy, debridement medial femoral condyle and patellofemoral debridement     COLONOSCOPY  2004    nml     COLONOSCOPY  7/23/2014    Procedure: COLONOSCOPY;  Surgeon: Nghia Hernandez DO;  Location: [facility]     CYSTOSCOPY      micro hematuria neg     DILATION AND CURETTAGE       EXCISE LESION UPPER EXTREMITY Right 10/25/2017    Procedure: EXCISE LESION UPPER EXTREMITY;  EXCISIONAL BIOPSY RIGHT FOREARM TIMES 2;  Surgeon: Nghia Hernandez DO;  Location: [facility]     tubal sterilization       uterine fibroids       wrist fx RT         Social History     Tobacco Use     Smoking status: Never Smoker     Smokeless tobacco: Never Used   Substance Use Topics     Alcohol use: Yes     Alcohol/week: 0.0 standard drinks     Comment: occasionally     Family History   Problem Relation Age of Onset     C.A.D. Mother      Other - See Comments Mother         high platelets     Hypertension Mother      C.A.D. Paternal Aunt      Diabetes Other         aunt     Cardiovascular Father         cardiovascular disease     Colon Cancer Other         family history-paternal side     Cerebrovascular Disease Maternal Grandmother      Cerebrovascular Disease Maternal Grandfather          Current Outpatient Medications   Medication Sig Dispense Refill     aspirin 81 MG tablet Take 81 mg by mouth daily       atorvastatin (LIPITOR) 20 MG tablet Take 1.5 tablets (30 mg) by mouth daily 135 tablet 3  "    Calcium-Magnesium-Vitamin D (CALCIUM 500 PO) Take by mouth daily       cinnamon 500 MG CAPS        co-enzyme Q-10 100 MG CAPS capsule Take 200 mg by mouth       Garlic 2000 MG CAPS Take 2,000 mg by mouth daily       GLUCOSAMINE-CHONDROITIN PO Take  by mouth. GLUCOSAMINE HCL/CHONDR SUA NA  One daily       levothyroxine (SYNTHROID/LEVOTHROID) 75 MCG tablet Take 1 tablet (75 mcg) by mouth daily 90 tablet 1     lisinopril (PRINIVIL/ZESTRIL) 5 MG tablet Take 1 tablet (5 mg) by mouth daily 90 tablet 3     Multiple Vitamins-Minerals (MULTIVITAMIN OR) Alive 1 tablet       Omega-3 Fatty Acids (FISH OIL) 1200 MG CAPS Take by mouth daily       order for DME Equipment being ordered: blood pressure cuff 1 each 0     Turmeric Curcumin 500 MG CAPS Take 1 capsule by mouth daily       Allergies   Allergen Reactions     Pseudoephedrine Hcl Other (See Comments)     Heart racing  Sudafed     Mucinex Palpitations     Recent Labs   Lab Test 02/05/20  0929 08/02/19  0914 04/29/19  0957  05/13/15  1026   A1C  --   --   --   --  5.7   * 100* 108*   < > 134*   HDL 57 59 59   < > 59   TRIG 131 100 138   < > 170*   ALT 37 34 34   < >  --    CR 0.99 0.96 0.97   < >  --    GFRESTIMATED 56* 58* 57*   < >  --    GFRESTBLACK 65 67 67   < >  --    POTASSIUM 4.7 4.2 4.4   < >  --    TSH 3.29 1.91 3.40   < > 3.75    < > = values in this interval not displayed.      BP Readings from Last 3 Encounters:   02/07/20 114/68   08/07/19 124/72   05/01/19 128/76    Wt Readings from Last 3 Encounters:   02/07/20 63.5 kg (140 lb)   08/07/19 64 kg (141 lb)   05/01/19 63.5 kg (140 lb)                 Reviewed and updated as needed this visit by Provider         Review of Systems   ROS COMP: Constitutional, HEENT, cardiovascular, pulmonary, gi and gu systems are negative, except as otherwise noted.        Objective    /68 (BP Location: Left arm, Patient Position: Chair, Cuff Size: Adult Regular)   Pulse (!) 47   Resp 14   Ht 1.626 m (5' 4\")   " Wt 63.5 kg (140 lb)   SpO2 98%   BMI 24.03 kg/m    Body mass index is 24.03 kg/m .  Physical Exam   GENERAL: healthy, alert and no distress  HENT: ear canals and TM's normal, nose and mouth without ulcers or lesions  NECK: no adenopathy, no asymmetry, masses, or scars and thyroid normal to palpation  RESP: lungs clear to auscultation - no rales, rhonchi or wheezes  CV: regular rate and rhythm, normal S1 S2, no S3 or S4, no murmur, click or rub, no peripheral edema and peripheral pulses strong  MS: no gross musculoskeletal defects noted, no edema  SKIN: right posterior neck - 4mm round raised lesion that is irregular in color - dark brown to light tan.  Borders are sharp.    PSYCH: mentation appears normal, affect normal/bright            Assessment & Plan     1. Hyperlipidemia with target LDL less than 130  - Lipid Profile; Future    2. Hypothyroidism due to acquired atrophy of thyroid  Dose increase and repeat TSH in 6-8 weeks TSH with free T4 reflex; Future  - levothyroxine (SYNTHROID/LEVOTHROID) 75 MCG tablet; Take 1 tablet (75 mcg) by mouth daily  Dispense: 90 tablet; Refill: 1  - TSH with free T4 reflex; Future    3. Benign essential hypertension  - Comprehensive metabolic panel; Future    4. CKD (chronic kidney disease) stage 3, GFR 30-59 ml/min (H)    5. On statin therapy  - Comprehensive metabolic panel; Future    6. Changing pigmented skin lesion  - DERMATOLOGY REFERRAL         Return in about 6 months (around 8/7/2020) for hypertension and lipids, thyroid .    Brittney Coe, NP  [facility]

## 2020-02-05 DIAGNOSIS — I10 BENIGN ESSENTIAL HYPERTENSION: ICD-10-CM

## 2020-02-05 DIAGNOSIS — Z79.899 ON STATIN THERAPY: ICD-10-CM

## 2020-02-05 DIAGNOSIS — E03.4 HYPOTHYROIDISM DUE TO ACQUIRED ATROPHY OF THYROID: ICD-10-CM

## 2020-02-05 DIAGNOSIS — E78.5 HYPERLIPIDEMIA WITH TARGET LDL LESS THAN 130: ICD-10-CM

## 2020-02-05 LAB
ALBUMIN SERPL-MCNC: 3.9 G/DL (ref 3.4–5)
ALP SERPL-CCNC: 76 U/L (ref 40–150)
ALT SERPL W P-5'-P-CCNC: 37 U/L (ref 0–50)
ANION GAP SERPL CALCULATED.3IONS-SCNC: 6 MMOL/L (ref 3–14)
AST SERPL W P-5'-P-CCNC: 29 U/L (ref 0–45)
BILIRUB SERPL-MCNC: 0.4 MG/DL (ref 0.2–1.3)
BUN SERPL-MCNC: 18 MG/DL (ref 7–30)
CALCIUM SERPL-MCNC: 9.2 MG/DL (ref 8.5–10.1)
CHLORIDE SERPL-SCNC: 111 MMOL/L (ref 94–109)
CHOLEST SERPL-MCNC: 200 MG/DL
CO2 SERPL-SCNC: 26 MMOL/L (ref 20–32)
CREAT SERPL-MCNC: 0.99 MG/DL (ref 0.52–1.04)
GFR SERPL CREATININE-BSD FRML MDRD: 56 ML/MIN/{1.73_M2}
GLUCOSE SERPL-MCNC: 86 MG/DL (ref 70–99)
HDLC SERPL-MCNC: 57 MG/DL
LDLC SERPL CALC-MCNC: 117 MG/DL
NONHDLC SERPL-MCNC: 143 MG/DL
POTASSIUM SERPL-SCNC: 4.7 MMOL/L (ref 3.4–5.3)
PROT SERPL-MCNC: 7.3 G/DL (ref 6.8–8.8)
SODIUM SERPL-SCNC: 143 MMOL/L (ref 133–144)
TRIGL SERPL-MCNC: 131 MG/DL
TSH SERPL DL<=0.005 MIU/L-ACNC: 3.29 MU/L (ref 0.4–4)

## 2020-02-05 PROCEDURE — 80053 COMPREHEN METABOLIC PANEL: CPT | Mod: ZL | Performed by: NURSE PRACTITIONER

## 2020-02-05 PROCEDURE — 84443 ASSAY THYROID STIM HORMONE: CPT | Mod: ZL | Performed by: NURSE PRACTITIONER

## 2020-02-05 PROCEDURE — 80061 LIPID PANEL: CPT | Mod: ZL | Performed by: NURSE PRACTITIONER

## 2020-02-05 PROCEDURE — 36415 COLL VENOUS BLD VENIPUNCTURE: CPT | Mod: ZL | Performed by: NURSE PRACTITIONER

## 2020-02-07 ENCOUNTER — OFFICE VISIT (OUTPATIENT)
Dept: FAMILY MEDICINE | Facility: OTHER | Age: 75
End: 2020-02-07
Attending: NURSE PRACTITIONER
Payer: COMMERCIAL

## 2020-02-07 VITALS
BODY MASS INDEX: 23.9 KG/M2 | HEIGHT: 64 IN | OXYGEN SATURATION: 98 % | RESPIRATION RATE: 14 BRPM | HEART RATE: 47 BPM | SYSTOLIC BLOOD PRESSURE: 114 MMHG | DIASTOLIC BLOOD PRESSURE: 68 MMHG | WEIGHT: 140 LBS

## 2020-02-07 DIAGNOSIS — N18.30 CKD (CHRONIC KIDNEY DISEASE) STAGE 3, GFR 30-59 ML/MIN (H): ICD-10-CM

## 2020-02-07 DIAGNOSIS — Z79.899 ON STATIN THERAPY: ICD-10-CM

## 2020-02-07 DIAGNOSIS — E78.5 HYPERLIPIDEMIA WITH TARGET LDL LESS THAN 130: Primary | ICD-10-CM

## 2020-02-07 DIAGNOSIS — L81.9 CHANGING PIGMENTED SKIN LESION: ICD-10-CM

## 2020-02-07 DIAGNOSIS — I10 BENIGN ESSENTIAL HYPERTENSION: ICD-10-CM

## 2020-02-07 DIAGNOSIS — E03.4 HYPOTHYROIDISM DUE TO ACQUIRED ATROPHY OF THYROID: ICD-10-CM

## 2020-02-07 PROCEDURE — 99214 OFFICE O/P EST MOD 30 MIN: CPT | Performed by: NURSE PRACTITIONER

## 2020-02-07 PROCEDURE — G0463 HOSPITAL OUTPT CLINIC VISIT: HCPCS

## 2020-02-07 RX ORDER — LEVOTHYROXINE SODIUM 75 UG/1
75 TABLET ORAL DAILY
Qty: 90 TABLET | Refills: 1 | Status: SHIPPED | OUTPATIENT
Start: 2020-02-07 | End: 2020-02-13

## 2020-02-07 ASSESSMENT — ANXIETY QUESTIONNAIRES
2. NOT BEING ABLE TO STOP OR CONTROL WORRYING: NOT AT ALL
GAD7 TOTAL SCORE: 0
3. WORRYING TOO MUCH ABOUT DIFFERENT THINGS: NOT AT ALL
IF YOU CHECKED OFF ANY PROBLEMS ON THIS QUESTIONNAIRE, HOW DIFFICULT HAVE THESE PROBLEMS MADE IT FOR YOU TO DO YOUR WORK, TAKE CARE OF THINGS AT HOME, OR GET ALONG WITH OTHER PEOPLE: NOT DIFFICULT AT ALL
6. BECOMING EASILY ANNOYED OR IRRITABLE: NOT AT ALL
7. FEELING AFRAID AS IF SOMETHING AWFUL MIGHT HAPPEN: NOT AT ALL
4. TROUBLE RELAXING: NOT AT ALL
5. BEING SO RESTLESS THAT IT IS HARD TO SIT STILL: NOT AT ALL
1. FEELING NERVOUS, ANXIOUS, OR ON EDGE: NOT AT ALL

## 2020-02-07 ASSESSMENT — PATIENT HEALTH QUESTIONNAIRE - PHQ9: SUM OF ALL RESPONSES TO PHQ QUESTIONS 1-9: 1

## 2020-02-07 ASSESSMENT — MIFFLIN-ST. JEOR: SCORE: 1115.04

## 2020-02-07 ASSESSMENT — PAIN SCALES - GENERAL: PAINLEVEL: NO PAIN (0)

## 2020-02-07 NOTE — NURSING NOTE
"Chief Complaint   Patient presents with     Hypertension     Lipids     Thyroid Problem       Initial /68 (BP Location: Left arm, Patient Position: Chair, Cuff Size: Adult Regular)   Pulse (!) 47   Resp 14   Ht 1.626 m (5' 4\")   Wt 63.5 kg (140 lb)   SpO2 98%   BMI 24.03 kg/m   Estimated body mass index is 24.03 kg/m  as calculated from the following:    Height as of this encounter: 1.626 m (5' 4\").    Weight as of this encounter: 63.5 kg (140 lb).  Medication Reconciliation: complete  Pamela M. Lechevalier, LPN    "

## 2020-02-07 NOTE — PATIENT INSTRUCTIONS
Assessment & Plan     1. Hyperlipidemia with target LDL less than 130  - Lipid Profile; Future    2. Hypothyroidism due to acquired atrophy of thyroid  Dose increase repeat TSH in 6-8 weeks TSH with free T4 reflex; Future  - levothyroxine (SYNTHROID/LEVOTHROID) 75 MCG tablet; Take 1 tablet (75 mcg) by mouth daily  Dispense: 90 tablet; Refill: 1  - TSH with free T4 reflex; Future    3. Benign essential hypertension  - Comprehensive metabolic panel; Future    4. CKD (chronic kidney disease) stage 3, GFR 30-59 ml/min (H)    5. On statin therapy  - Comprehensive metabolic panel; Future    6. Changing pigmented skin lesion  - DERMATOLOGY REFERRAL         Return in about 6 months (around 8/7/2020) for hypertension and lipids, thyroid .    Brittney Coe NP  [facility]

## 2020-02-08 ASSESSMENT — ANXIETY QUESTIONNAIRES: GAD7 TOTAL SCORE: 0

## 2020-02-11 ENCOUNTER — TRANSFERRED RECORDS (OUTPATIENT)
Dept: HEALTH INFORMATION MANAGEMENT | Facility: CLINIC | Age: 75
End: 2020-02-11

## 2020-02-13 ENCOUNTER — OFFICE VISIT (OUTPATIENT)
Dept: FAMILY MEDICINE | Facility: OTHER | Age: 75
End: 2020-02-13
Attending: NURSE PRACTITIONER
Payer: COMMERCIAL

## 2020-02-13 VITALS
HEART RATE: 59 BPM | HEIGHT: 64 IN | BODY MASS INDEX: 24.24 KG/M2 | SYSTOLIC BLOOD PRESSURE: 132 MMHG | WEIGHT: 142 LBS | OXYGEN SATURATION: 98 % | TEMPERATURE: 97 F | DIASTOLIC BLOOD PRESSURE: 70 MMHG

## 2020-02-13 DIAGNOSIS — E03.4 HYPOTHYROIDISM DUE TO ACQUIRED ATROPHY OF THYROID: Primary | ICD-10-CM

## 2020-02-13 PROCEDURE — 99213 OFFICE O/P EST LOW 20 MIN: CPT | Performed by: NURSE PRACTITIONER

## 2020-02-13 PROCEDURE — G0463 HOSPITAL OUTPT CLINIC VISIT: HCPCS

## 2020-02-13 RX ORDER — LEVOTHYROXINE SODIUM 50 UG/1
50 TABLET ORAL DAILY
Qty: 90 TABLET | Refills: 1 | Status: SHIPPED | OUTPATIENT
Start: 2020-02-13 | End: 2020-08-07

## 2020-02-13 ASSESSMENT — MIFFLIN-ST. JEOR: SCORE: 1124.11

## 2020-02-13 ASSESSMENT — PAIN SCALES - GENERAL: PAINLEVEL: NO PAIN (0)

## 2020-02-13 NOTE — PROGRESS NOTES
Subjective     Franca Angeles is a 74 year old female who presents to clinic today for the following health issues:    HPI   Dizziness  Onset: 3 days ago    Description:   Do you feel faint:  no   Does it feel like the surroundings (bed, room) are moving: no   Unsteady/off balance: YES  Have you passed out or fallen: no     Intensity: mild, moderate    Progression of Symptoms:  worsening    Accompanying Signs & Symptoms:  Heart palpitations: YES  Nausea, vomiting: no   Weakness in arms or legs: no   Fatigue: no   Vision or speech changes: no   Ringing in ears (Tinnitus): no   Hearing Loss: no     History:   Head trauma/concussion hx: no   Previous similar symptoms: no   Recent bleeding history: no     Precipitating factors:   Worse with activity or head movement: YES  Any new medications (BP?): YES- change in synthroid  Alcohol/drug abuse/withdrawal: no     Alleviating factors:   Does staying in a fixed position give relief:  YES    Therapies Tried and outcome: chiropractor       Patient Active Problem List   Diagnosis     Menopause     Joint pain     Disorder of bone and cartilage     Advanced care planning/counseling discussion     Hyperlipidemia with target LDL less than 130     Routine general medical examination at a health care facility (ANNUAL)     Colon cancer screening     Hypothyroidism due to acquired atrophy of thyroid     Benign essential hypertension     CKD (chronic kidney disease) stage 3, GFR 30-59 ml/min (H)     Past Surgical History:   Procedure Laterality Date     ARTHRODESIS KNEE  [id number]    left knee, paritla medial meniscectomy, debridement medial femoral condyle and patellofemoral debridement     COLONOSCOPY  2004    nml     COLONOSCOPY  7/23/2014    Procedure: COLONOSCOPY;  Surgeon: Nghia Hernandez DO;  Location: [facility]     CYSTOSCOPY      micro hematuria neg     DILATION AND CURETTAGE       EXCISE LESION UPPER EXTREMITY Right 10/25/2017    Procedure: EXCISE LESION UPPER EXTREMITY;   EXCISIONAL BIOPSY RIGHT FOREARM TIMES 2;  Surgeon: Nghia Hernandez DO;  Location: [facility]     tubal sterilization       uterine fibroids       wrist fx RT         Social History     Tobacco Use     Smoking status: Never Smoker     Smokeless tobacco: Never Used   Substance Use Topics     Alcohol use: Yes     Alcohol/week: 0.0 standard drinks     Comment: occasionally     Family History   Problem Relation Age of Onset     C.A.D. Mother      Other - See Comments Mother         high platelets     Hypertension Mother      C.A.D. Paternal Aunt      Diabetes Other         aunt     Cardiovascular Father         cardiovascular disease     Colon Cancer Other         family history-paternal side     Cerebrovascular Disease Maternal Grandmother      Cerebrovascular Disease Maternal Grandfather          Current Outpatient Medications   Medication Sig Dispense Refill     aspirin 81 MG tablet Take 81 mg by mouth daily       atorvastatin (LIPITOR) 20 MG tablet Take 1.5 tablets (30 mg) by mouth daily 135 tablet 3     Calcium-Magnesium-Vitamin D (CALCIUM 500 PO) Take by mouth daily       cinnamon 500 MG CAPS        co-enzyme Q-10 100 MG CAPS capsule Take 200 mg by mouth       Garlic 2000 MG CAPS Take 2,000 mg by mouth daily       GLUCOSAMINE-CHONDROITIN PO Take  by mouth. GLUCOSAMINE HCL/CHONDR SUA NA  One daily       levothyroxine (SYNTHROID/LEVOTHROID) 75 MCG tablet Take 1 tablet (75 mcg) by mouth daily 90 tablet 1     lisinopril (PRINIVIL/ZESTRIL) 5 MG tablet Take 1 tablet (5 mg) by mouth daily 90 tablet 3     Multiple Vitamins-Minerals (MULTIVITAMIN OR) Alive 1 tablet       Omega-3 Fatty Acids (FISH OIL) 1200 MG CAPS Take by mouth daily       order for DME Equipment being ordered: blood pressure cuff 1 each 0     Turmeric Curcumin 500 MG CAPS Take 1 capsule by mouth daily       Allergies   Allergen Reactions     Pseudoephedrine Hcl Other (See Comments)     Heart racing  Sudafed     Mucinex Palpitations     Recent Labs   Lab  "Test 02/05/20  0929 08/02/19  0914 04/29/19  0957  05/13/15  1026   A1C  --   --   --   --  5.7   * 100* 108*   < > 134*   HDL 57 59 59   < > 59   TRIG 131 100 138   < > 170*   ALT 37 34 34   < >  --    CR 0.99 0.96 0.97   < >  --    GFRESTIMATED 56* 58* 57*   < >  --    GFRESTBLACK 65 67 67   < >  --    POTASSIUM 4.7 4.2 4.4   < >  --    TSH 3.29 1.91 3.40   < > 3.75    < > = values in this interval not displayed.      BP Readings from Last 3 Encounters:   02/13/20 132/70   02/07/20 114/68   08/07/19 124/72    Wt Readings from Last 3 Encounters:   02/13/20 64.4 kg (142 lb)   02/07/20 63.5 kg (140 lb)   08/07/19 64 kg (141 lb)                    Reviewed and updated as needed this visit by Provider         Review of Systems   ROS COMP: Constitutional, HEENT, cardiovascular, pulmonary, gi and gu systems are negative, except as otherwise noted.      Objective    /70 (BP Location: Right arm, Patient Position: Chair, Cuff Size: Adult Regular)   Pulse 59   Temp 97  F (36.1  C) (Tympanic)   Ht 1.626 m (5' 4\")   Wt 64.4 kg (142 lb)   SpO2 98%   BMI 24.37 kg/m    Body mass index is 24.37 kg/m .  Physical Exam   GENERAL: healthy, alert and no distress  HENT: ear canals and TM's normal, nose and mouth without ulcers or lesions  NECK: no adenopathy, no asymmetry, masses, or scars and thyroid normal to palpation  RESP: lungs clear to auscultation - no rales, rhonchi or wheezes  CV: regular rate and rhythm, normal S1 S2, no S3 or S4, no murmur, click or rub, no peripheral edema and peripheral pulses strong  MS: no gross musculoskeletal defects noted, no edema  NEURO: Normal strength and tone, mentation intact and speech normal  PSYCH: mentation appears normal, affect normal/bright  Neftali Ulloa-pike negative bilateral.           Assessment & Plan     1. Hypothyroidism due to acquired atrophy of thyroid  Dose decrease due to dizziness on higher dose  - levothyroxine (SYNTHROID/LEVOTHROID) 50 MCG tablet; Take 1 " tablet (50 mcg) by mouth daily  Dispense: 90 tablet; Refill: 1     Lab only in 6-8 weeks      Follow-up in August as scheduled    Brittney Coe, NP  [facility]

## 2020-02-13 NOTE — NURSING NOTE
"Chief Complaint   Patient presents with     Dizziness     synthroid?       Initial /70 (BP Location: Right arm, Patient Position: Chair, Cuff Size: Adult Regular)   Pulse 59   Temp 97  F (36.1  C) (Tympanic)   Ht 1.626 m (5' 4\")   Wt 64.4 kg (142 lb)   SpO2 98%   BMI 24.37 kg/m   Estimated body mass index is 24.37 kg/m  as calculated from the following:    Height as of this encounter: 1.626 m (5' 4\").    Weight as of this encounter: 64.4 kg (142 lb).  Medication Reconciliation: complete  Jo Thompson LPN    "

## 2020-03-02 ENCOUNTER — HEALTH MAINTENANCE LETTER (OUTPATIENT)
Age: 75
End: 2020-03-02

## 2020-04-20 ENCOUNTER — TELEPHONE (OUTPATIENT)
Dept: FAMILY MEDICINE | Facility: OTHER | Age: 75
End: 2020-04-20

## 2020-04-20 NOTE — TELEPHONE ENCOUNTER
The patient called in and stated that she has had a bad itch on her left side and it is now turning black and blue - patient is worried and would like a call back [phone].

## 2020-08-05 DIAGNOSIS — I10 BENIGN ESSENTIAL HYPERTENSION: ICD-10-CM

## 2020-08-05 DIAGNOSIS — Z79.899 ON STATIN THERAPY: ICD-10-CM

## 2020-08-05 DIAGNOSIS — E03.4 HYPOTHYROIDISM DUE TO ACQUIRED ATROPHY OF THYROID: ICD-10-CM

## 2020-08-05 DIAGNOSIS — E78.5 HYPERLIPIDEMIA WITH TARGET LDL LESS THAN 130: ICD-10-CM

## 2020-08-05 PROBLEM — L57.0 AK (ACTINIC KERATOSIS): Status: ACTIVE | Noted: 2018-08-28

## 2020-08-05 LAB
ALBUMIN SERPL-MCNC: 3.7 G/DL (ref 3.4–5)
ALP SERPL-CCNC: 76 U/L (ref 40–150)
ALT SERPL W P-5'-P-CCNC: 36 U/L (ref 0–50)
ANION GAP SERPL CALCULATED.3IONS-SCNC: 6 MMOL/L (ref 3–14)
AST SERPL W P-5'-P-CCNC: 29 U/L (ref 0–45)
BILIRUB SERPL-MCNC: 0.4 MG/DL (ref 0.2–1.3)
BUN SERPL-MCNC: 21 MG/DL (ref 7–30)
CALCIUM SERPL-MCNC: 9.3 MG/DL (ref 8.5–10.1)
CHLORIDE SERPL-SCNC: 107 MMOL/L (ref 94–109)
CHOLEST SERPL-MCNC: 216 MG/DL
CO2 SERPL-SCNC: 27 MMOL/L (ref 20–32)
CREAT SERPL-MCNC: 0.97 MG/DL (ref 0.52–1.04)
GFR SERPL CREATININE-BSD FRML MDRD: 57 ML/MIN/{1.73_M2}
GLUCOSE SERPL-MCNC: 100 MG/DL (ref 70–99)
HDLC SERPL-MCNC: 53 MG/DL
LDLC SERPL CALC-MCNC: 138 MG/DL
NONHDLC SERPL-MCNC: 163 MG/DL
POTASSIUM SERPL-SCNC: 4.6 MMOL/L (ref 3.4–5.3)
PROT SERPL-MCNC: 7.2 G/DL (ref 6.8–8.8)
SODIUM SERPL-SCNC: 140 MMOL/L (ref 133–144)
TRIGL SERPL-MCNC: 123 MG/DL
TSH SERPL DL<=0.005 MIU/L-ACNC: 3.1 MU/L (ref 0.4–4)

## 2020-08-05 PROCEDURE — 84443 ASSAY THYROID STIM HORMONE: CPT | Mod: ZL | Performed by: NURSE PRACTITIONER

## 2020-08-05 PROCEDURE — 80061 LIPID PANEL: CPT | Mod: ZL | Performed by: NURSE PRACTITIONER

## 2020-08-05 PROCEDURE — 80053 COMPREHEN METABOLIC PANEL: CPT | Mod: ZL | Performed by: NURSE PRACTITIONER

## 2020-08-05 PROCEDURE — 36415 COLL VENOUS BLD VENIPUNCTURE: CPT | Mod: ZL | Performed by: NURSE PRACTITIONER

## 2020-08-05 RX ORDER — LISINOPRIL 2.5 MG/1
TABLET ORAL
COMMUNITY
Start: 2020-06-26 | End: 2020-08-07

## 2020-08-05 NOTE — PROGRESS NOTES
Subjective     Franca Angeles is a 74 year old female who presents to clinic today for the following health issues:    HPI       Hyperlipidemia Follow-Up      Are you regularly taking any medication or supplement to lower your cholesterol?   Yes- lipitor    Are you having muscle aches or other side effects that you think could be caused by your cholesterol lowering medication?  No   The 10-year ASCVD risk score (Otis LANGSTON Jr., et al., 2013) is: 27.3%    Values used to calculate the score:      Age: 74 years      Sex: Female      Is Non- : No      Diabetic: No      Tobacco smoker: No      Systolic Blood Pressure: 148 mmHg      Is BP treated: Yes      HDL Cholesterol: 53 mg/dL      Total Cholesterol: 216 mg/dL        Hypertension Follow-up      Do you check your blood pressure regularly outside of the clinic? Yes 120's/60's at home in the evening.      Are you following a low salt diet? Yes    Are your blood pressures ever more than 140 on the top number (systolic) OR more   than 90 on the bottom number (diastolic), for example 140/90? No    Hypothyroidism Follow-up      Since last visit, patient describes the following symptoms: Weight stable, no hair loss, no skin changes, no constipation, no loose stools      How many servings of fruits and vegetables do you eat daily?  0-1    On average, how many sweetened beverages do you drink each day (Examples: soda, juice, sweet tea, etc.  Do NOT count diet or artificially sweetened beverages)?   0    How many days per week do you exercise enough to make your heart beat faster? 3 or less    How many minutes a day do you exercise enough to make your heart beat faster? 60 or more    How many days per week do you miss taking your medication? 0      She is feeling well, does have insomnia but is very intermittent.      Patient Active Problem List   Diagnosis     Menopause     Joint pain     Disorder of bone and cartilage     Advanced care  planning/counseling discussion     Hyperlipidemia with target LDL less than 130     Routine general medical examination at a health care facility (ANNUAL)     Colon cancer screening     Hypothyroidism due to acquired atrophy of thyroid     Benign essential hypertension     CKD (chronic kidney disease) stage 3, GFR 30-59 ml/min (H)     AK (actinic keratosis)     Past Surgical History:   Procedure Laterality Date     ARTHRODESIS KNEE  [id number]    left knee, paritla medial meniscectomy, debridement medial femoral condyle and patellofemoral debridement     COLONOSCOPY  2004    nml     COLONOSCOPY  7/23/2014    Procedure: COLONOSCOPY;  Surgeon: Nghia Hernandez DO;  Location: [facility]     CYSTOSCOPY      micro hematuria neg     DILATION AND CURETTAGE       EXCISE LESION UPPER EXTREMITY Right 10/25/2017    Procedure: EXCISE LESION UPPER EXTREMITY;  EXCISIONAL BIOPSY RIGHT FOREARM TIMES 2;  Surgeon: Nghia Hernandez DO;  Location: [facility]     tubal sterilization       uterine fibroids       wrist fx RT         Social History     Tobacco Use     Smoking status: Never Smoker     Smokeless tobacco: Never Used   Substance Use Topics     Alcohol use: Yes     Alcohol/week: 0.0 standard drinks     Comment: occasionally     Family History   Problem Relation Age of Onset     C.A.D. Mother      Other - See Comments Mother         high platelets     Hypertension Mother      C.A.D. Paternal Aunt      Diabetes Other         aunt     Cardiovascular Father         cardiovascular disease     Colon Cancer Other         family history-paternal side     Cerebrovascular Disease Maternal Grandmother      Cerebrovascular Disease Maternal Grandfather          Current Outpatient Medications   Medication Sig Dispense Refill     aspirin 81 MG tablet Take 81 mg by mouth daily       atorvastatin (LIPITOR) 20 MG tablet Take 1.5 tablets (30 mg) by mouth daily 135 tablet 3     Calcium-Magnesium-Vitamin D (CALCIUM 500 PO) Take by mouth daily        cinnamon 500 MG CAPS        co-enzyme Q-10 100 MG CAPS capsule Take 200 mg by mouth       Garlic 2000 MG CAPS Take 2,000 mg by mouth daily       GLUCOSAMINE-CHONDROITIN PO Take  by mouth. GLUCOSAMINE HCL/CHONDR SUA NA  One daily       levothyroxine (SYNTHROID/LEVOTHROID) 50 MCG tablet Take 1 tablet (50 mcg) by mouth daily 90 tablet 1     lisinopril (PRINIVIL/ZESTRIL) 5 MG tablet Take 1 tablet (5 mg) by mouth daily 90 tablet 3     Multiple Vitamins-Minerals (MULTIVITAMIN OR) Alive 1 tablet       Omega-3 Fatty Acids (FISH OIL) 1200 MG CAPS Take by mouth daily       order for DME Equipment being ordered: blood pressure cuff 1 each 0     triamcinolone (KENALOG) 0.1 % external cream Apply topically 2 times daily 89 g 1     Turmeric Curcumin 500 MG CAPS Take 1 capsule by mouth daily       Allergies   Allergen Reactions     Pseudoephedrine Hcl Other (See Comments)     Heart racing  Sudafed     Mucinex Palpitations     Recent Labs   Lab Test 08/05/20  0829 02/05/20  0929 08/02/19  0914  05/13/15  1026   A1C  --   --   --   --  5.7   * 117* 100*   < > 134*   HDL 53 57 59   < > 59   TRIG 123 131 100   < > 170*   ALT 36 37 34   < >  --    CR 0.97 0.99 0.96   < >  --    GFRESTIMATED 57* 56* 58*   < >  --    GFRESTBLACK 66 65 67   < >  --    POTASSIUM 4.6 4.7 4.2   < >  --    TSH 3.10 3.29 1.91   < > 3.75    < > = values in this interval not displayed.      BP Readings from Last 3 Encounters:   08/07/20 (!) 148/78   02/13/20 132/70   02/07/20 114/68    Wt Readings from Last 3 Encounters:   08/07/20 63.5 kg (140 lb)   02/13/20 64.4 kg (142 lb)   02/07/20 63.5 kg (140 lb)                    Reviewed and updated as needed this visit by Provider         Review of Systems   Constitutional, HEENT, cardiovascular, pulmonary, gi and gu systems are negative, except as otherwise noted.      Objective    BP (!) 148/78 (BP Location: Right arm, Patient Position: Chair, Cuff Size: Adult Regular)   Pulse 61   Temp 97.6  F (36.4  C)  "(Tympanic)   Resp 16   Ht 1.626 m (5' 4\")   Wt 63.5 kg (140 lb)   SpO2 98%   BMI 24.03 kg/m    Body mass index is 24.03 kg/m .  Physical Exam   GENERAL: healthy, alert and no distress  NECK: no adenopathy, no asymmetry, masses, or scars, thyroid normal to palpation and no carotid bruits  RESP: lungs clear to auscultation - no rales, rhonchi or wheezes  CV: regular rate and rhythm, normal S1 S2, no S3 or S4, no murmur, click or rub, no peripheral edema and peripheral pulses strong  MS: no gross musculoskeletal defects noted, no edema  PSYCH: mentation appears normal, affect normal/bright            Assessment & Plan   1. Hyperlipidemia with target LDL less than 130  - Lipid Profile; Future  - atorvastatin (LIPITOR) 20 MG tablet; Take 1.5 tablets (30 mg) by mouth daily  Dispense: 135 tablet; Refill: 3    2. Benign essential hypertension  - Comprehensive metabolic panel; Future  - TSH with free T4 reflex; Future  - lisinopril (ZESTRIL) 5 MG tablet; Take 1 tablet (5 mg) by mouth daily  Dispense: 90 tablet; Refill: 3    3. Hypothyroidism due to acquired atrophy of thyroid  - levothyroxine (SYNTHROID/LEVOTHROID) 50 MCG tablet; Take 1 tablet (50 mcg) by mouth daily  Dispense: 90 tablet; Refill: 1    4. Elevated fasting glucose  - Hemoglobin A1c; Future    5. On statin therapy  - Comprehensive metabolic panel; Future    6. Primary insomnia  Continue benadryl at bedtime as needed   May consider sleep study       Mammogram as scheduled, declined jimena scan  shingrix at local pharmacy.     Return in about 3 months (around 11/7/2020) for hypertension and lipids, thyroid.    Brittney Coe NP  [facility]  "

## 2020-08-07 ENCOUNTER — OFFICE VISIT (OUTPATIENT)
Dept: FAMILY MEDICINE | Facility: OTHER | Age: 75
End: 2020-08-07
Attending: NURSE PRACTITIONER
Payer: COMMERCIAL

## 2020-08-07 VITALS
BODY MASS INDEX: 23.9 KG/M2 | RESPIRATION RATE: 16 BRPM | HEIGHT: 64 IN | HEART RATE: 61 BPM | WEIGHT: 140 LBS | SYSTOLIC BLOOD PRESSURE: 148 MMHG | OXYGEN SATURATION: 98 % | TEMPERATURE: 97.6 F | DIASTOLIC BLOOD PRESSURE: 78 MMHG

## 2020-08-07 DIAGNOSIS — F51.01 PRIMARY INSOMNIA: ICD-10-CM

## 2020-08-07 DIAGNOSIS — E78.5 HYPERLIPIDEMIA WITH TARGET LDL LESS THAN 130: ICD-10-CM

## 2020-08-07 DIAGNOSIS — E78.5 HYPERLIPIDEMIA WITH TARGET LDL LESS THAN 130: Primary | ICD-10-CM

## 2020-08-07 DIAGNOSIS — I10 BENIGN ESSENTIAL HYPERTENSION: ICD-10-CM

## 2020-08-07 DIAGNOSIS — R73.01 ELEVATED FASTING GLUCOSE: ICD-10-CM

## 2020-08-07 DIAGNOSIS — Z79.899 ON STATIN THERAPY: ICD-10-CM

## 2020-08-07 DIAGNOSIS — E03.4 HYPOTHYROIDISM DUE TO ACQUIRED ATROPHY OF THYROID: ICD-10-CM

## 2020-08-07 PROCEDURE — 99214 OFFICE O/P EST MOD 30 MIN: CPT | Performed by: NURSE PRACTITIONER

## 2020-08-07 PROCEDURE — G0463 HOSPITAL OUTPT CLINIC VISIT: HCPCS

## 2020-08-07 RX ORDER — LEVOTHYROXINE SODIUM 50 UG/1
50 TABLET ORAL DAILY
Qty: 90 TABLET | Refills: 1 | Status: SHIPPED | OUTPATIENT
Start: 2020-08-07 | End: 2021-02-12

## 2020-08-07 RX ORDER — LISINOPRIL 5 MG/1
5 TABLET ORAL DAILY
Qty: 90 TABLET | Refills: 3 | Status: SHIPPED | OUTPATIENT
Start: 2020-08-07 | End: 2020-11-03

## 2020-08-07 RX ORDER — ATORVASTATIN CALCIUM 20 MG/1
30 TABLET, FILM COATED ORAL DAILY
Qty: 135 TABLET | Refills: 3 | Status: SHIPPED | OUTPATIENT
Start: 2020-08-07 | End: 2021-05-12

## 2020-08-07 ASSESSMENT — ANXIETY QUESTIONNAIRES
3. WORRYING TOO MUCH ABOUT DIFFERENT THINGS: NOT AT ALL
2. NOT BEING ABLE TO STOP OR CONTROL WORRYING: NOT AT ALL
5. BEING SO RESTLESS THAT IT IS HARD TO SIT STILL: NOT AT ALL
1. FEELING NERVOUS, ANXIOUS, OR ON EDGE: NOT AT ALL
7. FEELING AFRAID AS IF SOMETHING AWFUL MIGHT HAPPEN: NOT AT ALL
GAD7 TOTAL SCORE: 0
4. TROUBLE RELAXING: NOT AT ALL
IF YOU CHECKED OFF ANY PROBLEMS ON THIS QUESTIONNAIRE, HOW DIFFICULT HAVE THESE PROBLEMS MADE IT FOR YOU TO DO YOUR WORK, TAKE CARE OF THINGS AT HOME, OR GET ALONG WITH OTHER PEOPLE: NOT DIFFICULT AT ALL
6. BECOMING EASILY ANNOYED OR IRRITABLE: NOT AT ALL

## 2020-08-07 ASSESSMENT — PAIN SCALES - GENERAL: PAINLEVEL: NO PAIN (0)

## 2020-08-07 ASSESSMENT — MIFFLIN-ST. JEOR: SCORE: 1115.04

## 2020-08-07 ASSESSMENT — PATIENT HEALTH QUESTIONNAIRE - PHQ9: SUM OF ALL RESPONSES TO PHQ QUESTIONS 1-9: 2

## 2020-08-07 NOTE — NURSING NOTE
"Chief Complaint   Patient presents with     Hypertension     Lipids     Thyroid Problem       Initial BP (!) 148/78 (BP Location: Right arm, Patient Position: Chair, Cuff Size: Adult Regular)   Pulse 61   Temp 97.6  F (36.4  C) (Tympanic)   Resp 16   Ht 1.626 m (5' 4\")   Wt 63.5 kg (140 lb)   SpO2 98%   BMI 24.03 kg/m   Estimated body mass index is 24.03 kg/m  as calculated from the following:    Height as of this encounter: 1.626 m (5' 4\").    Weight as of this encounter: 63.5 kg (140 lb).  Medication Reconciliation: complete  Pamela M. Lechevalier, LPN    "

## 2020-08-07 NOTE — PATIENT INSTRUCTIONS
Assessment & Plan     1. Hyperlipidemia with target LDL less than 130  Continue liptior  - Lipid Profile; Future    2. Benign essential hypertension  Continue current plan  - Comprehensive metabolic panel; Future  - TSH with free T4 reflex; Future    3. Hypothyroidism due to acquired atrophy of thyroid  Continue current plan    4. Elevated fasting glucose  - Hemoglobin A1c; Future    5. On statin therapy  - Comprehensive metabolic panel; Future     Mammogram as scheduled, declined jimena scan  shingrix at local pharmacy.     Return in about 3 months (around 11/7/2020) for hypertension and lipids, thyroid.    Brittney Coe, NP  [facility]

## 2020-08-08 ASSESSMENT — ANXIETY QUESTIONNAIRES: GAD7 TOTAL SCORE: 0

## 2020-08-10 RX ORDER — ATORVASTATIN CALCIUM 20 MG/1
TABLET, FILM COATED ORAL
Qty: 135 TABLET | Refills: 3 | OUTPATIENT
Start: 2020-08-10

## 2020-08-18 ENCOUNTER — ANCILLARY PROCEDURE (OUTPATIENT)
Dept: MAMMOGRAPHY | Facility: OTHER | Age: 75
End: 2020-08-18
Attending: NURSE PRACTITIONER
Payer: MEDICARE

## 2020-08-18 DIAGNOSIS — Z12.31 VISIT FOR SCREENING MAMMOGRAM: ICD-10-CM

## 2020-08-18 PROCEDURE — 77067 SCR MAMMO BI INCL CAD: CPT | Mod: TC

## 2020-10-02 ENCOUNTER — HOSPITAL ENCOUNTER (EMERGENCY)
Facility: HOSPITAL | Age: 75
Discharge: HOME | End: 2020-10-02
Attending: EMERGENCY MEDICINE | Admitting: EMERGENCY MEDICINE
Payer: MEDICARE

## 2020-10-02 ENCOUNTER — APPOINTMENT (OUTPATIENT)
Dept: GENERAL RADIOLOGY | Facility: HOSPITAL | Age: 75
End: 2020-10-02
Attending: EMERGENCY MEDICINE
Payer: MEDICARE

## 2020-10-02 ENCOUNTER — VIRTUAL VISIT (OUTPATIENT)
Dept: FAMILY MEDICINE | Facility: OTHER | Age: 75
End: 2020-10-02
Attending: NURSE PRACTITIONER
Payer: COMMERCIAL

## 2020-10-02 ENCOUNTER — NURSE TRIAGE (OUTPATIENT)
Dept: FAMILY MEDICINE | Facility: OTHER | Age: 75
End: 2020-10-02

## 2020-10-02 VITALS
WEIGHT: 141 LBS | RESPIRATION RATE: 16 BRPM | TEMPERATURE: 99.1 F | BODY MASS INDEX: 24.2 KG/M2 | DIASTOLIC BLOOD PRESSURE: 83 MMHG | SYSTOLIC BLOOD PRESSURE: 150 MMHG | HEART RATE: 76 BPM | OXYGEN SATURATION: 95 %

## 2020-10-02 VITALS — BODY MASS INDEX: 24.07 KG/M2 | WEIGHT: 141 LBS | HEIGHT: 64 IN

## 2020-10-02 DIAGNOSIS — R06.09 DYSPNEA ON EXERTION: Primary | ICD-10-CM

## 2020-10-02 DIAGNOSIS — E78.5 HYPERLIPIDEMIA WITH TARGET LDL LESS THAN 130: ICD-10-CM

## 2020-10-02 DIAGNOSIS — E03.4 HYPOTHYROIDISM DUE TO ACQUIRED ATROPHY OF THYROID: ICD-10-CM

## 2020-10-02 DIAGNOSIS — I10 BENIGN ESSENTIAL HYPERTENSION: ICD-10-CM

## 2020-10-02 DIAGNOSIS — R06.09 DYSPNEA ON EXERTION: ICD-10-CM

## 2020-10-02 LAB
ALBUMIN SERPL-MCNC: 3.8 G/DL (ref 3.4–5)
ALBUMIN UR-MCNC: NEGATIVE MG/DL
ALP SERPL-CCNC: 86 U/L (ref 40–150)
ALT SERPL W P-5'-P-CCNC: 40 U/L (ref 0–50)
ANION GAP SERPL CALCULATED.3IONS-SCNC: 6 MMOL/L (ref 3–14)
APPEARANCE UR: CLEAR
AST SERPL W P-5'-P-CCNC: 33 U/L (ref 0–45)
BASOPHILS # BLD AUTO: 0 10E9/L (ref 0–0.2)
BASOPHILS NFR BLD AUTO: 0.5 %
BILIRUB DIRECT SERPL-MCNC: <0.1 MG/DL (ref 0–0.2)
BILIRUB SERPL-MCNC: 0.3 MG/DL (ref 0.2–1.3)
BILIRUB UR QL STRIP: NEGATIVE
BUN SERPL-MCNC: 19 MG/DL (ref 7–30)
CALCIUM SERPL-MCNC: 8.9 MG/DL (ref 8.5–10.1)
CHLORIDE SERPL-SCNC: 109 MMOL/L (ref 94–109)
CO2 SERPL-SCNC: 25 MMOL/L (ref 20–32)
COLOR UR AUTO: NORMAL
CREAT SERPL-MCNC: 0.94 MG/DL (ref 0.52–1.04)
D DIMER PPP FEU-MCNC: 0.5 UG/ML FEU (ref 0–0.5)
DIFFERENTIAL METHOD BLD: NORMAL
EOSINOPHIL # BLD AUTO: 0.2 10E9/L (ref 0–0.7)
EOSINOPHIL NFR BLD AUTO: 3 %
ERYTHROCYTE [DISTWIDTH] IN BLOOD BY AUTOMATED COUNT: 14.2 % (ref 10–15)
EST. AVERAGE GLUCOSE BLD GHB EST-MCNC: 114 MG/DL
GFR SERPL CREATININE-BSD FRML MDRD: 59 ML/MIN/{1.73_M2}
GLUCOSE SERPL-MCNC: 105 MG/DL (ref 70–99)
GLUCOSE UR STRIP-MCNC: NEGATIVE MG/DL
HBA1C MFR BLD: 5.6 % (ref 0–5.6)
HCT VFR BLD AUTO: 39.2 % (ref 35–47)
HGB BLD-MCNC: 13 G/DL (ref 11.7–15.7)
HGB UR QL STRIP: NEGATIVE
IMM GRANULOCYTES # BLD: 0 10E9/L (ref 0–0.4)
IMM GRANULOCYTES NFR BLD: 0.3 %
INR PPP: 0.86 (ref 0.86–1.14)
KETONES UR STRIP-MCNC: NEGATIVE MG/DL
LEUKOCYTE ESTERASE UR QL STRIP: NEGATIVE
LYMPHOCYTES # BLD AUTO: 2.5 10E9/L (ref 0.8–5.3)
LYMPHOCYTES NFR BLD AUTO: 31.8 %
MCH RBC QN AUTO: 28.6 PG (ref 26.5–33)
MCHC RBC AUTO-ENTMCNC: 33.2 G/DL (ref 31.5–36.5)
MCV RBC AUTO: 86 FL (ref 78–100)
MONOCYTES # BLD AUTO: 0.6 10E9/L (ref 0–1.3)
MONOCYTES NFR BLD AUTO: 7.7 %
NEUTROPHILS # BLD AUTO: 4.4 10E9/L (ref 1.6–8.3)
NEUTROPHILS NFR BLD AUTO: 56.7 %
NITRATE UR QL: NEGATIVE
NRBC # BLD AUTO: 0 10*3/UL
NRBC BLD AUTO-RTO: 0 /100
NT-PROBNP SERPL-MCNC: 83 PG/ML (ref 0–1800)
PH UR STRIP: 6.5 PH (ref 4.7–8)
PLATELET # BLD AUTO: 337 10E9/L (ref 150–450)
POTASSIUM SERPL-SCNC: 3.7 MMOL/L (ref 3.4–5.3)
PROT SERPL-MCNC: 7.3 G/DL (ref 6.8–8.8)
RBC # BLD AUTO: 4.55 10E12/L (ref 3.8–5.2)
SODIUM SERPL-SCNC: 140 MMOL/L (ref 133–144)
SOURCE: NORMAL
SP GR UR STRIP: 1.02 (ref 1–1.03)
TROPONIN I SERPL-MCNC: <0.015 UG/L (ref 0–0.04)
TSH SERPL DL<=0.005 MIU/L-ACNC: 2.39 MU/L (ref 0.4–4)
UROBILINOGEN UR STRIP-MCNC: NORMAL MG/DL (ref 0–2)
WBC # BLD AUTO: 7.8 10E9/L (ref 4–11)

## 2020-10-02 PROCEDURE — 85610 PROTHROMBIN TIME: CPT | Performed by: EMERGENCY MEDICINE

## 2020-10-02 PROCEDURE — 81003 URINALYSIS AUTO W/O SCOPE: CPT | Performed by: EMERGENCY MEDICINE

## 2020-10-02 PROCEDURE — 71046 X-RAY EXAM CHEST 2 VIEWS: CPT

## 2020-10-02 PROCEDURE — 80076 HEPATIC FUNCTION PANEL: CPT | Performed by: EMERGENCY MEDICINE

## 2020-10-02 PROCEDURE — 80048 BASIC METABOLIC PNL TOTAL CA: CPT | Performed by: EMERGENCY MEDICINE

## 2020-10-02 PROCEDURE — 85379 FIBRIN DEGRADATION QUANT: CPT | Performed by: EMERGENCY MEDICINE

## 2020-10-02 PROCEDURE — 83880 ASSAY OF NATRIURETIC PEPTIDE: CPT | Performed by: EMERGENCY MEDICINE

## 2020-10-02 PROCEDURE — 99285 EMERGENCY DEPT VISIT HI MDM: CPT | Mod: 25

## 2020-10-02 PROCEDURE — 36415 COLL VENOUS BLD VENIPUNCTURE: CPT

## 2020-10-02 PROCEDURE — 999N001182 HC STATISTIC ESTIMATED AVERAGE GLUCOSE: Performed by: EMERGENCY MEDICINE

## 2020-10-02 PROCEDURE — 85025 COMPLETE CBC W/AUTO DIFF WBC: CPT | Performed by: EMERGENCY MEDICINE

## 2020-10-02 PROCEDURE — 84443 ASSAY THYROID STIM HORMONE: CPT | Performed by: EMERGENCY MEDICINE

## 2020-10-02 PROCEDURE — 99213 OFFICE O/P EST LOW 20 MIN: CPT | Mod: 95 | Performed by: NURSE PRACTITIONER

## 2020-10-02 PROCEDURE — 99285 EMERGENCY DEPT VISIT HI MDM: CPT | Performed by: EMERGENCY MEDICINE

## 2020-10-02 PROCEDURE — 84484 ASSAY OF TROPONIN QUANT: CPT | Performed by: EMERGENCY MEDICINE

## 2020-10-02 PROCEDURE — 93005 ELECTROCARDIOGRAM TRACING: CPT

## 2020-10-02 PROCEDURE — 83036 HEMOGLOBIN GLYCOSYLATED A1C: CPT | Performed by: EMERGENCY MEDICINE

## 2020-10-02 PROCEDURE — 93010 ELECTROCARDIOGRAM REPORT: CPT | Performed by: INTERNAL MEDICINE

## 2020-10-02 ASSESSMENT — PAIN SCALES - GENERAL: PAINLEVEL: NO PAIN (0)

## 2020-10-02 ASSESSMENT — MIFFLIN-ST. JEOR: SCORE: 1114.57

## 2020-10-02 NOTE — TELEPHONE ENCOUNTER
"Shortness of breath with activity and legs weak after climbing stairs starting x a couple days ago.     See protocol for details.     Please contact pt, requesting to be seen today by Brittney Coe only.     No openings on schedule.       Additional Information    Negative: Breathing stopped and hasn't returned    Negative: Choking on something    Negative: SEVERE difficulty breathing (e.g., struggling for each breath, speaks in single words, pulse > 120)    Negative: Bluish (or gray) lips or face    Negative: Difficult to awaken or acting confused (e.g., disoriented, slurred speech)    Negative: Passed out (i.e., fainted, collapsed and was not responding)    Negative: Wheezing started suddenly after medicine, an allergic food, or bee sting    Negative: Stridor    Negative: Slow, shallow and weak breathing    Negative: Sounds like a life-threatening emergency to the triager    Negative: Chest pain    Negative: Wheezing (high pitched whistling sound) and previous asthma attacks or use of asthma medicines    Negative: Difficulty breathing and only present when coughing    Negative: Difficulty breathing and only from stuffy or runny nose    Negative: MODERATE difficulty breathing (e.g., speaks in phrases, SOB even at rest, pulse 100-120) of new onset or worse than normal    Negative: Wheezing can be heard across the room    Negative: Drooling or spitting out saliva (because can't swallow)    Negative: Any history of prior \"blood clot\" in leg or lungs    Negative: Recent illness requiring prolonged bedrest (i.e., immobilization)    Negative: Hip or leg fracture in past 2 months (e.g., or had cast on leg or ankle)    Negative: Major surgery in the past month    Negative: Recent long-distance travel with prolonged time in car, bus, plane, or train (i.e., within past 2 weeks; 6 or  more hours duration)    Negative: Extra heart beats OR irregular heart beating   (i.e., \"palpitations\")    Negative: Fever > 103 F (39.4 " "C)    Negative: Fever > 101 F (38.3 C) and over 60 years of age    Negative: Fever > 100.0 F (37.8 C) and bedridden (e.g., nursing home patient, stroke, chronic illness, recovering from surgery)    Negative: Fever > 100.0 F (37.8 C) and diabetes mellitus or weak immune system (e.g., HIV positive, cancer chemo, splenectomy, organ transplant, chronic steroids)    Negative: Periods where breathing stops and then resumes normally and bedridden (e.g., nursing home patient, CVA)    Negative: Pregnant or postpartum (from 0 to 6 weeks after delivery)    Negative: Patient sounds very sick or weak to the triager    MILD difficulty breathing (e.g., minimal/no SOB at rest, SOB with walking, pulse < 100) of new onset or worse than normal    Answer Assessment - Initial Assessment Questions  1. RESPIRATORY STATUS: \"Describe your breathing?\" (e.g., wheezing, shortness of breath, unable to speak, severe coughing)       Shortness of breath with activity    2. ONSET: \"When did this breathing problem begin?\"       X 2-3 days ago    3. PATTERN \"Does the difficult breathing come and go, or has it been constant since it started?\"       Comes and go. Experiences with activity    4. SEVERITY: \"How bad is your breathing?\" (e.g., mild, moderate, severe)     - MILD: No SOB at rest, mild SOB with walking, speaks normally in sentences, can lay down, no retractions, pulse < 100.     - MODERATE: SOB at rest, SOB with minimal exertion and prefers to sit, cannot lie down flat, speaks in phrases, mild retractions, audible wheezing, pulse 100-120.     - SEVERE: Very SOB at rest, speaks in single words, struggling to breathe, sitting hunched forward, retractions, pulse > 120       Mild per pt    5. RECURRENT SYMPTOM: \"Have you had difficulty breathing before?\" If so, ask: \"When was the last time?\" and \"What happened that time?\"       No     6. CARDIAC HISTORY: \"Do you have any history of heart disease?\" (e.g., heart attack, angina, bypass surgery, " "angioplasty)       No    7. LUNG HISTORY: \"Do you have any history of lung disease?\"  (e.g., pulmonary embolus, asthma, emphysema)      No     8. CAUSE: \"What do you think is causing the breathing problem?\"       Unknown    9. OTHER SYMPTOMS: \"Do you have any other symptoms? (e.g., dizziness, runny nose, cough, chest pain, fever)      No     10. PREGNANCY: \"Is there any chance you are pregnant?\" \"When was your last menstrual period?\"        NA    11. TRAVEL: \"Have you traveled out of the country in the last month?\" (e.g., travel history, exposures)        No    Protocols used: BREATHING DIFFICULTY-A-[place]      "

## 2020-10-02 NOTE — NURSING NOTE
"Chief Complaint   Patient presents with     Shortness of Breath       Initial Ht 1.626 m (5' 4\")   Wt 64 kg (141 lb)   BMI 24.20 kg/m   Estimated body mass index is 24.2 kg/m  as calculated from the following:    Height as of this encounter: 1.626 m (5' 4\").    Weight as of this encounter: 64 kg (141 lb).  Medication Reconciliation: complete  Pamela M. Lechevalier, LPN    "

## 2020-10-02 NOTE — PROGRESS NOTES
"Franca Angeles is a 75 year old female who is being evaluated via a billable telephone visit.      The patient has been notified of following:     \"This telephone visit will be conducted via a call between you and your physician/provider. We have found that certain health care needs can be provided without the need for a physical exam.  This service lets us provide the care you need with a short phone conversation.  If a prescription is necessary we can send it directly to your pharmacy.  If lab work is needed we can place an order for that and you can then stop by our lab to have the test done at a later time.    Telephone visits are billed at different rates depending on your insurance coverage. During this emergency period, for some insurers they may be billed the same as an in-person visit.  Please reach out to your insurance provider with any questions.    If during the course of the call the physician/provider feels a telephone visit is not appropriate, you will not be charged for this service.\"    Patient has given verbal consent for Telephone visit?  Yes    What phone number would you like to be contacted at? [phone]    How would you like to obtain your AVS? MyChart    Subjective     Franca Angeles is a 75 year old female who presents via phone visit today for the following health issues:    HPI     Concern - Shortness of breath with activity   Onset: 3-4 days   Description: not when resting when climbing stairs, some nausea  Intensity: moderate  Progression of Symptoms:  worsening  Accompanying Signs & Symptoms: acid in stomach - she took TUMS and symptoms resolved.   Denies any URI symptoms - fever, cough, nasal congestion, myalgia.  She also feels weak in her legs and fatigued.    Previous history of similar problem: no  Precipitating factors:        Worsened by: climbing stairs   Alleviating factors:        Improved by: nothing   Therapies tried and outcome: None     Positive family history of " MI.      Patient Active Problem List   Diagnosis     Menopause     Joint pain     Disorder of bone and cartilage     Advanced care planning/counseling discussion     Hyperlipidemia with target LDL less than 130     Routine general medical examination at a health care facility (ANNUAL)     Colon cancer screening     Hypothyroidism due to acquired atrophy of thyroid     Benign essential hypertension     CKD (chronic kidney disease) stage 3, GFR 30-59 ml/min     AK (actinic keratosis)     Past Surgical History:   Procedure Laterality Date     ARTHRODESIS KNEE  [id number]    left knee, paritla medial meniscectomy, debridement medial femoral condyle and patellofemoral debridement     COLONOSCOPY  2004    nml     COLONOSCOPY  7/23/2014    Procedure: COLONOSCOPY;  Surgeon: Nghia Hernandez DO;  Location: [facility]     CYSTOSCOPY      micro hematuria neg     DILATION AND CURETTAGE       EXCISE LESION UPPER EXTREMITY Right 10/25/2017    Procedure: EXCISE LESION UPPER EXTREMITY;  EXCISIONAL BIOPSY RIGHT FOREARM TIMES 2;  Surgeon: Nghia Hernandez DO;  Location: [facility]     tubal sterilization       uterine fibroids       wrist fx RT         Social History     Tobacco Use     Smoking status: Never Smoker     Smokeless tobacco: Never Used   Substance Use Topics     Alcohol use: Yes     Alcohol/week: 0.0 standard drinks     Comment: occasionally     Family History   Problem Relation Age of Onset     C.A.D. Mother      Other - See Comments Mother         high platelets     Hypertension Mother      C.A.D. Paternal Aunt      Diabetes Other         aunt     Cardiovascular Father         cardiovascular disease     Colon Cancer Other         family history-paternal side     Cerebrovascular Disease Maternal Grandmother      Cerebrovascular Disease Maternal Grandfather          Current Outpatient Medications   Medication Sig Dispense Refill     aspirin 81 MG tablet Take 81 mg by mouth daily       atorvastatin (LIPITOR) 20 MG tablet Take  1.5 tablets (30 mg) by mouth daily 135 tablet 3     Calcium-Magnesium-Vitamin D (CALCIUM 500 PO) Take by mouth daily       cinnamon 500 MG CAPS        co-enzyme Q-10 100 MG CAPS capsule Take 200 mg by mouth       Garlic 2000 MG CAPS Take 2,000 mg by mouth daily       GLUCOSAMINE-CHONDROITIN PO Take  by mouth. GLUCOSAMINE HCL/CHONDR SUA NA  One daily       levothyroxine (SYNTHROID/LEVOTHROID) 50 MCG tablet Take 1 tablet (50 mcg) by mouth daily 90 tablet 1     lisinopril (ZESTRIL) 5 MG tablet Take 1 tablet (5 mg) by mouth daily 90 tablet 3     Multiple Vitamins-Minerals (MULTIVITAMIN OR) Alive 1 tablet       Omega-3 Fatty Acids (FISH OIL) 1200 MG CAPS Take by mouth daily       order for DME Equipment being ordered: blood pressure cuff 1 each 0     triamcinolone (KENALOG) 0.1 % external cream Apply topically 2 times daily 89 g 1     Turmeric Curcumin 500 MG CAPS Take 1 capsule by mouth daily       Allergies   Allergen Reactions     Pseudoephedrine Hcl Other (See Comments)     Heart racing  Sudafed     Mucinex Palpitations     Recent Labs   Lab Test 08/05/20  0829 02/05/20  0929 08/02/19  0914 05/13/15  1026 05/13/15  1026   A1C  --   --   --   --  5.7   * 117* 100*   < > 134*   HDL 53 57 59   < > 59   TRIG 123 131 100   < > 170*   ALT 36 37 34   < >  --    CR 0.97 0.99 0.96   < >  --    GFRESTIMATED 57* 56* 58*   < >  --    GFRESTBLACK 66 65 67   < >  --    POTASSIUM 4.6 4.7 4.2   < >  --    TSH 3.10 3.29 1.91   < > 3.75    < > = values in this interval not displayed.      BP Readings from Last 3 Encounters:   08/07/20 (!) 148/78   02/13/20 132/70   02/07/20 114/68    Wt Readings from Last 3 Encounters:   10/02/20 64 kg (141 lb)   08/07/20 63.5 kg (140 lb)   02/13/20 64.4 kg (142 lb)                      Review of Systems   Constitutional, HEENT, cardiovascular, pulmonary, gi and gu systems are negative, except as otherwise noted.       Objective   Vitals - Patient Reported  Pain Score: No Pain (0)  Pain Loc:  (antacid)  Home Temp 96.2      alert and no distress  PSYCH: Alert and oriented times 3; coherent speech, normal   rate and volume, able to articulate logical thoughts, able   to abstract reason, no tangential thoughts, no hallucinations   or delusions  Her affect is normal and pleasant  RESP: No cough, no audible wheezing, able to talk in full sentences  Remainder of exam unable to be completed due to telephone visits                Assessment & Plan     1. Dyspnea on exertion  Due to ongoing symptoms, I encouraged her to go to urgent care for evaluation.  She verbalized understanding and will head over to [facility] in [place].  I did call urgent care and spoke with Rob Jones NP to discuss case.      2. Hyperlipidemia with target LDL less than 130    3. Benign essential hypertension    4. Hypothyroidism due to acquired atrophy of thyroid            Brittney Coe NP  [facility]    Phone call duration:  15 minutes

## 2020-10-02 NOTE — ED PROVIDER NOTES
History     Chief Complaint   Patient presents with     Shortness of Breath     SOB with exertion up and down steps. Virtual visit with PCP and was sent to the ED.      Fatigue     feels tired.      Extremity Weakness     leg weakness while bowling yesterday.     HPI  Franca Angeles is a 75 year old female who presents after a virtual visit whereby she communicated that she is short of breath.  She was advised to come to the ED.  She notes shortness of breath going up and down stairs.  No other shortness of breath.  No coronavirus exposure.  No fevers.  No chills.  No other associated symptoms.  She notes it started 3 to 4 days ago.  She notes it is only present when she climbs stairs.  Nothing makes it better other than not climbing stairs.  No symptoms at present.  Not worse with lying flat.    Allergies:  Allergies   Allergen Reactions     Pseudoephedrine Hcl Other (See Comments)     Heart racing  Sudafed     Mucinex Palpitations       Problem List:    Patient Active Problem List    Diagnosis Date Noted     CKD (chronic kidney disease) stage 3, GFR 30-59 ml/min 08/07/2019     Priority: Medium     AK (actinic keratosis) 08/28/2018     Priority: Medium     Benign essential hypertension 06/14/2017     Priority: Medium     Hypothyroidism due to acquired atrophy of thyroid 05/21/2015     Priority: Medium     Routine general medical examination at a health care facility (ANNUAL) 04/30/2014     Priority: Medium     Colon cancer screening 04/30/2014     Priority: Medium     Done in July. Return is 10 years       Hyperlipidemia with target LDL less than 130 04/18/2014     Priority: Medium     Menopause 04/23/2013     Priority: Medium     Joint pain 04/23/2013     Priority: Medium     Advanced care planning/counseling discussion 10/09/2012     Priority: Medium     Disorder of bone and cartilage 05/10/2011     Priority: Medium        Past Medical History:    Past Medical History:   Diagnosis Date     Esophageal reflux  5/10/2011       Past Surgical History:    Past Surgical History:   Procedure Laterality Date     ARTHRODESIS KNEE  [id number]    left knee, paritla medial meniscectomy, debridement medial femoral condyle and patellofemoral debridement     COLONOSCOPY  2004    nml     COLONOSCOPY  7/23/2014    Procedure: COLONOSCOPY;  Surgeon: Nghia Hernandez DO;  Location: [facility]     CYSTOSCOPY      micro hematuria neg     DILATION AND CURETTAGE       EXCISE LESION UPPER EXTREMITY Right 10/25/2017    Procedure: EXCISE LESION UPPER EXTREMITY;  EXCISIONAL BIOPSY RIGHT FOREARM TIMES 2;  Surgeon: Nghia Hernandez DO;  Location: [facility]     tubal sterilization       uterine fibroids       wrist fx RT         Family History:    Family History   Problem Relation Age of Onset     C.A.D. Mother      Other - See Comments Mother         high platelets     Hypertension Mother      C.A.D. Paternal Aunt      Diabetes Other         aunt     Cardiovascular Father         cardiovascular disease     Colon Cancer Other         family history-paternal side     Cerebrovascular Disease Maternal Grandmother      Cerebrovascular Disease Maternal Grandfather        Social History:  Marital Status:   [2]  Social History     Tobacco Use     Smoking status: Never Smoker     Smokeless tobacco: Never Used   Substance Use Topics     Alcohol use: Yes     Alcohol/week: 0.0 standard drinks     Comment: occasionally     Drug use: No        Medications:         aspirin 81 MG tablet       atorvastatin (LIPITOR) 20 MG tablet       Calcium-Magnesium-Vitamin D (CALCIUM 500 PO)       cinnamon 500 MG CAPS       co-enzyme Q-10 100 MG CAPS capsule       Garlic 2000 MG CAPS       GLUCOSAMINE-CHONDROITIN PO       levothyroxine (SYNTHROID/LEVOTHROID) 50 MCG tablet       lisinopril (ZESTRIL) 5 MG tablet       Multiple Vitamins-Minerals (MULTIVITAMIN OR)       Omega-3 Fatty Acids (FISH OIL) 1200 MG CAPS       Turmeric Curcumin 500 MG CAPS       order for DME        triamcinolone (KENALOG) 0.1 % external cream          Review of Systems   Respiratory per HPI.  Cardiovascular denies.  No Chest pain. GI denies.   denies other than history of chronic kidney disease.  Heme on aspirin.  Remainder of complete 10 point review of systems negative.    Physical Exam   BP: 137/85  Pulse: 69  Temp: 98.8  F (37.1  C)  Resp: 20  Weight: 64 kg (141 lb)  SpO2: 98 %      Physical Exam  Constitutional:       Appearance: She is well-developed.   HENT:      Head: Normocephalic and atraumatic.   Eyes:      Extraocular Movements: Extraocular movements intact.      Pupils: Pupils are equal, round, and reactive to light.   Neck:      Musculoskeletal: Normal range of motion and neck supple.   Cardiovascular:      Rate and Rhythm: Normal rate.      Comments: No tachycardia  Pulmonary:      Effort: Pulmonary effort is normal. No tachypnea.      Comments: No orthopnea.  No wheezing.  No tachypnea.  No focal asymmetry.  Abdominal:      Palpations: Abdomen is soft.   Musculoskeletal: Normal range of motion.      Right lower leg: No edema.      Left lower leg: No edema.   Skin:     General: Skin is warm and dry.      Capillary Refill: Capillary refill takes less than 2 seconds.   Neurological:      General: No focal deficit present.      Mental Status: She is alert.   Psychiatric:         Mood and Affect: Mood normal.         EKG read in real-time by the emergency physician shows left bundle.  Rate 60, MI interval 144, , .  No prior left bundle noted on previous EKGs.  Most recent EKG from 8/24/2017 does not show left bundle.  Did review care everywhere and the only system that came up was [facility] and there is no record of prior EKGs      Bedside point-of-care cardiac ultrasound.  No evidence for pericardial effusion.  Ultrasound performed and interpreted in real-time by the emergency physician         Results for orders placed or performed during the hospital encounter of 10/02/20 (from the  past 24 hour(s))   UA reflex to Microscopic and Culture    Specimen: Midstream Urine   Result Value Ref Range    Color Urine Light Yellow     Appearance Urine Clear     Glucose Urine Negative NEG^Negative mg/dL    Bilirubin Urine Negative NEG^Negative    Ketones Urine Negative NEG^Negative mg/dL    Specific Gravity Urine 1.023 1.003 - 1.035    Blood Urine Negative NEG^Negative    pH Urine 6.5 4.7 - 8.0 pH    Protein Albumin Urine Negative NEG^Negative mg/dL    Urobilinogen mg/dL Normal 0.0 - 2.0 mg/dL    Nitrite Urine Negative NEG^Negative    Leukocyte Esterase Urine Negative NEG^Negative    Source Midstream Urine    CBC with platelets differential   Result Value Ref Range    WBC 7.8 4.0 - 11.0 10e9/L    RBC Count 4.55 3.8 - 5.2 10e12/L    Hemoglobin 13.0 11.7 - 15.7 g/dL    Hematocrit 39.2 35.0 - 47.0 %    MCV 86 78 - 100 fl    MCH 28.6 26.5 - 33.0 pg    MCHC 33.2 31.5 - 36.5 g/dL    RDW 14.2 10.0 - 15.0 %    Platelet Count 337 150 - 450 10e9/L    Diff Method Automated Method     % Neutrophils 56.7 %    % Lymphocytes 31.8 %    % Monocytes 7.7 %    % Eosinophils 3.0 %    % Basophils 0.5 %    % Immature Granulocytes 0.3 %    Nucleated RBCs 0 0 /100    Absolute Neutrophil 4.4 1.6 - 8.3 10e9/L    Absolute Lymphocytes 2.5 0.8 - 5.3 10e9/L    Absolute Monocytes 0.6 0.0 - 1.3 10e9/L    Absolute Eosinophils 0.2 0.0 - 0.7 10e9/L    Absolute Basophils 0.0 0.0 - 0.2 10e9/L    Abs Immature Granulocytes 0.0 0 - 0.4 10e9/L    Absolute Nucleated RBC 0.0    INR   Result Value Ref Range    INR 0.86 0.86 - 1.14   Basic metabolic panel   Result Value Ref Range    Sodium 140 133 - 144 mmol/L    Potassium 3.7 3.4 - 5.3 mmol/L    Chloride 109 94 - 109 mmol/L    Carbon Dioxide 25 20 - 32 mmol/L    Anion Gap 6 3 - 14 mmol/L    Glucose 105 (H) 70 - 99 mg/dL    Urea Nitrogen 19 7 - 30 mg/dL    Creatinine 0.94 0.52 - 1.04 mg/dL    GFR Estimate 59 (L) >60 mL/min/[1.73_m2]    GFR Estimate If Black 68 >60 mL/min/[1.73_m2]    Calcium 8.9 8.5 -  10.1 mg/dL   Troponin I   Result Value Ref Range    Troponin I ES <0.015 0.000 - 0.045 ug/L   Nt probnp inpatient (BNP)   Result Value Ref Range    N-Terminal Pro BNP Inpatient 83 0 - 1,800 pg/mL   Hemoglobin A1c   Result Value Ref Range    Hemoglobin A1C 5.6 0 - 5.6 %   TSH with free T4 reflex   Result Value Ref Range    TSH 2.39 0.40 - 4.00 mU/L   Hepatic panel   Result Value Ref Range    Bilirubin Direct <0.1 0.0 - 0.2 mg/dL    Bilirubin Total 0.3 0.2 - 1.3 mg/dL    Albumin 3.8 3.4 - 5.0 g/dL    Protein Total 7.3 6.8 - 8.8 g/dL    Alkaline Phosphatase 86 40 - 150 U/L    ALT 40 0 - 50 U/L    AST 33 0 - 45 U/L   Estimated Average Glucose   Result Value Ref Range    Estimated Average Glucose 114 mg/dL   D dimer quantitative   Result Value Ref Range    D Dimer 0.5 0.0 - 0.50 ug/ml FEU   Chest XR,  PA & LAT    Narrative    PROCEDURE:  XR CHEST 2 VW    HISTORY:  sob.     COMPARISON:  None.    FINDINGS:   The cardiac silhouette is normal in size. The pulmonary vasculature is  normal.  The lungs are clear. No pleural effusion or pneumothorax.      Impression    IMPRESSION:  No acute cardiopulmonary disease.      QUEENIE HANSON MD       Medications - No data to display    Assessments & Plan (with Medical Decision Making)   75-year-old female presenting with report of dyspnea on climbing steps.  No other shortness of breath.  No other associated features.  It is not present at rest.  She is very healthy and quite concerned.  EKG nondiagnostic.  Bedside ultrasound without pericardial effusion.  Chest x-ray nondiagnostic.  Laboratories returned reassuring.  Dimer below threshold particularly given age.  Troponin negative.  Long discussion with the patient and plan for discharge at this time.  Dyspnea on exertion can be due to underlying coronary disease though acute coronary syndrome not present at this time.  Patient asymptomatic.  In light of such, the patient can be discharged home and follow-up for outpatient stress  test.  She was advised to avoid anything that might induce symptoms and to certainly return if any new or concerning symptoms.    Discharge Medication List as of 10/2/2020  5:19 PM          Final diagnoses:   Dyspnea on exertion       10/2/2020   [facility]     Adonis Rios MD  10/02/20 1744

## 2020-10-02 NOTE — ED AVS SNAPSHOT
[facility]  [address] [address]  Phone: [phone]                                    Franca Angeles   MRN: [id number]    Department: [facility]   Date of Visit: 10/2/2020           After Visit Summary Signature Page    I have received my discharge instructions, and my questions have been answered. I have discussed any challenges I see with this plan with the nurse or doctor.    ..........................................................................................................................................  Patient/Patient Representative Signature      ..........................................................................................................................................  Patient Representative Print Name and Relationship to Patient    ..................................................               ................................................  Date                                   Time    ..........................................................................................................................................  Reviewed by Signature/Title    ...................................................              ..............................................  Date                                               Time          22EPIC Rev 08/18

## 2020-10-06 ENCOUNTER — TELEPHONE (OUTPATIENT)
Dept: CARDIOLOGY | Facility: OTHER | Age: 75
End: 2020-10-06

## 2020-10-06 NOTE — TELEPHONE ENCOUNTER
Pt verified her .  Called Re:  Stress echocardiogram that was ordered from her ER visit.  Pt has a follow up appointment for 10/7/2020 with .  Message sent to Dr.Couture Tavera re:  Need for different type of stress test.  Informed patient that 10/19/2020 appointment for stress echo will be cancelled and Dr.Couture Tavera will discuss with patient.  Pt verbalized understanding.

## 2020-10-06 NOTE — PROGRESS NOTES
Subjective     Franca Angeles is a 75 year old female who presents to clinic today for the following health issues:    HPI         ED/UC Followup:    [place]  [facility] [place]: 10/2/20  Reason for visit: Dyspnea on exertion   Current Status: still has shortness of breath and weakness in legs      Assessments & Plan (with Medical Decision Making)   75-year-old female presenting with report of dyspnea on climbing steps.  No other shortness of breath.  No other associated features.  It is not present at rest.  She is very healthy and quite concerned.  EKG nondiagnostic.  Bedside ultrasound without pericardial effusion.  Chest x-ray nondiagnostic.  Laboratories returned reassuring.  Dimer below threshold particularly given age.  Troponin negative.  Long discussion with the patient and plan for discharge at this time.  Dyspnea on exertion can be due to underlying coronary disease though acute coronary syndrome not present at this time.  Patient asymptomatic.  In light of such, the patient can be discharged home and follow-up for outpatient stress test.  She was advised to avoid anything that might induce symptoms and to certainly return if any new or concerning symptoms.    She continues with dyspnea on exertion, denies chest pain, nausea, dizziness, headache.      Patient Active Problem List   Diagnosis     Menopause     Joint pain     Disorder of bone and cartilage     Advanced care planning/counseling discussion     Hyperlipidemia with target LDL less than 130     Routine general medical examination at a health care facility (ANNUAL)     Colon cancer screening     Hypothyroidism due to acquired atrophy of thyroid     Benign essential hypertension     CKD (chronic kidney disease) stage 3, GFR 30-59 ml/min     AK (actinic keratosis)     Past Surgical History:   Procedure Laterality Date     ARTHRODESIS KNEE  [id number]    left knee, paritla medial meniscectomy, debridement medial femoral condyle and patellofemoral  debridement     COLONOSCOPY  2004    nml     COLONOSCOPY  7/23/2014    Procedure: COLONOSCOPY;  Surgeon: Nghia Hernandez DO;  Location: [facility]     CYSTOSCOPY      micro hematuria neg     DILATION AND CURETTAGE       EXCISE LESION UPPER EXTREMITY Right 10/25/2017    Procedure: EXCISE LESION UPPER EXTREMITY;  EXCISIONAL BIOPSY RIGHT FOREARM TIMES 2;  Surgeon: Nghia Hernandez DO;  Location: [facility]     tubal sterilization       uterine fibroids       wrist fx RT         Social History     Tobacco Use     Smoking status: Never Smoker     Smokeless tobacco: Never Used   Substance Use Topics     Alcohol use: Yes     Alcohol/week: 0.0 standard drinks     Comment: occasionally     Family History   Problem Relation Age of Onset     C.A.D. Mother      Other - See Comments Mother         high platelets     Hypertension Mother      C.A.D. Paternal Aunt      Diabetes Other         aunt     Cardiovascular Father         cardiovascular disease     Colon Cancer Other         family history-paternal side     Cerebrovascular Disease Maternal Grandmother      Cerebrovascular Disease Maternal Grandfather          Current Outpatient Medications   Medication Sig Dispense Refill     aspirin 81 MG tablet Take 81 mg by mouth daily       atorvastatin (LIPITOR) 20 MG tablet Take 1.5 tablets (30 mg) by mouth daily 135 tablet 3     Calcium-Magnesium-Vitamin D (CALCIUM 500 PO) Take by mouth daily       cinnamon 500 MG CAPS        co-enzyme Q-10 100 MG CAPS capsule Take 200 mg by mouth       Garlic 2000 MG CAPS Take 2,000 mg by mouth daily       GLUCOSAMINE-CHONDROITIN PO Take  by mouth. GLUCOSAMINE HCL/CHONDR SUA NA  One daily       levothyroxine (SYNTHROID/LEVOTHROID) 50 MCG tablet Take 1 tablet (50 mcg) by mouth daily 90 tablet 1     lisinopril (ZESTRIL) 5 MG tablet Take 1 tablet (5 mg) by mouth daily 90 tablet 3     Multiple Vitamins-Minerals (MULTIVITAMIN OR) Alive 1 tablet       Omega-3 Fatty Acids (FISH OIL) 1200 MG CAPS Take by  "mouth daily       order for DME Equipment being ordered: blood pressure cuff 1 each 0     triamcinolone (KENALOG) 0.1 % external cream Apply topically 2 times daily 89 g 1     Turmeric Curcumin 500 MG CAPS Take 1 capsule by mouth daily       Allergies   Allergen Reactions     Pseudoephedrine Hcl Other (See Comments)     Heart racing  Sudafed     Mucinex Palpitations     Recent Labs   Lab Test 10/02/20  1527 08/05/20  0829 02/05/20  0929 08/02/19  0914 05/13/15  1026 05/13/15  1026   A1C 5.6  --   --   --   --  5.7   LDL  --  138* 117* 100*   < > 134*   HDL  --  53 57 59   < > 59   TRIG  --  123 131 100   < > 170*   ALT 40 36 37 34   < >  --    CR 0.94 0.97 0.99 0.96   < >  --    GFRESTIMATED 59* 57* 56* 58*   < >  --    GFRESTBLACK 68 66 65 67   < >  --    POTASSIUM 3.7 4.6 4.7 4.2   < >  --    TSH 2.39 3.10 3.29 1.91   < > 3.75    < > = values in this interval not displayed.      BP Readings from Last 3 Encounters:   10/07/20 (!) 148/92   10/02/20 150/83   08/07/20 (!) 148/78    Wt Readings from Last 3 Encounters:   10/07/20 63.5 kg (140 lb)   10/02/20 64 kg (141 lb)   10/02/20 64 kg (141 lb)                   Review of Systems   Constitutional, HEENT, cardiovascular, pulmonary, gi and gu systems are negative, except as otherwise noted.      Objective    BP (!) 148/92 (BP Location: Left arm, Patient Position: Chair, Cuff Size: Adult Regular)   Pulse 70   Temp 98.5  F (36.9  C) (Tympanic)   Resp 16   Ht 1.626 m (5' 4\")   Wt 63.5 kg (140 lb)   SpO2 99%   BMI 24.03 kg/m    Body mass index is 24.03 kg/m .  Physical Exam   GENERAL: healthy, alert and no distress  NECK: no adenopathy, no asymmetry, masses, or scars and thyroid normal to palpation  RESP: lungs clear to auscultation - no rales, rhonchi or wheezes  CV: regular rate and rhythm, normal S1 S2, no S3 or S4, no murmur, click or rub, no peripheral edema and peripheral pulses strong  MS: no gross musculoskeletal defects noted, no edema  PSYCH: mentation " appears normal, affect normal/bright            Assessment & Plan     1. Dyspnea on exertion  - Echocardiogram Complete  - NM MPI Treadmill; Future    2. Heart murmur  - Echocardiogram Complete    3. LBBB (left bundle branch block)  - EKG 12-lead complete w/read - (Clinic Performed)      will most likely refer to cardiology as well.          Return if symptoms worsen or fail to improve.    Brittney Coe, NP  [facility]

## 2020-10-07 ENCOUNTER — OFFICE VISIT (OUTPATIENT)
Dept: FAMILY MEDICINE | Facility: OTHER | Age: 75
End: 2020-10-07
Attending: NURSE PRACTITIONER
Payer: COMMERCIAL

## 2020-10-07 VITALS
HEIGHT: 64 IN | OXYGEN SATURATION: 99 % | RESPIRATION RATE: 16 BRPM | DIASTOLIC BLOOD PRESSURE: 92 MMHG | WEIGHT: 140 LBS | BODY MASS INDEX: 23.9 KG/M2 | TEMPERATURE: 98.5 F | SYSTOLIC BLOOD PRESSURE: 148 MMHG | HEART RATE: 70 BPM

## 2020-10-07 DIAGNOSIS — R01.1 HEART MURMUR: ICD-10-CM

## 2020-10-07 DIAGNOSIS — I44.7 LBBB (LEFT BUNDLE BRANCH BLOCK): ICD-10-CM

## 2020-10-07 DIAGNOSIS — R06.09 DYSPNEA ON EXERTION: Primary | ICD-10-CM

## 2020-10-07 PROCEDURE — G0463 HOSPITAL OUTPT CLINIC VISIT: HCPCS

## 2020-10-07 PROCEDURE — G0463 HOSPITAL OUTPT CLINIC VISIT: HCPCS | Mod: 25

## 2020-10-07 PROCEDURE — 99213 OFFICE O/P EST LOW 20 MIN: CPT | Performed by: NURSE PRACTITIONER

## 2020-10-07 ASSESSMENT — MIFFLIN-ST. JEOR: SCORE: 1110.04

## 2020-10-07 ASSESSMENT — PAIN SCALES - GENERAL: PAINLEVEL: NO PAIN (0)

## 2020-10-07 NOTE — NURSING NOTE
"Chief Complaint   Patient presents with     ER F/U       Initial BP (!) 148/92 (BP Location: Left arm, Patient Position: Chair, Cuff Size: Adult Regular)   Pulse 70   Temp 98.5  F (36.9  C) (Tympanic)   Resp 16   Ht 1.626 m (5' 4\")   Wt 63.5 kg (140 lb)   SpO2 99%   BMI 24.03 kg/m   Estimated body mass index is 24.03 kg/m  as calculated from the following:    Height as of this encounter: 1.626 m (5' 4\").    Weight as of this encounter: 63.5 kg (140 lb).  Medication Reconciliation: complete  Pamela M. Lechevalier, LPN    "

## 2020-10-07 NOTE — PATIENT INSTRUCTIONS
Assessment & Plan     1. Dyspnea on exertion  - Echocardiogram Complete  - NM MPI Treadmill; Future    2. Heart murmur  - Echocardiogram Complete    3. LBBB (left bundle branch block)  - EKG 12-lead complete w/read - (Clinic Performed)      will refer to cardiology as well.          Return if symptoms worsen or fail to improve.    Brittney Coe, NP  [facility]

## 2020-10-16 ENCOUNTER — TELEPHONE (OUTPATIENT)
Dept: NUCLEAR MEDICINE | Facility: HOSPITAL | Age: 75
End: 2020-10-16

## 2020-10-16 ENCOUNTER — HOSPITAL ENCOUNTER (OUTPATIENT)
Dept: CARDIOLOGY | Facility: HOSPITAL | Age: 75
Discharge: HOME | End: 2020-10-16
Attending: NURSE PRACTITIONER | Admitting: INTERNAL MEDICINE
Payer: MEDICARE

## 2020-10-16 PROCEDURE — 93306 TTE W/DOPPLER COMPLETE: CPT | Mod: 26 | Performed by: INTERNAL MEDICINE

## 2020-10-16 PROCEDURE — 93306 TTE W/DOPPLER COMPLETE: CPT

## 2020-10-16 NOTE — TELEPHONE ENCOUNTER
A reminder call was performed on 10/16 for the patients nuclear medicine stress test on 10/19. Patient was given the time, date, location and prep for the exam. Patient understood.

## 2020-10-19 ENCOUNTER — HOSPITAL ENCOUNTER (OUTPATIENT)
Dept: NUCLEAR MEDICINE | Facility: HOSPITAL | Age: 75
Setting detail: NUCLEAR MEDICINE
End: 2020-10-19
Attending: NURSE PRACTITIONER
Payer: MEDICARE

## 2020-10-19 ENCOUNTER — HOSPITAL ENCOUNTER (OUTPATIENT)
Dept: CARDIOLOGY | Facility: HOSPITAL | Age: 75
Setting detail: NUCLEAR MEDICINE
End: 2020-10-19
Attending: NURSE PRACTITIONER
Payer: MEDICARE

## 2020-10-19 DIAGNOSIS — R06.09 DYSPNEA ON EXERTION: ICD-10-CM

## 2020-10-19 LAB
CV BLOOD PRESSURE: 84 %
CV STRESS MAX HR HE: 145
NUC STRESS EJECTION FRACTION: 80 %
RATE PRESSURE PRODUCT: NORMAL
STRESS ECHO BASELINE DIASTOLIC HE: 84
STRESS ECHO BASELINE HR: 71
STRESS ECHO BASELINE SYSTOLIC BP: 162
STRESS ECHO CALCULATED PERCENT HR: 101 %
STRESS ECHO LAST STRESS DIASTOLIC BP: 92
STRESS ECHO LAST STRESS SYSTOLIC BP: 184
STRESS ECHO POST ESTIMATED WORKLOAD: 4.6 METS
STRESS ECHO POST EXERCISE DUR MIN: 3 MIN
STRESS ECHO POST EXERCISE DUR SEC: 35 SEC
STRESS ECHO TARGET HR: 144

## 2020-10-19 PROCEDURE — 93017 CV STRESS TEST TRACING ONLY: CPT | Performed by: INTERNAL MEDICINE

## 2020-10-19 PROCEDURE — A9500 TC99M SESTAMIBI: HCPCS | Performed by: RADIOLOGY

## 2020-10-19 PROCEDURE — 93018 CV STRESS TEST I&R ONLY: CPT | Performed by: INTERNAL MEDICINE

## 2020-10-19 PROCEDURE — 343N000001 HC RX 343: Performed by: RADIOLOGY

## 2020-10-19 PROCEDURE — 78452 HT MUSCLE IMAGE SPECT MULT: CPT

## 2020-10-19 PROCEDURE — 93016 CV STRESS TEST SUPVJ ONLY: CPT | Performed by: INTERNAL MEDICINE

## 2020-10-19 PROCEDURE — 258N000003 HC RX IP 258 OP 636: Performed by: INTERNAL MEDICINE

## 2020-10-19 RX ORDER — SODIUM CHLORIDE 9 MG/ML
INJECTION, SOLUTION INTRAVENOUS ONCE
Status: COMPLETED | OUTPATIENT
Start: 2020-10-19 | End: 2020-10-19

## 2020-10-19 RX ADMIN — SODIUM CHLORIDE: 9 INJECTION, SOLUTION INTRAVENOUS at 13:17

## 2020-10-19 RX ADMIN — Medication 10.4 MILLICURIE: at 11:27

## 2020-10-19 RX ADMIN — Medication 32.1 MILLICURIE: at 13:45

## 2020-10-21 DIAGNOSIS — R01.1 HEART MURMUR: ICD-10-CM

## 2020-10-21 DIAGNOSIS — R06.09 DYSPNEA ON EXERTION: Primary | ICD-10-CM

## 2020-10-21 DIAGNOSIS — I44.7 LBBB (LEFT BUNDLE BRANCH BLOCK): ICD-10-CM

## 2020-10-22 ENCOUNTER — TELEPHONE (OUTPATIENT)
Dept: FAMILY MEDICINE | Facility: OTHER | Age: 75
End: 2020-10-22
Payer: COMMERCIAL

## 2020-10-22 NOTE — TELEPHONE ENCOUNTER
10:47 AM    Reason for Call: Phone Call    Description: Pt called and is requesting a call back from the nurse regarding her cardiology referral. Please give her a call back     Was an appointment offered for this call? No  If yes : Appointment type              Date    Preferred method for responding to this message: Telephone Call  What is your phone number ?[phone]    If we cannot reach you directly, may we leave a detailed response at the number you provided? Yes    Can this message wait until your PCP/provider returns, if available today? Not applicable    Vaishnavi Le

## 2020-11-03 ENCOUNTER — OFFICE VISIT (OUTPATIENT)
Dept: FAMILY MEDICINE | Facility: OTHER | Age: 75
End: 2020-11-03
Attending: NURSE PRACTITIONER
Payer: COMMERCIAL

## 2020-11-03 VITALS
DIASTOLIC BLOOD PRESSURE: 84 MMHG | BODY MASS INDEX: 23.56 KG/M2 | WEIGHT: 138 LBS | OXYGEN SATURATION: 97 % | SYSTOLIC BLOOD PRESSURE: 154 MMHG | HEIGHT: 64 IN | TEMPERATURE: 97.7 F | HEART RATE: 79 BPM | RESPIRATION RATE: 16 BRPM

## 2020-11-03 DIAGNOSIS — F41.9 ANXIETY: ICD-10-CM

## 2020-11-03 DIAGNOSIS — R06.09 DYSPNEA ON EXERTION: Primary | ICD-10-CM

## 2020-11-03 DIAGNOSIS — I10 BENIGN ESSENTIAL HYPERTENSION: ICD-10-CM

## 2020-11-03 DIAGNOSIS — F32.0 MILD MAJOR DEPRESSION (H): ICD-10-CM

## 2020-11-03 PROCEDURE — 99214 OFFICE O/P EST MOD 30 MIN: CPT | Performed by: NURSE PRACTITIONER

## 2020-11-03 PROCEDURE — G0463 HOSPITAL OUTPT CLINIC VISIT: HCPCS

## 2020-11-03 RX ORDER — HYDROXYZINE HYDROCHLORIDE 25 MG/1
25-50 TABLET, FILM COATED ORAL 3 TIMES DAILY PRN
Qty: 60 TABLET | Refills: 1 | Status: SHIPPED | OUTPATIENT
Start: 2020-11-03 | End: 2020-11-13 | Stop reason: SINTOL

## 2020-11-03 RX ORDER — LISINOPRIL 20 MG/1
20 TABLET ORAL DAILY
Qty: 90 TABLET | Refills: 1 | Status: SHIPPED | OUTPATIENT
Start: 2020-11-03 | End: 2021-05-12

## 2020-11-03 RX ORDER — VILAZODONE HYDROCHLORIDE 20 MG/1
20 TABLET ORAL DAILY
Qty: 30 TABLET | Refills: 1 | Status: SHIPPED | OUTPATIENT
Start: 2020-11-03 | End: 2020-11-13 | Stop reason: SINTOL

## 2020-11-03 ASSESSMENT — ANXIETY QUESTIONNAIRES
6. BECOMING EASILY ANNOYED OR IRRITABLE: SEVERAL DAYS
7. FEELING AFRAID AS IF SOMETHING AWFUL MIGHT HAPPEN: MORE THAN HALF THE DAYS
3. WORRYING TOO MUCH ABOUT DIFFERENT THINGS: NEARLY EVERY DAY
1. FEELING NERVOUS, ANXIOUS, OR ON EDGE: NEARLY EVERY DAY
GAD7 TOTAL SCORE: 17
4. TROUBLE RELAXING: NEARLY EVERY DAY
IF YOU CHECKED OFF ANY PROBLEMS ON THIS QUESTIONNAIRE, HOW DIFFICULT HAVE THESE PROBLEMS MADE IT FOR YOU TO DO YOUR WORK, TAKE CARE OF THINGS AT HOME, OR GET ALONG WITH OTHER PEOPLE: SOMEWHAT DIFFICULT
2. NOT BEING ABLE TO STOP OR CONTROL WORRYING: NEARLY EVERY DAY
5. BEING SO RESTLESS THAT IT IS HARD TO SIT STILL: MORE THAN HALF THE DAYS

## 2020-11-03 ASSESSMENT — PAIN SCALES - GENERAL: PAINLEVEL: NO PAIN (0)

## 2020-11-03 ASSESSMENT — MIFFLIN-ST. JEOR: SCORE: 1100.96

## 2020-11-03 ASSESSMENT — PATIENT HEALTH QUESTIONNAIRE - PHQ9: SUM OF ALL RESPONSES TO PHQ QUESTIONS 1-9: 17

## 2020-11-03 NOTE — NURSING NOTE
"Chief Complaint   Patient presents with     Anxiety       Initial BP (!) 154/84 (BP Location: Right arm, Patient Position: Sitting, Cuff Size: Adult Regular)   Pulse 79   Temp 97.7  F (36.5  C) (Tympanic)   Resp 16   Ht 1.626 m (5' 4\")   Wt 62.6 kg (138 lb)   SpO2 97%   BMI 23.69 kg/m   Estimated body mass index is 23.69 kg/m  as calculated from the following:    Height as of this encounter: 1.626 m (5' 4\").    Weight as of this encounter: 62.6 kg (138 lb).  Medication Reconciliation: complete  Adrianne Loredo MA  "

## 2020-11-03 NOTE — PROGRESS NOTES
Subjective     Franca Angeles is a 75 year old female who presents to clinic today for the following health issues:    HPI         Anxiety Follow-Up    How are you doing with your anxiety since your last visit? Worsened -feels it is a lot worse    Are you having other symptoms that might be associated with anxiety? Yes:  depression, crying all the time, not sleep    Have you had a significant life event? No     Are you feeling depressed? Yes:  crying a lot    Do you have any concerns with your use of alcohol or other drugs? No     Several new stressors, issues with son, covid.      Social History     Tobacco Use     Smoking status: Never Smoker     Smokeless tobacco: Never Used   Substance Use Topics     Alcohol use: Yes     Alcohol/week: 0.0 standard drinks     Comment: occasionally     Drug use: No     MCKINLEY-7 SCORE 2/7/2020 8/7/2020 11/3/2020   Total Score 0 0 17     PHQ 2/7/2020 8/7/2020 11/3/2020   PHQ-9 Total Score 1 2 17   Q9: Thoughts of better off dead/self-harm past 2 weeks Not at all Not at all Not at all     Last PHQ-9 11/3/2020   1.  Little interest or pleasure in doing things 2   2.  Feeling down, depressed, or hopeless 3   3.  Trouble falling or staying asleep, or sleeping too much 3   4.  Feeling tired or having little energy 2   5.  Poor appetite or overeating 3   6.  Feeling bad about yourself 3   7.  Trouble concentrating 1   8.  Moving slowly or restless 0   Q9: Thoughts of better off dead/self-harm past 2 weeks 0   PHQ-9 Total Score 17   Difficulty at work, home, or with people Somewhat difficult     MCKINLEY-7  11/3/2020   1. Feeling nervous, anxious, or on edge 3   2. Not being able to stop or control worrying 3   3. Worrying too much about different things 3   4. Trouble relaxing 3   5. Being so restless that it is hard to sit still 2   6. Becoming easily annoyed or irritable 1   7. Feeling afraid, as if something awful might happen 2   MCKINLEY-7 Total Score 17   If you checked any problems, how  difficult have they made it for you to do your work, take care of things at home, or get along with other people? Somewhat difficult         How many servings of fruits and vegetables do you eat daily?  2-3    On average, how many sweetened beverages do you drink each day (Examples: soda, juice, sweet tea, etc.  Do NOT count diet or artificially sweetened beverages)?   0    How many days per week do you exercise enough to make your heart beat faster? 3 or less    How many minutes a day do you exercise enough to make your heart beat faster? 9 or less    How many days per week do you miss taking your medication? 0    Blood pressure is again elevated, has not been checking at home.  She continues to experience shortness of breath, normal stable cardiac echo and stress test, she does have an upcoming appt with cardiology.  She does have a known heart murmur.      Patient Active Problem List   Diagnosis     Menopause     Joint pain     Disorder of bone and cartilage     Advanced care planning/counseling discussion     Hyperlipidemia with target LDL less than 130     Routine general medical examination at a health care facility (ANNUAL)     Colon cancer screening     Hypothyroidism due to acquired atrophy of thyroid     Benign essential hypertension     CKD (chronic kidney disease) stage 3, GFR 30-59 ml/min     AK (actinic keratosis)     Past Surgical History:   Procedure Laterality Date     ARTHRODESIS KNEE  [id number]    left knee, paritla medial meniscectomy, debridement medial femoral condyle and patellofemoral debridement     COLONOSCOPY  2004    nml     COLONOSCOPY  7/23/2014    Procedure: COLONOSCOPY;  Surgeon: Nghia Hernandez DO;  Location: [facility]     CYSTOSCOPY      micro hematuria neg     DILATION AND CURETTAGE       EXCISE LESION UPPER EXTREMITY Right 10/25/2017    Procedure: EXCISE LESION UPPER EXTREMITY;  EXCISIONAL BIOPSY RIGHT FOREARM TIMES 2;  Surgeon: Nghia Hernandez DO;  Location: [facility]     tubal  sterilization       uterine fibroids       wrist fx RT         Social History     Tobacco Use     Smoking status: Never Smoker     Smokeless tobacco: Never Used   Substance Use Topics     Alcohol use: Yes     Alcohol/week: 0.0 standard drinks     Comment: occasionally     Family History   Problem Relation Age of Onset     C.A.D. Mother      Other - See Comments Mother         high platelets     Hypertension Mother      C.A.D. Paternal Aunt      Diabetes Other         aunt     Cardiovascular Father         cardiovascular disease     Colon Cancer Other         family history-paternal side     Cerebrovascular Disease Maternal Grandmother      Cerebrovascular Disease Maternal Grandfather          Current Outpatient Medications   Medication Sig Dispense Refill     aspirin 81 MG tablet Take 81 mg by mouth daily       atorvastatin (LIPITOR) 20 MG tablet Take 1.5 tablets (30 mg) by mouth daily 135 tablet 3     Calcium-Magnesium-Vitamin D (CALCIUM 500 PO) Take by mouth daily       cinnamon 500 MG CAPS        co-enzyme Q-10 100 MG CAPS capsule Take 200 mg by mouth       Garlic 2000 MG CAPS Take 2,000 mg by mouth daily       GLUCOSAMINE-CHONDROITIN PO Take  by mouth. GLUCOSAMINE HCL/CHONDR SUA NA  One daily       levothyroxine (SYNTHROID/LEVOTHROID) 50 MCG tablet Take 1 tablet (50 mcg) by mouth daily 90 tablet 1     lisinopril (ZESTRIL) 5 MG tablet Take 1 tablet (5 mg) by mouth daily 90 tablet 3     Multiple Vitamins-Minerals (MULTIVITAMIN OR) Alive 1 tablet       Omega-3 Fatty Acids (FISH OIL) 1200 MG CAPS Take by mouth daily       order for DME Equipment being ordered: blood pressure cuff 1 each 0     triamcinolone (KENALOG) 0.1 % external cream Apply topically 2 times daily 89 g 1     Turmeric Curcumin 500 MG CAPS Take 1 capsule by mouth daily       Allergies   Allergen Reactions     Pseudoephedrine Hcl Other (See Comments)     Heart racing  Sudafed     Mucinex Palpitations     Recent Labs   Lab Test 10/02/20  1527  "08/05/20  0829 02/05/20  0929 08/02/19  0914 05/13/15  1026 05/13/15  1026   A1C 5.6  --   --   --   --  5.7   LDL  --  138* 117* 100*   < > 134*   HDL  --  53 57 59   < > 59   TRIG  --  123 131 100   < > 170*   ALT 40 36 37 34   < >  --    CR 0.94 0.97 0.99 0.96   < >  --    GFRESTIMATED 59* 57* 56* 58*   < >  --    GFRESTBLACK 68 66 65 67   < >  --    POTASSIUM 3.7 4.6 4.7 4.2   < >  --    TSH 2.39 3.10 3.29 1.91   < > 3.75    < > = values in this interval not displayed.      BP Readings from Last 3 Encounters:   11/03/20 (!) 154/84   10/07/20 (!) 148/92   10/02/20 150/83    Wt Readings from Last 3 Encounters:   11/03/20 62.6 kg (138 lb)   10/07/20 63.5 kg (140 lb)   10/02/20 64 kg (141 lb)                      Review of Systems   Constitutional, HEENT, cardiovascular, pulmonary, gi and gu systems are negative, except as otherwise noted.      Objective    BP (!) 154/84 (BP Location: Right arm, Patient Position: Sitting, Cuff Size: Adult Regular)   Pulse 79   Temp 97.7  F (36.5  C) (Tympanic)   Resp 16   Ht 1.626 m (5' 4\")   Wt 62.6 kg (138 lb)   SpO2 97%   BMI 23.69 kg/m    Body mass index is 23.69 kg/m .  Physical Exam   GENERAL: healthy, alert and no distress  NECK: no adenopathy, no asymmetry, masses, or scars, thyroid normal to palpation and no carotid bruits  RESP: lungs clear to auscultation - no rales, rhonchi or wheezes  CV: regular rate and rhythm, 3/6 murmur best heard over aortic region, no peripheral edema and peripheral pulses strong  MS: no gross musculoskeletal defects noted, no edema  PSYCH: mentation appears normal, affect normal but is tearful at times.             Assessment & Plan     1. Benign essential hypertension  Dose increase   - lisinopril (ZESTRIL) 20 MG tablet; Take 1 tablet (20 mg) by mouth daily  Dispense: 90 tablet; Refill: 1    2. Dyspnea on exertion  - General PFT Lab (Please always keep checked); Future  - Pulmonary Function Test; Future  - General PFT Lab (Please always " keep checked)    3. Anxiety  New onset, may consider 12.5mg hydroxyzine during daytime hours if causes fatigue.    - vilazodone (VIIBRYD) 20 MG TABS tablet; Take 1 tablet (20 mg) by mouth daily  Dispense: 30 tablet; Refill: 1    - hydrOXYzine (ATARAX) 25 MG tablet; Take 1-2 tablets (25-50 mg) by mouth 3 times daily as needed for anxiety (or insomnia)  Dispense: 60 tablet; Refill: 1    4. Mild major depression (H)  - vilazodone (VIIBRYD) 20 MG TABS tablet; Take 1 tablet (20 mg) by mouth daily  Dispense: 30 tablet; Refill: 1 November 13, 2020 as scheduled for chronic conditions.     Brittney Coe NP  [facility]

## 2020-11-03 NOTE — PATIENT INSTRUCTIONS
Assessment & Plan     1. Benign essential hypertension  Dose increase   - lisinopril (ZESTRIL) 20 MG tablet; Take 1 tablet (20 mg) by mouth daily  Dispense: 90 tablet; Refill: 1    2. Dyspnea on exertion  - General PFT Lab (Please always keep checked); Future  - Pulmonary Function Test; Future  - General PFT Lab (Please always keep checked)    3. Anxiety  New onset, may consider 12.5mg hydroxyzine during daytime hours if causes fatigue.    - vilazodone (VIIBRYD) 20 MG TABS tablet; Take 1 tablet (20 mg) by mouth daily  Dispense: 30 tablet; Refill: 1    - hydrOXYzine (ATARAX) 25 MG tablet; Take 1-2 tablets (25-50 mg) by mouth 3 times daily as needed for anxiety (or insomnia)  Dispense: 60 tablet; Refill: 1    4. Mild major depression (H)  - vilazodone (VIIBRYD) 20 MG TABS tablet; Take 1 tablet (20 mg) by mouth daily  Dispense: 30 tablet; Refill: 1 November 13, 2020    Brittney Coe, NP  [facility]

## 2020-11-04 ASSESSMENT — ANXIETY QUESTIONNAIRES: GAD7 TOTAL SCORE: 17

## 2020-11-06 ENCOUNTER — TELEPHONE (OUTPATIENT)
Dept: FAMILY MEDICINE | Facility: OTHER | Age: 75
End: 2020-11-06
Payer: COMMERCIAL

## 2020-11-06 NOTE — TELEPHONE ENCOUNTER
To: Nurse to Jameson Tavera  Patient would like to talk about her anti depression medication side effect of not sleeping well.  Please return her call @ [phone].  Thank you

## 2020-11-06 NOTE — TELEPHONE ENCOUNTER
Pt states didn't get any sleep she did take the viibryd at supper time please advise maybe needs to take in am

## 2020-11-11 DIAGNOSIS — I10 BENIGN ESSENTIAL HYPERTENSION: ICD-10-CM

## 2020-11-11 DIAGNOSIS — R73.01 ELEVATED FASTING GLUCOSE: ICD-10-CM

## 2020-11-11 DIAGNOSIS — E78.5 HYPERLIPIDEMIA WITH TARGET LDL LESS THAN 130: ICD-10-CM

## 2020-11-11 DIAGNOSIS — Z79.899 ON STATIN THERAPY: ICD-10-CM

## 2020-11-11 LAB
ALBUMIN SERPL-MCNC: 4.1 G/DL (ref 3.4–5)
ALP SERPL-CCNC: 83 U/L (ref 40–150)
ALT SERPL W P-5'-P-CCNC: 39 U/L (ref 0–50)
ANION GAP SERPL CALCULATED.3IONS-SCNC: 6 MMOL/L (ref 3–14)
AST SERPL W P-5'-P-CCNC: 33 U/L (ref 0–45)
BILIRUB SERPL-MCNC: 0.5 MG/DL (ref 0.2–1.3)
BUN SERPL-MCNC: 15 MG/DL (ref 7–30)
CALCIUM SERPL-MCNC: 9.9 MG/DL (ref 8.5–10.1)
CHLORIDE SERPL-SCNC: 105 MMOL/L (ref 94–109)
CHOLEST SERPL-MCNC: 189 MG/DL
CO2 SERPL-SCNC: 28 MMOL/L (ref 20–32)
CREAT SERPL-MCNC: 1.01 MG/DL (ref 0.52–1.04)
EST. AVERAGE GLUCOSE BLD GHB EST-MCNC: 117 MG/DL
GFR SERPL CREATININE-BSD FRML MDRD: 54 ML/MIN/{1.73_M2}
GLUCOSE SERPL-MCNC: 99 MG/DL (ref 70–99)
HBA1C MFR BLD: 5.7 % (ref 0–5.6)
HDLC SERPL-MCNC: 74 MG/DL
LDLC SERPL CALC-MCNC: 87 MG/DL
NONHDLC SERPL-MCNC: 115 MG/DL
POTASSIUM SERPL-SCNC: 4 MMOL/L (ref 3.4–5.3)
PROT SERPL-MCNC: 7.9 G/DL (ref 6.8–8.8)
SODIUM SERPL-SCNC: 139 MMOL/L (ref 133–144)
TRIGL SERPL-MCNC: 142 MG/DL
TSH SERPL DL<=0.005 MIU/L-ACNC: 3.49 MU/L (ref 0.4–4)

## 2020-11-11 PROCEDURE — 80061 LIPID PANEL: CPT | Mod: ZL | Performed by: NURSE PRACTITIONER

## 2020-11-11 PROCEDURE — 84443 ASSAY THYROID STIM HORMONE: CPT | Mod: ZL | Performed by: NURSE PRACTITIONER

## 2020-11-11 PROCEDURE — 36415 COLL VENOUS BLD VENIPUNCTURE: CPT | Mod: ZL | Performed by: NURSE PRACTITIONER

## 2020-11-11 PROCEDURE — 999N001182 HC STATISTIC ESTIMATED AVERAGE GLUCOSE: Mod: ZL | Performed by: NURSE PRACTITIONER

## 2020-11-11 PROCEDURE — 83036 HEMOGLOBIN GLYCOSYLATED A1C: CPT | Mod: ZL | Performed by: NURSE PRACTITIONER

## 2020-11-11 PROCEDURE — 80053 COMPREHEN METABOLIC PANEL: CPT | Mod: ZL | Performed by: NURSE PRACTITIONER

## 2020-11-12 DIAGNOSIS — R06.00 DYSPNEA: Primary | ICD-10-CM

## 2020-11-12 DIAGNOSIS — R01.1 HEART MURMUR: ICD-10-CM

## 2020-11-12 NOTE — PROGRESS NOTES
"Subjective     Franca Angeles is a 75 year old female who presents to clinic today for the following health issues:    HPI         Hyperlipidemia Follow-Up      Are you regularly taking any medication or supplement to lower your cholesterol?   Yes- atorvastatin    Are you having muscle aches or other side effects that you think could be caused by your cholesterol lowering medication?  No   The 10-year ASCVD risk score (Otis LANGSTON Jr., et al., 2013) is: 26.9%    Values used to calculate the score:      Age: 75 years      Sex: Female      Is Non- : No      Diabetic: No      Tobacco smoker: No      Systolic Blood Pressure: 138 mmHg      Is BP treated: Yes      HDL Cholesterol: 74 mg/dL      Total Cholesterol: 189 mg/dL        Hypertension Follow-up      Do you check your blood pressure regularly outside of the clinic? No     Are you following a low salt diet? Yes    Are your blood pressures ever more than 140 on the top number (systolic) OR more   than 90 on the bottom number (diastolic), for example 140/90? No    Depression and Anxiety Follow-Up    How are you doing with your depression since your last visit? Worsened     How are you doing with your anxiety since your last visit?  Worsened     Are you having other symptoms that might be associated with depression or anxiety? Yes:  tremor from vibryd or hydroxizine. feels more anxious with hydroxyzine both are added to allergy list due to side effects.      Have you had a significant life event? No     Do you have any concerns with your use of alcohol or other drugs? No     Insomnia - has tried melatonin, tylenol PM and CBD.  Feels that is was sleeping better she \"could cope\" with things.      Social History     Tobacco Use     Smoking status: Never Smoker     Smokeless tobacco: Never Used   Substance Use Topics     Alcohol use: Yes     Alcohol/week: 0.0 standard drinks     Comment: occasionally     Drug use: No     PHQ 8/7/2020 11/3/2020 " 11/13/2020   PHQ-9 Total Score 2 17 13   Q9: Thoughts of better off dead/self-harm past 2 weeks Not at all Not at all Not at all     MCKINLEY-7 SCORE 8/7/2020 11/3/2020 11/13/2020   Total Score 0 17 12     Last PHQ-9 11/13/2020   1.  Little interest or pleasure in doing things 0   2.  Feeling down, depressed, or hopeless 3   3.  Trouble falling or staying asleep, or sleeping too much 3   4.  Feeling tired or having little energy 3   5.  Poor appetite or overeating 3   6.  Feeling bad about yourself 0   7.  Trouble concentrating 0   8.  Moving slowly or restless 1   Q9: Thoughts of better off dead/self-harm past 2 weeks 0   PHQ-9 Total Score 13   Difficulty at work, home, or with people Extremely dIfficult     MCKINLEY-7  11/13/2020   1. Feeling nervous, anxious, or on edge 3   2. Not being able to stop or control worrying 3   3. Worrying too much about different things 3   4. Trouble relaxing 1   5. Being so restless that it is hard to sit still 1   6. Becoming easily annoyed or irritable 0   7. Feeling afraid, as if something awful might happen 1   MCKINLEY-7 Total Score 12   If you checked any problems, how difficult have they made it for you to do your work, take care of things at home, or get along with other people? Somewhat difficult       Suicide Assessment Five-step Evaluation and Treatment (SAFE-T)    Chronic Kidney Disease Follow-up      Do you take any over the counter pain medicine?: No    Hypothyroidism Follow-up      Since last visit, patient describes the following symptoms: weight loss of 5 lbs      How many servings of fruits and vegetables do you eat daily?  2-3    On average, how many sweetened beverages do you drink each day (Examples: soda, juice, sweet tea, etc.  Do NOT count diet or artificially sweetened beverages)?   0    How many days per week do you exercise enough to make your heart beat faster? 3 or less    How many minutes a day do you exercise enough to make your heart beat faster? 9 or less    How  many days per week do you miss taking your medication? 0        Patient Active Problem List   Diagnosis     Menopause     Joint pain     Disorder of bone and cartilage     Advanced care planning/counseling discussion     Hyperlipidemia with target LDL less than 130     Routine general medical examination at a health care facility (ANNUAL)     Colon cancer screening     Hypothyroidism due to acquired atrophy of thyroid     Benign essential hypertension     CKD (chronic kidney disease) stage 3, GFR 30-59 ml/min     AK (actinic keratosis)     Mild major depression (H)     Anxiety     Past Surgical History:   Procedure Laterality Date     ARTHRODESIS KNEE  [id number]    left knee, paritla medial meniscectomy, debridement medial femoral condyle and patellofemoral debridement     COLONOSCOPY  2004    nml     COLONOSCOPY  7/23/2014    Procedure: COLONOSCOPY;  Surgeon: Nghia Hernandez DO;  Location: [facility]     CYSTOSCOPY      micro hematuria neg     DILATION AND CURETTAGE       EXCISE LESION UPPER EXTREMITY Right 10/25/2017    Procedure: EXCISE LESION UPPER EXTREMITY;  EXCISIONAL BIOPSY RIGHT FOREARM TIMES 2;  Surgeon: Nghia Hernandez DO;  Location: [facility]     tubal sterilization       uterine fibroids       wrist fx RT         Social History     Tobacco Use     Smoking status: Never Smoker     Smokeless tobacco: Never Used   Substance Use Topics     Alcohol use: Yes     Alcohol/week: 0.0 standard drinks     Comment: occasionally     Family History   Problem Relation Age of Onset     C.A.D. Mother      Other - See Comments Mother         high platelets     Hypertension Mother      C.A.D. Paternal Aunt      Diabetes Other         aunt     Cardiovascular Father         cardiovascular disease     Colon Cancer Other         family history-paternal side     Cerebrovascular Disease Maternal Grandmother      Cerebrovascular Disease Maternal Grandfather          Current Outpatient Medications   Medication Sig Dispense Refill      aspirin 81 MG tablet Take 81 mg by mouth daily       atorvastatin (LIPITOR) 20 MG tablet Take 1.5 tablets (30 mg) by mouth daily 135 tablet 3     Calcium-Magnesium-Vitamin D (CALCIUM 500 PO) Take by mouth daily       cinnamon 500 MG CAPS        co-enzyme Q-10 100 MG CAPS capsule Take 200 mg by mouth       gabapentin (NEURONTIN) 100 MG capsule Take 3 capsules (300 mg) by mouth At Bedtime 30 capsule 0     Garlic 2000 MG CAPS Take 2,000 mg by mouth daily       GLUCOSAMINE-CHONDROITIN PO Take  by mouth. GLUCOSAMINE HCL/CHONDR SUA NA  One daily       levothyroxine (SYNTHROID/LEVOTHROID) 50 MCG tablet Take 1 tablet (50 mcg) by mouth daily 90 tablet 1     lisinopril (ZESTRIL) 20 MG tablet Take 1 tablet (20 mg) by mouth daily 90 tablet 1     Multiple Vitamins-Minerals (MULTIVITAMIN OR) Alive 1 tablet       Omega-3 Fatty Acids (FISH OIL) 1200 MG CAPS Take by mouth daily       order for DME Equipment being ordered: blood pressure cuff 1 each 0     triamcinolone (KENALOG) 0.1 % external cream Apply topically 2 times daily 89 g 1     Turmeric Curcumin 500 MG CAPS Take 1 capsule by mouth daily       Allergies   Allergen Reactions     Pseudoephedrine Hcl Other (See Comments)     Heart racing  Sudafed     Hydroxyzine Anxiety     jittery     Mucinex Palpitations     Viibryd Anxiety     Restless legs     Recent Labs   Lab Test 11/11/20  0807 10/02/20  1527 08/05/20  0829 02/05/20  0929 05/13/15  1026 05/13/15  1026   A1C 5.7* 5.6  --   --   --  5.7   LDL 87  --  138* 117*   < > 134*   HDL 74  --  53 57   < > 59   TRIG 142  --  123 131   < > 170*   ALT 39 40 36 37   < >  --    CR 1.01 0.94 0.97 0.99   < >  --    GFRESTIMATED 54* 59* 57* 56*   < >  --    GFRESTBLACK 63 68 66 65   < >  --    POTASSIUM 4.0 3.7 4.6 4.7   < >  --    TSH 3.49 2.39 3.10 3.29   < > 3.75    < > = values in this interval not displayed.      BP Readings from Last 3 Encounters:   11/13/20 138/82   11/03/20 (!) 154/84   10/07/20 (!) 148/92    Wt Readings from  "Last 3 Encounters:   11/13/20 61.7 kg (136 lb)   11/03/20 62.6 kg (138 lb)   10/07/20 63.5 kg (140 lb)                   Review of Systems   Constitutional, HEENT, cardiovascular, pulmonary, gi and gu systems are negative, except as otherwise noted.      Objective    /82 (BP Location: Right arm, Patient Position: Chair, Cuff Size: Adult Regular)   Pulse 86   Temp 99  F (37.2  C) (Temporal)   Ht 1.626 m (5' 4\")   Wt 61.7 kg (136 lb)   SpO2 98%   BMI 23.34 kg/m    Body mass index is 23.34 kg/m .  Physical Exam   GENERAL: healthy, alert and no distress  NECK: no adenopathy, no asymmetry, masses, or scars, thyroid normal to palpation and no carotid bruits  RESP: lungs clear to auscultation - no rales, rhonchi or wheezes  CV: regular rate and rhythm, normal S1 S2, no S3 or S4, no murmur, click or rub, no peripheral edema and peripheral pulses strong  MS: no gross musculoskeletal defects noted, no edema  PSYCH: mentation appears normal, affect normal/bright            Assessment & Plan     1. Hyperlipidemia with target LDL less than 130  - Lipid Profile; Future    2. Benign essential hypertension  - Comprehensive metabolic panel; Future  - TSH with free T4 reflex; Future    3. Hypothyroidism due to acquired atrophy of thyroid    4. Stage 3a chronic kidney disease  Do not use NSAIDS    5. Mild major depression (H)    6. Anxiety  Start   - gabapentin (NEURONTIN) 100 MG capsule; Take 3 capsules (300 mg) by mouth At Bedtime  Dispense: 30 capsule; Refill: 0    7. Primary insomnia  - gabapentin (NEURONTIN) 100 MG capsule; Take 3 capsules (300 mg) by mouth At Bedtime  Dispense: 30 capsule; Refill: 0    8. On statin therapy  - Comprehensive metabolic panel; Future    9. Encounter for hepatitis C screening test for low risk patient  - Hepatitis C Screen Reflex to HCV RNA Quant and Genotype; Future    10. Dyspnea on exertion  [facility].   - General PFT Lab (Please always keep checked); Future  - Pulmonary " Function Test; Future  - General PFT Lab (Please always keep checked)          1 week telephone visit to follow-up on gabapentin, call if any issues.      Return in about 3 months (around 2/13/2021) for hypertension and lipids, anxiety/depression.    Brittney Coe, NP  [facility]

## 2020-11-13 ENCOUNTER — OFFICE VISIT (OUTPATIENT)
Dept: FAMILY MEDICINE | Facility: OTHER | Age: 75
End: 2020-11-13
Attending: NURSE PRACTITIONER
Payer: COMMERCIAL

## 2020-11-13 VITALS
TEMPERATURE: 99 F | BODY MASS INDEX: 23.22 KG/M2 | HEART RATE: 86 BPM | SYSTOLIC BLOOD PRESSURE: 138 MMHG | HEIGHT: 64 IN | OXYGEN SATURATION: 98 % | DIASTOLIC BLOOD PRESSURE: 82 MMHG | WEIGHT: 136 LBS

## 2020-11-13 DIAGNOSIS — I10 BENIGN ESSENTIAL HYPERTENSION: ICD-10-CM

## 2020-11-13 DIAGNOSIS — E78.5 HYPERLIPIDEMIA WITH TARGET LDL LESS THAN 130: Primary | ICD-10-CM

## 2020-11-13 DIAGNOSIS — F51.01 PRIMARY INSOMNIA: ICD-10-CM

## 2020-11-13 DIAGNOSIS — Z11.59 ENCOUNTER FOR HEPATITIS C SCREENING TEST FOR LOW RISK PATIENT: ICD-10-CM

## 2020-11-13 DIAGNOSIS — N18.31 STAGE 3A CHRONIC KIDNEY DISEASE (H): ICD-10-CM

## 2020-11-13 DIAGNOSIS — F41.9 ANXIETY: ICD-10-CM

## 2020-11-13 DIAGNOSIS — F32.0 MILD MAJOR DEPRESSION (H): ICD-10-CM

## 2020-11-13 DIAGNOSIS — R06.09 DYSPNEA ON EXERTION: ICD-10-CM

## 2020-11-13 DIAGNOSIS — Z79.899 ON STATIN THERAPY: ICD-10-CM

## 2020-11-13 DIAGNOSIS — E03.4 HYPOTHYROIDISM DUE TO ACQUIRED ATROPHY OF THYROID: ICD-10-CM

## 2020-11-13 PROCEDURE — G0463 HOSPITAL OUTPT CLINIC VISIT: HCPCS

## 2020-11-13 PROCEDURE — 99214 OFFICE O/P EST MOD 30 MIN: CPT | Performed by: NURSE PRACTITIONER

## 2020-11-13 RX ORDER — GABAPENTIN 100 MG/1
300 CAPSULE ORAL AT BEDTIME
Qty: 30 CAPSULE | Refills: 0 | Status: SHIPPED | OUTPATIENT
Start: 2020-11-13 | End: 2020-11-20

## 2020-11-13 ASSESSMENT — ANXIETY QUESTIONNAIRES
1. FEELING NERVOUS, ANXIOUS, OR ON EDGE: NEARLY EVERY DAY
6. BECOMING EASILY ANNOYED OR IRRITABLE: NOT AT ALL
2. NOT BEING ABLE TO STOP OR CONTROL WORRYING: NEARLY EVERY DAY
7. FEELING AFRAID AS IF SOMETHING AWFUL MIGHT HAPPEN: SEVERAL DAYS
GAD7 TOTAL SCORE: 12
IF YOU CHECKED OFF ANY PROBLEMS ON THIS QUESTIONNAIRE, HOW DIFFICULT HAVE THESE PROBLEMS MADE IT FOR YOU TO DO YOUR WORK, TAKE CARE OF THINGS AT HOME, OR GET ALONG WITH OTHER PEOPLE: SOMEWHAT DIFFICULT
4. TROUBLE RELAXING: SEVERAL DAYS
5. BEING SO RESTLESS THAT IT IS HARD TO SIT STILL: SEVERAL DAYS
3. WORRYING TOO MUCH ABOUT DIFFERENT THINGS: NEARLY EVERY DAY

## 2020-11-13 ASSESSMENT — PAIN SCALES - GENERAL: PAINLEVEL: NO PAIN (0)

## 2020-11-13 ASSESSMENT — PATIENT HEALTH QUESTIONNAIRE - PHQ9: SUM OF ALL RESPONSES TO PHQ QUESTIONS 1-9: 13

## 2020-11-13 ASSESSMENT — MIFFLIN-ST. JEOR: SCORE: 1091.89

## 2020-11-13 NOTE — LETTER
My Depression Action Plan  Name: Franca Angeles   Date of Birth 1945  Date: 11/12/2020    My doctor: Brittney Coe   My clinic: [facility]  [address]  [address]  [place] [address]  [phone]          GREEN    ZONE   Good Control    What it looks like:     Things are going generally well. You have normal ups and downs. You may even feel depressed from time to time, but bad moods usually last less than a day.   What you need to do:  1. Continue to care for yourself (see self care plan)  2. Check your depression survival kit and update it as needed  3. Follow your physician s recommendations including any medication.  4. Do not stop taking medication unless you consult with your physician first.           YELLOW         ZONE Getting Worse    What it looks like:     Depression is starting to interfere with your life.     It may be hard to get out of bed; you may be starting to isolate yourself from others.    Symptoms of depression are starting to last most all day and this has happened for several days.     You may have suicidal thoughts but they are not constant.   What you need to do:     1. Call your care team. Your response to treatment will improve if you keep your care team informed of your progress. Yellow periods are signs an adjustment may need to be made.     2. Continue your self-care.  Just get dressed and ready for the day.  Don't give yourself time to talk yourself out of it.    3. Talk to someone in your support network.    4. Open up your Depression Self-Care Plan/Wellness Kit.           RED    ZONE Medical Alert - Get Help    What it looks like:     Depression is seriously interfering with your life.     You may experience these or other symptoms: You can t get out of bed most days, can t work or engage in other necessary activities, you have trouble taking care of basic hygiene, or basic responsibilities, thoughts of suicide or death  that will not go away, self-injurious behavior.     What you need to do:  1. Call your care team and request a same-day appointment. If they are not available (weekends or after hours) call your local crisis line, emergency room or 911.            Depression Self-Care Plan / Wellness Kit    Self-Care for Depression  Here s the deal. Your body and mind are really not as separate as most people think.  What you do and think affects how you feel and how you feel influences what you do and think. This means if you do things that people who feel good do, it will help you feel better.  Sometimes this is all it takes.  There is also a place for medication and therapy depending on how severe your depression is, so be sure to consult with your medical provider and/ or Behavioral Health Consultant if your symptoms are worsening or not improving.     In order to better manage my stress, I will:    Exercise  Get some form of exercise, every day. This will help reduce pain and release endorphins, the  feel good  chemicals in your brain. This is almost as good as taking antidepressants!  This is not the same as joining a gym and then never going! (they count on that by the way ) It can be as simple as just going for a walk or doing some gardening, anything that will get you moving.      Hygiene   Maintain good hygiene (get out of bed in the morning, make your bed, brush your teeth, take a shower, and get dressed like you were going to work, even if you are unemployed).  If your clothes don't fit try to get ones that do.    Diet  Strive to eat foods that are good for me, drink plenty of water, and avoid excessive sugar, caffeine, alcohol, and other mood-altering substances.  Some foods that are helpful in depression are: complex carbohydrates, B vitamins, flaxseed, fish or fish oil, fresh fruits and vegetables.    Psychotherapy  Agree to participate in Individual Therapy (if recommended).    Medication  If prescribed medications,  I agree to take them.  Missing doses can result in serious side effects.  I understand that drinking alcohol, or other illicit drug use, may cause potential side effects.  I will not stop my medication abruptly without first discussing it with my provider.    Staying Connected With Others  Stay in touch with my friends, family members, and my primary care provider/team.    Use your imagination  Be creative.  We all have a creative side; it doesn t matter if it s oil painting, sand castles, or mud pies! This will also kick up the endorphins.    Witness Beauty  (AKA stop and smell the roses) Take a look outside, even in mid-winter. Notice colors, textures. Watch the squirrels and birds.     Service to others  Be of service to others.  There is always someone else in need.  By helping others we can  get out of ourselves  and remember the really important things.  This also provides opportunities for practicing all the other parts of the program.    Humor  Laugh and be silly!  Adjust your TV habits for less news and crime-drama and more comedy.    Control your stress  Try breathing deep, massage therapy, biofeedback, and meditation. Find time to relax each day.     Crisis Text Line  http://www.crisistextline.org    The Crisis Text Line serves anyone, in any type of crisis, providing access to free, 24/7 support and information via the medium people already use and trust:    Here's how it works:  1.  Text 013-748 from anywhere in the [place], anytime, about any type of crisis.  2.  A live, trained Crisis Counselor receives the text and responds quickly.  3.  The volunteer Crisis Counselor will help you move from a 'hot moment to a cool moment'.    My support system    Clinic Contact:  Phone number:    Contact 1:  Phone number:    Contact 2:  Phone number:    Islam/:  Phone number:    Therapist:  Phone number:    Local crisis center:    Phone number:    Other community support:  Phone number:

## 2020-11-13 NOTE — PATIENT INSTRUCTIONS
Assessment & Plan     1. Hyperlipidemia with target LDL less than 130  - Lipid Profile; Future    2. Benign essential hypertension  - Comprehensive metabolic panel; Future  - TSH with free T4 reflex; Future    3. Hypothyroidism due to acquired atrophy of thyroid    4. Stage 3a chronic kidney disease  Do not use NSAIDS    5. Mild major depression (H)    6. Anxiety  Start   - gabapentin (NEURONTIN) 100 MG capsule; Take 3 capsules (300 mg) by mouth At Bedtime  Dispense: 30 capsule; Refill: 0    7. Primary insomnia  - gabapentin (NEURONTIN) 100 MG capsule; Take 3 capsules (300 mg) by mouth At Bedtime  Dispense: 30 capsule; Refill: 0    8. On statin therapy  - Comprehensive metabolic panel; Future    9. Encounter for hepatitis C screening test for low risk patient  - Hepatitis C Screen Reflex to HCV RNA Quant and Genotype; Future    10. Dyspnea on exertion  [facility].   - General PFT Lab (Please always keep checked); Future  - Pulmonary Function Test; Future  - General PFT Lab (Please always keep checked)          1 week telephone visit.     Return in about 3 months (around 2/13/2021) for hypertension and lipids, anxiety/depression.    Brittney Coe NP  [facility]

## 2020-11-13 NOTE — NURSING NOTE
"Chief Complaint   Patient presents with     Hypertension     Thyroid Disease     Lipids     Kidney Problem       Initial /82 (BP Location: Right arm, Patient Position: Chair, Cuff Size: Adult Regular)   Pulse 86   Temp 99  F (37.2  C) (Temporal)   Ht 1.626 m (5' 4\")   Wt 61.7 kg (136 lb)   SpO2 98%   BMI 23.34 kg/m   Estimated body mass index is 23.34 kg/m  as calculated from the following:    Height as of this encounter: 1.626 m (5' 4\").    Weight as of this encounter: 61.7 kg (136 lb).  Medication Reconciliation: complete  Jo Thompson LPN    "

## 2020-11-14 ASSESSMENT — ANXIETY QUESTIONNAIRES: GAD7 TOTAL SCORE: 12

## 2020-11-16 ENCOUNTER — OFFICE VISIT (OUTPATIENT)
Dept: CARDIOLOGY | Facility: OTHER | Age: 75
End: 2020-11-16
Attending: NURSE PRACTITIONER
Payer: COMMERCIAL

## 2020-11-16 VITALS
SYSTOLIC BLOOD PRESSURE: 138 MMHG | HEART RATE: 59 BPM | BODY MASS INDEX: 23.34 KG/M2 | WEIGHT: 136 LBS | DIASTOLIC BLOOD PRESSURE: 81 MMHG | TEMPERATURE: 98 F | OXYGEN SATURATION: 96 %

## 2020-11-16 DIAGNOSIS — R06.09 DYSPNEA ON EXERTION: Primary | ICD-10-CM

## 2020-11-16 DIAGNOSIS — I44.7 LBBB (LEFT BUNDLE BRANCH BLOCK): ICD-10-CM

## 2020-11-16 DIAGNOSIS — N18.31 STAGE 3A CHRONIC KIDNEY DISEASE (H): ICD-10-CM

## 2020-11-16 DIAGNOSIS — I10 BENIGN ESSENTIAL HYPERTENSION: ICD-10-CM

## 2020-11-16 DIAGNOSIS — R73.03 PREDIABETES: ICD-10-CM

## 2020-11-16 DIAGNOSIS — F41.9 ANXIETY: ICD-10-CM

## 2020-11-16 DIAGNOSIS — E78.2 MIXED HYPERLIPIDEMIA: ICD-10-CM

## 2020-11-16 DIAGNOSIS — E03.4 HYPOTHYROIDISM DUE TO ACQUIRED ATROPHY OF THYROID: ICD-10-CM

## 2020-11-16 PROCEDURE — 93005 ELECTROCARDIOGRAM TRACING: CPT

## 2020-11-16 PROCEDURE — 93010 ELECTROCARDIOGRAM REPORT: CPT | Performed by: INTERNAL MEDICINE

## 2020-11-16 PROCEDURE — 99204 OFFICE O/P NEW MOD 45 MIN: CPT | Performed by: INTERNAL MEDICINE

## 2020-11-16 PROCEDURE — G0463 HOSPITAL OUTPT CLINIC VISIT: HCPCS | Mod: 25

## 2020-11-16 PROCEDURE — G0463 HOSPITAL OUTPT CLINIC VISIT: HCPCS

## 2020-11-16 ASSESSMENT — PAIN SCALES - GENERAL: PAINLEVEL: NO PAIN (0)

## 2020-11-16 NOTE — NURSING NOTE
"Chief Complaint   Patient presents with     Consult       Initial BP (!) 154/89 (BP Location: Left arm, Patient Position: Sitting, Cuff Size: Adult Regular)   Pulse 59   Temp 98  F (36.7  C) (Tympanic)   Wt 61.7 kg (136 lb)   SpO2 96%   BMI 23.34 kg/m   Estimated body mass index is 23.34 kg/m  as calculated from the following:    Height as of 11/13/20: 1.626 m (5' 4\").    Weight as of this encounter: 61.7 kg (136 lb).  Medication Reconciliation: complete  Kari Lainez LPN    "

## 2020-11-16 NOTE — PATIENT INSTRUCTIONS
Thank you for allowing Dr. Mckee and our  team to participate in your care. Please call our office at [phone] with scheduling questions or if you need to cancel or change your appointment. With any other questions or concerns you may call Kari cardiology nurse at [phone].       If you experience chest pain, chest pressure, chest tightness, shortness of breath, fainting, lightheadedness, nausea, vomiting, or other concerning symptoms, please report to the Emergency Department or call 911. These symptoms may be emergent, and best treated in the Emergency Department.    We will let you know if you need to follow up based on your Zio Patch results.     You will be scheduled for an appointment to have a Zio Patch placed on the skin.  This monitor will be worn for 14 days.  This is a device that will monitor your heart rate, and rhythm. The hospital scheduling department will contact you to schedule this appointment, and educate you on the use of this patch.

## 2020-11-16 NOTE — PROGRESS NOTES
[facility] HEART CARE   CARDIOLOGY CONSULT     Franca Angeles   1945  [id number]    Brittney Coe     Chief Complaint   Patient presents with     Consult          HPI:   Mrs. Angeles is a 75-year-old female who is being seen by cardiology for dyspnea on exertion.  She is dyspneic with climbing up and down stairs, nothing else.  She also has a history of left bundle branch block, hypertension, hyperlipidemia, hypothyroidism, CKD-3, anxiety/depression, and prediabetes.    For the last 2 months, she has described dyspnea exertion particularly walking up and down stairs.  She states her dyspnea has been inconsistent.  Around this time, she had some life stressors which were undisclosed today.  She also admits she has significant anxiety.  She has been decreasingly less active.  Secondary to her symptoms, she was seen in the ER on 10/2/2020.    She was sent to the ER after seeing her primary via a telephone visit for dyspnea on exertion.  She was evaluated in the ER.  Denied Covid exposure.  At that time, she reported symptoms starting 3 to 4 days ago.  It only happened with climbing stairs, particularly carrying a laundry basket.  She did not have it every time climbing stairs.  No orthopnea with normal BNP at 83 on 10/2/2020.  Hypothyroidism with normal TSH on 10/2/2020.  Also, checked her cholesterol levels, liver function, BNP, CBC, and D-dimer which were all normal.  Kidney function was mildly reduced at 59.  Chest x-ray negative on 10/2/2020.  Echo on 10/7/2020 was with a normal EF of 60 to 65% without chamber or valvular abnormalities.  Normal stress test on 10/19/2020.  EKG with left bundle branch block.  Patient was referred to cardiology as a result.  Patient has no history of heart disease, stenting, or bypass.  She has not had anginal symptoms.    With her shortness of breath, she does endorse occasional/infrequent palpitations.  She describes her heart beating fast.  It is possible she could  have a dysrhythmia causing intermittent shortness of breath when walking upstairs.  ZIO Patch ordered on 1/16/2020.        IMAGING RESULTS:   Echo on 10/7/2020:  The right ventricle is normal size.  Global right ventricular function is normal.  Both atria appear normal.  Trace mitral insufficiency is present.  Aortic valve is normal in structure and function.  Trace tricuspid insufficiency is present.  Right ventricular systolic pressure is 31 mmHg above the right atrial pressure.  The pulmonic valve is normal.  The aorta root is normal.  No pericardial effusion is present.  Global and regional left ventricular function is normal with an EF of 60-65%.    Stress test on 10/19/2020:     The nuclear stress test is probably negative for inducible myocardial ischemia or infarction.     The left ventricular ejection fraction at rest is 84%.  The left ventricular ejection fraction at stress is 80%.     There is no prior study for comparison.    CURRENT MEDICATIONS:   Prior to Admission medications    Medication Sig Start Date End Date Taking? Authorizing Provider   aspirin 81 MG tablet Take 81 mg by mouth daily    Reported, Patient   atorvastatin (LIPITOR) 20 MG tablet Take 1.5 tablets (30 mg) by mouth daily 8/7/20   Brittney Coe, NP   Calcium-Magnesium-Vitamin D (CALCIUM 500 PO) Take by mouth daily    Reported, Patient   cinnamon 500 MG CAPS  7/15/13   Reported, Patient   co-enzyme Q-10 100 MG CAPS capsule Take 200 mg by mouth 7/16/14   Reported, Patient   gabapentin (NEURONTIN) 100 MG capsule Take 3 capsules (300 mg) by mouth At Bedtime 11/13/20   Brittney Coe NP   Garlic 2000 MG CAPS Take 2,000 mg by mouth daily    Reported, Patient   GLUCOSAMINE-CHONDROITIN PO Take  by mouth. GLUCOSAMINE HCL/CHONDR SUA NA  One daily    Reported, Patient   levothyroxine (SYNTHROID/LEVOTHROID) 50 MCG tablet Take 1 tablet (50 mcg) by mouth daily 8/7/20   Brittney Coe NP   lisinopril (ZESTRIL) 20 MG  tablet Take 1 tablet (20 mg) by mouth daily 11/3/20   Brittney Coe, NP   Multiple Vitamins-Minerals (MULTIVITAMIN OR) Alive 1 tablet    Reported, Patient   Omega-3 Fatty Acids (FISH OIL) 1200 MG CAPS Take by mouth daily    Reported, Patient   order for DME Equipment being ordered: blood pressure cuff 4/6/18   Brittney Coe NP   triamcinolone (KENALOG) 0.1 % external cream Apply topically 2 times daily 4/20/20   Brittney Coe NP   Turmeric Curcumin 500 MG CAPS Take 1 capsule by mouth daily    Reported, Patient       ALLERGIES:   Allergies   Allergen Reactions     Pseudoephedrine Hcl Other (See Comments)     Heart racing  Sudafed     Hydroxyzine Anxiety     jittery     Mucinex Palpitations     Viibryd Anxiety     Restless legs        PAST MEDICAL HISTORY:   Past Medical History:   Diagnosis Date     Esophageal reflux 5/10/2011        PAST SURGICAL HISTORY:   Past Surgical History:   Procedure Laterality Date     ARTHRODESIS KNEE  [id number]    left knee, paritla medial meniscectomy, debridement medial femoral condyle and patellofemoral debridement     COLONOSCOPY  2004    nml     COLONOSCOPY  7/23/2014    Procedure: COLONOSCOPY;  Surgeon: Nghia Hernandez DO;  Location: [facility] OR     CYSTOSCOPY      micro hematuria neg     DILATION AND CURETTAGE       EXCISE LESION UPPER EXTREMITY Right 10/25/2017    Procedure: EXCISE LESION UPPER EXTREMITY;  EXCISIONAL BIOPSY RIGHT FOREARM TIMES 2;  Surgeon: Nghia Hernandez DO;  Location: [facility] OR     tubal sterilization       uterine fibroids       wrist fx RT          FAMILY HISTORY:   Family History   Problem Relation Age of Onset     C.A.D. Mother      Other - See Comments Mother         high platelets     Hypertension Mother      C.A.D. Paternal Aunt      Diabetes Other         aunt     Cardiovascular Father         cardiovascular disease     Colon Cancer Other         family history-paternal side     Cerebrovascular Disease Maternal Grandmother       Cerebrovascular Disease Maternal Grandfather         SOCIAL HISTORY:   Social History     Socioeconomic History     Marital status:      Spouse name: Not on file     Number of children: Not on file     Years of education: Not on file     Highest education level: Not on file   Occupational History     Occupation: retired manager   Social Needs     Financial resource strain: Not on file     Food insecurity     Worry: Not on file     Inability: Not on file     Transportation needs     Medical: Not on file     Non-medical: Not on file   Tobacco Use     Smoking status: Never Smoker     Smokeless tobacco: Never Used   Substance and Sexual Activity     Alcohol use: Yes     Alcohol/week: 0.0 standard drinks     Comment: occasionally     Drug use: No     Sexual activity: Yes     Partners: Male     Birth control/protection: None   Lifestyle     Physical activity     Days per week: Not on file     Minutes per session: Not on file     Stress: Not on file   Relationships     Social connections     Talks on phone: Not on file     Gets together: Not on file     Attends [employer] service: Not on file     Active member of club or organization: Not on file     Attends meetings of clubs or organizations: Not on file     Relationship status: Not on file     Intimate partner violence     Fear of current or ex partner: Not on file     Emotionally abused: Not on file     Physically abused: Not on file     Forced sexual activity: Not on file   Other Topics Concern      Service Not Asked     Blood Transfusions Not Asked     Caffeine Concern Yes     Comment: coffee - 5 cups     Occupational Exposure Not Asked     Hobby Hazards Not Asked     Sleep Concern Not Asked     Stress Concern Not Asked     Weight Concern Not Asked     Special Diet Not Asked     Back Care Not Asked     Exercise Not Asked     Bike Helmet Not Asked     Seat Belt Not Asked     Self-Exams Not Asked     Parent/sibling w/ CABG, MI or angioplasty before  65F 55M? No   Social History Narrative     Not on file          ROS:   CONSTITUTIONAL: No weight loss, fever, chills, weakness or fatigue.   HEENT: Eyes: No visual changes. Ears, Nose, Throat: No hearing loss, congestion or difficulty swallowing.   CARDIOVASCULAR: No chest pain, chest pressure or chest discomfort. (+) palpitations but no lower extremity edema.   RESPIRATORY: (+) shortness of breath, dyspnea upon exertion, cough or sputum production.   GASTROINTESTINAL: No abdominal pain. No anorexia, nausea, vomiting or diarrhea.   NEUROLOGICAL: No headache, lightheadedness, dizziness, syncope, ataxia or weakness.   HEMATOLOGIC: No anemia, bleeding or bruising.   PSYCHIATRIC: No history of depression or anxiety.   ENDOCRINOLOGIC: No reports of sweating, cold or heat intolerance. No polyuria or polydipsia.   SKIN: No abnormal rashes or itching.       PHYSICAL EXAM:   GENERAL: The patient is a well-developed, well-nourished, in no apparent distress. Alert and oriented x3.   HEENT: Head is normocephalic and atraumatic. Eyes are symmetrical with normal visual tracking.  HEART: Regular rate and rhythm, S1S2 present without murmur, rub or gallop.   LUNGS: Respirations regular and unlabored. Clear to auscultation.   GI: Abdomen is soft and nondistended.   EXTREMITIES: No peripheral edema present.   MUSCULOSKELETAL: No joint swelling.   NEUROLOGIC: Alert and oriented X3.    SKIN: No jaundice. No rashes or visible skin lesions present.        LAB RESULTS:   Orders Only on 11/11/2020   Component Date Value Ref Range Status     TSH 11/11/2020 3.49  0.40 - 4.00 mU/L Final     Sodium 11/11/2020 139  133 - 144 mmol/L Final     Potassium 11/11/2020 4.0  3.4 - 5.3 mmol/L Final     Chloride 11/11/2020 105  94 - 109 mmol/L Final     Carbon Dioxide 11/11/2020 28  20 - 32 mmol/L Final     Anion Gap 11/11/2020 6  3 - 14 mmol/L Final     Glucose 11/11/2020 99  70 - 99 mg/dL Final     Urea Nitrogen 11/11/2020 15  7 - 30 mg/dL Final      Creatinine 11/11/2020 1.01  0.52 - 1.04 mg/dL Final     GFR Estimate 11/11/2020 54* >60 mL/min/[1.73_m2] Final     GFR Estimate If Black 11/11/2020 63  >60 mL/min/[1.73_m2] Final     Calcium 11/11/2020 9.9  8.5 - 10.1 mg/dL Final     Bilirubin Total 11/11/2020 0.5  0.2 - 1.3 mg/dL Final     Albumin 11/11/2020 4.1  3.4 - 5.0 g/dL Final     Protein Total 11/11/2020 7.9  6.8 - 8.8 g/dL Final     Alkaline Phosphatase 11/11/2020 83  40 - 150 U/L Final     ALT 11/11/2020 39  0 - 50 U/L Final     AST 11/11/2020 33  0 - 45 U/L Final     Cholesterol 11/11/2020 189  <200 mg/dL Final     Triglycerides 11/11/2020 142  <150 mg/dL Final     HDL Cholesterol 11/11/2020 74  >49 mg/dL Final     LDL Cholesterol Calculated 11/11/2020 87  <100 mg/dL Final     Non HDL Cholesterol 11/11/2020 115  <130 mg/dL Final     Hemoglobin A1C 11/11/2020 5.7* 0 - 5.6 % Final     Estimated Average Glucose 11/11/2020 117  mg/dL Final          ASSESSMENT:       ICD-10-CM    1. Dyspnea on exertion  R06.00 Zio Patch (RANGE) Adult Pediatric > 48 hours   2. LBBB (left bundle branch block)  I44.7 Zio Patch (RANGE) Adult Pediatric > 48 hours   3. Benign essential hypertension  I10 Zio Patch (RANGE) Adult Pediatric > 48 hours   4. Mixed hyperlipidemia  E78.2    5. Hypothyroidism due to acquired atrophy of thyroid  E03.4    6. Stage 3a chronic kidney disease  N18.31    7. Anxiety  F41.9    8. Prediabetes  R73.03          PLAN:   1.  We reviewed her testing thus far which has been really good.  Normal labs include troponin, TSH, BNP, CBC, D-dimer, protein levels, electrolytes, and A1c.  Normal chest x-ray on 10/2/2020, echocardiogram on 10/7/2020, and stress test on 10/19/2020.  EKG with left bundle branch block.  At this point, I think her dyspnea on exertion is more situational than it is medical.  However, with infrequent palpitations, have suggested a Zio patch to determine if her symptoms are rhythm related.  Zio patch ordered on 11/16/2020.    2.  If she  were to have ongoing concerns of shortness of breath in the future, would consider a CTA of coronaries, CT chest, ABG, VQ scan, and ultrasound of her neck to see if there is a mass pushing up on her trachea.  Also, would consider referral to ENT.  3.  PFT's pending.  4.  It is possible that her shortness of breath is deconditioning.  5.  If Zio patch is unremarkable, will follow up in the future on as-needed basis.    Thank you for allowing me to participate in the care of your patient. Please do not hesitate to contact me if you have any questions.     Efraín Mckee, DO

## 2020-11-17 ENCOUNTER — HOSPITAL ENCOUNTER (OUTPATIENT)
Dept: RESPIRATORY THERAPY | Facility: HOSPITAL | Age: 75
Discharge: HOME | End: 2020-11-17
Attending: NURSE PRACTITIONER | Admitting: INTERNAL MEDICINE
Payer: MEDICARE

## 2020-11-17 DIAGNOSIS — R06.09 DYSPNEA ON EXERTION: ICD-10-CM

## 2020-11-17 LAB
COHGB MFR BLD: 1.1 % (ref 0–2)
HGB BLD-MCNC: 13.3 G/DL (ref 11.7–15.7)

## 2020-11-17 PROCEDURE — 85018 HEMOGLOBIN: CPT | Performed by: NURSE PRACTITIONER

## 2020-11-17 PROCEDURE — 82375 ASSAY CARBOXYHB QUANT: CPT | Performed by: NURSE PRACTITIONER

## 2020-11-17 PROCEDURE — 94729 DIFFUSING CAPACITY: CPT

## 2020-11-17 PROCEDURE — 94375 RESPIRATORY FLOW VOLUME LOOP: CPT

## 2020-11-17 PROCEDURE — 36415 COLL VENOUS BLD VENIPUNCTURE: CPT | Performed by: NURSE PRACTITIONER

## 2020-11-17 PROCEDURE — 94726 PLETHYSMOGRAPHY LUNG VOLUMES: CPT

## 2020-11-17 PROCEDURE — 94729 DIFFUSING CAPACITY: CPT | Mod: 26 | Performed by: INTERNAL MEDICINE

## 2020-11-17 PROCEDURE — 94726 PLETHYSMOGRAPHY LUNG VOLUMES: CPT | Mod: 26 | Performed by: INTERNAL MEDICINE

## 2020-11-17 PROCEDURE — 94010 BREATHING CAPACITY TEST: CPT

## 2020-11-17 PROCEDURE — 94010 BREATHING CAPACITY TEST: CPT | Mod: 26 | Performed by: INTERNAL MEDICINE

## 2020-11-17 RX ORDER — ALBUTEROL SULFATE 90 UG/1
2 AEROSOL, METERED RESPIRATORY (INHALATION) EVERY 6 HOURS PRN
Status: DISCONTINUED | OUTPATIENT
Start: 2020-11-17 | End: 2020-11-17 | Stop reason: CLARIF

## 2020-11-17 NOTE — PROGRESS NOTES
"Franca Angeles is a 75 year old female who is being evaluated via a billable telephone visit.      The patient has been notified of following:     \"This telephone visit will be conducted via a call between you and your physician/provider. We have found that certain health care needs can be provided without the need for a physical exam.  This service lets us provide the care you need with a short phone conversation.  If a prescription is necessary we can send it directly to your pharmacy.  If lab work is needed we can place an order for that and you can then stop by our lab to have the test done at a later time.    Telephone visits are billed at different rates depending on your insurance coverage. During this emergency period, for some insurers they may be billed the same as an in-person visit.  Please reach out to your insurance provider with any questions.    If during the course of the call the physician/provider feels a telephone visit is not appropriate, you will not be charged for this service.\"    Patient has given verbal consent for Telephone visit?  Yes    What phone number would you like to be contacted at? [phone]    How would you like to obtain your AVS? MyChart    Subjective     Franca Angeles is a 75 year old female who presents via phone visit today for the following health issues:    HPI      please have nurse contact pharmacy to fax the prior authorization to the clinic for hydroxyzine.     Insomnia  Onset/Duration: about one month  Description:   Frequency of insomnia:  several times a week  Time to fall asleep (sleep latency): 2 hours plus  Middle of night awakening:  YES  Early morning awakening:  YES  Progression of Symptoms:  waxing and waning  Accompanying Signs & Symptoms:  Daytime sleepiness/napping: no  Excessive snoring/apnea: no  Restless legs: YES  Waking to urinate: no  Chronic pain:  no  Depression symptoms (if yes, do PHQ9): no  Anxiety symptoms (if yes, do MCKINLEY-7): YES- see " MCKINLEY  History:  Prior Insomnia: no  New stressful situation: YES- problems with son  Precipitating factors:   Caffeine intake: YES  OTC decongestants: no  Any new medications: YES- gabapentin - started with 100mg, was not helping so increased to 200mg - still no improvement.   Alleviating factors:  Self medicating (alcohol, etc.):  no  Stress-reduction (exercise, yoga, meditation etc): YES- meditation (yesterday) and also went for a walk yesterday - did exercise on a regular basis prior to heart condition   Therapies tried and outcome: caffeine avoidance, Benadryl -  not effective, Tylenol pm -  not effective and Advil pm -  not effective             Patient Active Problem List   Diagnosis     Menopause     Joint pain     Disorder of bone and cartilage     Advanced care planning/counseling discussion     Routine general medical examination at a health care facility (ANNUAL)     Colon cancer screening     Hypothyroidism due to acquired atrophy of thyroid     Benign essential hypertension     CKD (chronic kidney disease) stage 3, GFR 30-59 ml/min     AK (actinic keratosis)     Mild major depression (H)     Anxiety     Mixed hyperlipidemia     LBBB (left bundle branch block)     Dyspnea on exertion     Prediabetes     Past Surgical History:   Procedure Laterality Date     ARTHRODESIS KNEE  [id number]    left knee, paritla medial meniscectomy, debridement medial femoral condyle and patellofemoral debridement     COLONOSCOPY  2004    nml     COLONOSCOPY  7/23/2014    Procedure: COLONOSCOPY;  Surgeon: Nghia Hernandez DO;  Location: [facility]     CYSTOSCOPY      micro hematuria neg     DILATION AND CURETTAGE       EXCISE LESION UPPER EXTREMITY Right 10/25/2017    Procedure: EXCISE LESION UPPER EXTREMITY;  EXCISIONAL BIOPSY RIGHT FOREARM TIMES 2;  Surgeon: Nghia Hernandez DO;  Location: [facility]     tubal sterilization       uterine fibroids       wrist fx RT         Social History     Tobacco Use     Smoking status: Never  Smoker     Smokeless tobacco: Never Used   Substance Use Topics     Alcohol use: Yes     Alcohol/week: 0.0 standard drinks     Comment: occasionally     Family History   Problem Relation Age of Onset     C.A.D. Mother      Other - See Comments Mother         high platelets     Hypertension Mother      C.A.D. Paternal Aunt      Diabetes Other         aunt     Cardiovascular Father         cardiovascular disease     Colon Cancer Other         family history-paternal side     Cerebrovascular Disease Maternal Grandmother      Cerebrovascular Disease Maternal Grandfather          Current Outpatient Medications   Medication Sig Dispense Refill     aspirin 81 MG tablet Take 81 mg by mouth daily       atorvastatin (LIPITOR) 20 MG tablet Take 1.5 tablets (30 mg) by mouth daily 135 tablet 3     Calcium-Magnesium-Vitamin D (CALCIUM 500 PO) Take by mouth daily       cinnamon 500 MG CAPS        co-enzyme Q-10 100 MG CAPS capsule Take 200 mg by mouth       gabapentin (NEURONTIN) 300 MG capsule Take 1 capsule (300 mg) by mouth At Bedtime 30 capsule 0     Garlic 2000 MG CAPS Take 2,000 mg by mouth daily       GLUCOSAMINE-CHONDROITIN PO Take  by mouth. GLUCOSAMINE HCL/CHONDR SUA NA  One daily       levothyroxine (SYNTHROID/LEVOTHROID) 50 MCG tablet Take 1 tablet (50 mcg) by mouth daily 90 tablet 1     lisinopril (ZESTRIL) 20 MG tablet Take 1 tablet (20 mg) by mouth daily 90 tablet 1     LORazepam (ATIVAN) 0.5 MG tablet Take 1 tablet (0.5 mg) by mouth nightly as needed for anxiety or sleep 15 tablet 0     Multiple Vitamins-Minerals (MULTIVITAMIN OR) Alive 1 tablet       Omega-3 Fatty Acids (FISH OIL) 1200 MG CAPS Take by mouth daily       Turmeric Curcumin 500 MG CAPS Take 1 capsule by mouth daily       Allergies   Allergen Reactions     Pseudoephedrine Hcl Other (See Comments)     Heart racing  Sudafed     Hydroxyzine Anxiety     jittery     Mucinex Palpitations     Viibryd Anxiety     Restless legs     Recent Labs   Lab Test  11/11/20  0807 10/02/20  1527 08/05/20  0829 02/05/20  0929 05/13/15  1026 05/13/15  1026   A1C 5.7* 5.6  --   --   --  5.7   LDL 87  --  138* 117*   < > 134*   HDL 74  --  53 57   < > 59   TRIG 142  --  123 131   < > 170*   ALT 39 40 36 37   < >  --    CR 1.01 0.94 0.97 0.99   < >  --    GFRESTIMATED 54* 59* 57* 56*   < >  --    GFRESTBLACK 63 68 66 65   < >  --    POTASSIUM 4.0 3.7 4.6 4.7   < >  --    TSH 3.49 2.39 3.10 3.29   < > 3.75    < > = values in this interval not displayed.      BP Readings from Last 3 Encounters:   11/16/20 138/81   11/13/20 138/82   11/03/20 (!) 154/84    Wt Readings from Last 3 Encounters:   11/20/20 61.7 kg (136 lb)   11/16/20 61.7 kg (136 lb)   11/13/20 61.7 kg (136 lb)                      Review of Systems   Constitutional, HEENT, cardiovascular, pulmonary, gi and gu systems are negative, except as otherwise noted.       Objective            alert and no distress  PSYCH: Alert and oriented times 3; coherent speech, normal   rate and volume, able to articulate logical thoughts, able   to abstract reason, no tangential thoughts, no hallucinations   or delusions  Her affect is normal and pleasant  RESP: No cough, no audible wheezing, able to talk in full sentences  Remainder of exam unable to be completed due to telephone visits                Assessment & Plan     1. Anxiety  Increase gabapentin to 300mg nightly.    - gabapentin (NEURONTIN) 300 MG capsule; Take 1 capsule (300 mg) by mouth At Bedtime  Dispense: 30 capsule; Refill: 0  - LORazepam (ATIVAN) 0.5 MG tablet; Take 1 tablet (0.5 mg) by mouth nightly as needed for anxiety or sleep  Dispense: 15 tablet; Refill: 0    2. Primary insomnia  - gabapentin (NEURONTIN) 300 MG capsule; Take 1 capsule (300 mg) by mouth At Bedtime  Dispense: 30 capsule; Refill: 0  - LORazepam (ATIVAN) 0.5 MG tablet; Take 1 tablet (0.5 mg) by mouth nightly as needed for anxiety or sleep  Dispense: 15 tablet; Refill: 0            Follow-up in 2 weeks or as  needed    Brittney Coe, NP  [facility] [place] [facility]    Phone call duration:  11 minutes

## 2020-11-20 ENCOUNTER — VIRTUAL VISIT (OUTPATIENT)
Dept: FAMILY MEDICINE | Facility: OTHER | Age: 75
End: 2020-11-20
Attending: NURSE PRACTITIONER
Payer: COMMERCIAL

## 2020-11-20 VITALS — WEIGHT: 136 LBS | BODY MASS INDEX: 23.34 KG/M2

## 2020-11-20 DIAGNOSIS — F51.01 PRIMARY INSOMNIA: ICD-10-CM

## 2020-11-20 DIAGNOSIS — F41.9 ANXIETY: ICD-10-CM

## 2020-11-20 PROCEDURE — 99213 OFFICE O/P EST LOW 20 MIN: CPT | Mod: 95 | Performed by: NURSE PRACTITIONER

## 2020-11-20 RX ORDER — LORAZEPAM 0.5 MG/1
0.5 TABLET ORAL
Qty: 15 TABLET | Refills: 0 | Status: SHIPPED | OUTPATIENT
Start: 2020-11-20 | End: 2020-12-14

## 2020-11-20 RX ORDER — GABAPENTIN 300 MG/1
300 CAPSULE ORAL AT BEDTIME
Qty: 30 CAPSULE | Refills: 0 | Status: SHIPPED | OUTPATIENT
Start: 2020-11-20 | End: 2020-12-22

## 2020-11-20 ASSESSMENT — ANXIETY QUESTIONNAIRES
2. NOT BEING ABLE TO STOP OR CONTROL WORRYING: MORE THAN HALF THE DAYS
6. BECOMING EASILY ANNOYED OR IRRITABLE: NOT AT ALL
1. FEELING NERVOUS, ANXIOUS, OR ON EDGE: SEVERAL DAYS
GAD7 TOTAL SCORE: 7
7. FEELING AFRAID AS IF SOMETHING AWFUL MIGHT HAPPEN: NOT AT ALL
3. WORRYING TOO MUCH ABOUT DIFFERENT THINGS: NEARLY EVERY DAY
4. TROUBLE RELAXING: SEVERAL DAYS
5. BEING SO RESTLESS THAT IT IS HARD TO SIT STILL: NOT AT ALL

## 2020-11-20 ASSESSMENT — PAIN SCALES - GENERAL: PAINLEVEL: NO PAIN (0)

## 2020-11-20 ASSESSMENT — PATIENT HEALTH QUESTIONNAIRE - PHQ9: SUM OF ALL RESPONSES TO PHQ QUESTIONS 1-9: 0

## 2020-11-20 NOTE — NURSING NOTE
"Chief Complaint   Patient presents with     Sleep Problem       Initial Wt 61.7 kg (136 lb)   BMI 23.34 kg/m   Estimated body mass index is 23.34 kg/m  as calculated from the following:    Height as of 11/13/20: 1.626 m (5' 4\").    Weight as of this encounter: 61.7 kg (136 lb).  Medication Reconciliation: complete  Mini Junior LPN  "

## 2020-11-21 ASSESSMENT — ANXIETY QUESTIONNAIRES: GAD7 TOTAL SCORE: 7

## 2020-11-23 ENCOUNTER — HOSPITAL ENCOUNTER (OUTPATIENT)
Dept: CARDIOLOGY | Facility: HOSPITAL | Age: 75
Discharge: HOME | End: 2020-11-23
Attending: INTERNAL MEDICINE | Admitting: INTERNAL MEDICINE
Payer: MEDICARE

## 2020-11-23 DIAGNOSIS — I44.7 LBBB (LEFT BUNDLE BRANCH BLOCK): ICD-10-CM

## 2020-11-23 DIAGNOSIS — I10 BENIGN ESSENTIAL HYPERTENSION: ICD-10-CM

## 2020-11-23 DIAGNOSIS — R06.09 DYSPNEA ON EXERTION: ICD-10-CM

## 2020-11-23 PROCEDURE — 0298T LEADLESS EKG MONITOR 3 TO 14 DAYS: CPT | Performed by: INTERNAL MEDICINE

## 2020-11-23 PROCEDURE — 0296T LEADLESS EKG MONITOR 3 TO 14 DAYS: CPT

## 2020-11-24 NOTE — PROGRESS NOTES
"Franca Angeles is a 75 year old female who is being evaluated via a billable telephone visit.      The patient has been notified of following:     \"This telephone visit will be conducted via a call between you and your physician/provider. We have found that certain health care needs can be provided without the need for a physical exam.  This service lets us provide the care you need with a short phone conversation.  If a prescription is necessary we can send it directly to your pharmacy.  If lab work is needed we can place an order for that and you can then stop by our lab to have the test done at a later time.    Telephone visits are billed at different rates depending on your insurance coverage. During this emergency period, for some insurers they may be billed the same as an in-person visit.  Please reach out to your insurance provider with any questions.    If during the course of the call the physician/provider feels a telephone visit is not appropriate, you will not be charged for this service.\"    Patient has given verbal consent for Telephone visit?  Yes    What phone number would you like to be contacted at? [phone]    How would you like to obtain your AVS? Harishart    Raffaele     Franca Angeles is a 75 year old female who presents via phone visit today for the following health issues:    HPI     Insomnia  Onset/Duration: f/u  Description:   Frequency of insomnia:  nightly  Time to fall asleep (sleep latency): 30 minutes  Middle of night awakening:  no  Early morning awakening:  no  Progression of Symptoms:  same  Accompanying Signs & Symptoms:  Daytime sleepiness/napping: no  Excessive snoring/apnea: no  Restless legs: no  Waking to urinate: no  Chronic pain:  no  Depression symptoms (if yes, do PHQ9): no  Anxiety symptoms (if yes, do MCKINLEY-7): YES  History:  Prior Insomnia: YES  New stressful situation: no  Precipitating factors:   Caffeine intake: YES but not after noon  OTC decongestants: " no  Any new medications: no  Alleviating factors:  Self medicating (alcohol, etc.):  no  Stress-reduction (exercise, yoga, meditation etc): YES  Therapies tried and outcome: lorazepam -  usually effective and gabapentin but not just gabapentin alone.  Has not been to counseling but thinks it may be time to proceed with counseling.           Patient Active Problem List   Diagnosis     Menopause     Joint pain     Disorder of bone and cartilage     Advanced care planning/counseling discussion     Routine general medical examination at a health care facility (ANNUAL)     Colon cancer screening     Hypothyroidism due to acquired atrophy of thyroid     Benign essential hypertension     CKD (chronic kidney disease) stage 3, GFR 30-59 ml/min     AK (actinic keratosis)     Mild major depression (H)     Anxiety     Mixed hyperlipidemia     LBBB (left bundle branch block)     Dyspnea on exertion     Prediabetes     Past Surgical History:   Procedure Laterality Date     ARTHRODESIS KNEE  [id number]    left knee, paritla medial meniscectomy, debridement medial femoral condyle and patellofemoral debridement     COLONOSCOPY  2004    nml     COLONOSCOPY  7/23/2014    Procedure: COLONOSCOPY;  Surgeon: Nghia Hernandez DO;  Location: [facility]     CYSTOSCOPY      micro hematuria neg     DILATION AND CURETTAGE       EXCISE LESION UPPER EXTREMITY Right 10/25/2017    Procedure: EXCISE LESION UPPER EXTREMITY;  EXCISIONAL BIOPSY RIGHT FOREARM TIMES 2;  Surgeon: Nghia Hernandez DO;  Location: [facility]     tubal sterilization       uterine fibroids       wrist fx RT         Social History     Tobacco Use     Smoking status: Never Smoker     Smokeless tobacco: Never Used   Substance Use Topics     Alcohol use: Yes     Alcohol/week: 0.0 standard drinks     Comment: occasionally     Family History   Problem Relation Age of Onset     C.A.D. Mother      Other - See Comments Mother         high platelets     Hypertension Mother      C.A.D.  Paternal Aunt      Diabetes Other         aunt     Cardiovascular Father         cardiovascular disease     Colon Cancer Other         family history-paternal side     Cerebrovascular Disease Maternal Grandmother      Cerebrovascular Disease Maternal Grandfather          Current Outpatient Medications   Medication Sig Dispense Refill     aspirin 81 MG tablet Take 81 mg by mouth daily       atorvastatin (LIPITOR) 20 MG tablet Take 1.5 tablets (30 mg) by mouth daily 135 tablet 3     Calcium-Magnesium-Vitamin D (CALCIUM 500 PO) Take by mouth daily       cinnamon 500 MG CAPS        co-enzyme Q-10 100 MG CAPS capsule Take 200 mg by mouth       gabapentin (NEURONTIN) 300 MG capsule Take 1 capsule (300 mg) by mouth At Bedtime 30 capsule 0     Garlic 2000 MG CAPS Take 2,000 mg by mouth daily       GLUCOSAMINE-CHONDROITIN PO Take  by mouth. GLUCOSAMINE HCL/CHONDR SUA NA  One daily       levothyroxine (SYNTHROID/LEVOTHROID) 50 MCG tablet Take 1 tablet (50 mcg) by mouth daily 90 tablet 1     lisinopril (ZESTRIL) 20 MG tablet Take 1 tablet (20 mg) by mouth daily 90 tablet 1     LORazepam (ATIVAN) 0.5 MG tablet Take 1 tablet (0.5 mg) by mouth nightly as needed for anxiety or sleep 15 tablet 0     Multiple Vitamins-Minerals (MULTIVITAMIN OR) Alive 1 tablet       Omega-3 Fatty Acids (FISH OIL) 1200 MG CAPS Take by mouth daily       Turmeric Curcumin 500 MG CAPS Take 1 capsule by mouth daily       Allergies   Allergen Reactions     Pseudoephedrine Hcl Other (See Comments)     Heart racing  Sudafed     Hydroxyzine Anxiety     jittery     Mucinex Palpitations     Viibryd Anxiety     Restless legs     Recent Labs   Lab Test 11/11/20  0807 10/02/20  1527 08/05/20  0829 02/05/20  0929 05/13/15  1026 05/13/15  1026   A1C 5.7* 5.6  --   --   --  5.7   LDL 87  --  138* 117*   < > 134*   HDL 74  --  53 57   < > 59   TRIG 142  --  123 131   < > 170*   ALT 39 40 36 37   < >  --    CR 1.01 0.94 0.97 0.99   < >  --    GFRESTIMATED 54* 59* 57*  56*   < >  --    GFRESTBLACK 63 68 66 65   < >  --    POTASSIUM 4.0 3.7 4.6 4.7   < >  --    TSH 3.49 2.39 3.10 3.29   < > 3.75    < > = values in this interval not displayed.      BP Readings from Last 3 Encounters:   11/16/20 138/81   11/13/20 138/82   11/03/20 (!) 154/84    Wt Readings from Last 3 Encounters:   11/30/20 61.7 kg (136 lb)   11/20/20 61.7 kg (136 lb)   11/16/20 61.7 kg (136 lb)                   Review of Systems   Constitutional, HEENT, cardiovascular, pulmonary, gi and gu systems are negative, except as otherwise noted.       Objective   Vitals - Patient Reported  Pain Score: No Pain (0)      Vitals:  No vitals were obtained today due to virtual visit.    alert and no distress  PSYCH: Alert and oriented times 3; coherent speech, normal   rate and volume, able to articulate logical thoughts, able   to abstract reason, no tangential thoughts, no hallucinations   or delusions  Her affect is normal and pleasant  RESP: No cough, no audible wheezing, able to talk in full sentences  Remainder of exam unable to be completed due to telephone visits            Assessment/Plan:    1. Anxiety  Continue gabapentin at bedtime  - MENTAL HEALTH REFERRAL  - Adult; Outpatient Treatment; Individual/Couples/Family/Group Therapy/Health Psychology; Range: [facility] [phone]; We will contact you to schedule the appointment or please call ...    2. Primary insomnia  Continue ativan as needed for sleep.      Follow-up in 2 weeks or as needed for acute concerns.     Brittney Coe,   Certified Adult Nurse Practitioner  [phone]        Phone call duration:  16 minutes

## 2020-11-30 ENCOUNTER — VIRTUAL VISIT (OUTPATIENT)
Dept: FAMILY MEDICINE | Facility: OTHER | Age: 75
End: 2020-11-30
Attending: NURSE PRACTITIONER
Payer: COMMERCIAL

## 2020-11-30 VITALS — WEIGHT: 136 LBS | BODY MASS INDEX: 23.34 KG/M2

## 2020-11-30 DIAGNOSIS — F41.9 ANXIETY: Primary | ICD-10-CM

## 2020-11-30 DIAGNOSIS — F51.01 PRIMARY INSOMNIA: ICD-10-CM

## 2020-11-30 PROCEDURE — 99213 OFFICE O/P EST LOW 20 MIN: CPT | Mod: 95 | Performed by: NURSE PRACTITIONER

## 2020-11-30 ASSESSMENT — PAIN SCALES - GENERAL: PAINLEVEL: NO PAIN (0)

## 2020-11-30 NOTE — NURSING NOTE
"Chief Complaint   Patient presents with     Insomnia       Initial Wt 61.7 kg (136 lb)   BMI 23.34 kg/m   Estimated body mass index is 23.34 kg/m  as calculated from the following:    Height as of 11/13/20: 1.626 m (5' 4\").    Weight as of this encounter: 61.7 kg (136 lb).  Medication Reconciliation: complete  Honey Doshi LPN    "

## 2020-12-02 ENCOUNTER — TELEPHONE (OUTPATIENT)
Dept: FAMILY MEDICINE | Facility: OTHER | Age: 75
End: 2020-12-02

## 2020-12-02 NOTE — TELEPHONE ENCOUNTER
Pt notified about pft and is ok with not seeing a pulmonologist and was inquiring about referral for mental health has not heard back yet will send to pac to follow up

## 2020-12-07 NOTE — PROGRESS NOTES
"Franca Angeles is a 75 year old female who is being evaluated via a billable telephone visit.      The patient has been notified of following:     \"This telephone visit will be conducted via a call between you and your physician/provider. We have found that certain health care needs can be provided without the need for a physical exam.  This service lets us provide the care you need with a short phone conversation.  If a prescription is necessary we can send it directly to your pharmacy.  If lab work is needed we can place an order for that and you can then stop by our lab to have the test done at a later time.    Telephone visits are billed at different rates depending on your insurance coverage. During this emergency period, for some insurers they may be billed the same as an in-person visit.  Please reach out to your insurance provider with any questions.    If during the course of the call the physician/provider feels a telephone visit is not appropriate, you will not be charged for this service.\"    Patient has given verbal consent for Telephone visit?  Yes    What phone number would you like to be contacted at? [phone]    How would you like to obtain your AVS? Mail a copy    Subjective     Franca Angeles is a 75 year old female who presents via phone visit today for the following health issues:    HPI     Depression and Anxiety Follow-Up    How are you doing with your depression since your last visit? Improved - feels is is getting better. [facility] - does not take anyone on Medicare -for therapy    How are you doing with your anxiety since your last visit?  Improved - pt states \" still comes ,but  Nearly not as much as before\"    Are you having other symptoms that might be associated with depression or anxiety? No Insomnia - takes ativan 0.5mg 3-4 times a week.     Have you had a significant life event? OTHER: \"family drama\"     Do you have any concerns with your use of alcohol or other drugs? " No    Social History     Tobacco Use     Smoking status: Never Smoker     Smokeless tobacco: Never Used   Substance Use Topics     Alcohol use: Yes     Alcohol/week: 0.0 standard drinks     Comment: occasionally     Drug use: No     PHQ 11/3/2020 2020 2020   PHQ-9 Total Score 17 13 0   Q9: Thoughts of better off dead/self-harm past 2 weeks Not at all Not at all Not at all     MCKINLEY-7 SCORE 11/3/2020 2020 2020   Total Score 17 12 7         Suicide Assessment Five-step Evaluation and Treatment (SAFE-T)      How many servings of fruits and vegetables do you eat daily?  2-3    On average, how many sweetened beverages do you drink each day (Examples: soda, juice, sweet tea, etc.  Do NOT count diet or artificially sweetened beverages)?   2    How many days per week do you exercise enough to make your heart beat faster? 7- does some form of exercise every day- went to the gym when they were ope    How many minutes a day do you exercise enough to make your heart beat faster? 30 - 60    How many days per week do you miss taking your medication? 0    Insomnia      Duration: Intermittent- ongoing several years     Description  Frequency of insomnia:  occasionally  Time to fall asleep: 30 minutes  Middle of night awakenin-2 times a week  Early morning awakening:  several times a week    Accompanying signs and symptoms:  Due to Anxiety    History  Similar episodes in past:  YES  Previous evaluation/sleep study:  no     Precipitating or alleviating factors:  New stressful situation: YES- family stuff  Caffeine intake after lunchtime: no  OTC decongestants: no   Any new medications: no     Therapies tried and outcome: lorazepam -  effective and gabapentin      she completed PFT's, diffusion defect.  Will refer to pulmonology for evaluation.     Patient Active Problem List   Diagnosis     Menopause     Joint pain     Disorder of bone and cartilage     Advanced care planning/counseling discussion      Routine general medical examination at a health care facility (ANNUAL)     Colon cancer screening     Hypothyroidism due to acquired atrophy of thyroid     Benign essential hypertension     CKD (chronic kidney disease) stage 3, GFR 30-59 ml/min     AK (actinic keratosis)     Mild major depression (H)     Anxiety     Mixed hyperlipidemia     LBBB (left bundle branch block)     Dyspnea on exertion     Prediabetes     Past Surgical History:   Procedure Laterality Date     ARTHRODESIS KNEE  [id number]    left knee, paritla medial meniscectomy, debridement medial femoral condyle and patellofemoral debridement     COLONOSCOPY  2004    nml     COLONOSCOPY  7/23/2014    Procedure: COLONOSCOPY;  Surgeon: Nghia Hernandez DO;  Location: [facility]     CYSTOSCOPY      micro hematuria neg     DILATION AND CURETTAGE       EXCISE LESION UPPER EXTREMITY Right 10/25/2017    Procedure: EXCISE LESION UPPER EXTREMITY;  EXCISIONAL BIOPSY RIGHT FOREARM TIMES 2;  Surgeon: Nghia Hernandez DO;  Location: [facility] OR     tubal sterilization       uterine fibroids       wrist fx RT         Social History     Tobacco Use     Smoking status: Never Smoker     Smokeless tobacco: Never Used   Substance Use Topics     Alcohol use: Yes     Alcohol/week: 0.0 standard drinks     Comment: occasionally     Family History   Problem Relation Age of Onset     C.A.D. Mother      Other - See Comments Mother         high platelets     Hypertension Mother      C.A.D. Paternal Aunt      Diabetes Other         aunt     Cardiovascular Father         cardiovascular disease     Colon Cancer Other         family history-paternal side     Cerebrovascular Disease Maternal Grandmother      Cerebrovascular Disease Maternal Grandfather          Current Outpatient Medications   Medication Sig Dispense Refill     aspirin 81 MG tablet Take 81 mg by mouth daily       atorvastatin (LIPITOR) 20 MG tablet Take 1.5 tablets (30 mg) by mouth daily 135 tablet 3      Calcium-Magnesium-Vitamin D (CALCIUM 500 PO) Take by mouth daily       cinnamon 500 MG CAPS        co-enzyme Q-10 100 MG CAPS capsule Take 200 mg by mouth       gabapentin (NEURONTIN) 300 MG capsule Take 1 capsule (300 mg) by mouth At Bedtime 30 capsule 0     Garlic 2000 MG CAPS Take 2,000 mg by mouth daily       GLUCOSAMINE-CHONDROITIN PO Take  by mouth. GLUCOSAMINE HCL/CHONDR SUA NA  One daily       levothyroxine (SYNTHROID/LEVOTHROID) 50 MCG tablet Take 1 tablet (50 mcg) by mouth daily 90 tablet 1     lisinopril (ZESTRIL) 20 MG tablet Take 1 tablet (20 mg) by mouth daily 90 tablet 1     LORazepam (ATIVAN) 0.5 MG tablet Take 1 tablet (0.5 mg) by mouth nightly as needed for anxiety or sleep 30 tablet 0     Multiple Vitamins-Minerals (MULTIVITAMIN OR) Alive 1 tablet       Omega-3 Fatty Acids (FISH OIL) 1200 MG CAPS Take by mouth daily       Turmeric Curcumin 500 MG CAPS Take 1 capsule by mouth daily       Allergies   Allergen Reactions     Pseudoephedrine Hcl Other (See Comments)     Heart racing  Sudafed     Hydroxyzine Anxiety     jittery     Mucinex Palpitations     Viibryd Anxiety     Restless legs     Recent Labs   Lab Test 11/11/20  0807 10/02/20  1527 08/05/20  0829 02/05/20  0929 05/13/15  1026 05/13/15  1026   A1C 5.7* 5.6  --   --   --  5.7   LDL 87  --  138* 117*   < > 134*   HDL 74  --  53 57   < > 59   TRIG 142  --  123 131   < > 170*   ALT 39 40 36 37   < >  --    CR 1.01 0.94 0.97 0.99   < >  --    GFRESTIMATED 54* 59* 57* 56*   < >  --    GFRESTBLACK 63 68 66 65   < >  --    POTASSIUM 4.0 3.7 4.6 4.7   < >  --    TSH 3.49 2.39 3.10 3.29   < > 3.75    < > = values in this interval not displayed.      BP Readings from Last 3 Encounters:   11/16/20 138/81   11/13/20 138/82   11/03/20 (!) 154/84    Wt Readings from Last 3 Encounters:   12/14/20 61.2 kg (135 lb)   11/30/20 61.7 kg (136 lb)   11/20/20 61.7 kg (136 lb)                      Review of Systems   Constitutional, HEENT, cardiovascular,  pulmonary, gi and gu systems are negative, except as otherwise noted.       Objective          Vitals:  No vitals were obtained today due to virtual visit.    alert and no distress  PSYCH: Alert and oriented times 3; coherent speech, normal   rate and volume, able to articulate logical thoughts, able   to abstract reason, no tangential thoughts, no hallucinations   or delusions  Her affect is normal and pleasant  RESP: No cough, no audible wheezing, able to talk in full sentences  Remainder of exam unable to be completed due to telephone visits            Assessment/Plan:    1. Anxiety  Will continue to work on a referral for counseling.    - LORazepam (ATIVAN) 0.5 MG tablet; Take 1 tablet (0.5 mg) by mouth nightly as needed for anxiety or sleep  Dispense: 30 tablet; Refill: 0    2. Primary insomnia  Continue current plan  - LORazepam (ATIVAN) 0.5 MG tablet; Take 1 tablet (0.5 mg) by mouth nightly as needed for anxiety or sleep  Dispense: 30 tablet; Refill: 0    3. Dyspnea on exertion  Dr Deanne Nguyen for evaluation.    - PULMONARY MEDICINE REFERRAL    Brittney Coe,   Certified Adult Nurse Practitioner  [phone]          Phone call duration:  15 minutes

## 2020-12-14 ENCOUNTER — VIRTUAL VISIT (OUTPATIENT)
Dept: FAMILY MEDICINE | Facility: OTHER | Age: 75
End: 2020-12-14
Attending: NURSE PRACTITIONER
Payer: COMMERCIAL

## 2020-12-14 VITALS — BODY MASS INDEX: 23.17 KG/M2 | WEIGHT: 135 LBS

## 2020-12-14 DIAGNOSIS — R06.09 DYSPNEA ON EXERTION: Primary | ICD-10-CM

## 2020-12-14 DIAGNOSIS — F41.9 ANXIETY: ICD-10-CM

## 2020-12-14 DIAGNOSIS — F51.01 PRIMARY INSOMNIA: ICD-10-CM

## 2020-12-14 PROCEDURE — 99214 OFFICE O/P EST MOD 30 MIN: CPT | Mod: 95 | Performed by: NURSE PRACTITIONER

## 2020-12-14 RX ORDER — LORAZEPAM 0.5 MG/1
0.5 TABLET ORAL
Qty: 30 TABLET | Refills: 0 | Status: SHIPPED | OUTPATIENT
Start: 2020-12-14 | End: 2021-05-12

## 2020-12-14 ASSESSMENT — PAIN SCALES - GENERAL: PAINLEVEL: NO PAIN (0)

## 2020-12-14 NOTE — Clinical Note
1 mo video visit. I made a referral to [facility], they do not take medicare.  Can we try Irma or Roma Rao ?? I do not remember the full name.

## 2020-12-14 NOTE — NURSING NOTE
"Chief Complaint   Patient presents with     Recheck Medication     gabapentin (NEURONTIN) 300 MG capsule, LORazepam (ATIVAN) 0.5 MG tablet       Initial There were no vitals taken for this visit. Estimated body mass index is 23.34 kg/m  as calculated from the following:    Height as of 11/13/20: 1.626 m (5' 4\").    Weight as of 11/30/20: 61.7 kg (136 lb).  Medication Reconciliation: complete  Grace Garcia LPN  "

## 2020-12-21 DIAGNOSIS — F51.01 PRIMARY INSOMNIA: ICD-10-CM

## 2020-12-21 DIAGNOSIS — F41.9 ANXIETY: ICD-10-CM

## 2020-12-22 DIAGNOSIS — F51.01 PRIMARY INSOMNIA: ICD-10-CM

## 2020-12-22 DIAGNOSIS — F41.9 ANXIETY: ICD-10-CM

## 2020-12-22 RX ORDER — GABAPENTIN 300 MG/1
CAPSULE ORAL
Qty: 30 CAPSULE | Refills: 0 | OUTPATIENT
Start: 2020-12-22

## 2020-12-22 RX ORDER — LORAZEPAM 0.5 MG/1
TABLET ORAL
Qty: 15 TABLET | OUTPATIENT
Start: 2020-12-22

## 2020-12-22 RX ORDER — GABAPENTIN 300 MG/1
300 CAPSULE ORAL AT BEDTIME
Qty: 30 CAPSULE | Refills: 2 | Status: SHIPPED | OUTPATIENT
Start: 2020-12-22 | End: 2021-02-12

## 2020-12-22 NOTE — TELEPHONE ENCOUNTER
Pt of Yuri.    Neurontin  Last Written Prescription Date:  11.20.2020  Last Fill Quantity: 30 # refills: 0  Last Office Visit: 12.14.2020  Future Office visit:    Next 5 appointments (look out 90 days)    Feb 12, 2021  9:30 AM  (Arrive by 9:15 AM)  SHORT with Brittney Coe NP  [facility] ([facility] ) [address]  [facility] [address]  [phone]           Routing refill request to provider for review/approval because:  Drug not on the [facility], [facility] or  Health refill protocol or controlled substance    Suni Morley RN

## 2020-12-22 NOTE — TELEPHONE ENCOUNTER
gabapentin (NEURONTIN) 300 MG capsule      Last Written Prescription Date:  11/20/20  Last Fill Quantity: 30,   # refills: 0  Last Office Visit: 12/14/20  Future Office visit:    Next 5 appointments (look out 90 days)    Feb 12, 2021  9:30 AM  (Arrive by 9:15 AM)  SHORT with Brittney Coe NP  [facility] ([facility] ) [address]  [place] [address]  [phone]           LORazepam (ATIVAN) 0.5 MG tablet     Last Written Prescription Date:  12/14/20  Last Fill Quantity: 30,   # refills: 0  Last Office Visit: 12/14/20  Future Office visit:    Next 5 appointments (look out 90 days)    Feb 12, 2021  9:30 AM  (Arrive by 9:15 AM)  SHORT with Brittney Coe NP  [facility] ([facility] ) [address]  [place] [address]  [phone]           Routing refill request to provider for review/approval b

## 2021-01-06 NOTE — PROGRESS NOTES
Franca is a 75 year old who is being evaluated via a billable telephone visit.      What phone number would you like to be contacted at? [phone]  How would you like to obtain your AVS? Mail a copy       Assessment & Plan     1. Anxiety  Continue ativan as needed     2. Mild major depression (H)    3. Primary insomnia  Continue gabapentin at bedtime.       Follow-up in February as scheduled    Brittney Coe NP  [facility] [place] [facility]    Subjective     Franca is a 75 year old who presents to clinic today for the following health issues     HPI       Depression and Anxiety Follow-Up    How are you doing with your depression since your last visit? Improved     How are you doing with your anxiety since your last visit?  Improved     Are you having other symptoms that might be associated with depression or anxiety? Yes:  once had panic attack has ativan to use as needed - has taken it twice since prescribed.     Have you had a significant life event? No     Do you have any concerns with your use of alcohol or other drugs? No    Social History     Tobacco Use     Smoking status: Never Smoker     Smokeless tobacco: Never Used   Substance Use Topics     Alcohol use: Yes     Alcohol/week: 0.0 standard drinks     Comment: occasionally     Drug use: No     PHQ 11/13/2020 11/20/2020 1/14/2021   PHQ-9 Total Score 13 0 1   Q9: Thoughts of better off dead/self-harm past 2 weeks Not at all Not at all Not at all     MCKINLEY-7 SCORE 11/13/2020 11/20/2020 1/14/2021   Total Score 12 7 4     Last PHQ-9 1/14/2021   1.  Little interest or pleasure in doing things 0   2.  Feeling down, depressed, or hopeless 1   3.  Trouble falling or staying asleep, or sleeping too much 0   4.  Feeling tired or having little energy 0   5.  Poor appetite or overeating 0   6.  Feeling bad about yourself 0   7.  Trouble concentrating 0   8.  Moving slowly or restless 0   Q9: Thoughts of better off dead/self-harm past 2 weeks 0   PHQ-9  Total Score 1   Difficulty at work, home, or with people Somewhat difficult     MCKINLEY-7  1/14/2021   1. Feeling nervous, anxious, or on edge 1   2. Not being able to stop or control worrying 1   3. Worrying too much about different things 1   4. Trouble relaxing 0   5. Being so restless that it is hard to sit still 0   6. Becoming easily annoyed or irritable 0   7. Feeling afraid, as if something awful might happen 1   MCKINLEY-7 Total Score 4   If you checked any problems, how difficult have they made it for you to do your work, take care of things at home, or get along with other people? Somewhat difficult       Suicide Assessment Five-step Evaluation and Treatment (SAFE-T)      How many servings of fruits and vegetables do you eat daily?  2-3    On average, how many sweetened beverages do you drink each day (Examples: soda, juice, sweet tea, etc.  Do NOT count diet or artificially sweetened beverages)?   1    How many days per week do you exercise enough to make your heart beat faster? 7    How many minutes a day do you exercise enough to make your heart beat faster? 20 - 29    How many days per week do you miss taking your medication? 0    Insomnia  Onset/Duration: seen for last 3 months for insomnia    Description:   Frequency of insomnia:  Once a week  Time to fall asleep (sleep latency): 30 minutes  Middle of night awakening:  YES  Early morning awakening:  YES  Progression of Symptoms:  improving  Accompanying Signs & Symptoms:  Daytime sleepiness/napping: no  Excessive snoring/apnea: no  Restless legs: no  Waking to urinate: YES  Chronic pain:  no  Depression symptoms (if yes, do PHQ9): YES  Anxiety symptoms (if yes, do MCKINLEY-7): YES  History:  Prior Insomnia: YES  New stressful situation: no  Precipitating factors:   Caffeine intake: YES  OTC decongestants: no  Any new medications: no  Alleviating factors:  Self medicating (alcohol, etc.):  no  Stress-reduction (exercise, yoga, meditation etc): YES- yoga silver fit    Therapies tried and outcome: Tylenol pm -  not effective, Advil pm -  not effective and gabapentin works well - takes nightly.        She dies have her pulmonary consult scheduled with Dr Alicea next week.    Review of Systems   Constitutional, HEENT, cardiovascular, pulmonary, gi and gu systems are negative, except as otherwise noted.      Objective           Vitals:  No vitals were obtained today due to virtual visit.    Physical Exam   healthy, alert and no distress  PSYCH: Alert and oriented times 3; coherent speech, normal   rate and volume, able to articulate logical thoughts, able   to abstract reason, no tangential thoughts, no hallucinations   or delusions  Her affect is normal and pleasant  RESP: No cough, no audible wheezing, able to talk in full sentences  Remainder of exam unable to be completed due to telephone visits                Phone call duration: 11 minutes

## 2021-01-14 ENCOUNTER — VIRTUAL VISIT (OUTPATIENT)
Dept: FAMILY MEDICINE | Facility: OTHER | Age: 76
End: 2021-01-14
Attending: NURSE PRACTITIONER
Payer: COMMERCIAL

## 2021-01-14 VITALS — WEIGHT: 136 LBS | HEIGHT: 64 IN | BODY MASS INDEX: 23.22 KG/M2

## 2021-01-14 DIAGNOSIS — F51.01 PRIMARY INSOMNIA: ICD-10-CM

## 2021-01-14 DIAGNOSIS — F41.9 ANXIETY: Primary | ICD-10-CM

## 2021-01-14 DIAGNOSIS — F32.0 MILD MAJOR DEPRESSION (H): ICD-10-CM

## 2021-01-14 PROCEDURE — 99214 OFFICE O/P EST MOD 30 MIN: CPT | Mod: 95 | Performed by: NURSE PRACTITIONER

## 2021-01-14 ASSESSMENT — PATIENT HEALTH QUESTIONNAIRE - PHQ9: SUM OF ALL RESPONSES TO PHQ QUESTIONS 1-9: 1

## 2021-01-14 ASSESSMENT — ANXIETY QUESTIONNAIRES
7. FEELING AFRAID AS IF SOMETHING AWFUL MIGHT HAPPEN: SEVERAL DAYS
6. BECOMING EASILY ANNOYED OR IRRITABLE: NOT AT ALL
1. FEELING NERVOUS, ANXIOUS, OR ON EDGE: SEVERAL DAYS
IF YOU CHECKED OFF ANY PROBLEMS ON THIS QUESTIONNAIRE, HOW DIFFICULT HAVE THESE PROBLEMS MADE IT FOR YOU TO DO YOUR WORK, TAKE CARE OF THINGS AT HOME, OR GET ALONG WITH OTHER PEOPLE: SOMEWHAT DIFFICULT
3. WORRYING TOO MUCH ABOUT DIFFERENT THINGS: SEVERAL DAYS
4. TROUBLE RELAXING: NOT AT ALL
2. NOT BEING ABLE TO STOP OR CONTROL WORRYING: SEVERAL DAYS
GAD7 TOTAL SCORE: 4
5. BEING SO RESTLESS THAT IT IS HARD TO SIT STILL: NOT AT ALL

## 2021-01-14 ASSESSMENT — MIFFLIN-ST. JEOR: SCORE: 1091.89

## 2021-01-14 NOTE — NURSING NOTE
"Chief Complaint   Patient presents with     Depression     Anxiety     Insomnia       Initial Ht 1.626 m (5' 4\")   Wt 61.7 kg (136 lb)   BMI 23.34 kg/m   Estimated body mass index is 23.34 kg/m  as calculated from the following:    Height as of this encounter: 1.626 m (5' 4\").    Weight as of this encounter: 61.7 kg (136 lb).  Medication Reconciliation: complete  Pamela M. Lechevalier, LPN    "

## 2021-01-15 ASSESSMENT — ANXIETY QUESTIONNAIRES: GAD7 TOTAL SCORE: 4

## 2021-01-19 ENCOUNTER — TRANSFERRED RECORDS (OUTPATIENT)
Dept: HEALTH INFORMATION MANAGEMENT | Facility: CLINIC | Age: 76
End: 2021-01-19

## 2021-01-20 ENCOUNTER — MEDICAL CORRESPONDENCE (OUTPATIENT)
Dept: HEALTH INFORMATION MANAGEMENT | Facility: HOSPITAL | Age: 76
End: 2021-01-20

## 2021-01-20 DIAGNOSIS — R06.09 DOE (DYSPNEA ON EXERTION): Primary | ICD-10-CM

## 2021-02-05 ENCOUNTER — TELEPHONE (OUTPATIENT)
Dept: FAMILY MEDICINE | Facility: OTHER | Age: 76
End: 2021-02-05

## 2021-02-05 DIAGNOSIS — Z79.899 ON STATIN THERAPY: ICD-10-CM

## 2021-02-05 DIAGNOSIS — Z11.59 ENCOUNTER FOR HEPATITIS C SCREENING TEST FOR LOW RISK PATIENT: ICD-10-CM

## 2021-02-05 DIAGNOSIS — I10 BENIGN ESSENTIAL HYPERTENSION: ICD-10-CM

## 2021-02-05 DIAGNOSIS — E78.5 HYPERLIPIDEMIA WITH TARGET LDL LESS THAN 130: ICD-10-CM

## 2021-02-05 LAB
ALBUMIN SERPL-MCNC: 3.8 G/DL (ref 3.4–5)
ALP SERPL-CCNC: 83 U/L (ref 40–150)
ALT SERPL W P-5'-P-CCNC: 36 U/L (ref 0–50)
ANION GAP SERPL CALCULATED.3IONS-SCNC: 5 MMOL/L (ref 3–14)
AST SERPL W P-5'-P-CCNC: 29 U/L (ref 0–45)
BILIRUB SERPL-MCNC: 0.4 MG/DL (ref 0.2–1.3)
BUN SERPL-MCNC: 15 MG/DL (ref 7–30)
CALCIUM SERPL-MCNC: 9.5 MG/DL (ref 8.5–10.1)
CHLORIDE SERPL-SCNC: 108 MMOL/L (ref 94–109)
CHOLEST SERPL-MCNC: 204 MG/DL
CO2 SERPL-SCNC: 27 MMOL/L (ref 20–32)
CREAT SERPL-MCNC: 1.01 MG/DL (ref 0.52–1.04)
GFR SERPL CREATININE-BSD FRML MDRD: 54 ML/MIN/{1.73_M2}
GLUCOSE SERPL-MCNC: 88 MG/DL (ref 70–99)
HDLC SERPL-MCNC: 55 MG/DL
LDLC SERPL CALC-MCNC: 119 MG/DL
NONHDLC SERPL-MCNC: 149 MG/DL
POTASSIUM SERPL-SCNC: 4.1 MMOL/L (ref 3.4–5.3)
PROT SERPL-MCNC: 7.2 G/DL (ref 6.8–8.8)
SODIUM SERPL-SCNC: 140 MMOL/L (ref 133–144)
T4 FREE SERPL-MCNC: 0.87 NG/DL (ref 0.76–1.46)
TRIGL SERPL-MCNC: 149 MG/DL
TSH SERPL DL<=0.005 MIU/L-ACNC: 4.17 MU/L (ref 0.4–4)

## 2021-02-05 PROCEDURE — 84443 ASSAY THYROID STIM HORMONE: CPT | Mod: ZL | Performed by: NURSE PRACTITIONER

## 2021-02-05 PROCEDURE — 36415 COLL VENOUS BLD VENIPUNCTURE: CPT | Mod: ZL | Performed by: NURSE PRACTITIONER

## 2021-02-05 PROCEDURE — 84439 ASSAY OF FREE THYROXINE: CPT | Mod: ZL | Performed by: NURSE PRACTITIONER

## 2021-02-05 PROCEDURE — 80053 COMPREHEN METABOLIC PANEL: CPT | Mod: ZL | Performed by: NURSE PRACTITIONER

## 2021-02-05 PROCEDURE — 80061 LIPID PANEL: CPT | Mod: ZL | Performed by: NURSE PRACTITIONER

## 2021-02-05 PROCEDURE — 86803 HEPATITIS C AB TEST: CPT | Mod: ZL | Performed by: NURSE PRACTITIONER

## 2021-02-05 NOTE — TELEPHONE ENCOUNTER
Prior Authorization Retail Medication Request  [facility] KEY# [id number]    Medication/Dose: LIPITOR 20MG TABLETS  ICD code (if different than what is on RX):    Previously Tried and Failed:    Rationale:      Insurance Name:    Insurance ID:        Pharmacy Information (if different than what is on RX)  Name:    Phone:

## 2021-02-08 LAB — HCV AB SERPL QL IA: NONREACTIVE

## 2021-02-09 NOTE — TELEPHONE ENCOUNTER
Central Prior Authorization Team   Phone: [phone]      Prior Authorization Approval    Authorization Effective Date: 11/11/2020  Authorization Expiration Date: 2/9/2022  Medication: LIPITOR 20MG TABLETS - APPROVED  Approved Dose/Quantity: 135 FOR 90  Reference #:     Insurance Company: [employer] Part D - Phone [phone] Fax [phone]  Expected CoPay:       CoPay Card Available:      Foundation Assistance Needed:    Which Pharmacy is filling the prescription (Not needed for infusion/clinic administered): [id number] - [place] [address] AT [facility] [address]  Pharmacy Notified: Yes  Patient Notified: Yes (**Instructed pharmacy to notify patient when script is ready to /ship.**)

## 2021-02-09 NOTE — TELEPHONE ENCOUNTER
Central Prior Authorization Team   Phone: [phone]      PA Initiation    Medication: LIPITOR 20MG TABLETS - INITIATED  Insurance Company: [employer] Part D - Phone [phone] Fax [phone]  Pharmacy Filling the Rx: [id number] [address] [place] AT [facility] [address]  Filling Pharmacy Phone: [phone]  Filling Pharmacy Fax: [phone]  Start Date: 2/9/2021

## 2021-02-12 ENCOUNTER — TELEPHONE (OUTPATIENT)
Dept: FAMILY MEDICINE | Facility: OTHER | Age: 76
End: 2021-02-12

## 2021-02-12 ENCOUNTER — OFFICE VISIT (OUTPATIENT)
Dept: FAMILY MEDICINE | Facility: OTHER | Age: 76
End: 2021-02-12
Attending: NURSE PRACTITIONER
Payer: COMMERCIAL

## 2021-02-12 VITALS
HEIGHT: 64 IN | SYSTOLIC BLOOD PRESSURE: 118 MMHG | TEMPERATURE: 97 F | OXYGEN SATURATION: 98 % | WEIGHT: 137 LBS | HEART RATE: 52 BPM | DIASTOLIC BLOOD PRESSURE: 78 MMHG | BODY MASS INDEX: 23.39 KG/M2 | RESPIRATION RATE: 16 BRPM

## 2021-02-12 DIAGNOSIS — H61.23 EXCESSIVE CERUMEN IN BOTH EAR CANALS: ICD-10-CM

## 2021-02-12 DIAGNOSIS — Z79.899 ON STATIN THERAPY: ICD-10-CM

## 2021-02-12 DIAGNOSIS — E03.4 HYPOTHYROIDISM DUE TO ACQUIRED ATROPHY OF THYROID: ICD-10-CM

## 2021-02-12 DIAGNOSIS — R73.03 PREDIABETES: ICD-10-CM

## 2021-02-12 DIAGNOSIS — N18.31 STAGE 3A CHRONIC KIDNEY DISEASE (H): ICD-10-CM

## 2021-02-12 DIAGNOSIS — F41.1 GENERALIZED ANXIETY DISORDER: ICD-10-CM

## 2021-02-12 DIAGNOSIS — F51.01 PRIMARY INSOMNIA: ICD-10-CM

## 2021-02-12 DIAGNOSIS — I10 BENIGN ESSENTIAL HYPERTENSION: ICD-10-CM

## 2021-02-12 DIAGNOSIS — F32.0 MILD MAJOR DEPRESSION (H): ICD-10-CM

## 2021-02-12 DIAGNOSIS — E78.2 MIXED HYPERLIPIDEMIA: Primary | ICD-10-CM

## 2021-02-12 PROCEDURE — G0463 HOSPITAL OUTPT CLINIC VISIT: HCPCS

## 2021-02-12 PROCEDURE — 99214 OFFICE O/P EST MOD 30 MIN: CPT | Performed by: NURSE PRACTITIONER

## 2021-02-12 RX ORDER — GABAPENTIN 300 MG/1
300 CAPSULE ORAL AT BEDTIME
Qty: 90 CAPSULE | Refills: 1 | Status: SHIPPED | OUTPATIENT
Start: 2021-02-12 | End: 2021-08-23

## 2021-02-12 RX ORDER — LEVOTHYROXINE SODIUM 50 UG/1
50 TABLET ORAL DAILY
Qty: 90 TABLET | Refills: 1 | Status: SHIPPED | OUTPATIENT
Start: 2021-02-12 | End: 2021-08-23

## 2021-02-12 ASSESSMENT — PATIENT HEALTH QUESTIONNAIRE - PHQ9: SUM OF ALL RESPONSES TO PHQ QUESTIONS 1-9: 2

## 2021-02-12 ASSESSMENT — ANXIETY QUESTIONNAIRES
1. FEELING NERVOUS, ANXIOUS, OR ON EDGE: MORE THAN HALF THE DAYS
5. BEING SO RESTLESS THAT IT IS HARD TO SIT STILL: NOT AT ALL
IF YOU CHECKED OFF ANY PROBLEMS ON THIS QUESTIONNAIRE, HOW DIFFICULT HAVE THESE PROBLEMS MADE IT FOR YOU TO DO YOUR WORK, TAKE CARE OF THINGS AT HOME, OR GET ALONG WITH OTHER PEOPLE: SOMEWHAT DIFFICULT
4. TROUBLE RELAXING: SEVERAL DAYS
7. FEELING AFRAID AS IF SOMETHING AWFUL MIGHT HAPPEN: SEVERAL DAYS
6. BECOMING EASILY ANNOYED OR IRRITABLE: NOT AT ALL
GAD7 TOTAL SCORE: 7
2. NOT BEING ABLE TO STOP OR CONTROL WORRYING: SEVERAL DAYS
3. WORRYING TOO MUCH ABOUT DIFFERENT THINGS: MORE THAN HALF THE DAYS

## 2021-02-12 ASSESSMENT — MIFFLIN-ST. JEOR: SCORE: 1096.43

## 2021-02-12 ASSESSMENT — PAIN SCALES - GENERAL: PAINLEVEL: NO PAIN (0)

## 2021-02-12 NOTE — NURSING NOTE
"Chief Complaint   Patient presents with     Lipids     Hypertension     Thyroid Problem     Depression     Anxiety       Initial /78 (BP Location: Left arm, Patient Position: Chair, Cuff Size: Adult Regular)   Pulse 52   Temp 97  F (36.1  C) (Tympanic)   Resp 16   Ht 1.626 m (5' 4\")   Wt 62.1 kg (137 lb)   SpO2 98%   BMI 23.52 kg/m   Estimated body mass index is 23.52 kg/m  as calculated from the following:    Height as of this encounter: 1.626 m (5' 4\").    Weight as of this encounter: 62.1 kg (137 lb).  Medication Reconciliation: complete  Pamela M. Lechevalier, LPN    "

## 2021-02-12 NOTE — TELEPHONE ENCOUNTER
Faxed referral to [facility] ([place] location) at [phone]. Facility should contact patient to scheduled or she may call them at [phone]

## 2021-02-12 NOTE — TELEPHONE ENCOUNTER
1:37 PM    Reason for Call: Phone Call    Description: Pt called and stated that during her visit today it was agreed upon that she would be referred to psychiatry. Pt states that she received a call today from a place in [place] however this was not what was discussed. Please call pt back for further discussion.     Was an appointment offered for this call? No  If yes : Appointment type              Date    Preferred method for responding to this message: Telephone Call  What is your phone number ? [phone]    If we cannot reach you directly, may we leave a detailed response at the number you provided? Yes    Can this message wait until your PCP/provider returns, if available today? YES, No    Zayra Mart

## 2021-02-12 NOTE — PATIENT INSTRUCTIONS
Assessment & Plan     1. Mixed hyperlipidemia  - Lipid Profile; Future    2. Hypothyroidism due to acquired atrophy of thyroid  - TSH with free T4 reflex; Future  - levothyroxine (SYNTHROID/LEVOTHROID) 50 MCG tablet; Take 1 tablet (50 mcg) by mouth daily  Dispense: 90 tablet; Refill: 1    3. Prediabetes  - Hemoglobin A1c; Future    4. Benign essential hypertension  - Comprehensive metabolic panel; Future    5. Stage 3a chronic kidney disease    6. Mild major depression (H)  - MENTAL HEALTH REFERRAL  - Adult; Outpatient Treatment; Individual/Couples/Family/Group Therapy/Health Psychology; Other: [facility] [phone]; We will contact you to schedule the appointment or please call with any questions    7. Generalized anxiety disorder  - MENTAL HEALTH REFERRAL  - Adult; Outpatient Treatment; Individual/Couples/Family/Group Therapy/Health Psychology; Other: [facility] [phone]; We will contact you to schedule the appointment or please call with any questions  - gabapentin (NEURONTIN) 300 MG capsule; Take 1 capsule (300 mg) by mouth At Bedtime  Dispense: 90 capsule; Refill: 1    8. On statin therapy  - Comprehensive metabolic panel; Future    9. Primary insomnia  - gabapentin (NEURONTIN) 300 MG capsule; Take 1 capsule (300 mg) by mouth At Bedtime  Dispense: 90 capsule; Refill: 1    10. Excessive cerumen in both ear canals  [facility]   - OTOLARYNGOLOGY REFERRAL        Review of prior external note(s) from - Outside records from [facility] - pulmonology             Return in about 3 months (around 5/12/2021) for thyroid, anxiety/depression, HTN, and lipids.    Brittney Coe NP  [facility] [place] [facility]

## 2021-02-13 ASSESSMENT — ANXIETY QUESTIONNAIRES: GAD7 TOTAL SCORE: 7

## 2021-02-17 NOTE — PATIENT INSTRUCTIONS
Thank you for allowing Mirtha Plascencia NP and our ENT team to participate in your care.  If your medications are too expensive, please give the nurse a call.  We can possibly change this medication.  If you have a scheduling or an appointment question please contact our Health Unit Coordinator at their direct line [phone].   ALL nursing questions or concerns can be directed to your ENT nurse at: [phone] Yara Phoenix  Try afrin nasal spray the day before and day of flying to help with congestion - follow package directions    Use 1/2 distilled water and 1/2 white vinegar mixed in equal parts, instill several drops into the ear, let sit for about 5 minutes and then let drain out.  Dry the ear with a cool hairdryer as needed    Follow up as needed for ear cleaning

## 2021-02-19 ENCOUNTER — OFFICE VISIT (OUTPATIENT)
Dept: OTOLARYNGOLOGY | Facility: OTHER | Age: 76
End: 2021-02-19
Attending: NURSE PRACTITIONER
Payer: MEDICARE

## 2021-02-19 VITALS
WEIGHT: 136 LBS | BODY MASS INDEX: 23.22 KG/M2 | HEIGHT: 64 IN | OXYGEN SATURATION: 98 % | HEART RATE: 52 BPM | TEMPERATURE: 97.6 F | RESPIRATION RATE: 16 BRPM | SYSTOLIC BLOOD PRESSURE: 148 MMHG | DIASTOLIC BLOOD PRESSURE: 84 MMHG

## 2021-02-19 DIAGNOSIS — H61.22 EXCESSIVE CERUMEN IN EAR CANAL, LEFT: Primary | ICD-10-CM

## 2021-02-19 DIAGNOSIS — H61.21 IMPACTED CERUMEN OF RIGHT EAR: ICD-10-CM

## 2021-02-19 PROCEDURE — 92504 EAR MICROSCOPY EXAMINATION: CPT | Performed by: NURSE PRACTITIONER

## 2021-02-19 PROCEDURE — 69210 REMOVE IMPACTED EAR WAX UNI: CPT | Performed by: NURSE PRACTITIONER

## 2021-02-19 PROCEDURE — G0463 HOSPITAL OUTPT CLINIC VISIT: HCPCS

## 2021-02-19 PROCEDURE — 99212 OFFICE O/P EST SF 10 MIN: CPT | Mod: 25 | Performed by: NURSE PRACTITIONER

## 2021-02-19 ASSESSMENT — PAIN SCALES - GENERAL: PAINLEVEL: NO PAIN (0)

## 2021-02-19 ASSESSMENT — MIFFLIN-ST. JEOR: SCORE: 1091.89

## 2021-02-19 NOTE — NURSING NOTE
"Chief Complaint   Patient presents with     Cerumen Impaction     pt referred by excessive cerumen bilat       Initial BP (!) 148/84   Pulse 52   Temp 97.6  F (36.4  C) (Tympanic)   Resp 16   Ht 1.626 m (5' 4\")   Wt 61.7 kg (136 lb)   SpO2 98%   BMI 23.34 kg/m   Estimated body mass index is 23.34 kg/m  as calculated from the following:    Height as of this encounter: 1.626 m (5' 4\").    Weight as of this encounter: 61.7 kg (136 lb).  Medication Reconciliation: complete  Lizett Stanley LPN  "

## 2021-02-19 NOTE — LETTER
2/19/2021         RE: Franca Angeles  [address]  [facility] [address]        Dear Colleague,    Thank you for referring your patient, Franca Angeles, to the [facility]. Please see a copy of my visit note below.    Otolaryngology Note         Chief Complaint:     Patient presents with:  Cerumen Impaction: pt referred by excessive cerumen bilat           History of Present Illness:     Franca Angeles is a 75 year old female who presents today for ear cleaning.  She is concerned that her hearing has gradually decreased over time.      She has last had ears cleaned about 3 ago.   Minor tinnitus not bothersome  No vertigo, facial numbness or weakness.      No otalgia, otorrhea.    She had multiple OM as a child, No hx of COM or otologic surgeries.   She used to feel a change in her eustachian tube changes when she goes up and down hills  She does have ear pain with flying, uses ear plugs.   No flux heairng of aural fullness.          Medications:     Current Outpatient Rx   Medication Sig Dispense Refill     aspirin 81 MG tablet Take 81 mg by mouth daily       atorvastatin (LIPITOR) 20 MG tablet Take 1.5 tablets (30 mg) by mouth daily 135 tablet 3     Calcium-Magnesium-Vitamin D (CALCIUM 500 PO) Take by mouth daily       cinnamon 500 MG CAPS        co-enzyme Q-10 100 MG CAPS capsule Take 200 mg by mouth       gabapentin (NEURONTIN) 300 MG capsule Take 1 capsule (300 mg) by mouth At Bedtime 90 capsule 1     Garlic 2000 MG CAPS Take 2,000 mg by mouth daily       GLUCOSAMINE-CHONDROITIN PO Take  by mouth. GLUCOSAMINE HCL/CHONDR SUA NA  One daily       levothyroxine (SYNTHROID/LEVOTHROID) 50 MCG tablet Take 1 tablet (50 mcg) by mouth daily 90 tablet 1     lisinopril (ZESTRIL) 20 MG tablet Take 1 tablet (20 mg) by mouth daily 90 tablet 1     LORazepam (ATIVAN) 0.5 MG tablet Take 1 tablet (0.5 mg) by mouth nightly as needed for anxiety or sleep 30 tablet 0     Multiple  "Vitamins-Minerals (MULTIVITAMIN OR) Alive 1 tablet       Omega-3 Fatty Acids (FISH OIL) 1200 MG CAPS Take 1,080 mg by mouth daily        Turmeric Curcumin 500 MG CAPS Take 1 capsule by mouth daily              Allergies:     Allergies: Pseudoephedrine hcl, Hydroxyzine, Mucinex, and Viibryd          Past Medical History:     Past Medical History:   Diagnosis Date     Esophageal reflux 5/10/2011            Past Surgical History:     Past Surgical History:   Procedure Laterality Date     ARTHRODESIS KNEE  [id number]    left knee, paritla medial meniscectomy, debridement medial femoral condyle and patellofemoral debridement     COLONOSCOPY  2004    nml     COLONOSCOPY  7/23/2014    Procedure: COLONOSCOPY;  Surgeon: Nghia Hernandez DO;  Location: [facility]     CYSTOSCOPY      micro hematuria neg     DILATION AND CURETTAGE       EXCISE LESION UPPER EXTREMITY Right 10/25/2017    Procedure: EXCISE LESION UPPER EXTREMITY;  EXCISIONAL BIOPSY RIGHT FOREARM TIMES 2;  Surgeon: Nghia Hernandez DO;  Location: [facility]     tubal sterilization       uterine fibroids       wrist fx RT         ENT family history reviewed         Social History:     Social History     Tobacco Use     Smoking status: Never Smoker     Smokeless tobacco: Never Used   Substance Use Topics     Alcohol use: Yes     Alcohol/week: 0.0 standard drinks     Comment: occasionally     Drug use: No            Review of Systems:     ROS: See HPI         Physical Exam:     BP (!) 148/84   Pulse 52   Temp 97.6  F (36.4  C) (Tympanic)   Resp 16   Ht 1.626 m (5' 4\")   Wt 61.7 kg (136 lb)   SpO2 98%   BMI 23.34 kg/m      General - The patient is well nourished and well developed, and appears to have good nutritional status.  Alert and oriented to person and place, answers questions and cooperates with examination appropriately.   Head and Face - Normocephalic and atraumatic, with no gross asymmetry noted.  The facial nerve is intact, with strong symmetric " movements.  Voice and Breathing - The patient was breathing comfortably without the use of accessory muscles. There was no wheezing, stridor. The patients voice was clear and strong, and had appropriate pitch and quality.  Ears - The ears were examined with binocular microscopy and with otoscope.  External ears normal. Right canal cerumen impacted.  Left canal cerumen filled.  The right ear was cleaned with cupped forceps and #7 sucker.   Right tympanic membrane is intact without effusion, retraction or mass. The left ear was cleaned with cupped forceps.  Left tympanic membrane is intact without effusion, retraction or mass.  Eyes - Extraocular movements intact, sclera were not icteric or injected, conjunctiva were pink and moist.  Mouth - Examination of the oral cavity showed pink, healthy oral mucosa. Dentition in good condition. No lesions or ulcerations noted. The tongue was mobile and midline.   Throat - The walls of the oropharynx were smooth, pink, moist, symmetric, and had no lesions or ulcerations.  The tonsillar pillars and soft palate were symmetric. The uvula was midline on elevation.    Neck - Full range of motion on passive movement.  Palpation of the occipital, submental, submandibular, internal jugular chain, and supraclavicular nodes did not demonstrate any abnormal lymph nodes or masses.  Palpation of the thyroid was soft and smooth, with no nodules or goiter appreciated.  The trachea was mobile and midline.           Assessment and Plan:       ICD-10-CM    1. Excessive cerumen in ear canal, left  H61.22    2. Impacted cerumen of right ear  H61.21      Ears were cleaned, tolerated well    The ears were cleaned today.  The patient may return here as needed or with their primary physician.  Aural hygiene was discussed.  Avoidance of Q-tips was reiterated.  Avoid flushing the ear canal if there is a possibility of a hole or perforation in the tympanic membrane.   If there are any unresolved concerns  regarding hearing loss an audiogram is recommended.      Mirtha MARCOS  [facility]        Again, thank you for allowing me to participate in the care of your patient.        Sincerely,        Mirtha Plascencia NP

## 2021-02-19 NOTE — PROGRESS NOTES
Otolaryngology Note         Chief Complaint:     Patient presents with:  Cerumen Impaction: pt referred by lisa abrahamn natacha           History of Present Illness:     Franca Angeles is a 75 year old female who presents today for ear cleaning.  She is concerned that her hearing has gradually decreased over time.      She has last had ears cleaned about 3 ago.   Minor tinnitus not bothersome  No vertigo, facial numbness or weakness.      No otalgia, otorrhea.    She had multiple OM as a child, No hx of COM or otologic surgeries.   She used to feel a change in her eustachian tube changes when she goes up and down hills  She does have ear pain with flying, uses ear plugs.   No flux heairng of aural fullness.          Medications:     Current Outpatient Rx   Medication Sig Dispense Refill     aspirin 81 MG tablet Take 81 mg by mouth daily       atorvastatin (LIPITOR) 20 MG tablet Take 1.5 tablets (30 mg) by mouth daily 135 tablet 3     Calcium-Magnesium-Vitamin D (CALCIUM 500 PO) Take by mouth daily       cinnamon 500 MG CAPS        co-enzyme Q-10 100 MG CAPS capsule Take 200 mg by mouth       gabapentin (NEURONTIN) 300 MG capsule Take 1 capsule (300 mg) by mouth At Bedtime 90 capsule 1     Garlic 2000 MG CAPS Take 2,000 mg by mouth daily       GLUCOSAMINE-CHONDROITIN PO Take  by mouth. GLUCOSAMINE HCL/CHONDR SUA NA  One daily       levothyroxine (SYNTHROID/LEVOTHROID) 50 MCG tablet Take 1 tablet (50 mcg) by mouth daily 90 tablet 1     lisinopril (ZESTRIL) 20 MG tablet Take 1 tablet (20 mg) by mouth daily 90 tablet 1     LORazepam (ATIVAN) 0.5 MG tablet Take 1 tablet (0.5 mg) by mouth nightly as needed for anxiety or sleep 30 tablet 0     Multiple Vitamins-Minerals (MULTIVITAMIN OR) Alive 1 tablet       Omega-3 Fatty Acids (FISH OIL) 1200 MG CAPS Take 1,080 mg by mouth daily        Turmeric Curcumin 500 MG CAPS Take 1 capsule by mouth daily              Allergies:     Allergies: Pseudoephedrine hcl, Hydroxyzine,  "Mucinex, and Viibryd          Past Medical History:     Past Medical History:   Diagnosis Date     Esophageal reflux 5/10/2011            Past Surgical History:     Past Surgical History:   Procedure Laterality Date     ARTHRODESIS KNEE  [id number]    left knee, paritla medial meniscectomy, debridement medial femoral condyle and patellofemoral debridement     COLONOSCOPY  2004    nml     COLONOSCOPY  7/23/2014    Procedure: COLONOSCOPY;  Surgeon: Nghia Hernandez DO;  Location: [facility]     CYSTOSCOPY      micro hematuria neg     DILATION AND CURETTAGE       EXCISE LESION UPPER EXTREMITY Right 10/25/2017    Procedure: EXCISE LESION UPPER EXTREMITY;  EXCISIONAL BIOPSY RIGHT FOREARM TIMES 2;  Surgeon: Nghia Hernandez DO;  Location: [facility]     tubal sterilization       uterine fibroids       wrist fx RT         ENT family history reviewed         Social History:     Social History     Tobacco Use     Smoking status: Never Smoker     Smokeless tobacco: Never Used   Substance Use Topics     Alcohol use: Yes     Alcohol/week: 0.0 standard drinks     Comment: occasionally     Drug use: No            Review of Systems:     ROS: See HPI         Physical Exam:     BP (!) 148/84   Pulse 52   Temp 97.6  F (36.4  C) (Tympanic)   Resp 16   Ht 1.626 m (5' 4\")   Wt 61.7 kg (136 lb)   SpO2 98%   BMI 23.34 kg/m      General - The patient is well nourished and well developed, and appears to have good nutritional status.  Alert and oriented to person and place, answers questions and cooperates with examination appropriately.   Head and Face - Normocephalic and atraumatic, with no gross asymmetry noted.  The facial nerve is intact, with strong symmetric movements.  Voice and Breathing - The patient was breathing comfortably without the use of accessory muscles. There was no wheezing, stridor. The patients voice was clear and strong, and had appropriate pitch and quality.  Ears - The ears were examined with binocular microscopy " and with otoscope.  External ears normal. Right canal cerumen impacted.  Left canal cerumen filled.  The right ear was cleaned with cupped forceps and #7 sucker.   Right tympanic membrane is intact without effusion, retraction or mass. The left ear was cleaned with cupped forceps.  Left tympanic membrane is intact without effusion, retraction or mass.  Eyes - Extraocular movements intact, sclera were not icteric or injected, conjunctiva were pink and moist.  Mouth - Examination of the oral cavity showed pink, healthy oral mucosa. Dentition in good condition. No lesions or ulcerations noted. The tongue was mobile and midline.   Throat - The walls of the oropharynx were smooth, pink, moist, symmetric, and had no lesions or ulcerations.  The tonsillar pillars and soft palate were symmetric. The uvula was midline on elevation.    Neck - Full range of motion on passive movement.  Palpation of the occipital, submental, submandibular, internal jugular chain, and supraclavicular nodes did not demonstrate any abnormal lymph nodes or masses.  Palpation of the thyroid was soft and smooth, with no nodules or goiter appreciated.  The trachea was mobile and midline.           Assessment and Plan:       ICD-10-CM    1. Excessive cerumen in ear canal, left  H61.22    2. Impacted cerumen of right ear  H61.21      Ears were cleaned, tolerated well    The ears were cleaned today.  The patient may return here as needed or with their primary physician.  Aural hygiene was discussed.  Avoidance of Q-tips was reiterated.  Avoid flushing the ear canal if there is a possibility of a hole or perforation in the tympanic membrane.   If there are any unresolved concerns regarding hearing loss an audiogram is recommended.      Mirtha MARCOS  [facility]

## 2021-03-05 ENCOUNTER — TELEPHONE (OUTPATIENT)
Dept: FAMILY MEDICINE | Facility: OTHER | Age: 76
End: 2021-03-05

## 2021-03-05 DIAGNOSIS — E78.2 MIXED HYPERLIPIDEMIA: Primary | ICD-10-CM

## 2021-04-14 NOTE — PROGRESS NOTES
Assessment & Plan     1. Prediabetes  Normal A1c today  - Hemoglobin A1c; Future    2. Mixed hyperlipidemia  Continue lipitor  - Lipid Profile; Future    3. Hypothyroidism due to acquired atrophy of thyroid  No dose change  - TSH with free T4 reflex; Future    4. Benign essential hypertension  Continue current plan  - Comprehensive metabolic panel; Future  - lisinopril (ZESTRIL) 20 MG tablet; Take 1 tablet (20 mg) by mouth daily  Dispense: 90 tablet; Refill: 1    5. On statin therapy  - Comprehensive metabolic panel; Future    6. Stage 3a chronic kidney disease  Do not use NSAIDS    7. Hyperlipidemia with target LDL less than 130  - atorvastatin (LIPITOR) 20 MG tablet; Take 1.5 tablets (30 mg) by mouth daily  Dispense: 135 tablet; Refill: 3    8. Anxiety  - LORazepam (ATIVAN) 0.5 MG tablet; Take 1 tablet (0.5 mg) by mouth nightly as needed for anxiety or sleep  Dispense: 30 tablet; Refill: 0    9. Primary insomnia  - LORazepam (ATIVAN) 0.5 MG tablet; Take 1 tablet (0.5 mg) by mouth nightly as needed for anxiety or sleep  Dispense: 30 tablet; Refill: 0      Review of prior external note(s) from - CareEverywhere information from [facility] reviewed  Review of the result(s) of each unique test - labs below.         Return in about 3 months (around 8/12/2021) for pre-diabetes, lipids, HTN, anxiety/depression.    Brittney Coe, NP  [facility] - [place] MITRA Schulte is a 75 year old who presents for the following health issues     HPI     Hyperlipidemia Follow-Up      Are you regularly taking any medication or supplement to lower your cholesterol?   Yes- Lipitor    Are you having muscle aches or other side effects that you think could be caused by your cholesterol lowering medication?  No    Hypertension Follow-up      Do you check your blood pressure regularly outside of the clinic? Yes     Are you following a low salt diet? Yes    Are your blood pressures ever more than 140  on the top number (systolic) OR more   than 90 on the bottom number (diastolic), for example 140/90? No    Depression and Anxiety Follow-Up    How are you doing with your depression since your last visit? Improved     How are you doing with your anxiety since your last visit?  Improved     Are you having other symptoms that might be associated with depression or anxiety? Yes:  bouts of crying a few better than before     Have you had a significant life event? No     Do you have any concerns with your use of alcohol or other drugs? No    Social History     Tobacco Use     Smoking status: Never Smoker     Smokeless tobacco: Never Used   Substance Use Topics     Alcohol use: Yes     Alcohol/week: 0.0 standard drinks     Comment: occasionally     Drug use: No     PHQ 1/14/2021 2/12/2021 5/12/2021   PHQ-9 Total Score 1 2 2   Q9: Thoughts of better off dead/self-harm past 2 weeks Not at all Not at all Not at all     MCKINLEY-7 SCORE 1/14/2021 2/12/2021 5/12/2021   Total Score 4 7 4     Last PHQ-9 5/12/2021   1.  Little interest or pleasure in doing things 0   2.  Feeling down, depressed, or hopeless 0   3.  Trouble falling or staying asleep, or sleeping too much 2   4.  Feeling tired or having little energy 0   5.  Poor appetite or overeating 0   6.  Feeling bad about yourself 0   7.  Trouble concentrating 0   8.  Moving slowly or restless 0   Q9: Thoughts of better off dead/self-harm past 2 weeks 0   PHQ-9 Total Score 2   Difficulty at work, home, or with people Not difficult at all     MCKINLEY-7  5/12/2021   1. Feeling nervous, anxious, or on edge 1   2. Not being able to stop or control worrying 1   3. Worrying too much about different things 1   4. Trouble relaxing 0   5. Being so restless that it is hard to sit still 0   6. Becoming easily annoyed or irritable 0   7. Feeling afraid, as if something awful might happen 1   MCKINLEY-7 Total Score 4   If you checked any problems, how difficult have they made it for you to do your  work, take care of things at home, or get along with other people? Not difficult at all       Suicide Assessment Five-step Evaluation and Treatment (SAFE-T)    Chronic Kidney Disease Follow-up      Do you take any over the counter pain medicine?: No    Hypothyroidism Follow-up      Since last visit, patient describes the following symptoms: constipation      How many servings of fruits and vegetables do you eat daily?  2-3    On average, how many sweetened beverages do you drink each day (Examples: soda, juice, sweet tea, etc.  Do NOT count diet or artificially sweetened beverages)?   0    How many days per week do you exercise enough to make your heart beat faster? 5    How many minutes a day do you exercise enough to make your heart beat faster? 30 - 60    How many days per week do you miss taking your medication? 0    She is doing well, no new concerns.  She did meet with pulmonology - normal lung function; he does want to follow-up in 6 months.    Patient Active Problem List   Diagnosis     Menopause     Joint pain     Disorder of bone and cartilage     Advanced care planning/counseling discussion     Routine general medical examination at a health care facility (ANNUAL)     Colon cancer screening     Hypothyroidism due to acquired atrophy of thyroid     Benign essential hypertension     CKD (chronic kidney disease) stage 3, GFR 30-59 ml/min     AK (actinic keratosis)     Mild major depression (H)     Generalized anxiety disorder     Mixed hyperlipidemia     LBBB (left bundle branch block)     Dyspnea on exertion     Prediabetes     Past Surgical History:   Procedure Laterality Date     ARTHRODESIS KNEE  [id number]    left knee, paritla medial meniscectomy, debridement medial femoral condyle and patellofemoral debridement     COLONOSCOPY  2004    nml     COLONOSCOPY  7/23/2014    Procedure: COLONOSCOPY;  Surgeon: Nghia Hernandez DO;  Location: [facility]     CYSTOSCOPY      micro hematuria neg     DILATION AND  CURETTAGE       EXCISE LESION UPPER EXTREMITY Right 10/25/2017    Procedure: EXCISE LESION UPPER EXTREMITY;  EXCISIONAL BIOPSY RIGHT FOREARM TIMES 2;  Surgeon: Nghia Hernandez DO;  Location: [facility]     tubal sterilization       uterine fibroids       wrist fx RT         Social History     Tobacco Use     Smoking status: Never Smoker     Smokeless tobacco: Never Used   Substance Use Topics     Alcohol use: Yes     Alcohol/week: 0.0 standard drinks     Comment: occasionally     Family History   Problem Relation Age of Onset     C.A.D. Mother      Other - See Comments Mother         high platelets     Hypertension Mother      C.A.D. Paternal Aunt      Diabetes Other         aunt     Cardiovascular Father         cardiovascular disease     Colon Cancer Other         family history-paternal side     Cerebrovascular Disease Maternal Grandmother      Cerebrovascular Disease Maternal Grandfather          Current Outpatient Medications   Medication Sig Dispense Refill     aspirin 81 MG tablet Take 81 mg by mouth daily       atorvastatin (LIPITOR) 20 MG tablet Take 1.5 tablets (30 mg) by mouth daily 135 tablet 3     Calcium-Magnesium-Vitamin D (CALCIUM 500 PO) Take by mouth daily       cinnamon 500 MG CAPS        co-enzyme Q-10 100 MG CAPS capsule Take 200 mg by mouth       gabapentin (NEURONTIN) 300 MG capsule Take 1 capsule (300 mg) by mouth At Bedtime 90 capsule 1     Garlic 2000 MG CAPS Take 2,000 mg by mouth daily       GLUCOSAMINE-CHONDROITIN PO Take  by mouth. GLUCOSAMINE HCL/CHONDR SUA NA  One daily       levothyroxine (SYNTHROID/LEVOTHROID) 50 MCG tablet Take 1 tablet (50 mcg) by mouth daily 90 tablet 1     lisinopril (ZESTRIL) 20 MG tablet Take 1 tablet (20 mg) by mouth daily 90 tablet 1     LORazepam (ATIVAN) 0.5 MG tablet Take 1 tablet (0.5 mg) by mouth nightly as needed for anxiety or sleep 30 tablet 0     Multiple Vitamins-Minerals (MULTIVITAMIN OR) Alive 1 tablet       Omega-3 Fatty Acids (FISH OIL) 1200 MG  "CAPS Take 1,080 mg by mouth daily        psyllium (METAMUCIL/KONSYL) capsule Take 1 capsule by mouth daily       Turmeric Curcumin 500 MG CAPS Take 1 capsule by mouth daily       Allergies   Allergen Reactions     Pseudoephedrine Hcl Other (See Comments)     Heart racing  Sudafed     Hydroxyzine Anxiety     jittery     Mucinex Palpitations     Viibryd Anxiety     Restless legs     Recent Labs   Lab Test 05/10/21  0801 02/05/21  0809 11/11/20  0807 10/02/20  1527   A1C 5.6  --  5.7* 5.6   * 119* 87  --    HDL 58 55 74  --    TRIG 138 149 142  --    ALT 46 36 39 40   CR 1.04 1.01 1.01 0.94   GFRESTIMATED 52* 54* 54* 59*   GFRESTBLACK 60* 62 63 68   POTASSIUM 4.3 4.1 4.0 3.7   TSH 3.79 4.17* 3.49 2.39      BP Readings from Last 3 Encounters:   05/12/21 108/54   02/19/21 (!) 148/84   02/12/21 118/78    Wt Readings from Last 3 Encounters:   05/12/21 61.7 kg (136 lb)   02/19/21 61.7 kg (136 lb)   02/12/21 62.1 kg (137 lb)                      Review of Systems   Constitutional, HEENT, cardiovascular, pulmonary, gi and gu systems are negative, except as otherwise noted.      Objective    /54 (BP Location: Right arm, Patient Position: Chair, Cuff Size: Adult Regular)   Pulse 57   Temp 98.6  F (37  C) (Tympanic)   Resp 16   Ht 1.588 m (5' 2.5\")   Wt 61.7 kg (136 lb)   SpO2 98%   BMI 24.48 kg/m    Body mass index is 24.48 kg/m .  Physical Exam   GENERAL: healthy, alert and no distress  NECK: no adenopathy, no asymmetry, masses, or scars, thyroid normal to palpation and no carotid bruits  RESP: lungs clear to auscultation - no rales, rhonchi or wheezes  CV: regular rate and rhythm, normal S1 S2, no S3 or S4, no murmur, click or rub, no peripheral edema and peripheral pulses strong  MS: no gross musculoskeletal defects noted, no edema  PSYCH: mentation appears normal, affect normal/bright    Orders Only on 05/10/2021   Component Date Value Ref Range Status     TSH 05/10/2021 3.79  0.40 - 4.00 mU/L Final     " Sodium 05/10/2021 140  133 - 144 mmol/L Final     Potassium 05/10/2021 4.3  3.4 - 5.3 mmol/L Final     Chloride 05/10/2021 107  94 - 109 mmol/L Final     Carbon Dioxide 05/10/2021 26  20 - 32 mmol/L Final     Anion Gap 05/10/2021 7  3 - 14 mmol/L Final     Glucose 05/10/2021 91  70 - 99 mg/dL Final    Fasting specimen     Urea Nitrogen 05/10/2021 17  7 - 30 mg/dL Final     Creatinine 05/10/2021 1.04  0.52 - 1.04 mg/dL Final     GFR Estimate 05/10/2021 52* >60 mL/min/[1.73_m2] Final    Comment: Non  GFR Calc  Starting 12/18/2018, serum creatinine based estimated GFR (eGFR) will be   calculated using the Chronic Kidney Disease Epidemiology Collaboration   (CKD-EPI) equation.       GFR Estimate If Black 05/10/2021 60* >60 mL/min/[1.73_m2] Final    Comment:  GFR Calc  Starting 12/18/2018, serum creatinine based estimated GFR (eGFR) will be   calculated using the Chronic Kidney Disease Epidemiology Collaboration   (CKD-EPI) equation.       Calcium 05/10/2021 9.5  8.5 - 10.1 mg/dL Final     Bilirubin Total 05/10/2021 0.4  0.2 - 1.3 mg/dL Final     Albumin 05/10/2021 3.9  3.4 - 5.0 g/dL Final     Protein Total 05/10/2021 7.3  6.8 - 8.8 g/dL Final     Alkaline Phosphatase 05/10/2021 81  40 - 150 U/L Final     ALT 05/10/2021 46  0 - 50 U/L Final     AST 05/10/2021 47* 0 - 45 U/L Final     Cholesterol 05/10/2021 191  <200 mg/dL Final     Triglycerides 05/10/2021 138  <150 mg/dL Final    Fasting specimen     HDL Cholesterol 05/10/2021 58  >49 mg/dL Final     LDL Cholesterol Calculated 05/10/2021 105* <100 mg/dL Final    Comment: Above desirable:  100-129 mg/dl  Borderline High:  130-159 mg/dL  High:             160-189 mg/dL  Very high:       >189 mg/dl       Non HDL Cholesterol 05/10/2021 133* <130 mg/dL Final    Comment: Above Desirable:  130-159 mg/dl  Borderline high:  160-189 mg/dl  High:             190-219 mg/dl  Very high:       >219 mg/dl       Hemoglobin A1C 05/10/2021 5.6  0 - 5.6 %  Final    Comment: Normal <5.7% Prediabetes 5.7-6.4%  Diabetes 6.5% or higher - adopted from ADA   consensus guidelines.       Estimated Average Glucose 05/10/2021 114  mg/dL Final

## 2021-04-18 ENCOUNTER — HEALTH MAINTENANCE LETTER (OUTPATIENT)
Age: 76
End: 2021-04-18

## 2021-05-10 DIAGNOSIS — I10 BENIGN ESSENTIAL HYPERTENSION: ICD-10-CM

## 2021-05-10 DIAGNOSIS — R73.03 PREDIABETES: ICD-10-CM

## 2021-05-10 DIAGNOSIS — E78.2 MIXED HYPERLIPIDEMIA: ICD-10-CM

## 2021-05-10 DIAGNOSIS — E03.4 HYPOTHYROIDISM DUE TO ACQUIRED ATROPHY OF THYROID: ICD-10-CM

## 2021-05-10 DIAGNOSIS — Z79.899 ON STATIN THERAPY: ICD-10-CM

## 2021-05-10 LAB
ALBUMIN SERPL-MCNC: 3.9 G/DL (ref 3.4–5)
ALP SERPL-CCNC: 81 U/L (ref 40–150)
ALT SERPL W P-5'-P-CCNC: 46 U/L (ref 0–50)
ANION GAP SERPL CALCULATED.3IONS-SCNC: 7 MMOL/L (ref 3–14)
AST SERPL W P-5'-P-CCNC: 47 U/L (ref 0–45)
BILIRUB SERPL-MCNC: 0.4 MG/DL (ref 0.2–1.3)
BUN SERPL-MCNC: 17 MG/DL (ref 7–30)
CALCIUM SERPL-MCNC: 9.5 MG/DL (ref 8.5–10.1)
CHLORIDE SERPL-SCNC: 107 MMOL/L (ref 94–109)
CHOLEST SERPL-MCNC: 191 MG/DL
CO2 SERPL-SCNC: 26 MMOL/L (ref 20–32)
CREAT SERPL-MCNC: 1.04 MG/DL (ref 0.52–1.04)
EST. AVERAGE GLUCOSE BLD GHB EST-MCNC: 114 MG/DL
GFR SERPL CREATININE-BSD FRML MDRD: 52 ML/MIN/{1.73_M2}
GLUCOSE SERPL-MCNC: 91 MG/DL (ref 70–99)
HBA1C MFR BLD: 5.6 % (ref 0–5.6)
HDLC SERPL-MCNC: 58 MG/DL
LDLC SERPL CALC-MCNC: 105 MG/DL
NONHDLC SERPL-MCNC: 133 MG/DL
POTASSIUM SERPL-SCNC: 4.3 MMOL/L (ref 3.4–5.3)
PROT SERPL-MCNC: 7.3 G/DL (ref 6.8–8.8)
SODIUM SERPL-SCNC: 140 MMOL/L (ref 133–144)
TRIGL SERPL-MCNC: 138 MG/DL
TSH SERPL DL<=0.005 MIU/L-ACNC: 3.79 MU/L (ref 0.4–4)

## 2021-05-10 PROCEDURE — 36415 COLL VENOUS BLD VENIPUNCTURE: CPT | Mod: ZL | Performed by: NURSE PRACTITIONER

## 2021-05-10 PROCEDURE — 84443 ASSAY THYROID STIM HORMONE: CPT | Mod: ZL | Performed by: NURSE PRACTITIONER

## 2021-05-10 PROCEDURE — 80053 COMPREHEN METABOLIC PANEL: CPT | Mod: ZL | Performed by: NURSE PRACTITIONER

## 2021-05-10 PROCEDURE — 80061 LIPID PANEL: CPT | Mod: ZL | Performed by: NURSE PRACTITIONER

## 2021-05-10 PROCEDURE — 999N001182 HC STATISTIC ESTIMATED AVERAGE GLUCOSE: Mod: ZL | Performed by: NURSE PRACTITIONER

## 2021-05-10 PROCEDURE — 83036 HEMOGLOBIN GLYCOSYLATED A1C: CPT | Mod: ZL | Performed by: NURSE PRACTITIONER

## 2021-05-12 ENCOUNTER — OFFICE VISIT (OUTPATIENT)
Dept: FAMILY MEDICINE | Facility: OTHER | Age: 76
End: 2021-05-12
Attending: NURSE PRACTITIONER
Payer: COMMERCIAL

## 2021-05-12 VITALS
WEIGHT: 136 LBS | OXYGEN SATURATION: 98 % | DIASTOLIC BLOOD PRESSURE: 54 MMHG | HEIGHT: 63 IN | BODY MASS INDEX: 24.1 KG/M2 | SYSTOLIC BLOOD PRESSURE: 108 MMHG | TEMPERATURE: 98.6 F | HEART RATE: 57 BPM | RESPIRATION RATE: 16 BRPM

## 2021-05-12 DIAGNOSIS — E03.4 HYPOTHYROIDISM DUE TO ACQUIRED ATROPHY OF THYROID: ICD-10-CM

## 2021-05-12 DIAGNOSIS — R73.03 PREDIABETES: Primary | ICD-10-CM

## 2021-05-12 DIAGNOSIS — I10 BENIGN ESSENTIAL HYPERTENSION: ICD-10-CM

## 2021-05-12 DIAGNOSIS — F41.9 ANXIETY: ICD-10-CM

## 2021-05-12 DIAGNOSIS — N18.31 STAGE 3A CHRONIC KIDNEY DISEASE (H): ICD-10-CM

## 2021-05-12 DIAGNOSIS — Z79.899 ON STATIN THERAPY: ICD-10-CM

## 2021-05-12 DIAGNOSIS — E78.2 MIXED HYPERLIPIDEMIA: ICD-10-CM

## 2021-05-12 DIAGNOSIS — F51.01 PRIMARY INSOMNIA: ICD-10-CM

## 2021-05-12 DIAGNOSIS — E78.5 HYPERLIPIDEMIA WITH TARGET LDL LESS THAN 130: ICD-10-CM

## 2021-05-12 PROCEDURE — 99214 OFFICE O/P EST MOD 30 MIN: CPT | Performed by: NURSE PRACTITIONER

## 2021-05-12 PROCEDURE — G0463 HOSPITAL OUTPT CLINIC VISIT: HCPCS

## 2021-05-12 RX ORDER — LORAZEPAM 0.5 MG/1
0.5 TABLET ORAL
Qty: 30 TABLET | Refills: 0 | Status: SHIPPED | OUTPATIENT
Start: 2021-05-12 | End: 2021-08-23

## 2021-05-12 RX ORDER — LISINOPRIL 20 MG/1
20 TABLET ORAL DAILY
Qty: 90 TABLET | Refills: 1 | Status: SHIPPED | OUTPATIENT
Start: 2021-05-12 | End: 2021-08-23

## 2021-05-12 RX ORDER — ATORVASTATIN CALCIUM 20 MG/1
30 TABLET, FILM COATED ORAL DAILY
Qty: 135 TABLET | Refills: 3 | Status: SHIPPED | OUTPATIENT
Start: 2021-05-12 | End: 2021-11-29

## 2021-05-12 ASSESSMENT — PAIN SCALES - GENERAL: PAINLEVEL: NO PAIN (0)

## 2021-05-12 ASSESSMENT — ANXIETY QUESTIONNAIRES
IF YOU CHECKED OFF ANY PROBLEMS ON THIS QUESTIONNAIRE, HOW DIFFICULT HAVE THESE PROBLEMS MADE IT FOR YOU TO DO YOUR WORK, TAKE CARE OF THINGS AT HOME, OR GET ALONG WITH OTHER PEOPLE: NOT DIFFICULT AT ALL
7. FEELING AFRAID AS IF SOMETHING AWFUL MIGHT HAPPEN: SEVERAL DAYS
5. BEING SO RESTLESS THAT IT IS HARD TO SIT STILL: NOT AT ALL
GAD7 TOTAL SCORE: 4
6. BECOMING EASILY ANNOYED OR IRRITABLE: NOT AT ALL
1. FEELING NERVOUS, ANXIOUS, OR ON EDGE: SEVERAL DAYS
4. TROUBLE RELAXING: NOT AT ALL
2. NOT BEING ABLE TO STOP OR CONTROL WORRYING: SEVERAL DAYS
3. WORRYING TOO MUCH ABOUT DIFFERENT THINGS: SEVERAL DAYS

## 2021-05-12 ASSESSMENT — MIFFLIN-ST. JEOR: SCORE: 1068.08

## 2021-05-12 ASSESSMENT — PATIENT HEALTH QUESTIONNAIRE - PHQ9: SUM OF ALL RESPONSES TO PHQ QUESTIONS 1-9: 2

## 2021-05-12 NOTE — PATIENT INSTRUCTIONS
Assessment & Plan     1. Prediabetes  Normal A1c today  - Hemoglobin A1c; Future    2. Mixed hyperlipidemia  Continue lipitor  - Lipid Profile; Future    3. Hypothyroidism due to acquired atrophy of thyroid  No dose change  - TSH with free T4 reflex; Future    4. Benign essential hypertension  Continue current plan  - Comprehensive metabolic panel; Future  - lisinopril (ZESTRIL) 20 MG tablet; Take 1 tablet (20 mg) by mouth daily  Dispense: 90 tablet; Refill: 1    5. On statin therapy  - Comprehensive metabolic panel; Future    6. Stage 3a chronic kidney disease  Do not use NSAIDS    7. Hyperlipidemia with target LDL less than 130  - atorvastatin (LIPITOR) 20 MG tablet; Take 1.5 tablets (30 mg) by mouth daily  Dispense: 135 tablet; Refill: 3    8. Anxiety  - LORazepam (ATIVAN) 0.5 MG tablet; Take 1 tablet (0.5 mg) by mouth nightly as needed for anxiety or sleep  Dispense: 30 tablet; Refill: 0    9. Primary insomnia  - LORazepam (ATIVAN) 0.5 MG tablet; Take 1 tablet (0.5 mg) by mouth nightly as needed for anxiety or sleep  Dispense: 30 tablet; Refill: 0      Review of prior external note(s) from - CareEverywhere information from [facility] reviewed  Review of the result(s) of each unique test - labs below.         Return in about 3 months (around 8/12/2021) for pre-diabetes, lipids, HTN, anxiety/depression.    Brittney Coe, NP  [facility]

## 2021-05-12 NOTE — NURSING NOTE
"Chief Complaint   Patient presents with     Hypertension     Lipids     Thyroid Problem     Depression     Anxiety       Initial /54 (BP Location: Right arm, Patient Position: Chair, Cuff Size: Adult Regular)   Pulse 57   Temp 98.6  F (37  C) (Tympanic)   Resp 16   Ht 1.588 m (5' 2.5\")   Wt 61.7 kg (136 lb)   SpO2 98%   BMI 24.48 kg/m   Estimated body mass index is 24.48 kg/m  as calculated from the following:    Height as of this encounter: 1.588 m (5' 2.5\").    Weight as of this encounter: 61.7 kg (136 lb).  Medication Reconciliation: complete  Pamela M. Lechevalier, LPN    "

## 2021-05-13 RX ORDER — ROSUVASTATIN CALCIUM 20 MG/1
30 TABLET, COATED ORAL DAILY
Qty: 135 TABLET | Refills: 1 | Status: SHIPPED | OUTPATIENT
Start: 2021-05-13 | End: 2021-05-18 | Stop reason: ALTCHOICE

## 2021-05-13 ASSESSMENT — ANXIETY QUESTIONNAIRES: GAD7 TOTAL SCORE: 4

## 2021-05-18 DIAGNOSIS — F51.01 PRIMARY INSOMNIA: ICD-10-CM

## 2021-05-18 DIAGNOSIS — F41.9 ANXIETY: ICD-10-CM

## 2021-05-18 RX ORDER — LORAZEPAM 0.5 MG/1
TABLET ORAL
Qty: 30 TABLET | OUTPATIENT
Start: 2021-05-18

## 2021-05-18 NOTE — TELEPHONE ENCOUNTER
Pt called, states that pharmacy told her she was switched from lipitor to crestor. Per notes from encounter dated 3/5/21, lipitor not covered by insurance. Pt reports that med is covered by insurance. Crestor discontinued per pt request.

## 2021-05-18 NOTE — TELEPHONE ENCOUNTER
Pt called reports she picked up her Lipitor today and insurance did cover this rx. Pt paid $9.00 for this script.

## 2021-06-15 ENCOUNTER — HOSPITAL ENCOUNTER (OUTPATIENT)
Dept: GENERAL RADIOLOGY | Facility: HOSPITAL | Age: 76
End: 2021-06-15
Attending: INTERNAL MEDICINE
Payer: MEDICARE

## 2021-06-15 ENCOUNTER — HOSPITAL ENCOUNTER (OUTPATIENT)
Dept: RESPIRATORY THERAPY | Facility: HOSPITAL | Age: 76
End: 2021-06-15
Attending: NURSE PRACTITIONER
Payer: MEDICARE

## 2021-06-15 DIAGNOSIS — R06.09 DOE (DYSPNEA ON EXERTION): ICD-10-CM

## 2021-06-15 DIAGNOSIS — R06.00 DYSPNEA, UNSPECIFIED: ICD-10-CM

## 2021-06-15 PROCEDURE — 94010 BREATHING CAPACITY TEST: CPT | Mod: 26 | Performed by: INTERNAL MEDICINE

## 2021-06-15 PROCEDURE — 71046 X-RAY EXAM CHEST 2 VIEWS: CPT

## 2021-06-15 PROCEDURE — 94726 PLETHYSMOGRAPHY LUNG VOLUMES: CPT

## 2021-06-15 PROCEDURE — 94726 PLETHYSMOGRAPHY LUNG VOLUMES: CPT | Mod: 26 | Performed by: INTERNAL MEDICINE

## 2021-06-15 PROCEDURE — 94010 BREATHING CAPACITY TEST: CPT

## 2021-06-15 PROCEDURE — 94729 DIFFUSING CAPACITY: CPT

## 2021-06-15 PROCEDURE — 94729 DIFFUSING CAPACITY: CPT | Mod: 26 | Performed by: INTERNAL MEDICINE

## 2021-07-08 ENCOUNTER — TRANSFERRED RECORDS (OUTPATIENT)
Dept: HEALTH INFORMATION MANAGEMENT | Facility: CLINIC | Age: 76
End: 2021-07-08

## 2021-08-16 NOTE — PROGRESS NOTES
Assessment & Plan     1. Prediabetes  - Hemoglobin A1c; Future  - Albumin Random Urine Quantitative with Creat Ratio; Future    2. Mixed hyperlipidemia  - Lipid Profile; Future    3. Benign essential hypertension  - Lipid Profile; Future  - Comprehensive metabolic panel; Future  - lisinopril (ZESTRIL) 20 MG tablet; Take 1 tablet (20 mg) by mouth daily  Dispense: 90 tablet; Refill: 1    4. Hypothyroidism due to acquired atrophy of thyroid  - TSH with free T4 reflex; Future  - levothyroxine (SYNTHROID/LEVOTHROID) 50 MCG tablet; Take 1 tablet (50 mcg) by mouth daily  Dispense: 90 tablet; Refill: 1    5. Stage 3a chronic kidney disease  Do not use NSAIDS    6. Mild major depression (H)  Continue current plan    7. Generalized anxiety disorder  - gabapentin (NEURONTIN) 300 MG capsule; Take 1 capsule (300 mg) by mouth At Bedtime  Dispense: 90 capsule; Refill: 1  - LORazepam (ATIVAN) 0.5 MG tablet; Take 1 tablet (0.5 mg) by mouth nightly as needed for anxiety or sleep  Dispense: 30 tablet; Refill: 0    8. Primary insomnia  - gabapentin (NEURONTIN) 300 MG capsule; Take 1 capsule (300 mg) by mouth At Bedtime  Dispense: 90 capsule; Refill: 1  - LORazepam (ATIVAN) 0.5 MG tablet; Take 1 tablet (0.5 mg) by mouth nightly as needed for anxiety or sleep  Dispense: 30 tablet; Refill: 0    9. On statin therapy  - Comprehensive metabolic panel; Future    10. Left foot pain  - Orthopedic  Referral; Future    11. Callus of foot  - Orthopedic  Referral; Future - Dr Morrison.   - off load pressure using over the counter callus pads.     12. Excessive cerumen in both ear canals  - Otolaryngology Referral      Review of the result(s) of each unique test - previous labs          Return in about 3 months (around 11/23/2021) for pre-diabetes, lipids, HTN, anxiety/depression.    Brittney Coe NP  [facility]    Raffaele Schulte is a 75 year old who presents for the following health issues      HPI         Diabetes Follow-up (PRE)       How often are you checking your blood sugar? Not at all    What concerns do you have today about your diabetes? None     Do you have any of these symptoms? (Select all that apply)  No numbness or tingling in feet.  No redness, sores or blisters on feet.  No complaints of excessive thirst.  No reports of blurry vision.  No significant changes to weight.      BP Readings from Last 2 Encounters:   08/23/21 124/66   05/12/21 108/54     Hemoglobin A1C (%)   Date Value   08/20/2021 5.6   05/10/2021 5.6   11/11/2020 5.7 (H)     LDL Cholesterol Calculated (mg/dL)   Date Value   08/20/2021 109 (H)   05/10/2021 105 (H)   02/05/2021 119 (H)         Hyperlipidemia Follow-Up      Are you regularly taking any medication or supplement to lower your cholesterol?   Yes- Lipitor    Are you having muscle aches or other side effects that you think could be caused by your cholesterol lowering medication?  No    Hypertension Follow-up      Do you check your blood pressure regularly outside of the clinic? No     Are you following a low salt diet? Yes    Are your blood pressures ever more than 140 on the top number (systolic) OR more   than 90 on the bottom number (diastolic), for example 140/90? No    Depression and Anxiety Follow-Up    How are you doing with your depression since your last visit? No change    How are you doing with your anxiety since your last visit?  No change    Are you having other symptoms that might be associated with depression or anxiety? No    Have you had a significant life event? No     Do you have any concerns with your use of alcohol or other drugs? No    Social History     Tobacco Use     Smoking status: Never Smoker     Smokeless tobacco: Never Used   Substance Use Topics     Alcohol use: Yes     Alcohol/week: 0.0 standard drinks     Comment: occasionally     Drug use: No     PHQ 2/12/2021 5/12/2021 8/23/2021   PHQ-9 Total Score 2 2 2   Q9: Thoughts of better  off dead/self-harm past 2 weeks Not at all Not at all Not at all     MCKINLEY-7 SCORE 2/12/2021 5/12/2021 8/23/2021   Total Score 7 4 5     Last PHQ-9 8/23/2021   1.  Little interest or pleasure in doing things 0   2.  Feeling down, depressed, or hopeless 1   3.  Trouble falling or staying asleep, or sleeping too much 1   4.  Feeling tired or having little energy 0   5.  Poor appetite or overeating 0   6.  Feeling bad about yourself 0   7.  Trouble concentrating 0   8.  Moving slowly or restless 0   Q9: Thoughts of better off dead/self-harm past 2 weeks 0   PHQ-9 Total Score 2   Difficulty at work, home, or with people Not difficult at all     MCKINLEY-7  8/23/2021   1. Feeling nervous, anxious, or on edge 1   2. Not being able to stop or control worrying 1   3. Worrying too much about different things 1   4. Trouble relaxing 1   5. Being so restless that it is hard to sit still 0   6. Becoming easily annoyed or irritable 0   7. Feeling afraid, as if something awful might happen 1   MCKINLEY-7 Total Score 5   If you checked any problems, how difficult have they made it for you to do your work, take care of things at home, or get along with other people? Not difficult at all       Suicide Assessment Five-step Evaluation and Treatment (SAFE-T)    Chronic Kidney Disease Follow-up      Do you take any over the counter pain medicine?: No    Hypothyroidism Follow-up      Since last visit, patient describes the following symptoms: Weight stable, no hair loss, no skin changes, no constipation, no loose stools      How many servings of fruits and vegetables do you eat daily?  2-3    On average, how many sweetened beverages do you drink each day (Examples: soda, juice, sweet tea, etc.  Do NOT count diet or artificially sweetened beverages)?   0    How many days per week do you exercise enough to make your heart beat faster? 4    How many minutes a day do you exercise enough to make your heart beat faster? 30 - 60    How many days per week  do you miss taking your medication? 0    She notes callous of left foot, wonders if has excessive cerumen again which is a recurrent issue for her.    Patient Active Problem List   Diagnosis     Menopause     Joint pain     Disorder of bone and cartilage     Advanced care planning/counseling discussion     Routine general medical examination at a health care facility (ANNUAL)     Colon cancer screening     Hypothyroidism due to acquired atrophy of thyroid     Benign essential hypertension     CKD (chronic kidney disease) stage 3, GFR 30-59 ml/min     AK (actinic keratosis)     Mild major depression (H)     Generalized anxiety disorder     Mixed hyperlipidemia     LBBB (left bundle branch block)     Dyspnea on exertion     Prediabetes     Past Surgical History:   Procedure Laterality Date     ARTHRODESIS KNEE  [id number]    left knee, paritla medial meniscectomy, debridement medial femoral condyle and patellofemoral debridement     COLONOSCOPY  2004    nml     COLONOSCOPY  7/23/2014    Procedure: COLONOSCOPY;  Surgeon: Nghia Hernandez DO;  Location: [facility]     CYSTOSCOPY      micro hematuria neg     DILATION AND CURETTAGE       EXCISE LESION UPPER EXTREMITY Right 10/25/2017    Procedure: EXCISE LESION UPPER EXTREMITY;  EXCISIONAL BIOPSY RIGHT FOREARM TIMES 2;  Surgeon: Nghia Hernandez DO;  Location: [facility] OR     tubal sterilization       uterine fibroids       wrist fx RT         Social History     Tobacco Use     Smoking status: Never Smoker     Smokeless tobacco: Never Used   Substance Use Topics     Alcohol use: Yes     Alcohol/week: 0.0 standard drinks     Comment: occasionally     Family History   Problem Relation Age of Onset     C.A.D. Mother      Other - See Comments Mother         high platelets     Hypertension Mother      C.A.D. Paternal Aunt      Diabetes Other         aunt     Cardiovascular Father         cardiovascular disease     Colon Cancer Other         family history-paternal side      Cerebrovascular Disease Maternal Grandmother      Cerebrovascular Disease Maternal Grandfather          Current Outpatient Medications   Medication Sig Dispense Refill     aspirin 81 MG tablet Take 81 mg by mouth daily       atorvastatin (LIPITOR) 20 MG tablet Take 1.5 tablets (30 mg) by mouth daily 135 tablet 3     Calcium-Magnesium-Vitamin D (CALCIUM 500 PO) Take by mouth daily       cinnamon 500 MG CAPS        co-enzyme Q-10 100 MG CAPS capsule Take 100 mg by mouth        gabapentin (NEURONTIN) 300 MG capsule Take 1 capsule (300 mg) by mouth At Bedtime 90 capsule 1     Garlic 2000 MG CAPS Take 2,000 mg by mouth daily       GLUCOSAMINE-CHONDROITIN PO Take  by mouth. GLUCOSAMINE HCL/CHONDR SUA NA  One daily       levothyroxine (SYNTHROID/LEVOTHROID) 50 MCG tablet Take 1 tablet (50 mcg) by mouth daily 90 tablet 1     lisinopril (ZESTRIL) 20 MG tablet Take 1 tablet (20 mg) by mouth daily 90 tablet 1     LORazepam (ATIVAN) 0.5 MG tablet Take 1 tablet (0.5 mg) by mouth nightly as needed for anxiety or sleep 30 tablet 0     Multiple Vitamins-Minerals (MULTIVITAMIN OR) Alive 1 tablet       Omega-3 Fatty Acids (FISH OIL) 1200 MG CAPS Take 1,080 mg by mouth daily        psyllium (METAMUCIL/KONSYL) capsule Take 1 capsule by mouth daily       Turmeric Curcumin 500 MG CAPS Take 1 capsule by mouth daily       Allergies   Allergen Reactions     Pseudoephedrine Hcl Other (See Comments)     Heart racing  Sudafed     Hydroxyzine Anxiety     jittery     Mucinex Palpitations     Viibryd Anxiety     Restless legs     Recent Labs   Lab Test 08/20/21  0757 05/10/21  0801 02/05/21  0809 11/11/20  0807   A1C 5.6 5.6  --  5.7*   * 105* 119* 87   HDL 62 58 55 74   TRIG 129 138 149 142   ALT 41 46 36 39   CR 0.95 1.04 1.01 1.01   GFRESTIMATED 58* 52* 54* 54*   GFRESTBLACK  --  60* 62 63   POTASSIUM 4.0 4.3 4.1 4.0   TSH 3.66 3.79 4.17* 3.49      BP Readings from Last 3 Encounters:   08/23/21 124/66   05/12/21 108/54   02/19/21 (!)  "148/84    Wt Readings from Last 3 Encounters:   08/23/21 62.1 kg (137 lb)   05/12/21 61.7 kg (136 lb)   02/19/21 61.7 kg (136 lb)                      Review of Systems   Constitutional, HEENT, cardiovascular, pulmonary, gi and gu systems are negative, except as otherwise noted.      Objective    /66   Pulse 63   Temp 98.2  F (36.8  C) (Tympanic)   Ht 1.588 m (5' 2.5\")   Wt 62.1 kg (137 lb)   SpO2 97%   BMI 24.66 kg/m    Body mass index is 24.66 kg/m .  Physical Exam   GENERAL: healthy, alert and no distress  HENT: normal cephalic/atraumatic, both ears: occluded with wax, nose and mouth without ulcers or lesions, oropharynx clear and oral mucous membranes moist  NECK: no adenopathy, no asymmetry, masses, or scars, thyroid normal to palpation and no carotid bruits  RESP: lungs clear to auscultation - no rales, rhonchi or wheezes  CV: regular rate and rhythm, normal S1 S2, no S3 or S4, no murmur, click or rub, no peripheral edema and peripheral pulses strong  MS: no gross musculoskeletal defects noted, no edema  SKIN: plantar surface of left foot, small callus of ball of foot noted by thickened whitish skin no erythema noted.   PSYCH: mentation appears normal, affect normal/bright            "

## 2021-08-19 ENCOUNTER — ANCILLARY PROCEDURE (OUTPATIENT)
Dept: MAMMOGRAPHY | Facility: OTHER | Age: 76
End: 2021-08-19
Attending: NURSE PRACTITIONER
Payer: MEDICARE

## 2021-08-19 ENCOUNTER — TELEPHONE (OUTPATIENT)
Dept: MAMMOGRAPHY | Facility: OTHER | Age: 76
End: 2021-08-19

## 2021-08-19 DIAGNOSIS — Z12.31 VISIT FOR SCREENING MAMMOGRAM: ICD-10-CM

## 2021-08-19 PROCEDURE — 77063 BREAST TOMOSYNTHESIS BI: CPT | Mod: TC

## 2021-08-20 ENCOUNTER — LAB (OUTPATIENT)
Dept: LAB | Facility: OTHER | Age: 76
End: 2021-08-20
Attending: NURSE PRACTITIONER
Payer: MEDICARE

## 2021-08-20 DIAGNOSIS — I10 BENIGN ESSENTIAL HYPERTENSION: ICD-10-CM

## 2021-08-20 DIAGNOSIS — R73.03 PREDIABETES: ICD-10-CM

## 2021-08-20 DIAGNOSIS — E78.2 MIXED HYPERLIPIDEMIA: ICD-10-CM

## 2021-08-20 DIAGNOSIS — E03.4 HYPOTHYROIDISM DUE TO ACQUIRED ATROPHY OF THYROID: ICD-10-CM

## 2021-08-20 DIAGNOSIS — Z79.899 ON STATIN THERAPY: ICD-10-CM

## 2021-08-20 LAB
ALBUMIN SERPL-MCNC: 3.7 G/DL (ref 3.4–5)
ALP SERPL-CCNC: 75 U/L (ref 40–150)
ALT SERPL W P-5'-P-CCNC: 41 U/L (ref 0–50)
ANION GAP SERPL CALCULATED.3IONS-SCNC: 6 MMOL/L (ref 3–14)
AST SERPL W P-5'-P-CCNC: 33 U/L (ref 0–45)
BILIRUB SERPL-MCNC: 0.4 MG/DL (ref 0.2–1.3)
BUN SERPL-MCNC: 18 MG/DL (ref 7–30)
CALCIUM SERPL-MCNC: 9.1 MG/DL (ref 8.5–10.1)
CHLORIDE BLD-SCNC: 107 MMOL/L (ref 94–109)
CHOLEST SERPL-MCNC: 197 MG/DL
CO2 SERPL-SCNC: 26 MMOL/L (ref 20–32)
CREAT SERPL-MCNC: 0.95 MG/DL (ref 0.52–1.04)
EST. AVERAGE GLUCOSE BLD GHB EST-MCNC: 114 MG/DL
FASTING STATUS PATIENT QL REPORTED: YES
GFR SERPL CREATININE-BSD FRML MDRD: 58 ML/MIN/1.73M2
GLUCOSE BLD-MCNC: 85 MG/DL (ref 70–99)
HBA1C MFR BLD: 5.6 % (ref 0–5.6)
HDLC SERPL-MCNC: 62 MG/DL
LDLC SERPL CALC-MCNC: 109 MG/DL
NONHDLC SERPL-MCNC: 135 MG/DL
POTASSIUM BLD-SCNC: 4 MMOL/L (ref 3.4–5.3)
PROT SERPL-MCNC: 7.1 G/DL (ref 6.8–8.8)
SODIUM SERPL-SCNC: 139 MMOL/L (ref 133–144)
TRIGL SERPL-MCNC: 129 MG/DL
TSH SERPL DL<=0.005 MIU/L-ACNC: 3.66 MU/L (ref 0.4–4)

## 2021-08-20 PROCEDURE — 80053 COMPREHEN METABOLIC PANEL: CPT | Mod: ZL

## 2021-08-20 PROCEDURE — 83036 HEMOGLOBIN GLYCOSYLATED A1C: CPT | Mod: ZL

## 2021-08-20 PROCEDURE — 36415 COLL VENOUS BLD VENIPUNCTURE: CPT | Mod: ZL

## 2021-08-20 PROCEDURE — 80061 LIPID PANEL: CPT | Mod: ZL

## 2021-08-20 PROCEDURE — 84443 ASSAY THYROID STIM HORMONE: CPT | Mod: ZL

## 2021-08-23 ENCOUNTER — OFFICE VISIT (OUTPATIENT)
Dept: FAMILY MEDICINE | Facility: OTHER | Age: 76
End: 2021-08-23
Attending: NURSE PRACTITIONER
Payer: COMMERCIAL

## 2021-08-23 VITALS
HEART RATE: 63 BPM | HEIGHT: 63 IN | BODY MASS INDEX: 24.27 KG/M2 | SYSTOLIC BLOOD PRESSURE: 124 MMHG | DIASTOLIC BLOOD PRESSURE: 66 MMHG | OXYGEN SATURATION: 97 % | TEMPERATURE: 98.2 F | WEIGHT: 137 LBS

## 2021-08-23 DIAGNOSIS — M79.672 LEFT FOOT PAIN: ICD-10-CM

## 2021-08-23 DIAGNOSIS — N18.31 STAGE 3A CHRONIC KIDNEY DISEASE (H): ICD-10-CM

## 2021-08-23 DIAGNOSIS — F51.01 PRIMARY INSOMNIA: ICD-10-CM

## 2021-08-23 DIAGNOSIS — E78.2 MIXED HYPERLIPIDEMIA: ICD-10-CM

## 2021-08-23 DIAGNOSIS — R73.03 PREDIABETES: Primary | ICD-10-CM

## 2021-08-23 DIAGNOSIS — Z79.899 ON STATIN THERAPY: ICD-10-CM

## 2021-08-23 DIAGNOSIS — E03.4 HYPOTHYROIDISM DUE TO ACQUIRED ATROPHY OF THYROID: ICD-10-CM

## 2021-08-23 DIAGNOSIS — L84 CALLUS OF FOOT: ICD-10-CM

## 2021-08-23 DIAGNOSIS — F32.0 MILD MAJOR DEPRESSION (H): ICD-10-CM

## 2021-08-23 DIAGNOSIS — F41.1 GENERALIZED ANXIETY DISORDER: ICD-10-CM

## 2021-08-23 DIAGNOSIS — I10 BENIGN ESSENTIAL HYPERTENSION: ICD-10-CM

## 2021-08-23 DIAGNOSIS — H61.23 EXCESSIVE CERUMEN IN BOTH EAR CANALS: ICD-10-CM

## 2021-08-23 PROCEDURE — 99214 OFFICE O/P EST MOD 30 MIN: CPT | Performed by: NURSE PRACTITIONER

## 2021-08-23 PROCEDURE — G0463 HOSPITAL OUTPT CLINIC VISIT: HCPCS

## 2021-08-23 RX ORDER — LORAZEPAM 0.5 MG/1
0.5 TABLET ORAL
Qty: 30 TABLET | Refills: 0 | Status: SHIPPED | OUTPATIENT
Start: 2021-08-23 | End: 2022-03-03

## 2021-08-23 RX ORDER — LEVOTHYROXINE SODIUM 50 UG/1
50 TABLET ORAL DAILY
Qty: 90 TABLET | Refills: 1 | Status: SHIPPED | OUTPATIENT
Start: 2021-08-23 | End: 2022-04-01

## 2021-08-23 RX ORDER — LISINOPRIL 20 MG/1
20 TABLET ORAL DAILY
Qty: 90 TABLET | Refills: 1 | Status: SHIPPED | OUTPATIENT
Start: 2021-08-23 | End: 2021-12-20

## 2021-08-23 RX ORDER — GABAPENTIN 300 MG/1
300 CAPSULE ORAL AT BEDTIME
Qty: 90 CAPSULE | Refills: 1 | Status: SHIPPED | OUTPATIENT
Start: 2021-08-23 | End: 2022-03-03

## 2021-08-23 ASSESSMENT — MIFFLIN-ST. JEOR: SCORE: 1067.62

## 2021-08-23 ASSESSMENT — PAIN SCALES - GENERAL: PAINLEVEL: NO PAIN (0)

## 2021-08-23 ASSESSMENT — PATIENT HEALTH QUESTIONNAIRE - PHQ9: SUM OF ALL RESPONSES TO PHQ QUESTIONS 1-9: 2

## 2021-08-23 ASSESSMENT — ANXIETY QUESTIONNAIRES
7. FEELING AFRAID AS IF SOMETHING AWFUL MIGHT HAPPEN: SEVERAL DAYS
3. WORRYING TOO MUCH ABOUT DIFFERENT THINGS: SEVERAL DAYS
IF YOU CHECKED OFF ANY PROBLEMS ON THIS QUESTIONNAIRE, HOW DIFFICULT HAVE THESE PROBLEMS MADE IT FOR YOU TO DO YOUR WORK, TAKE CARE OF THINGS AT HOME, OR GET ALONG WITH OTHER PEOPLE: NOT DIFFICULT AT ALL
4. TROUBLE RELAXING: SEVERAL DAYS
1. FEELING NERVOUS, ANXIOUS, OR ON EDGE: SEVERAL DAYS
5. BEING SO RESTLESS THAT IT IS HARD TO SIT STILL: NOT AT ALL
GAD7 TOTAL SCORE: 5
6. BECOMING EASILY ANNOYED OR IRRITABLE: NOT AT ALL
2. NOT BEING ABLE TO STOP OR CONTROL WORRYING: SEVERAL DAYS

## 2021-08-23 NOTE — PATIENT INSTRUCTIONS
Assessment & Plan     1. Prediabetes  - Hemoglobin A1c; Future  - Albumin Random Urine Quantitative with Creat Ratio; Future    2. Mixed hyperlipidemia  - Lipid Profile; Future    3. Benign essential hypertension  - Lipid Profile; Future  - Comprehensive metabolic panel; Future  - lisinopril (ZESTRIL) 20 MG tablet; Take 1 tablet (20 mg) by mouth daily  Dispense: 90 tablet; Refill: 1    4. Hypothyroidism due to acquired atrophy of thyroid  - TSH with free T4 reflex; Future  - levothyroxine (SYNTHROID/LEVOTHROID) 50 MCG tablet; Take 1 tablet (50 mcg) by mouth daily  Dispense: 90 tablet; Refill: 1    5. Stage 3a chronic kidney disease  Do not use NSAIDS    6. Mild major depression (H)  Continue current plan    7. Generalized anxiety disorder  - gabapentin (NEURONTIN) 300 MG capsule; Take 1 capsule (300 mg) by mouth At Bedtime  Dispense: 90 capsule; Refill: 1  - LORazepam (ATIVAN) 0.5 MG tablet; Take 1 tablet (0.5 mg) by mouth nightly as needed for anxiety or sleep  Dispense: 30 tablet; Refill: 0    8. Primary insomnia  - gabapentin (NEURONTIN) 300 MG capsule; Take 1 capsule (300 mg) by mouth At Bedtime  Dispense: 90 capsule; Refill: 1  - LORazepam (ATIVAN) 0.5 MG tablet; Take 1 tablet (0.5 mg) by mouth nightly as needed for anxiety or sleep  Dispense: 30 tablet; Refill: 0    9. On statin therapy  - Comprehensive metabolic panel; Future    10. Left foot pain  - Orthopedic  Referral; Future    11. Callus of foot  - Orthopedic  Referral; Future - Dr Morrison.   - off load pressure using over the counter callus pads.     12. Excessive cerumen in both ear canals  - Otolaryngology Referral      Review of the result(s) of each unique test - previous labs          Return in about 3 months (around 11/23/2021) for pre-diabetes, lipids, HTN, anxiety/depression.    Brittney Coe NP  [facility]    
26-Mar-2018 20:35

## 2021-08-23 NOTE — NURSING NOTE
"Chief Complaint   Patient presents with     Diabetes     Lipids     Hypertension     Depression     Anxiety     Kidney Problem     Thyroid Disease       Initial /66   Pulse 63   Temp 98.2  F (36.8  C) (Tympanic)   Ht 1.588 m (5' 2.5\")   Wt 62.1 kg (137 lb)   SpO2 97%   BMI 24.66 kg/m   Estimated body mass index is 24.66 kg/m  as calculated from the following:    Height as of this encounter: 1.588 m (5' 2.5\").    Weight as of this encounter: 62.1 kg (137 lb).  Medication Reconciliation: complete  Jo Thompson LPN    "

## 2021-08-24 ASSESSMENT — ANXIETY QUESTIONNAIRES: GAD7 TOTAL SCORE: 5

## 2021-08-27 ENCOUNTER — OFFICE VISIT (OUTPATIENT)
Dept: OTOLARYNGOLOGY | Facility: OTHER | Age: 76
End: 2021-08-27
Attending: NURSE PRACTITIONER
Payer: MEDICARE

## 2021-08-27 VITALS
WEIGHT: 137 LBS | OXYGEN SATURATION: 98 % | DIASTOLIC BLOOD PRESSURE: 68 MMHG | TEMPERATURE: 97.2 F | HEART RATE: 61 BPM | BODY MASS INDEX: 24.27 KG/M2 | HEIGHT: 63 IN | SYSTOLIC BLOOD PRESSURE: 126 MMHG

## 2021-08-27 DIAGNOSIS — L29.9 EAR ITCHING: Primary | ICD-10-CM

## 2021-08-27 DIAGNOSIS — H61.23 EXCESSIVE CERUMEN IN BOTH EAR CANALS: ICD-10-CM

## 2021-08-27 PROCEDURE — G0463 HOSPITAL OUTPT CLINIC VISIT: HCPCS

## 2021-08-27 PROCEDURE — 92504 EAR MICROSCOPY EXAMINATION: CPT | Performed by: NURSE PRACTITIONER

## 2021-08-27 PROCEDURE — 69210 REMOVE IMPACTED EAR WAX UNI: CPT | Performed by: NURSE PRACTITIONER

## 2021-08-27 PROCEDURE — 99212 OFFICE O/P EST SF 10 MIN: CPT | Mod: 25 | Performed by: NURSE PRACTITIONER

## 2021-08-27 RX ORDER — MOMETASONE FUROATE 1 MG/G
CREAM TOPICAL
Qty: 15 G | Refills: 1 | Status: SHIPPED | OUTPATIENT
Start: 2021-08-27 | End: 2022-02-04

## 2021-08-27 RX ORDER — HYDROCORTISONE AND ACETIC ACID 20.75; 10.375 MG/ML; MG/ML
4 SOLUTION AURICULAR (OTIC) 2 TIMES DAILY
Qty: 6 ML | Refills: 2 | Status: SHIPPED | OUTPATIENT
Start: 2021-08-27 | End: 2021-09-10

## 2021-08-27 ASSESSMENT — MIFFLIN-ST. JEOR: SCORE: 1067.62

## 2021-08-27 ASSESSMENT — PAIN SCALES - GENERAL: PAINLEVEL: NO PAIN (0)

## 2021-08-27 NOTE — NURSING NOTE
"Chief Complaint   Patient presents with     Cerumen Impaction     Ear Cleaning        Initial /68 (Cuff Size: Adult Regular)   Pulse 61   Temp 97.2  F (36.2  C) (Tympanic)   Ht 1.588 m (5' 2.5\")   Wt 62.1 kg (137 lb)   SpO2 98%   BMI 24.66 kg/m   Estimated body mass index is 24.66 kg/m  as calculated from the following:    Height as of this encounter: 1.588 m (5' 2.5\").    Weight as of this encounter: 62.1 kg (137 lb).  Medication Reconciliation: complete  Roma Reno LPN    "

## 2021-08-27 NOTE — LETTER
8/27/2021         RE: Franca Angeles  [address]  [place] [address]        Dear Colleague,    Thank you for referring your patient, Franca Angeles, to the [facility]. Please see a copy of my visit note below.    Otolaryngology Note         Chief Complaint:     Patient presents with:  Cerumen Impaction: Ear Cleaning            History of Present Illness:     Franca Angeles is a 76 year old female who presents today for ear cleaning.    She last had ears cleaned in Feb 2021.   No concerns with hearing.  Minor tinnitus, not bothersome  No vertigo, facial numbness or weakness.      No otalgia, otorrhea.    No hx of COM or otologic surgeries.   She does complain of ear eczema and itching         Medications:     Current Outpatient Rx   Medication Sig Dispense Refill     aspirin 81 MG tablet Take 81 mg by mouth daily       atorvastatin (LIPITOR) 20 MG tablet Take 1.5 tablets (30 mg) by mouth daily 135 tablet 3     Calcium-Magnesium-Vitamin D (CALCIUM 500 PO) Take by mouth daily       cinnamon 500 MG CAPS        co-enzyme Q-10 100 MG CAPS capsule Take 100 mg by mouth        gabapentin (NEURONTIN) 300 MG capsule Take 1 capsule (300 mg) by mouth At Bedtime 90 capsule 1     Garlic 2000 MG CAPS Take 2,000 mg by mouth daily       GLUCOSAMINE-CHONDROITIN PO Take  by mouth. GLUCOSAMINE HCL/CHONDR SUA NA  One daily       levothyroxine (SYNTHROID/LEVOTHROID) 50 MCG tablet Take 1 tablet (50 mcg) by mouth daily 90 tablet 1     lisinopril (ZESTRIL) 20 MG tablet Take 1 tablet (20 mg) by mouth daily 90 tablet 1     LORazepam (ATIVAN) 0.5 MG tablet Take 1 tablet (0.5 mg) by mouth nightly as needed for anxiety or sleep 30 tablet 0     mometasone (ELOCON) 0.1 % external cream Apply a thin film of cream to the affected ear canal (s) twice a day for 2 weeks, then use sparingly as needed only. 15 g 1     Multiple Vitamins-Minerals (MULTIVITAMIN OR) Alive 1 tablet       Omega-3 Fatty Acids (FISH  "OIL) 1200 MG CAPS Take 1,080 mg by mouth daily        psyllium (METAMUCIL/KONSYL) capsule Take 1 capsule by mouth daily       Turmeric Curcumin 500 MG CAPS Take 1 capsule by mouth daily              Allergies:     Allergies: Pseudoephedrine hcl, Hydroxyzine, Mucinex, and Viibryd          Past Medical History:     Past Medical History:   Diagnosis Date     Esophageal reflux 5/10/2011     Need for prophylactic hormone replacement therapy (postmenopausal) 1995    took for 1-2 years            Past Surgical History:     Past Surgical History:   Procedure Laterality Date     ARTHRODESIS KNEE  [id number]    left knee, paritla medial meniscectomy, debridement medial femoral condyle and patellofemoral debridement     COLONOSCOPY  2004    nml     COLONOSCOPY  7/23/2014    Procedure: COLONOSCOPY;  Surgeon: Nghia Hernandez DO;  Location: [facility]     CYSTOSCOPY      micro hematuria neg     DILATION AND CURETTAGE       EXCISE LESION UPPER EXTREMITY Right 10/25/2017    Procedure: EXCISE LESION UPPER EXTREMITY;  EXCISIONAL BIOPSY RIGHT FOREARM TIMES 2;  Surgeon: Nghia Hernandez DO;  Location: [facility]     tubal sterilization       uterine fibroids       wrist fx RT         ENT family history reviewed         Social History:     Social History     Tobacco Use     Smoking status: Never Smoker     Smokeless tobacco: Never Used   Substance Use Topics     Alcohol use: Yes     Alcohol/week: 0.0 standard drinks     Comment: occasionally     Drug use: No            Review of Systems:     ROS: See HPI         Physical Exam:     /68 (Cuff Size: Adult Regular)   Pulse 61   Temp 97.2  F (36.2  C) (Tympanic)   Ht 1.588 m (5' 2.5\")   Wt 62.1 kg (137 lb)   SpO2 98%   BMI 24.66 kg/m      General - The patient is well nourished and well developed, and appears to have good nutritional status.  Alert and oriented to person and place, answers questions and cooperates with examination appropriately.   Head and Face - Normocephalic and " atraumatic, with no gross asymmetry noted.  The facial nerve is intact, with strong symmetric movements.  Voice and Breathing - The patient was breathing comfortably without the use of accessory muscles. There was no wheezing, stridor. The patients voice was clear and strong, and had appropriate pitch and quality.  Ears - The ears were examined with binocular microscopy and with otoscope.  External ears normal. Right canal with ceruminosis.  Left canal with ceruminosis.  The ears were cleaned with gentle suctioning and cupped forceps.   Right tympanic membrane is intact without effusion, retraction or mass.  Left tympanic membrane is intact without effusion, retraction or mass.  There is mild flaking skin on both pinnas  Eyes - Extraocular movements intact, sclera were not icteric or injected, conjunctiva were pink and moist.  Mouth - Examination of the oral cavity showed pink, healthy oral mucosa. Dentition in good condition. No lesions or ulcerations noted. The tongue was mobile and midline.   Throat - The walls of the oropharynx were smooth, pink, moist, symmetric, and had no lesions or ulcerations.  The tonsillar pillars and soft palate were symmetric. The uvula was midline on elevation.    Neck - Full range of motion on passive movement.  Palpation of the occipital, submental, submandibular, internal jugular chain, and supraclavicular nodes did not demonstrate any abnormal lymph nodes or masses.  Palpation of the thyroid was soft and smooth, with no nodules or goiter appreciated.  The trachea was mobile and midline.         Assessment and Plan:     (L29.9) Ear itching  (primary encounter diagnosis)  Comment: mild eczema  Plan: acetic acid-hydrocortisone (VOSOL-HC) 1-2 %         otic solution, mometasone (ELOCON) 0.1 %         external cream      (H61.23) Excessive cerumen in both ear canals  Comment: ears were both cleaned and look good  Plan: follow up as needed for ear cleaning.       Ears were cleaned,  tolerated well    The ears were cleaned today.  The patient may return here as needed or with their primary physician.  Aural hygiene was discussed.  Avoidance of Q-tips was reiterated.  Avoid flushing the ear canal if there is a possibility of a hole or perforation in the tympanic membrane.   If there are any unresolved concerns regarding hearing loss an audiogram is recommended.      Mirtha MARCOS  [facility]        Again, thank you for allowing me to participate in the care of your patient.        Sincerely,        Mirtha Plascencia NP

## 2021-08-27 NOTE — PATIENT INSTRUCTIONS
Thank you for allowing Mirtha Plascencia, NP and our ENT team to participate in your care.  If your medications are too expensive, please give the nurse a call.  We can possibly change this medication.  If you have a scheduling or an appointment question please contact our Health Unit Coordinator at their direct line [phone] ext: [phone].   ALL nursing questions or concerns can be directed to your ENT Nurse: [phone]--Naty    Use vosol drops as prescribed  Use elocon ointment as needed for ear itching/flaking   Follow up as needed for ear cleaning

## 2021-08-30 ENCOUNTER — TELEPHONE (OUTPATIENT)
Dept: OTOLARYNGOLOGY | Facility: OTHER | Age: 76
End: 2021-08-30

## 2021-08-30 NOTE — TELEPHONE ENCOUNTER
Received a PA from [facility] for Hydrocortisone-Acetic Acid 1-2 % Otic Solution. Not EPA enabled. Completed insurance form and faxed to [employer]. Waiting for a response.

## 2021-09-14 NOTE — TELEPHONE ENCOUNTER
Received a call from [facility] wanting to know a diagnosis other than the ear itching which is listed with the medication. They want to know the cause of the ear itching. Can you provide this to me ASAP?

## 2021-09-14 NOTE — TELEPHONE ENCOUNTER
I have since received a DENIAL from [employer] for the Hydrocortisone-Acetic Acid 1-2% Otic Solution.     I can resubmit a new PA with a new diagnosis if we need to. Forms scanned to Epic.     *When initiating the PA on [facility] it provided me with medication that is covered without a PA. Not sure if any of these would work for the patient or not, but wanted to share them with you.

## 2021-10-03 ENCOUNTER — HEALTH MAINTENANCE LETTER (OUTPATIENT)
Age: 76
End: 2021-10-03

## 2021-10-04 DIAGNOSIS — E03.4 HYPOTHYROIDISM DUE TO ACQUIRED ATROPHY OF THYROID: ICD-10-CM

## 2021-10-05 RX ORDER — LEVOTHYROXINE SODIUM 50 UG/1
TABLET ORAL
Qty: 90 TABLET | Refills: 1 | OUTPATIENT
Start: 2021-10-05

## 2021-10-06 ENCOUNTER — OFFICE VISIT (OUTPATIENT)
Dept: PODIATRY | Facility: OTHER | Age: 76
End: 2021-10-06
Attending: NURSE PRACTITIONER
Payer: COMMERCIAL

## 2021-10-06 VITALS
HEART RATE: 48 BPM | OXYGEN SATURATION: 98 % | DIASTOLIC BLOOD PRESSURE: 80 MMHG | SYSTOLIC BLOOD PRESSURE: 130 MMHG | TEMPERATURE: 97.5 F

## 2021-10-06 DIAGNOSIS — M62.462 GASTROCNEMIUS EQUINUS OF LEFT LOWER EXTREMITY: ICD-10-CM

## 2021-10-06 DIAGNOSIS — M79.672 LEFT FOOT PAIN: ICD-10-CM

## 2021-10-06 DIAGNOSIS — M20.12 HALLUX VALGUS (ACQUIRED), LEFT FOOT: ICD-10-CM

## 2021-10-06 DIAGNOSIS — M62.461 GASTROCNEMIUS EQUINUS OF RIGHT LOWER EXTREMITY: ICD-10-CM

## 2021-10-06 DIAGNOSIS — M21.42 PES PLANUS OF BOTH FEET: ICD-10-CM

## 2021-10-06 DIAGNOSIS — Q82.8 POROKERATOSIS: ICD-10-CM

## 2021-10-06 DIAGNOSIS — M21.41 PES PLANUS OF BOTH FEET: ICD-10-CM

## 2021-10-06 DIAGNOSIS — M20.11 HALLUX VALGUS (ACQUIRED), RIGHT FOOT: ICD-10-CM

## 2021-10-06 DIAGNOSIS — M77.51 BONE SPUR OF RIGHT FOOT: ICD-10-CM

## 2021-10-06 DIAGNOSIS — N18.31 STAGE 3A CHRONIC KIDNEY DISEASE (H): ICD-10-CM

## 2021-10-06 DIAGNOSIS — L84 CALLUS OF FOOT: Primary | ICD-10-CM

## 2021-10-06 DIAGNOSIS — L90.9 FAT PAD ATROPHY OF FOOT: ICD-10-CM

## 2021-10-06 PROCEDURE — 99204 OFFICE O/P NEW MOD 45 MIN: CPT | Mod: 25 | Performed by: PODIATRIST

## 2021-10-06 PROCEDURE — G0463 HOSPITAL OUTPT CLINIC VISIT: HCPCS | Mod: 25

## 2021-10-06 PROCEDURE — 11055 PARING/CUTG B9 HYPRKER LES 1: CPT | Mod: GA | Performed by: PODIATRIST

## 2021-10-06 ASSESSMENT — PAIN SCALES - GENERAL: PAINLEVEL: NO PAIN (1)

## 2021-10-06 NOTE — NURSING NOTE
"Chief Complaint   Patient presents with     Musculoskeletal Problem     Callus       Initial /80 (BP Location: Left arm, Patient Position: Sitting, Cuff Size: Adult Regular)   Pulse (!) 48   Temp 97.5  F (36.4  C) (Tympanic)   SpO2 98%  Estimated body mass index is 24.66 kg/m  as calculated from the following:    Height as of 8/27/21: 1.588 m (5' 2.5\").    Weight as of 8/27/21: 62.1 kg (137 lb).  Medication Reconciliation: complete  Nicole Thrasher  "

## 2021-10-06 NOTE — PATIENT INSTRUCTIONS
"Calluses on the bottom surface of the foot will continue to come back due to the pressure from the bone on the bottom of your foot. Below are some tips for keeping the callus(es) trimmed which often decreases the pain on the bottom of your foot.    -Callus care:  ---Do a daily epsom salt soak in lukewarm water for 20 minutes.   ---After the soak, the apply a moisturizing cream (ammonium lactate) to the callus and let it soak in for about ten minutes  ---Next, take an oriana board or nail file to or pumice stone the callus. This should be done daily (minimally lotion and callus paring) to keep the callus well pared.    -Ammonium Lactate will be ordered through your pharmacy today. You may consider looking into \"Good Rx\" for this cream.  ---You may also look online for a Urea cream (try 20% or 30%). The 10% may work but it may not be strong enough. You may not need one as strong as 40%.    -Orthotics / shoe inserts can be modified to take the pressure off the calluses which can decrease the rate a which they develop. You will be called within two weeks to schedule this appointment, or you can call the number on the card to schedule the appointment with the orthotist, Ilda Palacios. She is located at [place].  ---If you are scheduled with Ilda at the [place] in [place], then go through the [place] entrance at the [facility]. She is in the podiatry/wound care department. Ask at registration where this is located, but the closest entry is the [place] entrance.  ---This appointment will not shoe on on [employer].  ---If you don't hear from the orthotist  within one week, call podiatry at [phone]    "

## 2021-10-06 NOTE — LETTER
10/6/2021         RE: Franca Angeles  [address]  [place] [facility] [address]        Dear Colleague,    Thank you for referring your patient, Franca Angeles, to the [facility]. Please see a copy of my visit note below.    Chief complaint: Patient presents with:  Musculoskeletal Problem: Callus      History of Present Illness: This 76 year old female is seen at the request of Carolin for evaluation and suggestions of management of LEFT plantar fifth metatarsal head pain.    She has had calluses on the this part of her foot for years, but the foot started to become painful within the last month or two.    After walking aerobics, she thinks soaking her feet did help with the pain, but she says she does not like to sit long enough to soak her feet.    She dances at least once a week and it does not cause her pain when dancing. She mostly has pain when she is barefoot and sometimes when walking.      Patient says she also has a bump on the top of her LEFT great toe joint that has been present for a long time. She is not sure what caused this.    Lastly, she has another thickened callus on the RIGHT plantar heel. She is wondering what this is and how she can treat it although it does not cause her a lot of pain.    No further pedal complaints today.       /80 (BP Location: Left arm, Patient Position: Sitting, Cuff Size: Adult Regular)   Pulse (!) 48   Temp 97.5  F (36.4  C) (Tympanic)   SpO2 98%     Patient Active Problem List   Diagnosis     Menopause     Joint pain     Disorder of bone and cartilage     Advanced care planning/counseling discussion     Routine general medical examination at a health care facility (ANNUAL)     Colon cancer screening     Hypothyroidism due to acquired atrophy of thyroid     Benign essential hypertension     CKD (chronic kidney disease) stage 3, GFR 30-59 ml/min (H)     AK (actinic keratosis)     Mild major depression (H)     Generalized  anxiety disorder     Mixed hyperlipidemia     LBBB (left bundle branch block)     Dyspnea on exertion     Prediabetes       Past Surgical History:   Procedure Laterality Date     ARTHRODESIS KNEE  [id number]    left knee, paritla medial meniscectomy, debridement medial femoral condyle and patellofemoral debridement     COLONOSCOPY  2004    nml     COLONOSCOPY  7/23/2014    Procedure: COLONOSCOPY;  Surgeon: Nghia Hernandez DO;  Location: [facility] OR     CYSTOSCOPY      micro hematuria neg     DILATION AND CURETTAGE       EXCISE LESION UPPER EXTREMITY Right 10/25/2017    Procedure: EXCISE LESION UPPER EXTREMITY;  EXCISIONAL BIOPSY RIGHT FOREARM TIMES 2;  Surgeon: Nghia Hernandez DO;  Location: [facility] OR     tubal sterilization       uterine fibroids       wrist fx RT         Current Outpatient Medications   Medication     aspirin 81 MG tablet     atorvastatin (LIPITOR) 20 MG tablet     Calcium-Magnesium-Vitamin D (CALCIUM 500 PO)     cinnamon 500 MG CAPS     co-enzyme Q-10 100 MG CAPS capsule     gabapentin (NEURONTIN) 300 MG capsule     Garlic 2000 MG CAPS     GLUCOSAMINE-CHONDROITIN PO     levothyroxine (SYNTHROID/LEVOTHROID) 50 MCG tablet     lisinopril (ZESTRIL) 20 MG tablet     LORazepam (ATIVAN) 0.5 MG tablet     mometasone (ELOCON) 0.1 % external cream     Multiple Vitamins-Minerals (MULTIVITAMIN OR)     Omega-3 Fatty Acids (FISH OIL) 1200 MG CAPS     psyllium (METAMUCIL/KONSYL) capsule     Turmeric Curcumin 500 MG CAPS     No current facility-administered medications for this visit.          Allergies   Allergen Reactions     Pseudoephedrine Hcl Other (See Comments)     Heart racing  Sudafed     Hydroxyzine Anxiety     jittery     Mucinex Palpitations     Viibryd Anxiety     Restless legs       Family History   Problem Relation Age of Onset     C.A.D. Mother      Other - See Comments Mother         high platelets     Hypertension Mother      C.A.D. Paternal Aunt      Diabetes Other         aunt     Cardiovascular  Father         cardiovascular disease     Colon Cancer Other         family history-paternal side     Cerebrovascular Disease Maternal Grandmother      Cerebrovascular Disease Maternal Grandfather        Social History     Socioeconomic History     Marital status:      Spouse name: Not on file     Number of children: Not on file     Years of education: Not on file     Highest education level: Not on file   Occupational History     Occupation: retired manager   Tobacco Use     Smoking status: Never Smoker     Smokeless tobacco: Never Used   Substance and Sexual Activity     Alcohol use: Yes     Alcohol/week: 0.0 standard drinks     Comment: occasionally     Drug use: No     Sexual activity: Yes     Partners: Male     Birth control/protection: None   Other Topics Concern      Service Not Asked     Blood Transfusions Not Asked     Caffeine Concern Yes     Comment: coffee - 5 cups     Occupational Exposure Not Asked     Hobby Hazards Not Asked     Sleep Concern Not Asked     Stress Concern Not Asked     Weight Concern Not Asked     Special Diet Not Asked     Back Care Not Asked     Exercise Not Asked     Bike Helmet Not Asked     Seat Belt Not Asked     Self-Exams Not Asked     Parent/sibling w/ CABG, MI or angioplasty before 65F 55M? No   Social History Narrative     Not on file     Social Determinants of Health     Financial Resource Strain:      Difficulty of Paying Living Expenses:    Food Insecurity:      Worried About Running Out of Food in the Last Year:      Ran Out of Food in the Last Year:    Transportation Needs:      Lack of Transportation (Medical):      Lack of Transportation (Non-Medical):    Physical Activity:      Days of Exercise per Week:      Minutes of Exercise per Session:    Stress:      Feeling of Stress :    Social Connections:      Frequency of Communication with Friends and Family:      Frequency of Social Gatherings with Friends and Family:      Attends Jewish Services:       Active Member of Clubs or Organizations:      Attends Club or Organization Meetings:      Marital Status:    Intimate Partner Violence:      Fear of Current or Ex-Partner:      Emotionally Abused:      Physically Abused:      Sexually Abused:        ROS: 10 point ROS neg other than the symptoms noted above in the HPI.  EXAM  Constitutional: healthy, alert and no distress    Psychiatric: mentation appears normal and affect normal/bright    VASCULAR:  -Dorsalis pedis pulse +2/4 b/l  -Posterior tibial pulse +2/4 b/l  -Capillary refill time < 3 seconds to b/l hallux  NEURO:  -Light touch sensation intact to b/l plantar forefoot  DERM:  -Hyperkeratotic lesion to the bilateral plantar 5th metatarsal head (LEFT > RIGHT)  -Skin temperature, texture and turgor WNL b/l  -Moderate loss of fat pad to bilateral plantar forefoot and bilateral plantar heel    MSK:  -Pain on palpation to LEFT plantar 5th metatarsal head, bilaterally   -Moderate decrease in arch height while patient is NWB, bilaterally   -Moderate bony prominence to the dorsal lateral aspect of the RIGHT dorsal 1st MTPJ without pain  -Muscle strength of ankles +5/5 for dorsiflexion, plantarflexion, ABDUction and ADDuction b/l    -Ankle joint passive ROM within normal limits except for dorsiflexion:    Dorsiflexion, RIGHT Straight knee 0 degrees    Dorsiflexion, LEFT Straight knee 0 degrees    ============================================================    ASSESSMENT:  (L84) Callus of foot  (primary encounter diagnosis)    (M79.672) Left foot pain    (M21.41,  M21.42) Pes planus of both feet    (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 Treatment options discussed with no educational barriers noted.    -Callus pared x 1 to the LEFT plantar 1st metatarsal head without incident  ---ABN signed  ---Patient reminded that the callus will likely return due to the underlying, prominent bone causing the callus while the patient is walking.  ---Explained how the pes planus and gastroc equinus along with the fat pad atrophy contributes to additional pressure on the 5th metatarsal head. The pressure causes the painful callus to continue to develop.    Calluses on the bottom surface of the foot will continue to come back due to the pressure from the bone on the bottom of your foot. Below are some tips for keeping the callus(es) trimmed which often decreases the pain on the bottom of your foot.    -Callus care:  ---Do a daily epsom salt soak in lukewarm water for 20 minutes.   ---After the soak, the apply a moisturizing cream (ammonium lactate) to the callus and let it soak in for about ten minutes  ---Next, take an oriana board or nail file to or pumice stone the callus. This should be done daily (minimally lotion and callus paring) to keep the callus well pared.    -Ammonium Lactate will be ordered through your pharmacy today. You may consider looking into \"Good Rx\" for this cream.  ---You may also look online for a Urea cream (try 20% or 30%). The 10% may work but it may not be strong enough. You may not need one as strong as 40%.    -Orthotics / shoe inserts can be modified to take the pressure off the calluses which can decrease the rate a which they develop. You will be called within two weeks to schedule this appointment, or you can call the number on the card to schedule the appointment with the orthotist, Ilda Palacios. She is located at [place].  ---If you are scheduled with Ilda at the [place] in [place], then go through the [place] entrance at the hospital. She is in the podiatry/wound care department. Ask at registration where " this is located, but the closest entry is the [facility].  ---This appointment will not shoe on on [facility].  ---If you don't hear from the orthotist  within one week, call podiatry at [phone]    -Additionally discussed surgical options with the patient. If she exhausts conservative treatment options, she may consider a metatarsal head resection. Discussed the post-op recovery course and the surgical procedure in detail with the patient. She would likely be in a post-op shoe for a couple weeks until the sutures were removed, but it would not keep her off her foot for a long period of time.  -Patient would like to try orthotics, callus paring at home, and callus treatment at the clinic at this time.  ----------------------------------------  -Porokeratosis -- patient has a mild porokeratosis of the plantar RIGHT heel. This is not causing her pain. She may treat this like the callus as well and she does not need other treatment at this time unless it starts to cause her pain.    -Bone spur: Patient has bunions on both great toes. This is also a development from her biomechanics and her pes planus likely contributed. She developed a moderate dorsal bone spur, but she is advised to leave it at this time since it is not causing her pain.    -Patient in agreement with the above treatment plan and all of patient's questions were answered.      RTC as needed per patient request        Carol Pennington DPM      Again, thank you for allowing me to participate in the care of your patient.        Sincerely,        Carol Pennington DPM

## 2021-10-06 NOTE — PROGRESS NOTES
Chief complaint: Patient presents with:  Musculoskeletal Problem: Callus      History of Present Illness: This 76 year old female is seen at the request of Carolin for evaluation and suggestions of management of LEFT plantar fifth metatarsal head pain.    She has had calluses on the this part of her foot for years, but the foot started to become painful within the last month or two.    After walking aerobics, she thinks soaking her feet did help with the pain, but she says she does not like to sit long enough to soak her feet.    She dances at least once a week and it does not cause her pain when dancing. She mostly has pain when she is barefoot and sometimes when walking.      Patient says she also has a bump on the top of her LEFT great toe joint that has been present for a long time. She is not sure what caused this.    Lastly, she has another thickened callus on the RIGHT plantar heel. She is wondering what this is and how she can treat it although it does not cause her a lot of pain.    No further pedal complaints today.       /80 (BP Location: Left arm, Patient Position: Sitting, Cuff Size: Adult Regular)   Pulse (!) 48   Temp 97.5  F (36.4  C) (Tympanic)   SpO2 98%     Patient Active Problem List   Diagnosis     Menopause     Joint pain     Disorder of bone and cartilage     Advanced care planning/counseling discussion     Routine general medical examination at a health care facility (ANNUAL)     Colon cancer screening     Hypothyroidism due to acquired atrophy of thyroid     Benign essential hypertension     CKD (chronic kidney disease) stage 3, GFR 30-59 ml/min (H)     AK (actinic keratosis)     Mild major depression (H)     Generalized anxiety disorder     Mixed hyperlipidemia     LBBB (left bundle branch block)     Dyspnea on exertion     Prediabetes       Past Surgical History:   Procedure Laterality Date     ARTHRODESIS KNEE  [id number]    left knee, paritla medial meniscectomy, debridement  medial femoral condyle and patellofemoral debridement     COLONOSCOPY  2004    nml     COLONOSCOPY  7/23/2014    Procedure: COLONOSCOPY;  Surgeon: Nghia Hernandez DO;  Location: [facility]     CYSTOSCOPY      micro hematuria neg     DILATION AND CURETTAGE       EXCISE LESION UPPER EXTREMITY Right 10/25/2017    Procedure: EXCISE LESION UPPER EXTREMITY;  EXCISIONAL BIOPSY RIGHT FOREARM TIMES 2;  Surgeon: Nghia Hernandez DO;  Location: [facility]     tubal sterilization       uterine fibroids       wrist fx RT         Current Outpatient Medications   Medication     aspirin 81 MG tablet     atorvastatin (LIPITOR) 20 MG tablet     Calcium-Magnesium-Vitamin D (CALCIUM 500 PO)     cinnamon 500 MG CAPS     co-enzyme Q-10 100 MG CAPS capsule     gabapentin (NEURONTIN) 300 MG capsule     Garlic 2000 MG CAPS     GLUCOSAMINE-CHONDROITIN PO     levothyroxine (SYNTHROID/LEVOTHROID) 50 MCG tablet     lisinopril (ZESTRIL) 20 MG tablet     LORazepam (ATIVAN) 0.5 MG tablet     mometasone (ELOCON) 0.1 % external cream     Multiple Vitamins-Minerals (MULTIVITAMIN OR)     Omega-3 Fatty Acids (FISH OIL) 1200 MG CAPS     psyllium (METAMUCIL/KONSYL) capsule     Turmeric Curcumin 500 MG CAPS     No current facility-administered medications for this visit.          Allergies   Allergen Reactions     Pseudoephedrine Hcl Other (See Comments)     Heart racing  Sudafed     Hydroxyzine Anxiety     jittery     Mucinex Palpitations     Viibryd Anxiety     Restless legs       Family History   Problem Relation Age of Onset     C.A.D. Mother      Other - See Comments Mother         high platelets     Hypertension Mother      C.A.D. Paternal Aunt      Diabetes Other         aunt     Cardiovascular Father         cardiovascular disease     Colon Cancer Other         family history-paternal side     Cerebrovascular Disease Maternal Grandmother      Cerebrovascular Disease Maternal Grandfather        Social History     Socioeconomic History     Marital  status:      Spouse name: Not on file     Number of children: Not on file     Years of education: Not on file     Highest education level: Not on file   Occupational History     Occupation: retired manager   Tobacco Use     Smoking status: Never Smoker     Smokeless tobacco: Never Used   Substance and Sexual Activity     Alcohol use: Yes     Alcohol/week: 0.0 standard drinks     Comment: occasionally     Drug use: No     Sexual activity: Yes     Partners: Male     Birth control/protection: None   Other Topics Concern      Service Not Asked     Blood Transfusions Not Asked     Caffeine Concern Yes     Comment: coffee - 5 cups     Occupational Exposure Not Asked     Hobby Hazards Not Asked     Sleep Concern Not Asked     Stress Concern Not Asked     Weight Concern Not Asked     Special Diet Not Asked     Back Care Not Asked     Exercise Not Asked     Bike Helmet Not Asked     Seat Belt Not Asked     Self-Exams Not Asked     Parent/sibling w/ CABG, MI or angioplasty before 65F 55M? No   Social History Narrative     Not on file     Social Determinants of Health     Financial Resource Strain:      Difficulty of Paying Living Expenses:    Food Insecurity:      Worried About Running Out of Food in the Last Year:      Ran Out of Food in the Last Year:    Transportation Needs:      Lack of Transportation (Medical):      Lack of Transportation (Non-Medical):    Physical Activity:      Days of Exercise per Week:      Minutes of Exercise per Session:    Stress:      Feeling of Stress :    Social Connections:      Frequency of Communication with Friends and Family:      Frequency of Social Gatherings with Friends and Family:      Attends Church Services:      Active Member of Clubs or Organizations:      Attends Club or Organization Meetings:      Marital Status:    Intimate Partner Violence:      Fear of Current or Ex-Partner:      Emotionally Abused:      Physically Abused:      Sexually Abused:        ROS:  10 point ROS neg other than the symptoms noted above in the HPI.  EXAM  Constitutional: healthy, alert and no distress    Psychiatric: mentation appears normal and affect normal/bright    VASCULAR:  -Dorsalis pedis pulse +2/4 b/l  -Posterior tibial pulse +2/4 b/l  -Capillary refill time < 3 seconds to b/l hallux  NEURO:  -Light touch sensation intact to b/l plantar forefoot  DERM:  -Hyperkeratotic lesion to the bilateral plantar 5th metatarsal head (LEFT > RIGHT)  -Skin temperature, texture and turgor WNL b/l  -Moderate loss of fat pad to bilateral plantar forefoot and bilateral plantar heel    MSK:  -Pain on palpation to LEFT plantar 5th metatarsal head, bilaterally   -Moderate decrease in arch height while patient is NWB, bilaterally   -Moderate bony prominence to the dorsal lateral aspect of the RIGHT dorsal 1st MTPJ without pain  -Muscle strength of ankles +5/5 for dorsiflexion, plantarflexion, ABDUction and ADDuction b/l    -Ankle joint passive ROM within normal limits except for dorsiflexion:    Dorsiflexion, RIGHT Straight knee 0 degrees    Dorsiflexion, LEFT Straight knee 0 degrees    ============================================================    ASSESSMENT:  (L84) Callus of foot  (primary encounter diagnosis)    (M79.672) Left foot pain    (M21.41,  M21.42) Pes planus of both feet    (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 Treatment options discussed with no educational barriers noted.    -Callus pared x 1 to the LEFT plantar 1st metatarsal head without incident  ---ABN signed  ---Patient reminded that the callus will likely return due to the  "underlying, prominent bone causing the callus while the patient is walking.  ---Explained how the pes planus and gastroc equinus along with the fat pad atrophy contributes to additional pressure on the 5th metatarsal head. The pressure causes the painful callus to continue to develop.    Calluses on the bottom surface of the foot will continue to come back due to the pressure from the bone on the bottom of your foot. Below are some tips for keeping the callus(es) trimmed which often decreases the pain on the bottom of your foot.    -Callus care:  ---Do a daily epsom salt soak in lukewarm water for 20 minutes.   ---After the soak, the apply a moisturizing cream (ammonium lactate) to the callus and let it soak in for about ten minutes  ---Next, take an oriana board or nail file to or pumice stone the callus. This should be done daily (minimally lotion and callus paring) to keep the callus well pared.    -Ammonium Lactate will be ordered through your pharmacy today. You may consider looking into \"Good Rx\" for this cream.  ---You may also look online for a Urea cream (try 20% or 30%). The 10% may work but it may not be strong enough. You may not need one as strong as 40%.    -Orthotics / shoe inserts can be modified to take the pressure off the calluses which can decrease the rate a which they develop. You will be called within two weeks to schedule this appointment, or you can call the number on the card to schedule the appointment with the orthotist, Ilda Palacios. She is located at [facility].  ---If you are scheduled with Ilda at the [facility] in [place], then go through the [place] entrance at the hospital. She is in the podiatry/wound care department. Ask at registration where this is located, but the closest entry is the [facility].  ---This appointment will not shoe on on [employer].  ---If you don't hear from the orthotist  within one week, call podiatry at [phone]    -Additionally discussed surgical " options with the patient. If she exhausts conservative treatment options, she may consider a metatarsal head resection. Discussed the post-op recovery course and the surgical procedure in detail with the patient. She would likely be in a post-op shoe for a couple weeks until the sutures were removed, but it would not keep her off her foot for a long period of time.  -Patient would like to try orthotics, callus paring at home, and callus treatment at the clinic at this time.  ----------------------------------------  -Porokeratosis -- patient has a mild porokeratosis of the plantar RIGHT heel. This is not causing her pain. She may treat this like the callus as well and she does not need other treatment at this time unless it starts to cause her pain.    -Bone spur: Patient has bunions on both great toes. This is also a development from her biomechanics and her pes planus likely contributed. She developed a moderate dorsal bone spur, but she is advised to leave it at this time since it is not causing her pain.    -Patient in agreement with the above treatment plan and all of patient's questions were answered.      RTC as needed per patient request        Carol Pennington DPM

## 2021-10-19 PROBLEM — F32.9 MAJOR DEPRESSION: Status: ACTIVE | Noted: 2020-11-13

## 2021-11-23 NOTE — PROGRESS NOTES
Assessment & Plan     1. Prediabetes  Continue current plan  - Hemoglobin A1c; Future    2. Mixed hyperlipidemia  - Lipid Profile; Future  - atorvastatin (LIPITOR) 20 MG tablet; Take 1.5 tablets (30 mg) by mouth daily  Dispense: 135 tablet; Refill: 3    3. Hypothyroidism due to acquired atrophy of thyroid  - TSH with free T4 reflex; Future    4. Stage 3a chronic kidney disease (H)  Do not use NSAIDS  Stop tumeric    5. Generalized anxiety disorder  Continue ativan as needed     6. Benign essential hypertension  Continue lisinopril 10mg daily   - Comprehensive metabolic panel; Future    7. Chronic rhinitis  - triamcinolone (NASACORT) 55 MCG/ACT nasal aerosol; Spray 2 sprays into both nostrils daily  Dispense: 16.9 mL; Refill: 3      Review of the result(s) of each unique test - see below        Return in about 3 months (around 2/28/2022) for pre-diabetes, lipids, HTN.    Brittney Coe NP  [facility] [place] MITRA Schulte is a 76 year old who presents for the following health issues     HPI     Hyperlipidemia Follow-Up      Are you regularly taking any medication or supplement to lower your cholesterol?   Yes- lipitor     Are you having muscle aches or other side effects that you think could be caused by your cholesterol lowering medication?  No    Hypertension Follow-up      Do you check your blood pressure regularly outside of the clinic? Yes     Are you following a low salt diet? Yes    Are your blood pressures ever more than 140 on the top number (systolic) OR more   than 90 on the bottom number (diastolic), for example 140/90? No had a few low bp cut medication in half   Home readings:  106/63  119/71  119/65  101/62  135/74  144/777  98/58  She had been taking 20mg lisinopril, had low blood pressure and was not feeling well so cut back to 10,g daily.      Chronic Kidney Disease Follow-up      Do you take any over the counter pain medicine?: No bu has been taking tumeric as a  supplement.      Hypothyroidism Follow-up      Since last visit, patient describes the following symptoms: Weight stable, no hair loss, no skin changes, no constipation, no loose stools      How many servings of fruits and vegetables do you eat daily?  2-3    On average, how many sweetened beverages do you drink each day (Examples: soda, juice, sweet tea, etc.  Do NOT count diet or artificially sweetened beverages)?   0    How many days per week do you exercise enough to make your heart beat faster? 3 or less    How many minutes a day do you exercise enough to make your heart beat faster? 30 - 60    How many days per week do you miss taking your medication? 0    Only other concern is pressure above eyes.  Notes increased post nasal drainage at night, not blowing any out the nose, no maxillary sinus pressure, denies ear pain, sore throat, fever/chills.      Patient Active Problem List   Diagnosis     Menopause     Joint pain     Disorder of bone and cartilage     Advanced care planning/counseling discussion     Routine general medical examination at a health care facility (ANNUAL)     Colon cancer screening     Hypothyroidism due to acquired atrophy of thyroid     Benign essential hypertension     CKD (chronic kidney disease) stage 3, GFR 30-59 ml/min (H)     AK (actinic keratosis)     Mild major depression (H)     Generalized anxiety disorder     Mixed hyperlipidemia     LBBB (left bundle branch block)     Dyspnea on exertion     Prediabetes     Past Surgical History:   Procedure Laterality Date     ARTHRODESIS KNEE  [id number]    left knee, paritla medial meniscectomy, debridement medial femoral condyle and patellofemoral debridement     COLONOSCOPY  2004    nml     COLONOSCOPY  7/23/2014    Procedure: COLONOSCOPY;  Surgeon: Nghia Hernandez DO;  Location: [facility]     CYSTOSCOPY      micro hematuria neg     DILATION AND CURETTAGE       EXCISE LESION UPPER EXTREMITY Right 10/25/2017    Procedure: EXCISE LESION UPPER  EXTREMITY;  EXCISIONAL BIOPSY RIGHT FOREARM TIMES 2;  Surgeon: Nghia Hernandez DO;  Location: [facility]     tubal sterilization       uterine fibroids       wrist fx RT         Social History     Tobacco Use     Smoking status: Never Smoker     Smokeless tobacco: Never Used   Substance Use Topics     Alcohol use: Yes     Alcohol/week: 0.0 standard drinks     Comment: occasionally     Family History   Problem Relation Age of Onset     C.A.D. Mother      Other - See Comments Mother         high platelets     Hypertension Mother      C.A.D. Paternal Aunt      Diabetes Other         aunt     Cardiovascular Father         cardiovascular disease     Colon Cancer Other         family history-paternal side     Cerebrovascular Disease Maternal Grandmother      Cerebrovascular Disease Maternal Grandfather          Current Outpatient Medications   Medication Sig Dispense Refill     aspirin 81 MG tablet Take 81 mg by mouth daily       atorvastatin (LIPITOR) 20 MG tablet Take 1.5 tablets (30 mg) by mouth daily 135 tablet 3     Calcium-Magnesium-Vitamin D (CALCIUM 500 PO) Take by mouth daily       cinnamon 500 MG CAPS        co-enzyme Q-10 100 MG CAPS capsule Take 100 mg by mouth        gabapentin (NEURONTIN) 300 MG capsule Take 1 capsule (300 mg) by mouth At Bedtime 90 capsule 1     Garlic 2000 MG CAPS Take 2,000 mg by mouth daily       GLUCOSAMINE-CHONDROITIN PO Take  by mouth. GLUCOSAMINE HCL/CHONDR SUA NA  One daily       levothyroxine (SYNTHROID/LEVOTHROID) 50 MCG tablet Take 1 tablet (50 mcg) by mouth daily 90 tablet 1     lisinopril (ZESTRIL) 20 MG tablet Take 1 tablet (20 mg) by mouth daily 90 tablet 1     LORazepam (ATIVAN) 0.5 MG tablet Take 1 tablet (0.5 mg) by mouth nightly as needed for anxiety or sleep 30 tablet 0     mometasone (ELOCON) 0.1 % external cream Apply a thin film of cream to the affected ear canal (s) twice a day for 2 weeks, then use sparingly as needed only. 15 g 1     Multiple Vitamins-Minerals  "(MULTIVITAMIN OR) Alive 1 tablet       Omega-3 Fatty Acids (FISH OIL) 1200 MG CAPS Take 1,080 mg by mouth daily        psyllium (METAMUCIL/KONSYL) capsule Take 1 capsule by mouth daily       triamcinolone (NASACORT) 55 MCG/ACT nasal aerosol Spray 2 sprays into both nostrils daily 16.9 mL 3     Allergies   Allergen Reactions     Pseudoephedrine Hcl Other (See Comments)     Heart racing  Sudafed     Hydroxyzine Anxiety     jittery     Mucinex Palpitations     Viibryd Anxiety     Restless legs     Recent Labs   Lab Test 11/26/21  0758 11/26/21  0757 08/20/21  0757 05/10/21  0801 02/05/21  0809   A1C  --  5.6 5.6 5.6  --    *  --  109* 105* 119*   HDL 62  --  62 58 55   TRIG 110  --  129 138 149   ALT 36  --  41 46 36   CR 1.12*  --  0.95 1.04 1.01   GFRESTIMATED 47*  --  58* 52* 54*   GFRESTBLACK  --   --   --  60* 62   POTASSIUM 4.1  --  4.0 4.3 4.1   TSH 3.60  --  3.66 3.79 4.17*      BP Readings from Last 3 Encounters:   11/29/21 (!) 156/76   10/06/21 130/80   08/27/21 126/68    Wt Readings from Last 3 Encounters:   11/29/21 62.1 kg (137 lb)   08/27/21 62.1 kg (137 lb)   08/23/21 62.1 kg (137 lb)                      Review of Systems   Constitutional, HEENT, cardiovascular, pulmonary, gi and gu systems are negative, except as otherwise noted.      Objective    BP (!) 156/76 (BP Location: Right arm, Patient Position: Chair, Cuff Size: Adult Regular)   Pulse 57   Temp 97  F (36.1  C) (Tympanic)   Resp 16   Ht 1.588 m (5' 2.5\")   Wt 62.1 kg (137 lb)   SpO2 98%   BMI 24.66 kg/m    Body mass index is 24.66 kg/m .  Physical Exam   GENERAL: healthy, alert and no distress  HENT: normal cephalic/atraumatic, right ear: occluded with wax, left ear: normal: no effusions, no erythema, normal landmarks, nose and mouth without ulcers or lesions, rhinorrhea clear, throat with clear post nasal drainage.   NECK: no adenopathy, no asymmetry, masses, or scars and thyroid normal to palpation  RESP: lungs clear to " auscultation - no rales, rhonchi or wheezes  CV: regular rate and rhythm, normal S1 S2, no S3 or S4, no murmur, click or rub, no peripheral edema and peripheral pulses strong  MS: no gross musculoskeletal defects noted, no edema  PSYCH: mentation appears normal, affect normal/bright    Lab on 11/26/2021   Component Date Value Ref Range Status     Creatinine Urine mg/dL 11/26/2021 81  mg/dL Final     Albumin Urine mg/L 11/26/2021 <5  mg/L Final     Albumin Urine mg/g Cr 11/26/2021    Final    Unable to calculate:  Urine creatinine or albumin value below detectable level     TSH 11/26/2021 3.60  0.40 - 4.00 mU/L Final     Sodium 11/26/2021 140  133 - 144 mmol/L Final     Potassium 11/26/2021 4.1  3.4 - 5.3 mmol/L Final     Chloride 11/26/2021 106  94 - 109 mmol/L Final     Carbon Dioxide (CO2) 11/26/2021 27  20 - 32 mmol/L Final     Anion Gap 11/26/2021 7  3 - 14 mmol/L Final     Urea Nitrogen 11/26/2021 18  7 - 30 mg/dL Final     Creatinine 11/26/2021 1.12* 0.52 - 1.04 mg/dL Final     Calcium 11/26/2021 9.2  8.5 - 10.1 mg/dL Final     Glucose 11/26/2021 85  70 - 99 mg/dL Final     Alkaline Phosphatase 11/26/2021 80  40 - 150 U/L Final     AST 11/26/2021 31  0 - 45 U/L Final     ALT 11/26/2021 36  0 - 50 U/L Final     Protein Total 11/26/2021 7.4  6.8 - 8.8 g/dL Final     Albumin 11/26/2021 3.9  3.4 - 5.0 g/dL Final     Bilirubin Total 11/26/2021 0.5  0.2 - 1.3 mg/dL Final     GFR Estimate 11/26/2021 47* >60 mL/min/1.73m2 Final    As of July 11, 2021, eGFR is calculated by the CKD-EPI creatinine equation, without race adjustment. eGFR can be influenced by muscle mass, exercise, and diet. The reported eGFR is an estimation only and is only applicable if the renal function is stable.     Cholesterol 11/26/2021 198  <200 mg/dL Final     Triglycerides 11/26/2021 110  <150 mg/dL Final     Direct Measure HDL 11/26/2021 62  >=50 mg/dL Final     LDL Cholesterol Calculated 11/26/2021 114* <=100 mg/dL Final     Non HDL  Cholesterol 11/26/2021 136* <130 mg/dL Final     Patient Fasting > 8hrs? 11/26/2021 Yes   Final     Estimated Average Glucose 11/26/2021 114  mg/dL Final     Hemoglobin A1C 11/26/2021 5.6  0.0 - 5.6 % Final    Normal <5.7%   Prediabetes 5.7-6.4%    Diabetes 6.5% or higher     Note: Adopted from ADA consensus guidelines.

## 2021-11-26 ENCOUNTER — LAB (OUTPATIENT)
Dept: LAB | Facility: OTHER | Age: 76
End: 2021-11-26
Payer: MEDICARE

## 2021-11-26 DIAGNOSIS — Z79.899 ON STATIN THERAPY: ICD-10-CM

## 2021-11-26 DIAGNOSIS — E78.2 MIXED HYPERLIPIDEMIA: ICD-10-CM

## 2021-11-26 DIAGNOSIS — R73.03 PREDIABETES: ICD-10-CM

## 2021-11-26 DIAGNOSIS — I10 BENIGN ESSENTIAL HYPERTENSION: ICD-10-CM

## 2021-11-26 DIAGNOSIS — E03.4 HYPOTHYROIDISM DUE TO ACQUIRED ATROPHY OF THYROID: ICD-10-CM

## 2021-11-26 LAB
ALBUMIN SERPL-MCNC: 3.9 G/DL (ref 3.4–5)
ALP SERPL-CCNC: 80 U/L (ref 40–150)
ALT SERPL W P-5'-P-CCNC: 36 U/L (ref 0–50)
ANION GAP SERPL CALCULATED.3IONS-SCNC: 7 MMOL/L (ref 3–14)
AST SERPL W P-5'-P-CCNC: 31 U/L (ref 0–45)
BILIRUB SERPL-MCNC: 0.5 MG/DL (ref 0.2–1.3)
BUN SERPL-MCNC: 18 MG/DL (ref 7–30)
CALCIUM SERPL-MCNC: 9.2 MG/DL (ref 8.5–10.1)
CHLORIDE BLD-SCNC: 106 MMOL/L (ref 94–109)
CHOLEST SERPL-MCNC: 198 MG/DL
CO2 SERPL-SCNC: 27 MMOL/L (ref 20–32)
CREAT SERPL-MCNC: 1.12 MG/DL (ref 0.52–1.04)
CREAT UR-MCNC: 81 MG/DL
EST. AVERAGE GLUCOSE BLD GHB EST-MCNC: 114 MG/DL
FASTING STATUS PATIENT QL REPORTED: YES
GFR SERPL CREATININE-BSD FRML MDRD: 47 ML/MIN/1.73M2
GLUCOSE BLD-MCNC: 85 MG/DL (ref 70–99)
HBA1C MFR BLD: 5.6 % (ref 0–5.6)
HDLC SERPL-MCNC: 62 MG/DL
LDLC SERPL CALC-MCNC: 114 MG/DL
MICROALBUMIN UR-MCNC: <5 MG/L
MICROALBUMIN/CREAT UR: NORMAL MG/G{CREAT}
NONHDLC SERPL-MCNC: 136 MG/DL
POTASSIUM BLD-SCNC: 4.1 MMOL/L (ref 3.4–5.3)
PROT SERPL-MCNC: 7.4 G/DL (ref 6.8–8.8)
SODIUM SERPL-SCNC: 140 MMOL/L (ref 133–144)
TRIGL SERPL-MCNC: 110 MG/DL
TSH SERPL DL<=0.005 MIU/L-ACNC: 3.6 MU/L (ref 0.4–4)

## 2021-11-26 PROCEDURE — 82043 UR ALBUMIN QUANTITATIVE: CPT | Mod: ZL

## 2021-11-26 PROCEDURE — 80053 COMPREHEN METABOLIC PANEL: CPT | Mod: ZL

## 2021-11-26 PROCEDURE — 36415 COLL VENOUS BLD VENIPUNCTURE: CPT | Mod: ZL

## 2021-11-26 PROCEDURE — 84443 ASSAY THYROID STIM HORMONE: CPT | Mod: ZL

## 2021-11-26 PROCEDURE — 83036 HEMOGLOBIN GLYCOSYLATED A1C: CPT | Mod: ZL

## 2021-11-26 PROCEDURE — 80061 LIPID PANEL: CPT | Mod: ZL

## 2021-11-29 ENCOUNTER — OFFICE VISIT (OUTPATIENT)
Dept: FAMILY MEDICINE | Facility: OTHER | Age: 76
End: 2021-11-29
Attending: NURSE PRACTITIONER
Payer: COMMERCIAL

## 2021-11-29 VITALS
SYSTOLIC BLOOD PRESSURE: 132 MMHG | HEART RATE: 57 BPM | HEIGHT: 63 IN | BODY MASS INDEX: 24.27 KG/M2 | RESPIRATION RATE: 16 BRPM | TEMPERATURE: 97 F | DIASTOLIC BLOOD PRESSURE: 76 MMHG | WEIGHT: 137 LBS | OXYGEN SATURATION: 98 %

## 2021-11-29 DIAGNOSIS — N18.31 STAGE 3A CHRONIC KIDNEY DISEASE (H): ICD-10-CM

## 2021-11-29 DIAGNOSIS — R73.03 PREDIABETES: Primary | ICD-10-CM

## 2021-11-29 DIAGNOSIS — J31.0 CHRONIC RHINITIS: ICD-10-CM

## 2021-11-29 DIAGNOSIS — F41.1 GENERALIZED ANXIETY DISORDER: ICD-10-CM

## 2021-11-29 DIAGNOSIS — I10 BENIGN ESSENTIAL HYPERTENSION: ICD-10-CM

## 2021-11-29 DIAGNOSIS — E03.4 HYPOTHYROIDISM DUE TO ACQUIRED ATROPHY OF THYROID: ICD-10-CM

## 2021-11-29 DIAGNOSIS — E78.2 MIXED HYPERLIPIDEMIA: ICD-10-CM

## 2021-11-29 PROCEDURE — 99214 OFFICE O/P EST MOD 30 MIN: CPT | Performed by: NURSE PRACTITIONER

## 2021-11-29 PROCEDURE — G0463 HOSPITAL OUTPT CLINIC VISIT: HCPCS

## 2021-11-29 RX ORDER — TRIAMCINOLONE ACETONIDE 55 UG/1
2 SPRAY, METERED NASAL DAILY
Qty: 16.9 ML | Refills: 3 | Status: SHIPPED | OUTPATIENT
Start: 2021-11-29 | End: 2023-03-15 | Stop reason: SINTOL

## 2021-11-29 RX ORDER — ATORVASTATIN CALCIUM 20 MG/1
30 TABLET, FILM COATED ORAL DAILY
Qty: 135 TABLET | Refills: 3 | Status: SHIPPED | OUTPATIENT
Start: 2021-11-29 | End: 2022-03-03

## 2021-11-29 ASSESSMENT — ANXIETY QUESTIONNAIRES
3. WORRYING TOO MUCH ABOUT DIFFERENT THINGS: SEVERAL DAYS
4. TROUBLE RELAXING: SEVERAL DAYS
2. NOT BEING ABLE TO STOP OR CONTROL WORRYING: SEVERAL DAYS
6. BECOMING EASILY ANNOYED OR IRRITABLE: NOT AT ALL
5. BEING SO RESTLESS THAT IT IS HARD TO SIT STILL: NOT AT ALL
IF YOU CHECKED OFF ANY PROBLEMS ON THIS QUESTIONNAIRE, HOW DIFFICULT HAVE THESE PROBLEMS MADE IT FOR YOU TO DO YOUR WORK, TAKE CARE OF THINGS AT HOME, OR GET ALONG WITH OTHER PEOPLE: NOT DIFFICULT AT ALL
1. FEELING NERVOUS, ANXIOUS, OR ON EDGE: SEVERAL DAYS
GAD7 TOTAL SCORE: 5
7. FEELING AFRAID AS IF SOMETHING AWFUL MIGHT HAPPEN: SEVERAL DAYS

## 2021-11-29 ASSESSMENT — MIFFLIN-ST. JEOR: SCORE: 1067.62

## 2021-11-29 ASSESSMENT — PAIN SCALES - GENERAL: PAINLEVEL: NO PAIN (1)

## 2021-11-29 NOTE — PATIENT INSTRUCTIONS
Assessment & Plan     1. Prediabetes  Continue current plan  - Hemoglobin A1c; Future    2. Mixed hyperlipidemia  - Lipid Profile; Future  - atorvastatin (LIPITOR) 20 MG tablet; Take 1.5 tablets (30 mg) by mouth daily  Dispense: 135 tablet; Refill: 3    3. Hypothyroidism due to acquired atrophy of thyroid  - TSH with free T4 reflex; Future    4. Stage 3a chronic kidney disease (H)  Do not use NSAIDS  Stop tumeric    5. Generalized anxiety disorder  Continue ativan as needed     6. Benign essential hypertension  Continue lisinopril 10mg daily   - Comprehensive metabolic panel; Future    7. Chronic rhinitis  - triamcinolone (NASACORT) 55 MCG/ACT nasal aerosol; Spray 2 sprays into both nostrils daily  Dispense: 16.9 mL; Refill: 3      Review of the result(s) of each unique test - see below        Return in about 3 months (around 2/28/2022) for pre-diabetes, lipids, HTN.    Brittney Coe NP  [facility] - [facility]

## 2021-11-29 NOTE — NURSING NOTE
"Chief Complaint   Patient presents with     Anxiety     Depression     Lipids     Hypertension       Initial BP (!) 156/76 (BP Location: Right arm, Patient Position: Chair, Cuff Size: Adult Regular)   Pulse 57   Temp 97  F (36.1  C) (Tympanic)   Resp 16   Ht 1.588 m (5' 2.5\")   Wt 62.1 kg (137 lb)   SpO2 98%   BMI 24.66 kg/m   Estimated body mass index is 24.66 kg/m  as calculated from the following:    Height as of this encounter: 1.588 m (5' 2.5\").    Weight as of this encounter: 62.1 kg (137 lb).  Medication Reconciliation: complete  Pamela M. Lechevalier, LPN    "

## 2021-11-30 ASSESSMENT — ANXIETY QUESTIONNAIRES: GAD7 TOTAL SCORE: 5

## 2021-11-30 ASSESSMENT — PATIENT HEALTH QUESTIONNAIRE - PHQ9: SUM OF ALL RESPONSES TO PHQ QUESTIONS 1-9: 1

## 2021-12-20 DIAGNOSIS — I10 BENIGN ESSENTIAL HYPERTENSION: ICD-10-CM

## 2021-12-20 RX ORDER — LISINOPRIL 10 MG/1
10 TABLET ORAL DAILY
Qty: 90 TABLET | Refills: 0 | Status: SHIPPED | OUTPATIENT
Start: 2021-12-20 | End: 2022-03-03

## 2022-01-19 ENCOUNTER — TELEPHONE (OUTPATIENT)
Dept: FAMILY MEDICINE | Facility: OTHER | Age: 77
End: 2022-01-19

## 2022-01-19 ENCOUNTER — OFFICE VISIT (OUTPATIENT)
Dept: FAMILY MEDICINE | Facility: OTHER | Age: 77
End: 2022-01-19
Attending: NURSE PRACTITIONER
Payer: MEDICARE

## 2022-01-19 VITALS
SYSTOLIC BLOOD PRESSURE: 128 MMHG | HEART RATE: 67 BPM | OXYGEN SATURATION: 98 % | WEIGHT: 134 LBS | BODY MASS INDEX: 23.74 KG/M2 | HEIGHT: 63 IN | TEMPERATURE: 98 F | RESPIRATION RATE: 18 BRPM | DIASTOLIC BLOOD PRESSURE: 64 MMHG

## 2022-01-19 DIAGNOSIS — J06.9 VIRAL URI WITH COUGH: ICD-10-CM

## 2022-01-19 DIAGNOSIS — Z20.822 SUSPECTED 2019 NOVEL CORONAVIRUS INFECTION: ICD-10-CM

## 2022-01-19 DIAGNOSIS — J06.9 VIRAL URI WITH COUGH: Primary | ICD-10-CM

## 2022-01-19 PROCEDURE — G0463 HOSPITAL OUTPT CLINIC VISIT: HCPCS

## 2022-01-19 PROCEDURE — 87637 SARSCOV2&INF A&B&RSV AMP PRB: CPT | Mod: ZL | Performed by: NURSE PRACTITIONER

## 2022-01-19 PROCEDURE — 99214 OFFICE O/P EST MOD 30 MIN: CPT | Performed by: NURSE PRACTITIONER

## 2022-01-19 RX ORDER — ALBUTEROL SULFATE 90 UG/1
2 AEROSOL, METERED RESPIRATORY (INHALATION) EVERY 6 HOURS
Qty: 18 G | Refills: 0 | Status: SHIPPED | OUTPATIENT
Start: 2022-01-19 | End: 2022-01-20

## 2022-01-19 RX ORDER — BENZONATATE 100 MG/1
100 CAPSULE ORAL 3 TIMES DAILY PRN
Qty: 30 CAPSULE | Refills: 1 | Status: SHIPPED | OUTPATIENT
Start: 2022-01-19 | End: 2022-03-03

## 2022-01-19 ASSESSMENT — MIFFLIN-ST. JEOR: SCORE: 1054.01

## 2022-01-19 ASSESSMENT — PAIN SCALES - GENERAL: PAINLEVEL: SEVERE PAIN (7)

## 2022-01-19 NOTE — PATIENT INSTRUCTIONS
Assessment & Plan     1. Viral URI with cough  mucinex per package directions. (plain formulation)  Fluids, rest  Quarantine due to covid test pending  - albuterol (PROAIR HFA/PROVENTIL HFA/VENTOLIN HFA) 108 (90 Base) MCG/ACT inhaler; Inhale 2 puffs into the lungs every 6 hours  Dispense: 18 g; Refill: 0  - benzonatate (TESSALON) 100 MG capsule; Take 1 capsule (100 mg) by mouth 3 times daily as needed for cough  Dispense: 30 capsule; Refill: 1    2. Suspected 2019 novel coronavirus infection  - Symptomatic; Yes; 1/17/2022 Influenza A/B & SARS-CoV2 (COVID-19) Virus PCR Multiplex Nose      Follow-up is any worsening or no improvement.      Brittney Coe, NP  [facility]

## 2022-01-19 NOTE — PROGRESS NOTES
"  Assessment & Plan     1. Viral URI with cough  mucinex per package directions. (plain formulation)  Fluids, rest  Quarantine due to covid test pending  - albuterol (PROAIR HFA/PROVENTIL HFA/VENTOLIN HFA) 108 (90 Base) MCG/ACT inhaler; Inhale 2 puffs into the lungs every 6 hours  Dispense: 18 g; Refill: 0  - benzonatate (TESSALON) 100 MG capsule; Take 1 capsule (100 mg) by mouth 3 times daily as needed for cough  Dispense: 30 capsule; Refill: 1    2. Suspected 2019 novel coronavirus infection  - Symptomatic; Yes; 1/17/2022 Influenza A/B & SARS-CoV2 (COVID-19) Virus PCR Multiplex Nose        Follow-up is any worsening or no improvement.      Birttney Coe, NP  [facility]    Raffaele Schulte is a 76 year old who presents for the following health issues     HPI     Acute Illness  Acute illness concerns: sinus pressure off and on since November came back 2 days and tested at home for covid this am with negative results   Onset/Duration: 2 days   Symptoms:  Fever: no  Chills/Sweats: YES  Headache (location?): YES  Sinus Pressure: YES  Conjunctivitis:  no  Ear Pain: YES: right  Rhinorrhea: YES  Congestion: YES  Sore Throat: YES  Cough: YES-non-productive  Wheeze: no  Decreased Appetite: no  Nausea: no  Vomiting: no  Diarrhea: no  Dysuria/Freq.: no  Dysuria or Hematuria: no  Fatigue/Achiness: YES  Sick/Strep Exposure: no  Therapies tried and outcome: tylenol       Review of Systems   Constitutional, HEENT, cardiovascular, pulmonary, gi and gu systems are negative, except as otherwise noted.      Objective    /64 (BP Location: Right arm, Patient Position: Chair, Cuff Size: Adult Regular)   Pulse 67   Temp 98  F (36.7  C) (Tympanic)   Resp 18   Ht 1.588 m (5' 2.5\")   Wt 60.8 kg (134 lb)   SpO2 98%   BMI 24.12 kg/m    Body mass index is 24.12 kg/m .  Physical Exam   GENERAL: alert, no distress but ill appearing, glossy eyees  HENT: normal cephalic/atraumatic, both ears: clear " effusion, nasal mucosa edematous  and rhinorrhea clear, throat mildly erythematous without exudate.    NECK: no adenopathy, no asymmetry, masses, or scars and thyroid normal to palpation  RESP: lungs clear to auscultation - no rales, rhonchi or wheezes  CV: regular rate and rhythm, normal S1 S2, no S3 or S4, no murmur, click or rub, no peripheral edema and peripheral pulses strong  MS: no gross musculoskeletal defects noted, no edema  PSYCH: mentation appears normal, affect normal/bright

## 2022-01-19 NOTE — NURSING NOTE
"Chief Complaint   Patient presents with     Sinus Problem       Initial /64 (BP Location: Right arm, Patient Position: Chair, Cuff Size: Adult Regular)   Pulse 67   Temp 98  F (36.7  C) (Tympanic)   Resp 18   Ht 1.588 m (5' 2.5\")   Wt 60.8 kg (134 lb)   SpO2 98%   BMI 24.12 kg/m   Estimated body mass index is 24.12 kg/m  as calculated from the following:    Height as of this encounter: 1.588 m (5' 2.5\").    Weight as of this encounter: 60.8 kg (134 lb).  Medication Reconciliation: complete  Pamela M. Lechevalier, LPN    "

## 2022-01-19 NOTE — TELEPHONE ENCOUNTER
8:23 AM    Reason for Call: OVERBOOK    Patient is having the following symptoms: Patient is requesting to be seen for Sinus infection. Patient states she mentioned this the last time she was seen by Jameson Tavera. Patient complaining about green drainage.Patient said she did a home covid test and it was negative.1 weeks.    The patient is requesting an appointment for Overbook with Jameson Tavera.    Was an appointment offered for this call? No  If yes : Appointment type              Date    Preferred method for responding to this message: Telephone Call  What is your phone number ?   [phone]    If we cannot reach you directly, may we leave a detailed response at the number you provided? Yes    Can this message wait until your PCP/provider returns, if unavailable today? YES, Provider is in today    Tanya Meyer

## 2022-01-20 LAB
FLUAV RNA SPEC QL NAA+PROBE: NEGATIVE
FLUBV RNA RESP QL NAA+PROBE: NEGATIVE
RSV RNA SPEC NAA+PROBE: NEGATIVE
SARS-COV-2 RNA RESP QL NAA+PROBE: POSITIVE

## 2022-01-20 RX ORDER — ALBUTEROL SULFATE 90 UG/1
2 AEROSOL, METERED RESPIRATORY (INHALATION) EVERY 6 HOURS
Qty: 54 G | Refills: 0 | Status: SHIPPED | OUTPATIENT
Start: 2022-01-20 | End: 2022-06-13

## 2022-01-20 NOTE — TELEPHONE ENCOUNTER
Albuterol       Last Written Prescription Date:  1/19/2022  Last Fill Quantity: 18g,   # refills: 0  Last Office Visit: 1/19/2022  Future Office visit:    Next 5 appointments (look out 90 days)    Mar 03, 2022  9:45 AM  (Arrive by 9:30 AM)  SHORT with Brittney Coe NP  [facility] ([facility] ) [address]  [facility] [address]  [phone]           Routing refill request to provider for review/approval because:

## 2022-01-22 ENCOUNTER — TELEPHONE (OUTPATIENT)
Dept: FAMILY MEDICINE | Facility: OTHER | Age: 77
End: 2022-01-22
Payer: COMMERCIAL

## 2022-01-22 NOTE — TELEPHONE ENCOUNTER
"This patient has been identified as a high risk patient and may be eligible for Covid treatment options.        Coronavirus (COVID-19) Notification    Caller Name (Patient, parent, daughter/son, grandparent, etc)  patient    Reason for call  Notify of Positive Coronavirus (COVID-19) lab results, assess symptoms,  review [facility] recommendations    Lab Result    Lab test:  2019-nCoV rRt-PCR or SARS-CoV-2 PCR    Oropharyngeal AND/OR nasopharyngeal swabs is POSITIVE for 2019-nCoV RNA/SARS-COV-2 PCR (COVID-19 virus)    RN Recommendations/Instructions per [facility] Coronavirus COVID-19 recommendations    Brief introduction script  Introduce self then review script:  \"I am calling on behalf of [employer].  We were notified that your Coronavirus test (COVID-19) for was POSITIVE for the virus.     Patient refuses to talk to this caller, No information was relayed.    A Positive COVID-19 letter will be sent via [employer] or the mail. (Exception, no letters sent to Presurgerical/Preprocedure Patients)    Elizabeth Khan LPN        "

## 2022-02-04 ENCOUNTER — OFFICE VISIT (OUTPATIENT)
Dept: OTOLARYNGOLOGY | Facility: OTHER | Age: 77
End: 2022-02-04
Attending: NURSE PRACTITIONER
Payer: COMMERCIAL

## 2022-02-04 VITALS
OXYGEN SATURATION: 98 % | DIASTOLIC BLOOD PRESSURE: 62 MMHG | SYSTOLIC BLOOD PRESSURE: 132 MMHG | HEART RATE: 54 BPM | BODY MASS INDEX: 23.92 KG/M2 | HEIGHT: 63 IN | TEMPERATURE: 97.3 F | WEIGHT: 135 LBS

## 2022-02-04 DIAGNOSIS — L29.9 EAR ITCHING: ICD-10-CM

## 2022-02-04 DIAGNOSIS — J01.90 ACUTE SINUSITIS, RECURRENCE NOT SPECIFIED, UNSPECIFIED LOCATION: Primary | ICD-10-CM

## 2022-02-04 DIAGNOSIS — H61.21 IMPACTED CERUMEN OF RIGHT EAR: ICD-10-CM

## 2022-02-04 PROCEDURE — 69210 REMOVE IMPACTED EAR WAX UNI: CPT | Performed by: NURSE PRACTITIONER

## 2022-02-04 PROCEDURE — 99213 OFFICE O/P EST LOW 20 MIN: CPT | Mod: 25 | Performed by: NURSE PRACTITIONER

## 2022-02-04 PROCEDURE — G0463 HOSPITAL OUTPT CLINIC VISIT: HCPCS | Mod: 25

## 2022-02-04 RX ORDER — AZITHROMYCIN 250 MG/1
TABLET, FILM COATED ORAL
Qty: 6 TABLET | Refills: 0 | Status: SHIPPED | OUTPATIENT
Start: 2022-02-04 | End: 2022-03-03

## 2022-02-04 RX ORDER — MOMETASONE FUROATE 1 MG/G
CREAM TOPICAL
Qty: 15 G | Refills: 1 | Status: SHIPPED | OUTPATIENT
Start: 2022-02-04 | End: 2024-03-28

## 2022-02-04 ASSESSMENT — PAIN SCALES - GENERAL: PAINLEVEL: NO PAIN (0)

## 2022-02-04 ASSESSMENT — MIFFLIN-ST. JEOR: SCORE: 1058.55

## 2022-02-04 NOTE — PROGRESS NOTES
Otolaryngology Note         Chief Complaint:     Patient presents with:  Cerumen Impaction: Ear cleaning  referred by Brittney Tavera            History of Present Illness:     Franca Angeles is a 76 year old female who presents today for ear cleaning.  She has been feeling plugged in her ear and pain in the left ear    She last had ears cleaned 5 months ago.   She denies any acute changes to hearing  She does have gradual long term hearing loss, she misses some high pitched sound  No tinnitus  Slight disequilibrium, no rotary vertigo  No facial numbness or weakness.      No otalgia, otorrhea.    No hx of COM or otologic surgeries.     She is having some facial pain and pressure in the forehead for about a month  She saw PCP and was started on nasal spray and has noted some improvement.  She has not been treated with abx.          Medications:     Current Outpatient Rx   Medication Sig Dispense Refill     albuterol (PROAIR HFA/PROVENTIL HFA/VENTOLIN HFA) 108 (90 Base) MCG/ACT inhaler INHALE 2 PUFFS INTO THE LUNGS EVERY 6 HOURS 54 g 0     aspirin 81 MG tablet Take 81 mg by mouth daily       atorvastatin (LIPITOR) 20 MG tablet Take 1.5 tablets (30 mg) by mouth daily 135 tablet 3     azithromycin (ZITHROMAX Z-ELLIOTT) 250 MG tablet Take 500 mg on day 1, then 250 mg daily x 4 days 6 tablet 0     benzonatate (TESSALON) 100 MG capsule Take 1 capsule (100 mg) by mouth 3 times daily as needed for cough 30 capsule 1     Calcium-Magnesium-Vitamin D (CALCIUM 500 PO) Take by mouth daily       cinnamon 500 MG CAPS        co-enzyme Q-10 100 MG CAPS capsule Take 100 mg by mouth        gabapentin (NEURONTIN) 300 MG capsule Take 1 capsule (300 mg) by mouth At Bedtime 90 capsule 1     Garlic 2000 MG CAPS Take 2,000 mg by mouth daily       GLUCOSAMINE-CHONDROITIN PO Take  by mouth. GLUCOSAMINE HCL/CHONDR SUA NA  One daily       levothyroxine (SYNTHROID/LEVOTHROID) 50 MCG tablet Take 1 tablet (50 mcg) by mouth daily 90 tablet 1      lisinopril (ZESTRIL) 10 MG tablet Take 1 tablet (10 mg) by mouth daily 90 tablet 0     LORazepam (ATIVAN) 0.5 MG tablet Take 1 tablet (0.5 mg) by mouth nightly as needed for anxiety or sleep 30 tablet 0     mometasone (ELOCON) 0.1 % external cream Apply a thin film of cream to the affected ear canal (s) twice a day for 2 weeks, then use sparingly as needed only. 15 g 1     Multiple Vitamins-Minerals (MULTIVITAMIN OR) Alive 1 tablet       Omega-3 Fatty Acids (FISH OIL) 1200 MG CAPS Take 1,080 mg by mouth daily        psyllium (METAMUCIL/KONSYL) capsule Take 1 capsule by mouth daily       triamcinolone (NASACORT) 55 MCG/ACT nasal aerosol Spray 2 sprays into both nostrils daily 16.9 mL 3            Allergies:     Allergies: Pseudoephedrine hcl, Hydroxyzine, Mucinex, and Viibryd          Past Medical History:     Past Medical History:   Diagnosis Date     Esophageal reflux 5/10/2011     Need for prophylactic hormone replacement therapy (postmenopausal) 1995    took for 1-2 years            Past Surgical History:     Past Surgical History:   Procedure Laterality Date     ARTHRODESIS KNEE  [id number]    left knee, paritla medial meniscectomy, debridement medial femoral condyle and patellofemoral debridement     COLONOSCOPY  2004    nml     COLONOSCOPY  7/23/2014    Procedure: COLONOSCOPY;  Surgeon: Nghia Hernandez DO;  Location: [facility]     CYSTOSCOPY      micro hematuria neg     DILATION AND CURETTAGE       EXCISE LESION UPPER EXTREMITY Right 10/25/2017    Procedure: EXCISE LESION UPPER EXTREMITY;  EXCISIONAL BIOPSY RIGHT FOREARM TIMES 2;  Surgeon: Nghia Hernandez DO;  Location: [facility] OR     tubal sterilization       uterine fibroids       wrist fx RT         ENT family history reviewed         Social History:     Social History     Tobacco Use     Smoking status: Never Smoker     Smokeless tobacco: Never Used   Substance Use Topics     Alcohol use: Yes     Alcohol/week: 0.0 standard drinks     Comment: occasionally      "Drug use: No            Review of Systems:     ROS: See HPI         Physical Exam:     /62 (BP Location: Right arm, Patient Position: Sitting, Cuff Size: Adult Regular)   Pulse 54   Temp 97.3  F (36.3  C) (Tympanic)   Ht 1.588 m (5' 2.5\")   Wt 61.2 kg (135 lb)   SpO2 98%   BMI 24.30 kg/m      General - The patient is well nourished and well developed, and appears to have good nutritional status.  Alert and oriented to person and place, answers questions and cooperates with examination appropriately.   Head and Face - Normocephalic and atraumatic, with no gross asymmetry noted.  The facial nerve is intact, with strong symmetric movements.  Voice and Breathing - The patient was breathing comfortably without the use of accessory muscles. There was no wheezing, stridor. The patients voice was clear and strong, and had appropriate pitch and quality.  Ears - The ears were examined with binocular microscopy and with otoscope.  External ears normal. Right canal cerumen impacted with keratin debris.  Left canal with minimal ceruminosis.  The right ear was cleaned with cupped forceps.   Right tympanic membrane is intact without effusion, retraction or mass. The left ear was cleaned with cupped forceps.  Left tympanic membrane is intact without effusion, retraction or mass.  Eyes - Extraocular movements intact, sclera were not icteric or injected, conjunctiva were pink and moist.  Mouth - Examination of the oral cavity showed pink, healthy oral mucosa. Dentition in good condition. No lesions or ulcerations noted. The tongue was mobile and midline.   Throat - The walls of the oropharynx were smooth, pink, moist, symmetric, and had no lesions or ulcerations.  The tonsillar pillars and soft palate were symmetric. The uvula was midline on elevation.    Neck - Full range of motion on passive movement.  Palpation of the occipital, submental, submandibular, internal jugular chain, and supraclavicular nodes did not " demonstrate any abnormal lymph nodes or masses.  Palpation of the thyroid was soft and smooth, with no nodules or goiter appreciated.  The trachea was mobile and midline.  Nose - External contour is symmetric, no gross deflection or scars.  Nasal mucosa is pink and moist with no abnormal mucus.  The septum and turbinates were evaluated with nasal speculum, no polyps, masses, or purulence noted on examination.         Assessment and Plan:         ICD-10-CM    1. Acute sinusitis, recurrence not specified, unspecified location  J01.90 azithromycin (ZITHROMAX Z-ELLIOTT) 250 MG tablet   2. Ear itching  L29.9 mometasone (ELOCON) 0.1 % external cream   3. Impacted cerumen of right ear  H61.21      Follow up in about 1 year for ear cleaning, sooner as needed   I have given you a watch and wait prescription for sinusitis.  If symptoms persist for the next several days, fill the prescription and take as prescribed    Mirtha Plascencia NP-C  [facility]

## 2022-02-04 NOTE — NURSING NOTE
"Chief Complaint   Patient presents with     Cerumen Impaction     Ear cleaning  referred by Brittney Tavera        Initial /62 (BP Location: Right arm, Patient Position: Sitting, Cuff Size: Adult Regular)   Pulse 54   Temp 97.3  F (36.3  C) (Tympanic)   Ht 1.588 m (5' 2.5\")   Wt 61.2 kg (135 lb)   SpO2 98%   BMI 24.30 kg/m   Estimated body mass index is 24.3 kg/m  as calculated from the following:    Height as of this encounter: 1.588 m (5' 2.5\").    Weight as of this encounter: 61.2 kg (135 lb).  Medication Reconciliation: complete  Naty Isaacs LPN    "

## 2022-02-04 NOTE — LETTER
2/4/2022         RE: Franca Angeles  [address]  [facility] [address]        Dear Colleague,    Thank you for referring your patient, Franca Angeles, to the [facility]. Please see a copy of my visit note below.    Otolaryngology Note         Chief Complaint:     Patient presents with:  Cerumen Impaction: Ear cleaning  referred by Brittney Tavera            History of Present Illness:     Franca Angeles is a 76 year old female who presents today for ear cleaning.  She has been feeling plugged in her ear and pain in the left ear    She last had ears cleaned 5 months ago.   She denies any acute changes to hearing  She does have gradual long term hearing loss, she misses some high pitched sound  No tinnitus  Slight disequilibrium, no rotary vertigo  No facial numbness or weakness.      No otalgia, otorrhea.    No hx of COM or otologic surgeries.     She is having some facial pain and pressure in the forehead for about a month  She saw PCP and was started on nasal spray and has noted some improvement.  She has not been treated with abx.          Medications:     Current Outpatient Rx   Medication Sig Dispense Refill     albuterol (PROAIR HFA/PROVENTIL HFA/VENTOLIN HFA) 108 (90 Base) MCG/ACT inhaler INHALE 2 PUFFS INTO THE LUNGS EVERY 6 HOURS 54 g 0     aspirin 81 MG tablet Take 81 mg by mouth daily       atorvastatin (LIPITOR) 20 MG tablet Take 1.5 tablets (30 mg) by mouth daily 135 tablet 3     azithromycin (ZITHROMAX Z-ELLIOTT) 250 MG tablet Take 500 mg on day 1, then 250 mg daily x 4 days 6 tablet 0     benzonatate (TESSALON) 100 MG capsule Take 1 capsule (100 mg) by mouth 3 times daily as needed for cough 30 capsule 1     Calcium-Magnesium-Vitamin D (CALCIUM 500 PO) Take by mouth daily       cinnamon 500 MG CAPS        co-enzyme Q-10 100 MG CAPS capsule Take 100 mg by mouth        gabapentin (NEURONTIN) 300 MG capsule Take 1 capsule (300 mg) by mouth At Bedtime 90  capsule 1     Garlic 2000 MG CAPS Take 2,000 mg by mouth daily       GLUCOSAMINE-CHONDROITIN PO Take  by mouth. GLUCOSAMINE HCL/CHONDR SUA NA  One daily       levothyroxine (SYNTHROID/LEVOTHROID) 50 MCG tablet Take 1 tablet (50 mcg) by mouth daily 90 tablet 1     lisinopril (ZESTRIL) 10 MG tablet Take 1 tablet (10 mg) by mouth daily 90 tablet 0     LORazepam (ATIVAN) 0.5 MG tablet Take 1 tablet (0.5 mg) by mouth nightly as needed for anxiety or sleep 30 tablet 0     mometasone (ELOCON) 0.1 % external cream Apply a thin film of cream to the affected ear canal (s) twice a day for 2 weeks, then use sparingly as needed only. 15 g 1     Multiple Vitamins-Minerals (MULTIVITAMIN OR) Alive 1 tablet       Omega-3 Fatty Acids (FISH OIL) 1200 MG CAPS Take 1,080 mg by mouth daily        psyllium (METAMUCIL/KONSYL) capsule Take 1 capsule by mouth daily       triamcinolone (NASACORT) 55 MCG/ACT nasal aerosol Spray 2 sprays into both nostrils daily 16.9 mL 3            Allergies:     Allergies: Pseudoephedrine hcl, Hydroxyzine, Mucinex, and Viibryd          Past Medical History:     Past Medical History:   Diagnosis Date     Esophageal reflux 5/10/2011     Need for prophylactic hormone replacement therapy (postmenopausal) 1995    took for 1-2 years            Past Surgical History:     Past Surgical History:   Procedure Laterality Date     ARTHRODESIS KNEE  [id number]    left knee, paritla medial meniscectomy, debridement medial femoral condyle and patellofemoral debridement     COLONOSCOPY  2004    nml     COLONOSCOPY  7/23/2014    Procedure: COLONOSCOPY;  Surgeon: Nghia Hernandez DO;  Location: [facility]     CYSTOSCOPY      micro hematuria neg     DILATION AND CURETTAGE       EXCISE LESION UPPER EXTREMITY Right 10/25/2017    Procedure: EXCISE LESION UPPER EXTREMITY;  EXCISIONAL BIOPSY RIGHT FOREARM TIMES 2;  Surgeon: Nghia Hernandez DO;  Location: [facility]     tubal sterilization       uterine fibroids       wrist fx RT    "      ENT family history reviewed         Social History:     Social History     Tobacco Use     Smoking status: Never Smoker     Smokeless tobacco: Never Used   Substance Use Topics     Alcohol use: Yes     Alcohol/week: 0.0 standard drinks     Comment: occasionally     Drug use: No            Review of Systems:     ROS: See HPI         Physical Exam:     /62 (BP Location: Right arm, Patient Position: Sitting, Cuff Size: Adult Regular)   Pulse 54   Temp 97.3  F (36.3  C) (Tympanic)   Ht 1.588 m (5' 2.5\")   Wt 61.2 kg (135 lb)   SpO2 98%   BMI 24.30 kg/m      General - The patient is well nourished and well developed, and appears to have good nutritional status.  Alert and oriented to person and place, answers questions and cooperates with examination appropriately.   Head and Face - Normocephalic and atraumatic, with no gross asymmetry noted.  The facial nerve is intact, with strong symmetric movements.  Voice and Breathing - The patient was breathing comfortably without the use of accessory muscles. There was no wheezing, stridor. The patients voice was clear and strong, and had appropriate pitch and quality.  Ears - The ears were examined with binocular microscopy and with otoscope.  External ears normal. Right canal cerumen impacted with keratin debris.  Left canal with minimal ceruminosis.  The right ear was cleaned with cupped forceps.   Right tympanic membrane is intact without effusion, retraction or mass. The left ear was cleaned with cupped forceps.  Left tympanic membrane is intact without effusion, retraction or mass.  Eyes - Extraocular movements intact, sclera were not icteric or injected, conjunctiva were pink and moist.  Mouth - Examination of the oral cavity showed pink, healthy oral mucosa. Dentition in good condition. No lesions or ulcerations noted. The tongue was mobile and midline.   Throat - The walls of the oropharynx were smooth, pink, moist, symmetric, and had no lesions or " ulcerations.  The tonsillar pillars and soft palate were symmetric. The uvula was midline on elevation.    Neck - Full range of motion on passive movement.  Palpation of the occipital, submental, submandibular, internal jugular chain, and supraclavicular nodes did not demonstrate any abnormal lymph nodes or masses.  Palpation of the thyroid was soft and smooth, with no nodules or goiter appreciated.  The trachea was mobile and midline.  Nose - External contour is symmetric, no gross deflection or scars.  Nasal mucosa is pink and moist with no abnormal mucus.  The septum and turbinates were evaluated with nasal speculum, no polyps, masses, or purulence noted on examination.         Assessment and Plan:         ICD-10-CM    1. Acute sinusitis, recurrence not specified, unspecified location  J01.90 azithromycin (ZITHROMAX Z-ELLIOTT) 250 MG tablet   2. Ear itching  L29.9 mometasone (ELOCON) 0.1 % external cream   3. Impacted cerumen of right ear  H61.21      Follow up in about 1 year for ear cleaning, sooner as needed   I have given you a watch and wait prescription for sinusitis.  If symptoms persist for the next several days, fill the prescription and take as prescribed    Mirtha MARCOS  [facility]        Again, thank you for allowing me to participate in the care of your patient.        Sincerely,        Mirtha Plascencia NP

## 2022-02-04 NOTE — PATIENT INSTRUCTIONS
Follow up in about 1 year for ear cleaning, sooner as needed   I have given you a watch and wait prescription for sinusitis.  If symptoms persist for the next several days, fill the prescription and take as prescribed      Thank you for allowing Mirtha MARCOS and our ENT team to participate in your care.  If your medications are too expensive, please call my nurse at the number listed below.  We can possibly change this medication.    If you have a scheduling or an appointment question please contact our Health Unit Coordinator at their direct line [phone] ext [phone]  ALL nursing questions or concerns can be directed to my Nurse Naty [phone].

## 2022-03-01 ENCOUNTER — LAB (OUTPATIENT)
Dept: LAB | Facility: OTHER | Age: 77
End: 2022-03-01
Payer: MEDICARE

## 2022-03-01 DIAGNOSIS — E03.4 HYPOTHYROIDISM DUE TO ACQUIRED ATROPHY OF THYROID: ICD-10-CM

## 2022-03-01 DIAGNOSIS — I10 BENIGN ESSENTIAL HYPERTENSION: ICD-10-CM

## 2022-03-01 DIAGNOSIS — R73.03 PREDIABETES: ICD-10-CM

## 2022-03-01 DIAGNOSIS — E78.2 MIXED HYPERLIPIDEMIA: ICD-10-CM

## 2022-03-01 LAB
ALBUMIN SERPL-MCNC: 3.8 G/DL (ref 3.4–5)
ALP SERPL-CCNC: 79 U/L (ref 40–150)
ALT SERPL W P-5'-P-CCNC: 38 U/L (ref 0–50)
ANION GAP SERPL CALCULATED.3IONS-SCNC: 5 MMOL/L (ref 3–14)
AST SERPL W P-5'-P-CCNC: 34 U/L (ref 0–45)
BILIRUB SERPL-MCNC: 0.4 MG/DL (ref 0.2–1.3)
BUN SERPL-MCNC: 17 MG/DL (ref 7–30)
CALCIUM SERPL-MCNC: 9.4 MG/DL (ref 8.5–10.1)
CHLORIDE BLD-SCNC: 109 MMOL/L (ref 94–109)
CHOLEST SERPL-MCNC: 176 MG/DL
CO2 SERPL-SCNC: 26 MMOL/L (ref 20–32)
CREAT SERPL-MCNC: 0.91 MG/DL (ref 0.52–1.04)
EST. AVERAGE GLUCOSE BLD GHB EST-MCNC: 117 MG/DL
FASTING STATUS PATIENT QL REPORTED: YES
GFR SERPL CREATININE-BSD FRML MDRD: 65 ML/MIN/1.73M2
GLUCOSE BLD-MCNC: 96 MG/DL (ref 70–99)
HBA1C MFR BLD: 5.7 % (ref 0–5.6)
HDLC SERPL-MCNC: 55 MG/DL
LDLC SERPL CALC-MCNC: 98 MG/DL
NONHDLC SERPL-MCNC: 121 MG/DL
POTASSIUM BLD-SCNC: 4.1 MMOL/L (ref 3.4–5.3)
PROT SERPL-MCNC: 7 G/DL (ref 6.8–8.8)
SODIUM SERPL-SCNC: 140 MMOL/L (ref 133–144)
T4 FREE SERPL-MCNC: 1.01 NG/DL (ref 0.76–1.46)
TRIGL SERPL-MCNC: 117 MG/DL
TSH SERPL DL<=0.005 MIU/L-ACNC: 4.13 MU/L (ref 0.4–4)

## 2022-03-01 PROCEDURE — 80053 COMPREHEN METABOLIC PANEL: CPT | Mod: ZL

## 2022-03-01 PROCEDURE — 84443 ASSAY THYROID STIM HORMONE: CPT | Mod: ZL

## 2022-03-01 PROCEDURE — 36415 COLL VENOUS BLD VENIPUNCTURE: CPT | Mod: ZL

## 2022-03-01 PROCEDURE — 84439 ASSAY OF FREE THYROXINE: CPT | Mod: ZL

## 2022-03-01 PROCEDURE — 80061 LIPID PANEL: CPT | Mod: ZL

## 2022-03-01 PROCEDURE — 82040 ASSAY OF SERUM ALBUMIN: CPT | Mod: ZL

## 2022-03-01 PROCEDURE — 83036 HEMOGLOBIN GLYCOSYLATED A1C: CPT | Mod: ZL

## 2022-03-01 NOTE — PROGRESS NOTES
Assessment & Plan     1. Prediabetes  Continue current plan   - Hemoglobin A1c; Future    2. Benign essential hypertension  If cough continues, will stop lisinopril   - lisinopril (ZESTRIL) 10 MG tablet; Take 1 tablet (10 mg) by mouth daily  Dispense: 90 tablet; Refill: 0  - Comprehensive metabolic panel; Future    3. Mixed hyperlipidemia  - atorvastatin (LIPITOR) 20 MG tablet; Take 1.5 tablets (30 mg) by mouth daily  Dispense: 135 tablet; Refill: 3  - Lipid Profile; Future    4. Hypothyroidism due to acquired atrophy of thyroid  Continue current plan   - TSH with free T4 reflex; Future    5. Stage 3a chronic kidney disease (H)  Do not use NSAIDS     6. Mild recurrent major depression (H)  Continue current plan     7. Generalized anxiety disorder  As above  - gabapentin (NEURONTIN) 300 MG capsule; Take 1 capsule (300 mg) by mouth At Bedtime  Dispense: 90 capsule; Refill: 1  - LORazepam (ATIVAN) 0.5 MG tablet; Take 1 tablet (0.5 mg) by mouth nightly as needed for anxiety or sleep  Dispense: 30 tablet; Refill: 0    8. Cough  Follow-up in 3 weeks, is no improvement will change lisinopril     9. Acute sinusitis with symptoms greater than 10 days  - amoxicillin-clavulanate (AUGMENTIN) 875-125 MG tablet; Take 1 tablet by mouth 2 times daily for 14 days  Dispense: 28 tablet; Refill: 0    10. Primary insomnia  - gabapentin (NEURONTIN) 300 MG capsule; Take 1 capsule (300 mg) by mouth At Bedtime  Dispense: 90 capsule; Refill: 1  - LORazepam (ATIVAN) 0.5 MG tablet; Take 1 tablet (0.5 mg) by mouth nightly as needed for anxiety or sleep  Dispense: 30 tablet; Refill: 0    11. On statin therapy  - Comprehensive metabolic panel; Future      Follow-up in 3 weeks for cough  Follow-up in three  Months for chronic conditions.     Brittney Coe NP  [facility] [place] [facility]    Subjective   Franca is a 76 year old who presents for the following health issues     HPI     Hyperlipidemia Follow-Up      Are you regularly  taking any medication or supplement to lower your cholesterol?   Yes- Lipitor     Are you having muscle aches or other side effects that you think could be caused by your cholesterol lowering medication?  No    Hypertension Follow-up      Do you check your blood pressure regularly outside of the clinic? Yes     Are you following a low salt diet? Yes    Are your blood pressures ever more than 140 on the top number (systolic) OR more   than 90 on the bottom number (diastolic), for example 140/90? No    Chronic Kidney Disease Follow-up    Do you take any over the counter pain medicine?: Yes  What over the counter medicine are you taking for your pain?:  Tylenol       How often do you take this medicine?:  A few times a month    Hypothyroidism Follow-up      Since last visit, patient describes the following symptoms: weight loss of 2 lbs in last 3 months       How many servings of fruits and vegetables do you eat daily?  2-3    On average, how many sweetened beverages do you drink each day (Examples: soda, juice, sweet tea, etc.  Do NOT count diet or artificially sweetened beverages)?   0    How many days per week do you exercise enough to make your heart beat faster? 3 or less    How many minutes a day do you exercise enough to make your heart beat faster? 30 - 60    How many days per week do you miss taking your medication? 0    She was prescribed a z-pac for sinusitis, no improvement.  Deep cough continues and is feeling run down and has a headache above her eyes.     Of note, she is taking lisinopril and reviewed that this may cause a chronic cough.      Patient Active Problem List   Diagnosis     Menopause     Joint pain     Disorder of bone and cartilage     Advanced care planning/counseling discussion     Routine general medical examination at a health care facility (ANNUAL)     Colon cancer screening     Hypothyroidism due to acquired atrophy of thyroid     Benign essential hypertension     CKD (chronic kidney  disease) stage 3, GFR 30-59 ml/min (H)     AK (actinic keratosis)     Mild major depression (H)     Generalized anxiety disorder     Mixed hyperlipidemia     LBBB (left bundle branch block)     Dyspnea on exertion     Prediabetes     Past Surgical History:   Procedure Laterality Date     ARTHRODESIS KNEE  [id number]    left knee, paritla medial meniscectomy, debridement medial femoral condyle and patellofemoral debridement     COLONOSCOPY  2004    nml     COLONOSCOPY  7/23/2014    Procedure: COLONOSCOPY;  Surgeon: Nghia Hernandez DO;  Location: [facility]     CYSTOSCOPY      micro hematuria neg     DILATION AND CURETTAGE       EXCISE LESION UPPER EXTREMITY Right 10/25/2017    Procedure: EXCISE LESION UPPER EXTREMITY;  EXCISIONAL BIOPSY RIGHT FOREARM TIMES 2;  Surgeon: Nghia Hernandez DO;  Location: [facility] OR     tubal sterilization       uterine fibroids       wrist fx RT         Social History     Tobacco Use     Smoking status: Never Smoker     Smokeless tobacco: Never Used   Substance Use Topics     Alcohol use: Yes     Alcohol/week: 0.0 standard drinks     Comment: occasionally     Family History   Problem Relation Age of Onset     C.A.D. Mother      Other - See Comments Mother         high platelets     Hypertension Mother      C.A.D. Paternal Aunt      Diabetes Other         aunt     Cardiovascular Father         cardiovascular disease     Colon Cancer Other         family history-paternal side     Cerebrovascular Disease Maternal Grandmother      Cerebrovascular Disease Maternal Grandfather          Current Outpatient Medications   Medication Sig Dispense Refill     albuterol (PROAIR HFA/PROVENTIL HFA/VENTOLIN HFA) 108 (90 Base) MCG/ACT inhaler INHALE 2 PUFFS INTO THE LUNGS EVERY 6 HOURS 54 g 0     aspirin 81 MG tablet Take 81 mg by mouth daily       atorvastatin (LIPITOR) 20 MG tablet Take 1.5 tablets (30 mg) by mouth daily 135 tablet 3     Calcium-Magnesium-Vitamin D (CALCIUM 500 PO) Take by mouth daily        cinnamon 500 MG CAPS        co-enzyme Q-10 100 MG CAPS capsule Take 100 mg by mouth        gabapentin (NEURONTIN) 300 MG capsule Take 1 capsule (300 mg) by mouth At Bedtime 90 capsule 1     Garlic 2000 MG CAPS Take 2,000 mg by mouth daily       GLUCOSAMINE-CHONDROITIN PO Take  by mouth. GLUCOSAMINE HCL/CHONDR SUA NA  One daily       levothyroxine (SYNTHROID/LEVOTHROID) 50 MCG tablet Take 1 tablet (50 mcg) by mouth daily 90 tablet 1     lisinopril (ZESTRIL) 10 MG tablet Take 1 tablet (10 mg) by mouth daily 90 tablet 0     LORazepam (ATIVAN) 0.5 MG tablet Take 1 tablet (0.5 mg) by mouth nightly as needed for anxiety or sleep 30 tablet 0     mometasone (ELOCON) 0.1 % external cream Apply a thin film of cream to the affected ear canal (s) twice a day for 2 weeks, then use sparingly as needed only. 15 g 1     Multiple Vitamins-Minerals (MULTIVITAMIN OR) Alive 1 tablet       Omega-3 Fatty Acids (FISH OIL) 1200 MG CAPS Take 1,080 mg by mouth daily        psyllium (METAMUCIL/KONSYL) capsule Take 1 capsule by mouth daily       triamcinolone (NASACORT) 55 MCG/ACT nasal aerosol Spray 2 sprays into both nostrils daily 16.9 mL 3     Allergies   Allergen Reactions     Pseudoephedrine Hcl Other (See Comments)     Heart racing  Sudafed     Hydroxyzine Anxiety     jittery     Mucinex Palpitations     Viibryd Anxiety     Restless legs     Recent Labs   Lab Test 03/01/22  0803 11/26/21  0758 11/26/21  0757 08/20/21  0757 08/20/21  0757 05/10/21  0801 02/05/21  0809   A1C 5.7*  --  5.6  --  5.6 5.6  --    LDL 98 114*  --   --  109* 105* 119*   HDL 55 62  --   --  62 58 55   TRIG 117 110  --   --  129 138 149   ALT 38 36  --   --  41 46 36   CR 0.91 1.12*  --    < > 0.95 1.04 1.01   GFRESTIMATED 65 47*  --    < > 58* 52* 54*   GFRESTBLACK  --   --   --   --   --  60* 62   POTASSIUM 4.1 4.1  --    < > 4.0 4.3 4.1   TSH 4.13* 3.60  --    < > 3.66 3.79 4.17*    < > = values in this interval not displayed.      BP Readings from Last 3  "Encounters:   03/03/22 114/68   02/04/22 132/62   01/19/22 128/64    Wt Readings from Last 3 Encounters:   03/03/22 60.3 kg (133 lb)   02/04/22 61.2 kg (135 lb)   01/19/22 60.8 kg (134 lb)                      Review of Systems   Constitutional, HEENT, cardiovascular, pulmonary, gi and gu systems are negative, except as otherwise noted.      Objective    /68 (BP Location: Left arm, Patient Position: Chair, Cuff Size: Adult Regular)   Pulse 59   Temp 97.9  F (36.6  C) (Tympanic)   Resp 16   Ht 1.588 m (5' 2.5\")   Wt 60.3 kg (133 lb)   SpO2 99%   BMI 23.94 kg/m    Body mass index is 23.94 kg/m .  Physical Exam   GENERAL: healthy, alert and no distress  HENT: normal cephalic/atraumatic, both ears: erythematous and rhinorrhea yellow, thick post nasal drainage.    NECK: no adenopathy, no asymmetry, masses, or scars and thyroid normal to palpation  RESP: clear chest but deep bronchospasm.   CV: regular rate and rhythm, normal S1 S2, no S3 or S4, no murmur, click or rub, no peripheral edema and peripheral pulses strong  MS: no gross musculoskeletal defects noted, no edema  PSYCH: mentation appears normal, affect normal/bright    Lab on 03/01/2022   Component Date Value Ref Range Status     TSH 03/01/2022 4.13 (A) 0.40 - 4.00 mU/L Final     Sodium 03/01/2022 140  133 - 144 mmol/L Final     Potassium 03/01/2022 4.1  3.4 - 5.3 mmol/L Final     Chloride 03/01/2022 109  94 - 109 mmol/L Final     Carbon Dioxide (CO2) 03/01/2022 26  20 - 32 mmol/L Final     Anion Gap 03/01/2022 5  3 - 14 mmol/L Final     Urea Nitrogen 03/01/2022 17  7 - 30 mg/dL Final     Creatinine 03/01/2022 0.91  0.52 - 1.04 mg/dL Final     Calcium 03/01/2022 9.4  8.5 - 10.1 mg/dL Final     Glucose 03/01/2022 96  70 - 99 mg/dL Final     Alkaline Phosphatase 03/01/2022 79  40 - 150 U/L Final     AST 03/01/2022 34  0 - 45 U/L Final     ALT 03/01/2022 38  0 - 50 U/L Final     Protein Total 03/01/2022 7.0  6.8 - 8.8 g/dL Final     Albumin 03/01/2022 " 3.8  3.4 - 5.0 g/dL Final     Bilirubin Total 03/01/2022 0.4  0.2 - 1.3 mg/dL Final     GFR Estimate 03/01/2022 65  >60 mL/min/1.73m2 Final    Effective December 21, 2021 eGFRcr in adults is calculated using the 2021 CKD-EPI creatinine equation which includes age and gender (Kobi et al., NE, DOI: 10.1056/GAPCcg7542616)     Cholesterol 03/01/2022 176  <200 mg/dL Final     Triglycerides 03/01/2022 117  <150 mg/dL Final     Direct Measure HDL 03/01/2022 55  >=50 mg/dL Final     LDL Cholesterol Calculated 03/01/2022 98  <=100 mg/dL Final     Non HDL Cholesterol 03/01/2022 121  <130 mg/dL Final     Patient Fasting > 8hrs? 03/01/2022 Yes   Final     Estimated Average Glucose 03/01/2022 117  mg/dL Final     Hemoglobin A1C 03/01/2022 5.7 (A) 0.0 - 5.6 % Final    Normal <5.7%   Prediabetes 5.7-6.4%    Diabetes 6.5% or higher     Note: Adopted from ADA consensus guidelines.     Free T4 03/01/2022 1.01  0.76 - 1.46 ng/dL Final

## 2022-03-03 ENCOUNTER — OFFICE VISIT (OUTPATIENT)
Dept: FAMILY MEDICINE | Facility: OTHER | Age: 77
End: 2022-03-03
Attending: NURSE PRACTITIONER
Payer: COMMERCIAL

## 2022-03-03 VITALS
HEART RATE: 59 BPM | TEMPERATURE: 97.9 F | RESPIRATION RATE: 16 BRPM | SYSTOLIC BLOOD PRESSURE: 114 MMHG | HEIGHT: 63 IN | WEIGHT: 133 LBS | OXYGEN SATURATION: 99 % | BODY MASS INDEX: 23.57 KG/M2 | DIASTOLIC BLOOD PRESSURE: 68 MMHG

## 2022-03-03 DIAGNOSIS — J01.90 ACUTE SINUSITIS WITH SYMPTOMS GREATER THAN 10 DAYS: ICD-10-CM

## 2022-03-03 DIAGNOSIS — F41.1 GENERALIZED ANXIETY DISORDER: ICD-10-CM

## 2022-03-03 DIAGNOSIS — F51.01 PRIMARY INSOMNIA: ICD-10-CM

## 2022-03-03 DIAGNOSIS — E03.4 HYPOTHYROIDISM DUE TO ACQUIRED ATROPHY OF THYROID: ICD-10-CM

## 2022-03-03 DIAGNOSIS — R73.03 PREDIABETES: Primary | ICD-10-CM

## 2022-03-03 DIAGNOSIS — N18.31 STAGE 3A CHRONIC KIDNEY DISEASE (H): ICD-10-CM

## 2022-03-03 DIAGNOSIS — I10 BENIGN ESSENTIAL HYPERTENSION: ICD-10-CM

## 2022-03-03 DIAGNOSIS — Z79.899 ON STATIN THERAPY: ICD-10-CM

## 2022-03-03 DIAGNOSIS — F33.0 MILD RECURRENT MAJOR DEPRESSION (H): ICD-10-CM

## 2022-03-03 DIAGNOSIS — E78.2 MIXED HYPERLIPIDEMIA: ICD-10-CM

## 2022-03-03 DIAGNOSIS — R05.9 COUGH: ICD-10-CM

## 2022-03-03 PROCEDURE — 99214 OFFICE O/P EST MOD 30 MIN: CPT | Performed by: NURSE PRACTITIONER

## 2022-03-03 PROCEDURE — G0463 HOSPITAL OUTPT CLINIC VISIT: HCPCS

## 2022-03-03 RX ORDER — GABAPENTIN 300 MG/1
300 CAPSULE ORAL AT BEDTIME
Qty: 90 CAPSULE | Refills: 1 | Status: SHIPPED | OUTPATIENT
Start: 2022-03-03 | End: 2022-06-06

## 2022-03-03 RX ORDER — LISINOPRIL 10 MG/1
10 TABLET ORAL DAILY
Qty: 90 TABLET | Refills: 0 | Status: SHIPPED | OUTPATIENT
Start: 2022-03-03 | End: 2022-06-06

## 2022-03-03 RX ORDER — LORAZEPAM 0.5 MG/1
0.5 TABLET ORAL
Qty: 30 TABLET | Refills: 0 | Status: SHIPPED | OUTPATIENT
Start: 2022-03-03 | End: 2022-09-08

## 2022-03-03 RX ORDER — ATORVASTATIN CALCIUM 20 MG/1
30 TABLET, FILM COATED ORAL DAILY
Qty: 135 TABLET | Refills: 3 | Status: SHIPPED | OUTPATIENT
Start: 2022-03-03 | End: 2022-12-12

## 2022-03-03 ASSESSMENT — PAIN SCALES - GENERAL: PAINLEVEL: NO PAIN (1)

## 2022-03-03 NOTE — PATIENT INSTRUCTIONS
Assessment & Plan     1. Prediabetes  Continue current plan   - Hemoglobin A1c; Future    2. Benign essential hypertension  If cough continues, will stop lisinopril   - lisinopril (ZESTRIL) 10 MG tablet; Take 1 tablet (10 mg) by mouth daily  Dispense: 90 tablet; Refill: 0  - Comprehensive metabolic panel; Future    3. Mixed hyperlipidemia  - atorvastatin (LIPITOR) 20 MG tablet; Take 1.5 tablets (30 mg) by mouth daily  Dispense: 135 tablet; Refill: 3  - Lipid Profile; Future    4. Hypothyroidism due to acquired atrophy of thyroid  Continue current plan   - TSH with free T4 reflex; Future    5. Stage 3a chronic kidney disease (H)  Do not use NSAIDS     6. Mild recurrent major depression (H)  Continue current plan     7. Generalized anxiety disorder  As above  - gabapentin (NEURONTIN) 300 MG capsule; Take 1 capsule (300 mg) by mouth At Bedtime  Dispense: 90 capsule; Refill: 1  - LORazepam (ATIVAN) 0.5 MG tablet; Take 1 tablet (0.5 mg) by mouth nightly as needed for anxiety or sleep  Dispense: 30 tablet; Refill: 0    8. Cough  Follow-up in 3 weeks, is no improvement will change lisinopril     9. Acute sinusitis with symptoms greater than 10 days  - amoxicillin-clavulanate (AUGMENTIN) 875-125 MG tablet; Take 1 tablet by mouth 2 times daily for 14 days  Dispense: 28 tablet; Refill: 0    10. Primary insomnia  - gabapentin (NEURONTIN) 300 MG capsule; Take 1 capsule (300 mg) by mouth At Bedtime  Dispense: 90 capsule; Refill: 1  - LORazepam (ATIVAN) 0.5 MG tablet; Take 1 tablet (0.5 mg) by mouth nightly as needed for anxiety or sleep  Dispense: 30 tablet; Refill: 0    11. On statin therapy  - Comprehensive metabolic panel; Future      Follow-up in 3 weeks for cough  Follow-up in three  Months for chronic conditions.     Brittney Coe, NP  [facility]

## 2022-03-03 NOTE — NURSING NOTE
"Chief Complaint   Patient presents with     Hypertension     Lipids     Chronic Disease Management       Initial /68 (BP Location: Left arm, Patient Position: Chair, Cuff Size: Adult Regular)   Pulse 59   Temp 97.9  F (36.6  C) (Tympanic)   Resp 16   Ht 1.588 m (5' 2.5\")   Wt 60.3 kg (133 lb)   SpO2 99%   BMI 23.94 kg/m   Estimated body mass index is 23.94 kg/m  as calculated from the following:    Height as of this encounter: 1.588 m (5' 2.5\").    Weight as of this encounter: 60.3 kg (133 lb).  Medication Reconciliation: complete  Pamela M. Lechevalier, LPN    "

## 2022-03-31 DIAGNOSIS — E03.4 HYPOTHYROIDISM DUE TO ACQUIRED ATROPHY OF THYROID: ICD-10-CM

## 2022-04-01 RX ORDER — LEVOTHYROXINE SODIUM 50 UG/1
TABLET ORAL
Qty: 90 TABLET | Refills: 1 | Status: SHIPPED | OUTPATIENT
Start: 2022-04-01 | End: 2022-09-08

## 2022-04-01 NOTE — TELEPHONE ENCOUNTER
levothyroxine      Last Written Prescription Date:  8/23/21  Last Fill Quantity: 90,   # refills: 1  Last Office Visit: 3/3/22  Future Office visit:    Next 5 appointments (look out 90 days)    Jun 06, 2022  9:45 AM  (Arrive by 9:30 AM)  SHORT with Brittney Coe NP  [facility] ([facility] ) [address]  [facility] [address]  [phone]

## 2022-05-14 ENCOUNTER — HEALTH MAINTENANCE LETTER (OUTPATIENT)
Age: 77
End: 2022-05-14

## 2022-06-02 ENCOUNTER — LAB (OUTPATIENT)
Dept: LAB | Facility: OTHER | Age: 77
End: 2022-06-02
Payer: MEDICARE

## 2022-06-02 DIAGNOSIS — Z79.899 ON STATIN THERAPY: ICD-10-CM

## 2022-06-02 DIAGNOSIS — R73.03 PREDIABETES: ICD-10-CM

## 2022-06-02 DIAGNOSIS — I10 BENIGN ESSENTIAL HYPERTENSION: ICD-10-CM

## 2022-06-02 DIAGNOSIS — E78.2 MIXED HYPERLIPIDEMIA: ICD-10-CM

## 2022-06-02 DIAGNOSIS — E03.4 HYPOTHYROIDISM DUE TO ACQUIRED ATROPHY OF THYROID: ICD-10-CM

## 2022-06-02 LAB
ALBUMIN SERPL-MCNC: 3.6 G/DL (ref 3.4–5)
ALP SERPL-CCNC: 73 U/L (ref 40–150)
ALT SERPL W P-5'-P-CCNC: 37 U/L (ref 0–50)
ANION GAP SERPL CALCULATED.3IONS-SCNC: 3 MMOL/L (ref 3–14)
AST SERPL W P-5'-P-CCNC: 37 U/L (ref 0–45)
BILIRUB SERPL-MCNC: 0.5 MG/DL (ref 0.2–1.3)
BUN SERPL-MCNC: 21 MG/DL (ref 7–30)
CALCIUM SERPL-MCNC: 9.1 MG/DL (ref 8.5–10.1)
CHLORIDE BLD-SCNC: 109 MMOL/L (ref 94–109)
CHOLEST SERPL-MCNC: 186 MG/DL
CO2 SERPL-SCNC: 28 MMOL/L (ref 20–32)
CREAT SERPL-MCNC: 0.97 MG/DL (ref 0.52–1.04)
EST. AVERAGE GLUCOSE BLD GHB EST-MCNC: 117 MG/DL
FASTING STATUS PATIENT QL REPORTED: YES
GFR SERPL CREATININE-BSD FRML MDRD: 60 ML/MIN/1.73M2
GLUCOSE BLD-MCNC: 86 MG/DL (ref 70–99)
HBA1C MFR BLD: 5.7 % (ref 0–5.6)
HDLC SERPL-MCNC: 61 MG/DL
LDLC SERPL CALC-MCNC: 101 MG/DL
NONHDLC SERPL-MCNC: 125 MG/DL
POTASSIUM BLD-SCNC: 4.1 MMOL/L (ref 3.4–5.3)
PROT SERPL-MCNC: 7.1 G/DL (ref 6.8–8.8)
SODIUM SERPL-SCNC: 140 MMOL/L (ref 133–144)
TRIGL SERPL-MCNC: 120 MG/DL
TSH SERPL DL<=0.005 MIU/L-ACNC: 3.14 MU/L (ref 0.4–4)

## 2022-06-02 PROCEDURE — 83036 HEMOGLOBIN GLYCOSYLATED A1C: CPT | Mod: ZL

## 2022-06-02 PROCEDURE — 36415 COLL VENOUS BLD VENIPUNCTURE: CPT | Mod: ZL

## 2022-06-02 PROCEDURE — 84443 ASSAY THYROID STIM HORMONE: CPT | Mod: ZL

## 2022-06-02 PROCEDURE — 80061 LIPID PANEL: CPT | Mod: ZL

## 2022-06-02 PROCEDURE — 80053 COMPREHEN METABOLIC PANEL: CPT | Mod: ZL

## 2022-06-02 NOTE — PROGRESS NOTES
Assessment & Plan     1. Generalized anxiety disorder  - gabapentin (NEURONTIN) 300 MG capsule; Take 1 capsule (300 mg) by mouth At Bedtime  Dispense: 90 capsule; Refill: 1    2. Primary insomnia  - gabapentin (NEURONTIN) 300 MG capsule; Take 1 capsule (300 mg) by mouth At Bedtime  Dispense: 90 capsule; Refill: 1    3. Benign essential hypertension  - lisinopril (ZESTRIL) 10 MG tablet; Take 1 tablet (10 mg) by mouth daily  Dispense: 90 tablet; Refill: 0  - Comprehensive metabolic panel; Future  - TSH with free T4 reflex; Future    4. Hypothyroidism due to acquired atrophy of thyroid  Continue current plan     5. Prediabetes  - Hemoglobin A1c; Future    6. Stage 3a chronic kidney disease (H)  Do not use NSAIDS    7. Hyperlipidemia LDL goal <100  - Lipid Profile; Future    8. On statin therapy  - Comprehensive metabolic panel; Future        Return in about 3 months (around 9/6/2022) for pre-diabetes, lipids, HTN.    Brittney Coe NP  [facility] [place] [facility]    Subjective   Franca is a 76 year old who presents for the following health issues     HPI     Hyperlipidemia Follow-Up      Are you regularly taking any medication or supplement to lower your cholesterol?   Yes- lipitor    Are you having muscle aches or other side effects that you think could be caused by your cholesterol lowering medication?  No    Hypertension Follow-up      Do you check your blood pressure regularly outside of the clinic? No     Are you following a low salt diet? Yes    Are your blood pressures ever more than 140 on the top number (systolic) OR more   than 90 on the bottom number (diastolic), for example 140/90? No    Hypothyroidism Follow-up      Since last visit, patient describes the following symptoms: Weight stable, no hair loss, no skin changes, no constipation, no loose stools    She is feeling well, no new concerns.       Recent Labs   Lab Test 06/02/22  0758 03/01/22  0803 11/26/21  0758 11/26/21  0757  08/20/21  0757 05/10/21  0801 02/05/21  0809   A1C 5.7* 5.7*  --  5.6   < > 5.6  --    * 98 114*  --    < > 105* 119*   HDL 61 55 62  --    < > 58 55   TRIG 120 117 110  --    < > 138 149   ALT 37 38 36  --    < > 46 36   CR 0.97 0.91 1.12*  --    < > 1.04 1.01   GFRESTIMATED 60* 65 47*  --    < > 52* 54*   GFRESTBLACK  --   --   --   --   --  60* 62   POTASSIUM 4.1 4.1 4.1  --    < > 4.3 4.1   TSH 3.14 4.13* 3.60  --    < > 3.79 4.17*    < > = values in this interval not displayed.      BP Readings from Last 3 Encounters:   06/06/22 116/58   03/03/22 114/68   02/04/22 132/62    Wt Readings from Last 3 Encounters:   06/06/22 60.8 kg (134 lb)   03/03/22 60.3 kg (133 lb)   02/04/22 61.2 kg (135 lb)                      Review of Systems   Constitutional, HEENT, cardiovascular, pulmonary, gi and gu systems are negative, except as otherwise noted.      Objective    /58 (BP Location: Left arm, Patient Position: Chair, Cuff Size: Adult Regular)   Pulse 52   Temp 97.1  F (36.2  C) (Tympanic)   Wt 60.8 kg (134 lb)   SpO2 98%   BMI 24.12 kg/m    Body mass index is 24.12 kg/m .  Physical Exam   GENERAL: healthy, alert and no distress  NECK: no adenopathy, no asymmetry, masses, or scars, thyroid normal to palpation and no carotid bruits  RESP: lungs clear to auscultation - no rales, rhonchi or wheezes  CV: regular rate and rhythm, normal S1 S2, no S3 or S4, no murmur, click or rub, no peripheral edema and peripheral pulses strong  MS: no gross musculoskeletal defects noted, no edema  PSYCH: mentation appears normal, affect normal/bright    Lab on 06/02/2022   Component Date Value Ref Range Status     TSH 06/02/2022 3.14  0.40 - 4.00 mU/L Final     Sodium 06/02/2022 140  133 - 144 mmol/L Final     Potassium 06/02/2022 4.1  3.4 - 5.3 mmol/L Final     Chloride 06/02/2022 109  94 - 109 mmol/L Final     Carbon Dioxide (CO2) 06/02/2022 28  20 - 32 mmol/L Final     Anion Gap 06/02/2022 3  3 - 14 mmol/L Final      Urea Nitrogen 06/02/2022 21  7 - 30 mg/dL Final     Creatinine 06/02/2022 0.97  0.52 - 1.04 mg/dL Final     Calcium 06/02/2022 9.1  8.5 - 10.1 mg/dL Final     Glucose 06/02/2022 86  70 - 99 mg/dL Final     Alkaline Phosphatase 06/02/2022 73  40 - 150 U/L Final     AST 06/02/2022 37  0 - 45 U/L Final     ALT 06/02/2022 37  0 - 50 U/L Final     Protein Total 06/02/2022 7.1  6.8 - 8.8 g/dL Final     Albumin 06/02/2022 3.6  3.4 - 5.0 g/dL Final     Bilirubin Total 06/02/2022 0.5  0.2 - 1.3 mg/dL Final     GFR Estimate 06/02/2022 60 (A) >60 mL/min/1.73m2 Final    Effective December 21, 2021 eGFRcr in adults is calculated using the 2021 CKD-EPI creatinine equation which includes age and gender (Kobi et al., NEJ, DOI: 10.1056/MVEJjf3158302)     Cholesterol 06/02/2022 186  <200 mg/dL Final     Triglycerides 06/02/2022 120  <150 mg/dL Final     Direct Measure HDL 06/02/2022 61  >=50 mg/dL Final     LDL Cholesterol Calculated 06/02/2022 101 (A) <=100 mg/dL Final     Non HDL Cholesterol 06/02/2022 125  <130 mg/dL Final     Patient Fasting > 8hrs? 06/02/2022 Yes   Final     Estimated Average Glucose 06/02/2022 117  mg/dL Final     Hemoglobin A1C 06/02/2022 5.7 (A) 0.0 - 5.6 % Final    Normal <5.7%   Prediabetes 5.7-6.4%    Diabetes 6.5% or higher     Note: Adopted from ADA consensus guidelines.

## 2022-06-06 ENCOUNTER — OFFICE VISIT (OUTPATIENT)
Dept: FAMILY MEDICINE | Facility: OTHER | Age: 77
End: 2022-06-06
Attending: NURSE PRACTITIONER
Payer: COMMERCIAL

## 2022-06-06 VITALS
WEIGHT: 134 LBS | SYSTOLIC BLOOD PRESSURE: 116 MMHG | TEMPERATURE: 97.1 F | DIASTOLIC BLOOD PRESSURE: 58 MMHG | HEART RATE: 52 BPM | OXYGEN SATURATION: 98 % | BODY MASS INDEX: 24.12 KG/M2

## 2022-06-06 DIAGNOSIS — I10 BENIGN ESSENTIAL HYPERTENSION: ICD-10-CM

## 2022-06-06 DIAGNOSIS — H61.22 EXCESSIVE CERUMEN IN EAR CANAL, LEFT: ICD-10-CM

## 2022-06-06 DIAGNOSIS — F41.1 GENERALIZED ANXIETY DISORDER: ICD-10-CM

## 2022-06-06 DIAGNOSIS — Z79.899 ON STATIN THERAPY: ICD-10-CM

## 2022-06-06 DIAGNOSIS — R73.03 PREDIABETES: ICD-10-CM

## 2022-06-06 DIAGNOSIS — F51.01 PRIMARY INSOMNIA: ICD-10-CM

## 2022-06-06 DIAGNOSIS — E03.4 HYPOTHYROIDISM DUE TO ACQUIRED ATROPHY OF THYROID: Primary | ICD-10-CM

## 2022-06-06 DIAGNOSIS — E78.5 HYPERLIPIDEMIA LDL GOAL <100: ICD-10-CM

## 2022-06-06 DIAGNOSIS — N18.31 STAGE 3A CHRONIC KIDNEY DISEASE (H): ICD-10-CM

## 2022-06-06 PROCEDURE — 99214 OFFICE O/P EST MOD 30 MIN: CPT | Performed by: NURSE PRACTITIONER

## 2022-06-06 PROCEDURE — G0463 HOSPITAL OUTPT CLINIC VISIT: HCPCS

## 2022-06-06 RX ORDER — GABAPENTIN 300 MG/1
300 CAPSULE ORAL AT BEDTIME
Qty: 90 CAPSULE | Refills: 1 | Status: SHIPPED | OUTPATIENT
Start: 2022-06-06 | End: 2022-12-12

## 2022-06-06 RX ORDER — LISINOPRIL 10 MG/1
10 TABLET ORAL DAILY
Qty: 90 TABLET | Refills: 0 | Status: SHIPPED | OUTPATIENT
Start: 2022-06-06 | End: 2022-09-08

## 2022-06-06 ASSESSMENT — ANXIETY QUESTIONNAIRES
2. NOT BEING ABLE TO STOP OR CONTROL WORRYING: SEVERAL DAYS
GAD7 TOTAL SCORE: 5
GAD7 TOTAL SCORE: 5
7. FEELING AFRAID AS IF SOMETHING AWFUL MIGHT HAPPEN: SEVERAL DAYS
6. BECOMING EASILY ANNOYED OR IRRITABLE: SEVERAL DAYS
5. BEING SO RESTLESS THAT IT IS HARD TO SIT STILL: NOT AT ALL
IF YOU CHECKED OFF ANY PROBLEMS ON THIS QUESTIONNAIRE, HOW DIFFICULT HAVE THESE PROBLEMS MADE IT FOR YOU TO DO YOUR WORK, TAKE CARE OF THINGS AT HOME, OR GET ALONG WITH OTHER PEOPLE: NOT DIFFICULT AT ALL
1. FEELING NERVOUS, ANXIOUS, OR ON EDGE: SEVERAL DAYS
4. TROUBLE RELAXING: NOT AT ALL
3. WORRYING TOO MUCH ABOUT DIFFERENT THINGS: SEVERAL DAYS

## 2022-06-06 ASSESSMENT — PATIENT HEALTH QUESTIONNAIRE - PHQ9: SUM OF ALL RESPONSES TO PHQ QUESTIONS 1-9: 3

## 2022-06-06 ASSESSMENT — PAIN SCALES - GENERAL: PAINLEVEL: NO PAIN (0)

## 2022-06-06 NOTE — NURSING NOTE
"Chief Complaint   Patient presents with     Cough     Follow up       Initial /58 (BP Location: Left arm, Patient Position: Chair, Cuff Size: Adult Regular)   Pulse 52   Temp 97.1  F (36.2  C) (Tympanic)   Wt 60.8 kg (134 lb)   SpO2 98%   BMI 24.12 kg/m   Estimated body mass index is 24.12 kg/m  as calculated from the following:    Height as of 3/3/22: 1.588 m (5' 2.5\").    Weight as of this encounter: 60.8 kg (134 lb).  Medication Reconciliation: complete  Mirtha Diana    "

## 2022-06-06 NOTE — PATIENT INSTRUCTIONS
Assessment & Plan     1. Generalized anxiety disorder  - gabapentin (NEURONTIN) 300 MG capsule; Take 1 capsule (300 mg) by mouth At Bedtime  Dispense: 90 capsule; Refill: 1    2. Primary insomnia  - gabapentin (NEURONTIN) 300 MG capsule; Take 1 capsule (300 mg) by mouth At Bedtime  Dispense: 90 capsule; Refill: 1    3. Benign essential hypertension  - lisinopril (ZESTRIL) 10 MG tablet; Take 1 tablet (10 mg) by mouth daily  Dispense: 90 tablet; Refill: 0  - Comprehensive metabolic panel; Future  - TSH with free T4 reflex; Future    4. Hypothyroidism due to acquired atrophy of thyroid  Continue current plan     5. Prediabetes  - Hemoglobin A1c; Future    6. Stage 3a chronic kidney disease (H)  Do not use NSAIDS    7. Hyperlipidemia LDL goal <100  - Lipid Profile; Future    8. On statin therapy  - Comprehensive metabolic panel; Future        Return in about 3 months (around 9/6/2022) for pre-diabetes, lipids, HTN.    Brittney Coe NP  [facility] [place] [facility]

## 2022-06-09 NOTE — PROGRESS NOTES
"  Assessment & Plan     1. Skin lesion  Tolerated procedure well, reviewed to watch for signs of infection.     - DESTRUC BENIGN/PREMAL,1ST LESION [14359]      follow-up as needed    Brittney Coe NP  [facility] [place] Schulte is a 76 year old who presents for the following health issues     HPI     Concern - Lesion   Onset: top of head   Description: been there a long time   Intensity: none   Progression of Symptoms:  worsening  Accompanying Signs & Symptoms: irritation   Previous history of similar problem: no   Precipitating factors:        Worsened by: unknown   Alleviating factors:        Improved by: nothing   Therapies tried and outcome: None    Requesting removal via liquid nitrogen.      Recent Labs   Lab Test 06/02/22  0758 03/01/22  0803 11/26/21  0758 11/26/21  0757 08/20/21  0757 05/10/21  0801 02/05/21  0809   A1C 5.7* 5.7*  --  5.6   < > 5.6  --    * 98 114*  --    < > 105* 119*   HDL 61 55 62  --    < > 58 55   TRIG 120 117 110  --    < > 138 149   ALT 37 38 36  --    < > 46 36   CR 0.97 0.91 1.12*  --    < > 1.04 1.01   GFRESTIMATED 60* 65 47*  --    < > 52* 54*   GFRESTBLACK  --   --   --   --   --  60* 62   POTASSIUM 4.1 4.1 4.1  --    < > 4.3 4.1   TSH 3.14 4.13* 3.60  --    < > 3.79 4.17*    < > = values in this interval not displayed.      BP Readings from Last 3 Encounters:   06/13/22 104/56   06/06/22 116/58   03/03/22 114/68    Wt Readings from Last 3 Encounters:   06/13/22 60.8 kg (134 lb)   06/06/22 60.8 kg (134 lb)   03/03/22 60.3 kg (133 lb)                      Review of Systems   Constitutional, HEENT, cardiovascular, pulmonary, gi and gu systems are negative, except as otherwise noted.      Objective    /56 (BP Location: Right arm, Patient Position: Chair, Cuff Size: Adult Regular)   Pulse 52   Temp 97.7  F (36.5  C) (Tympanic)   Resp 14   Ht 1.588 m (5' 2.5\")   Wt 60.8 kg (134 lb)   SpO2 98%   BMI 24.12 kg/m    Body mass " index is 24.12 kg/m .  Physical Exam   GENERAL: healthy, alert and no distress  MS: no gross musculoskeletal defects noted, no edema  SKIN: 3mm oval lesion at the crown of head that is treated with 3 cycles of cryotherapy.  Tolerated procedure well.     PSYCH: mentation appears normal, affect normal/bright

## 2022-06-13 ENCOUNTER — OFFICE VISIT (OUTPATIENT)
Dept: FAMILY MEDICINE | Facility: OTHER | Age: 77
End: 2022-06-13
Attending: NURSE PRACTITIONER
Payer: MEDICARE

## 2022-06-13 VITALS
BODY MASS INDEX: 23.74 KG/M2 | HEART RATE: 52 BPM | WEIGHT: 134 LBS | OXYGEN SATURATION: 98 % | DIASTOLIC BLOOD PRESSURE: 56 MMHG | HEIGHT: 63 IN | RESPIRATION RATE: 14 BRPM | SYSTOLIC BLOOD PRESSURE: 104 MMHG | TEMPERATURE: 97.7 F

## 2022-06-13 DIAGNOSIS — L98.9 SKIN LESION: Primary | ICD-10-CM

## 2022-06-13 PROCEDURE — 17000 DESTRUCT PREMALG LESION: CPT | Performed by: NURSE PRACTITIONER

## 2022-06-13 PROCEDURE — 17110 DESTRUCTION B9 LES UP TO 14: CPT | Performed by: NURSE PRACTITIONER

## 2022-06-13 PROCEDURE — G0463 HOSPITAL OUTPT CLINIC VISIT: HCPCS

## 2022-06-13 ASSESSMENT — PAIN SCALES - GENERAL: PAINLEVEL: NO PAIN (0)

## 2022-06-13 NOTE — NURSING NOTE
"Chief Complaint   Patient presents with     Lesion       Initial /56 (BP Location: Right arm, Patient Position: Chair, Cuff Size: Adult Regular)   Pulse 52   Temp 97.7  F (36.5  C) (Tympanic)   Resp 14   Ht 1.588 m (5' 2.5\")   Wt 60.8 kg (134 lb)   SpO2 98%   BMI 24.12 kg/m   Estimated body mass index is 24.12 kg/m  as calculated from the following:    Height as of this encounter: 1.588 m (5' 2.5\").    Weight as of this encounter: 60.8 kg (134 lb).  Medication Reconciliation: complete  Pamela M. Lechevalier, LPN    "

## 2022-06-24 ENCOUNTER — OFFICE VISIT (OUTPATIENT)
Dept: OTOLARYNGOLOGY | Facility: OTHER | Age: 77
End: 2022-06-24
Attending: NURSE PRACTITIONER
Payer: MEDICARE

## 2022-06-24 VITALS
HEIGHT: 63 IN | HEART RATE: 66 BPM | BODY MASS INDEX: 23.74 KG/M2 | DIASTOLIC BLOOD PRESSURE: 72 MMHG | SYSTOLIC BLOOD PRESSURE: 138 MMHG | TEMPERATURE: 98.7 F | WEIGHT: 134 LBS | OXYGEN SATURATION: 97 %

## 2022-06-24 DIAGNOSIS — H61.23 BILATERAL IMPACTED CERUMEN: Primary | ICD-10-CM

## 2022-06-24 PROCEDURE — G0463 HOSPITAL OUTPT CLINIC VISIT: HCPCS

## 2022-06-24 PROCEDURE — 69210 REMOVE IMPACTED EAR WAX UNI: CPT | Mod: 50 | Performed by: NURSE PRACTITIONER

## 2022-06-24 ASSESSMENT — PAIN SCALES - GENERAL: PAINLEVEL: NO PAIN (0)

## 2022-06-24 NOTE — LETTER
6/24/2022         RE: Franca Angeles  [address]  [facility] [address]        Dear Colleague,    Thank you for referring your patient, Franca Angeles, to the [facility]. Please see a copy of my visit note below.    Otolaryngology Note         Chief Complaint:     Patient presents with:  Cerumen Impaction: Ear cleaning            History of Present Illness:     Franca Angeles is a 76 year old female who presents today for ear cleaning.    She last had ears cleaned about 6 months ago.   She has noticed that she has been turning up the radio slightly more than usual  + tinnitus - chronic, not bothersome  No vertigo, facial numbness or weakness.      No otalgia, otorrhea.    No hx of COM or otologic surgeries.    No audiograms on file          Medications:     Current Outpatient Rx   Medication Sig Dispense Refill     aspirin 81 MG tablet Take 81 mg by mouth daily       atorvastatin (LIPITOR) 20 MG tablet Take 1.5 tablets (30 mg) by mouth daily 135 tablet 3     Calcium-Magnesium-Vitamin D (CALCIUM 500 PO) Take by mouth daily       cinnamon 500 MG CAPS        co-enzyme Q-10 100 MG CAPS capsule Take 100 mg by mouth        gabapentin (NEURONTIN) 300 MG capsule Take 1 capsule (300 mg) by mouth At Bedtime 90 capsule 1     Garlic 2000 MG CAPS Take 2,000 mg by mouth daily       GLUCOSAMINE-CHONDROITIN PO Take  by mouth. GLUCOSAMINE HCL/CHONDR SUA NA  One daily       levothyroxine (SYNTHROID/LEVOTHROID) 50 MCG tablet TAKE 1 TABLET(50 MCG) BY MOUTH DAILY 90 tablet 1     lisinopril (ZESTRIL) 10 MG tablet Take 1 tablet (10 mg) by mouth daily 90 tablet 0     LORazepam (ATIVAN) 0.5 MG tablet Take 1 tablet (0.5 mg) by mouth nightly as needed for anxiety or sleep 30 tablet 0     mometasone (ELOCON) 0.1 % external cream Apply a thin film of cream to the affected ear canal (s) twice a day for 2 weeks, then use sparingly as needed only. 15 g 1     Multiple Vitamins-Minerals (MULTIVITAMIN OR)  "Alive 1 tablet       Omega-3 Fatty Acids (FISH OIL) 1200 MG CAPS Take 1,080 mg by mouth daily        psyllium (METAMUCIL/KONSYL) capsule Take 2 capsules by mouth daily       triamcinolone (NASACORT) 55 MCG/ACT nasal aerosol Spray 2 sprays into both nostrils daily 16.9 mL 3            Allergies:     Allergies: Pseudoephedrine hcl, Hydroxyzine, Mucinex, and Viibryd          Past Medical History:     Past Medical History:   Diagnosis Date     Esophageal reflux 5/10/2011     Need for prophylactic hormone replacement therapy (postmenopausal) 1995    took for 1-2 years            Past Surgical History:     Past Surgical History:   Procedure Laterality Date     ARTHRODESIS KNEE  [id number]    left knee, paritla medial meniscectomy, debridement medial femoral condyle and patellofemoral debridement     COLONOSCOPY  2004    nml     COLONOSCOPY  7/23/2014    Procedure: COLONOSCOPY;  Surgeon: Nghia Hernandez DO;  Location: [facility]     CYSTOSCOPY      micro hematuria neg     DILATION AND CURETTAGE       EXCISE LESION UPPER EXTREMITY Right 10/25/2017    Procedure: EXCISE LESION UPPER EXTREMITY;  EXCISIONAL BIOPSY RIGHT FOREARM TIMES 2;  Surgeon: Nghia Hernandez DO;  Location: [facility] OR     tubal sterilization       uterine fibroids       wrist fx RT         ENT family history reviewed         Social History:     Social History     Tobacco Use     Smoking status: Never Smoker     Smokeless tobacco: Never Used   Substance Use Topics     Alcohol use: Yes     Alcohol/week: 0.0 standard drinks     Comment: occasionally     Drug use: No            Review of Systems:     ROS: See HPI         Physical Exam:     /72 (BP Location: Right arm, Patient Position: Sitting, Cuff Size: Adult Regular)   Pulse 66   Temp 98.7  F (37.1  C) (Tympanic)   Ht 1.588 m (5' 2.5\")   Wt 60.8 kg (134 lb)   SpO2 97%   BMI 24.12 kg/m      General - The patient is well nourished and well developed, and appears to have good nutritional status.  Alert " and oriented to person and place, answers questions and cooperates with examination appropriately.   Head and Face - Normocephalic and atraumatic, with no gross asymmetry noted.  The facial nerve is intact, with strong symmetric movements.  Voice and Breathing - The patient was breathing comfortably without the use of accessory muscles. There was no wheezing, stridor. The patients voice was clear and strong, and had appropriate pitch and quality.  Ears - The ears were examined with binocular microscopy and with otoscope.  External ears normal. Right canal cerumen impacted.  Left canal cerumen impacted.  The right ear was cleaned with cupped forceps.   Right tympanic membrane is intact without effusion, retraction or mass. The left ear was cleaned with cupped forceps.  Left tympanic membrane is intact without effusion, retraction or mass.  Eyes - Extraocular movements intact, sclera were not icteric or injected, conjunctiva were pink and moist.  Mouth - Examination of the oral cavity showed pink, healthy oral mucosa. Dentition in good condition. No lesions or ulcerations noted. The tongue was mobile and midline.   Throat - The walls of the oropharynx were smooth, pink, moist, symmetric, and had no lesions or ulcerations.  The tonsillar pillars and soft palate were symmetric. The uvula was midline on elevation.    Neck - Full range of motion on passive movement.  Palpation of the occipital, submental, submandibular, internal jugular chain, and supraclavicular nodes did not demonstrate any abnormal lymph nodes or masses.  Palpation of the thyroid was soft and smooth, with no nodules or goiter appreciated.  The trachea was mobile and midline.  Nose - External contour is symmetric, no gross deflection or scars.  Nasal mucosa is pink and moist with no abnormal mucus.  The septum and turbinates were evaluated with nasal speculum, no polyps, masses, or purulence noted on examination.         Assessment and Plan:        ICD-10-CM    1. Bilateral impacted cerumen  H61.23        Ears were cleaned, tolerated well    The ears were cleaned today.  The patient may return here as needed or with their primary physician.  Aural hygiene was discussed.  Avoidance of Q-tips was reiterated.  Avoid flushing the ear canal if there is a possibility of a hole or perforation in the tympanic membrane.   If there are any unresolved concerns regarding hearing loss an audiogram is recommended.      Mirtha MIRANDAC  [facility]        Again, thank you for allowing me to participate in the care of your patient.        Sincerely,        Mirtha Plascencia, NEVAEH

## 2022-06-24 NOTE — PATIENT INSTRUCTIONS
Thank you for allowing Mirtha Plascencia and our ENT team to participate in your care.  If your medications are too expensive, please give the nurse a call.  We can possibly change this medication.  If you have a scheduling or an appointment question please contact our Health Unit Coordinator at their direct line [phone].   ALL nursing questions or concerns can be directed to your ENT nurse at: [phone] - [facility]

## 2022-06-24 NOTE — PROGRESS NOTES
Otolaryngology Note         Chief Complaint:     Patient presents with:  Cerumen Impaction: Ear cleaning            History of Present Illness:     Franca Angeles is a 76 year old female who presents today for ear cleaning.    She last had ears cleaned about 6 months ago.   She has noticed that she has been turning up the radio slightly more than usual  + tinnitus - chronic, not bothersome  No vertigo, facial numbness or weakness.      No otalgia, otorrhea.    No hx of COM or otologic surgeries.    No audiograms on file          Medications:     Current Outpatient Rx   Medication Sig Dispense Refill     aspirin 81 MG tablet Take 81 mg by mouth daily       atorvastatin (LIPITOR) 20 MG tablet Take 1.5 tablets (30 mg) by mouth daily 135 tablet 3     Calcium-Magnesium-Vitamin D (CALCIUM 500 PO) Take by mouth daily       cinnamon 500 MG CAPS        co-enzyme Q-10 100 MG CAPS capsule Take 100 mg by mouth        gabapentin (NEURONTIN) 300 MG capsule Take 1 capsule (300 mg) by mouth At Bedtime 90 capsule 1     Garlic 2000 MG CAPS Take 2,000 mg by mouth daily       GLUCOSAMINE-CHONDROITIN PO Take  by mouth. GLUCOSAMINE HCL/CHONDR SUA NA  One daily       levothyroxine (SYNTHROID/LEVOTHROID) 50 MCG tablet TAKE 1 TABLET(50 MCG) BY MOUTH DAILY 90 tablet 1     lisinopril (ZESTRIL) 10 MG tablet Take 1 tablet (10 mg) by mouth daily 90 tablet 0     LORazepam (ATIVAN) 0.5 MG tablet Take 1 tablet (0.5 mg) by mouth nightly as needed for anxiety or sleep 30 tablet 0     mometasone (ELOCON) 0.1 % external cream Apply a thin film of cream to the affected ear canal (s) twice a day for 2 weeks, then use sparingly as needed only. 15 g 1     Multiple Vitamins-Minerals (MULTIVITAMIN OR) Alive 1 tablet       Omega-3 Fatty Acids (FISH OIL) 1200 MG CAPS Take 1,080 mg by mouth daily        psyllium (METAMUCIL/KONSYL) capsule Take 2 capsules by mouth daily       triamcinolone (NASACORT) 55 MCG/ACT nasal aerosol Spray 2 sprays into both nostrils  "daily 16.9 mL 3            Allergies:     Allergies: Pseudoephedrine hcl, Hydroxyzine, Mucinex, and Viibryd          Past Medical History:     Past Medical History:   Diagnosis Date     Esophageal reflux 5/10/2011     Need for prophylactic hormone replacement therapy (postmenopausal) 1995    took for 1-2 years            Past Surgical History:     Past Surgical History:   Procedure Laterality Date     ARTHRODESIS KNEE  [id number]    left knee, paritla medial meniscectomy, debridement medial femoral condyle and patellofemoral debridement     COLONOSCOPY  2004    nml     COLONOSCOPY  7/23/2014    Procedure: COLONOSCOPY;  Surgeon: Nghia Hernandez DO;  Location: [facility]     CYSTOSCOPY      micro hematuria neg     DILATION AND CURETTAGE       EXCISE LESION UPPER EXTREMITY Right 10/25/2017    Procedure: EXCISE LESION UPPER EXTREMITY;  EXCISIONAL BIOPSY RIGHT FOREARM TIMES 2;  Surgeon: Nghia Hernandez DO;  Location: [facility]     tubal sterilization       uterine fibroids       wrist fx RT         ENT family history reviewed         Social History:     Social History     Tobacco Use     Smoking status: Never Smoker     Smokeless tobacco: Never Used   Substance Use Topics     Alcohol use: Yes     Alcohol/week: 0.0 standard drinks     Comment: occasionally     Drug use: No            Review of Systems:     ROS: See HPI         Physical Exam:     /72 (BP Location: Right arm, Patient Position: Sitting, Cuff Size: Adult Regular)   Pulse 66   Temp 98.7  F (37.1  C) (Tympanic)   Ht 1.588 m (5' 2.5\")   Wt 60.8 kg (134 lb)   SpO2 97%   BMI 24.12 kg/m      General - The patient is well nourished and well developed, and appears to have good nutritional status.  Alert and oriented to person and place, answers questions and cooperates with examination appropriately.   Head and Face - Normocephalic and atraumatic, with no gross asymmetry noted.  The facial nerve is intact, with strong symmetric movements.  Voice and " Breathing - The patient was breathing comfortably without the use of accessory muscles. There was no wheezing, stridor. The patients voice was clear and strong, and had appropriate pitch and quality.  Ears - The ears were examined with binocular microscopy and with otoscope.  External ears normal. Right canal cerumen impacted.  Left canal cerumen impacted.  The right ear was cleaned with cupped forceps.   Right tympanic membrane is intact without effusion, retraction or mass. The left ear was cleaned with cupped forceps.  Left tympanic membrane is intact without effusion, retraction or mass.  Eyes - Extraocular movements intact, sclera were not icteric or injected, conjunctiva were pink and moist.  Mouth - Examination of the oral cavity showed pink, healthy oral mucosa. Dentition in good condition. No lesions or ulcerations noted. The tongue was mobile and midline.   Throat - The walls of the oropharynx were smooth, pink, moist, symmetric, and had no lesions or ulcerations.  The tonsillar pillars and soft palate were symmetric. The uvula was midline on elevation.    Neck - Full range of motion on passive movement.  Palpation of the occipital, submental, submandibular, internal jugular chain, and supraclavicular nodes did not demonstrate any abnormal lymph nodes or masses.  Palpation of the thyroid was soft and smooth, with no nodules or goiter appreciated.  The trachea was mobile and midline.  Nose - External contour is symmetric, no gross deflection or scars.  Nasal mucosa is pink and moist with no abnormal mucus.  The septum and turbinates were evaluated with nasal speculum, no polyps, masses, or purulence noted on examination.         Assessment and Plan:       ICD-10-CM    1. Bilateral impacted cerumen  H61.23        Ears were cleaned, tolerated well    The ears were cleaned today.  The patient may return here as needed or with their primary physician.  Aural hygiene was discussed.  Avoidance of Q-tips was  reiterated.  Avoid flushing the ear canal if there is a possibility of a hole or perforation in the tympanic membrane.   If there are any unresolved concerns regarding hearing loss an audiogram is recommended.      Mirtha MIRANDAC  [facility]

## 2022-06-24 NOTE — NURSING NOTE
"Chief Complaint   Patient presents with     Cerumen Impaction     Ear cleaning        Initial /72 (BP Location: Right arm, Patient Position: Sitting, Cuff Size: Adult Regular)   Pulse 66   Temp 98.7  F (37.1  C) (Tympanic)   Ht 1.588 m (5' 2.5\")   Wt 60.8 kg (134 lb)   SpO2 97%   BMI 24.12 kg/m   Estimated body mass index is 24.12 kg/m  as calculated from the following:    Height as of this encounter: 1.588 m (5' 2.5\").    Weight as of this encounter: 60.8 kg (134 lb).  Medication Reconciliation: complete  Naty Isaacs LPN    "

## 2022-09-01 NOTE — PROGRESS NOTES
Assessment & Plan     1. Prediabetes  Stable   - Hemoglobin A1c; Future    2. Hyperlipidemia LDL goal <100  Continue lipitor  - Lipid Profile; Future    3. Benign essential hypertension  Well controlled   - lisinopril (ZESTRIL) 10 MG tablet; Take 1 tablet (10 mg) by mouth daily  Dispense: 90 tablet; Refill: 1  - Comprehensive metabolic panel; Future    4. Hypothyroidism due to acquired atrophy of thyroid  - levothyroxine (SYNTHROID/LEVOTHROID) 50 MCG tablet; TAKE 1 TABLET(50 MCG) BY MOUTH DAILY  Dispense: 90 tablet; Refill: 1  - TSH with free T4 reflex; Future    5. Stage 3a chronic kidney disease (H)  Improved     6. Cervicalgia  ice  - XR Cervical Spine 2/3 Views; Future  - Physical Therapy Referral; Future    7. Cervical arthritis with myelopathy  - Physical Therapy Referral; Future    8. Generalized anxiety disorder  - LORazepam (ATIVAN) 0.5 MG tablet; Take 1 tablet (0.5 mg) by mouth nightly as needed for anxiety or sleep  Dispense: 30 tablet; Refill: 0    9. Need for prophylactic vaccination and inoculation against influenza  - INFLUENZA, QUAD, HIGH DOSE, PF, 65YR + (FLUZONE HD)  - ADMIN INFLUENZA (For MEDICARE Patients ONLY) []    10. Primary insomnia  - LORazepam (ATIVAN) 0.5 MG tablet; Take 1 tablet (0.5 mg) by mouth nightly as needed for anxiety or sleep  Dispense: 30 tablet; Refill: 0    11. On statin therapy  - Comprehensive metabolic panel; Future          Return in about 3 months (around 12/8/2022) for pre-diabetes, lipids, HTN.    Brittney Coe NP  [facility]    Raffaele Schulte is a 77 year old, presenting for the following health issues:  Hypertension, Lipids, chronic kidney disease , and Imm/Inj (Flu Shot)      HPI     Hyperlipidemia Follow-Up      Are you regularly taking any medication or supplement to lower your cholesterol?   Yes- Lipitor     Are you having muscle aches or other side effects that you think could be caused by your cholesterol lowering  "medication?  No    Hypertension Follow-up      Do you check your blood pressure regularly outside of the clinic? Yes     Are you following a low salt diet? Yes    Are your blood pressures ever more than 140 on the top number (systolic) OR more   than 90 on the bottom number (diastolic), for example 140/90? No    Chronic Kidney Disease Follow-up      Do you take any over the counter pain medicine?: No    Hypothyroidism Follow-up      Since last visit, patient describes the following symptoms: Weight stable, no hair loss, no skin changes, no constipation, no loose stools      How many days per week do you miss taking your medication? 0    Neck continues to be painful.  She has been working with chiropractor and massage but is not getting any better.  Uses ice, heat.  denies numbness/tingling of upper extremities but neck muscles and shoulder muscles are tight and achy.      BP Readings from Last 3 Encounters:   09/08/22 126/70   06/24/22 138/72   06/13/22 104/56    Wt Readings from Last 3 Encounters:   09/08/22 60.3 kg (133 lb)   06/24/22 60.8 kg (134 lb)   06/13/22 60.8 kg (134 lb)                      Review of Systems   Constitutional, HEENT, cardiovascular, pulmonary, gi and gu systems are negative, except as otherwise noted.      Objective    /70 (BP Location: Left arm, Patient Position: Chair, Cuff Size: Adult Regular)   Pulse (!) 49   Temp 97  F (36.1  C) (Tympanic)   Resp 16   Ht 1.588 m (5' 2.5\")   Wt 60.3 kg (133 lb)   SpO2 98%   BMI 23.94 kg/m    Body mass index is 23.94 kg/m .  Physical Exam   GENERAL: healthy, alert and no distress  NECK: no adenopathy, no asymmetry, masses, or scars and thyroid normal to palpation  RESP: lungs clear to auscultation - no rales, rhonchi or wheezes  CV: regular rate and rhythm, normal S1 S2, no S3 or S4, no murmur, click or rub, no peripheral edema and peripheral pulses strong  MS: no gross musculoskeletal defects noted, no edema  PSYCH: mentation appears " normal, affect normal/bright    Lab on 09/06/2022   Component Date Value Ref Range Status     TSH 09/06/2022 3.30  0.40 - 4.00 mU/L Final     Sodium 09/06/2022 140  133 - 144 mmol/L Final     Potassium 09/06/2022 4.2  3.4 - 5.3 mmol/L Final     Chloride 09/06/2022 108  94 - 109 mmol/L Final     Carbon Dioxide (CO2) 09/06/2022 27  20 - 32 mmol/L Final     Anion Gap 09/06/2022 5  3 - 14 mmol/L Final     Urea Nitrogen 09/06/2022 19  7 - 30 mg/dL Final     Creatinine 09/06/2022 0.90  0.52 - 1.04 mg/dL Final     Calcium 09/06/2022 8.8  8.5 - 10.1 mg/dL Final     Glucose 09/06/2022 93  70 - 99 mg/dL Final     Alkaline Phosphatase 09/06/2022 83  40 - 150 U/L Final     AST 09/06/2022 30  0 - 45 U/L Final     ALT 09/06/2022 31  0 - 50 U/L Final     Protein Total 09/06/2022 7.1  6.8 - 8.8 g/dL Final     Albumin 09/06/2022 3.6  3.4 - 5.0 g/dL Final     Bilirubin Total 09/06/2022 0.4  0.2 - 1.3 mg/dL Final     GFR Estimate 09/06/2022 65  >60 mL/min/1.73m2 Final    Effective December 21, 2021 eGFRcr in adults is calculated using the 2021 CKD-EPI creatinine equation which includes age and gender (Kobi et al., NEJM, DOI: 10.1056/GDLToq8500915)     Cholesterol 09/06/2022 184  <200 mg/dL Final     Triglycerides 09/06/2022 111  <150 mg/dL Final     Direct Measure HDL 09/06/2022 62  >=50 mg/dL Final     LDL Cholesterol Calculated 09/06/2022 100  <=100 mg/dL Final     Non HDL Cholesterol 09/06/2022 122  <130 mg/dL Final     Patient Fasting > 8hrs? 09/06/2022 Yes   Final     Estimated Average Glucose 09/06/2022 117  mg/dL Final     Hemoglobin A1C 09/06/2022 5.7 (A) 0.0 - 5.6 % Final    Normal <5.7%   Prediabetes 5.7-6.4%    Diabetes 6.5% or higher     Note: Adopted from ADA consensus guidelines.             Results for orders placed or performed in visit on 09/08/22   XR Cervical Spine 2/3 Views     Status: None    Narrative    PROCEDURE: XR CERVICAL SPINE 2/3 VIEWS    HISTORY: Cervicalgia.    COMPARISON: None.    TECHNIQUE: 3 views of  the cervical spine were obtained.    FINDINGS: No acute fracture. Degenerative retrolisthesis of C5 on C6.  Advanced facet hypertrophy at C3-4 and C5-6. Intact dens.       Impression    IMPRESSION: Advanced degenerative changes of the cervical spine.     KARI AGYLE MD         SYSTEM ID:  [id number]

## 2022-09-04 ENCOUNTER — HEALTH MAINTENANCE LETTER (OUTPATIENT)
Age: 77
End: 2022-09-04

## 2022-09-06 ENCOUNTER — LAB (OUTPATIENT)
Dept: LAB | Facility: OTHER | Age: 77
End: 2022-09-06
Payer: MEDICARE

## 2022-09-06 DIAGNOSIS — Z79.899 ON STATIN THERAPY: ICD-10-CM

## 2022-09-06 DIAGNOSIS — I10 BENIGN ESSENTIAL HYPERTENSION: ICD-10-CM

## 2022-09-06 DIAGNOSIS — E78.5 HYPERLIPIDEMIA LDL GOAL <100: ICD-10-CM

## 2022-09-06 DIAGNOSIS — R73.03 PREDIABETES: ICD-10-CM

## 2022-09-06 LAB
ALBUMIN SERPL-MCNC: 3.6 G/DL (ref 3.4–5)
ALP SERPL-CCNC: 83 U/L (ref 40–150)
ALT SERPL W P-5'-P-CCNC: 31 U/L (ref 0–50)
ANION GAP SERPL CALCULATED.3IONS-SCNC: 5 MMOL/L (ref 3–14)
AST SERPL W P-5'-P-CCNC: 30 U/L (ref 0–45)
BILIRUB SERPL-MCNC: 0.4 MG/DL (ref 0.2–1.3)
BUN SERPL-MCNC: 19 MG/DL (ref 7–30)
CALCIUM SERPL-MCNC: 8.8 MG/DL (ref 8.5–10.1)
CHLORIDE BLD-SCNC: 108 MMOL/L (ref 94–109)
CHOLEST SERPL-MCNC: 184 MG/DL
CO2 SERPL-SCNC: 27 MMOL/L (ref 20–32)
CREAT SERPL-MCNC: 0.9 MG/DL (ref 0.52–1.04)
EST. AVERAGE GLUCOSE BLD GHB EST-MCNC: 117 MG/DL
FASTING STATUS PATIENT QL REPORTED: YES
GFR SERPL CREATININE-BSD FRML MDRD: 65 ML/MIN/1.73M2
GLUCOSE BLD-MCNC: 93 MG/DL (ref 70–99)
HBA1C MFR BLD: 5.7 % (ref 0–5.6)
HDLC SERPL-MCNC: 62 MG/DL
LDLC SERPL CALC-MCNC: 100 MG/DL
NONHDLC SERPL-MCNC: 122 MG/DL
POTASSIUM BLD-SCNC: 4.2 MMOL/L (ref 3.4–5.3)
PROT SERPL-MCNC: 7.1 G/DL (ref 6.8–8.8)
SODIUM SERPL-SCNC: 140 MMOL/L (ref 133–144)
TRIGL SERPL-MCNC: 111 MG/DL
TSH SERPL DL<=0.005 MIU/L-ACNC: 3.3 MU/L (ref 0.4–4)

## 2022-09-06 PROCEDURE — 80053 COMPREHEN METABOLIC PANEL: CPT | Mod: ZL

## 2022-09-06 PROCEDURE — 36415 COLL VENOUS BLD VENIPUNCTURE: CPT | Mod: ZL

## 2022-09-06 PROCEDURE — 84443 ASSAY THYROID STIM HORMONE: CPT | Mod: ZL

## 2022-09-06 PROCEDURE — 83036 HEMOGLOBIN GLYCOSYLATED A1C: CPT | Mod: ZL

## 2022-09-06 PROCEDURE — 80061 LIPID PANEL: CPT | Mod: ZL

## 2022-09-08 ENCOUNTER — ANCILLARY PROCEDURE (OUTPATIENT)
Dept: GENERAL RADIOLOGY | Facility: OTHER | Age: 77
End: 2022-09-08
Attending: NURSE PRACTITIONER
Payer: MEDICARE

## 2022-09-08 ENCOUNTER — OFFICE VISIT (OUTPATIENT)
Dept: FAMILY MEDICINE | Facility: OTHER | Age: 77
End: 2022-09-08
Attending: NURSE PRACTITIONER
Payer: COMMERCIAL

## 2022-09-08 VITALS
RESPIRATION RATE: 16 BRPM | BODY MASS INDEX: 23.57 KG/M2 | HEART RATE: 49 BPM | OXYGEN SATURATION: 98 % | WEIGHT: 133 LBS | TEMPERATURE: 97 F | DIASTOLIC BLOOD PRESSURE: 70 MMHG | HEIGHT: 63 IN | SYSTOLIC BLOOD PRESSURE: 126 MMHG

## 2022-09-08 DIAGNOSIS — R73.03 PREDIABETES: Primary | ICD-10-CM

## 2022-09-08 DIAGNOSIS — N18.31 STAGE 3A CHRONIC KIDNEY DISEASE (H): ICD-10-CM

## 2022-09-08 DIAGNOSIS — E03.4 HYPOTHYROIDISM DUE TO ACQUIRED ATROPHY OF THYROID: ICD-10-CM

## 2022-09-08 DIAGNOSIS — F51.01 PRIMARY INSOMNIA: ICD-10-CM

## 2022-09-08 DIAGNOSIS — M47.12 CERVICAL ARTHRITIS WITH MYELOPATHY: ICD-10-CM

## 2022-09-08 DIAGNOSIS — M54.2 CERVICALGIA: ICD-10-CM

## 2022-09-08 DIAGNOSIS — Z79.899 ON STATIN THERAPY: ICD-10-CM

## 2022-09-08 DIAGNOSIS — E78.5 HYPERLIPIDEMIA LDL GOAL <100: ICD-10-CM

## 2022-09-08 DIAGNOSIS — F41.1 GENERALIZED ANXIETY DISORDER: ICD-10-CM

## 2022-09-08 DIAGNOSIS — I10 BENIGN ESSENTIAL HYPERTENSION: ICD-10-CM

## 2022-09-08 DIAGNOSIS — Z23 NEED FOR PROPHYLACTIC VACCINATION AND INOCULATION AGAINST INFLUENZA: ICD-10-CM

## 2022-09-08 PROCEDURE — 72040 X-RAY EXAM NECK SPINE 2-3 VW: CPT | Mod: TC,FY

## 2022-09-08 PROCEDURE — G0463 HOSPITAL OUTPT CLINIC VISIT: HCPCS

## 2022-09-08 PROCEDURE — 99214 OFFICE O/P EST MOD 30 MIN: CPT | Performed by: NURSE PRACTITIONER

## 2022-09-08 PROCEDURE — G0463 HOSPITAL OUTPT CLINIC VISIT: HCPCS | Mod: 25

## 2022-09-08 PROCEDURE — G0008 ADMIN INFLUENZA VIRUS VAC: HCPCS

## 2022-09-08 RX ORDER — LEVOTHYROXINE SODIUM 50 UG/1
TABLET ORAL
Qty: 90 TABLET | Refills: 1 | Status: SHIPPED | OUTPATIENT
Start: 2022-09-08 | End: 2023-03-15

## 2022-09-08 RX ORDER — LISINOPRIL 10 MG/1
10 TABLET ORAL DAILY
Qty: 90 TABLET | Refills: 1 | Status: SHIPPED | OUTPATIENT
Start: 2022-09-08 | End: 2023-03-15

## 2022-09-08 RX ORDER — LORAZEPAM 0.5 MG/1
0.5 TABLET ORAL
Qty: 30 TABLET | Refills: 0 | Status: SHIPPED | OUTPATIENT
Start: 2022-09-08 | End: 2022-12-12

## 2022-09-08 ASSESSMENT — PAIN SCALES - GENERAL: PAINLEVEL: NO PAIN (0)

## 2022-09-08 NOTE — PATIENT INSTRUCTIONS
Assessment & Plan     1. Prediabetes  Stable   - Hemoglobin A1c; Future    2. Hyperlipidemia LDL goal <100  Continue lipitor  - Lipid Profile; Future    3. Benign essential hypertension  Well controlled   - lisinopril (ZESTRIL) 10 MG tablet; Take 1 tablet (10 mg) by mouth daily  Dispense: 90 tablet; Refill: 1  - Comprehensive metabolic panel; Future    4. Hypothyroidism due to acquired atrophy of thyroid  - levothyroxine (SYNTHROID/LEVOTHROID) 50 MCG tablet; TAKE 1 TABLET(50 MCG) BY MOUTH DAILY  Dispense: 90 tablet; Refill: 1  - TSH with free T4 reflex; Future    5. Stage 3a chronic kidney disease (H)  Improved     6. Cervicalgia  ice  - XR Cervical Spine 2/3 Views; Future  - Physical Therapy Referral; Future    7. Cervical arthritis with myelopathy  - Physical Therapy Referral; Future    8. Generalized anxiety disorder  - LORazepam (ATIVAN) 0.5 MG tablet; Take 1 tablet (0.5 mg) by mouth nightly as needed for anxiety or sleep  Dispense: 30 tablet; Refill: 0    9. Need for prophylactic vaccination and inoculation against influenza  - INFLUENZA, QUAD, HIGH DOSE, PF, 65YR + (FLUZONE HD)  - ADMIN INFLUENZA (For MEDICARE Patients ONLY) []    10. Primary insomnia  - LORazepam (ATIVAN) 0.5 MG tablet; Take 1 tablet (0.5 mg) by mouth nightly as needed for anxiety or sleep  Dispense: 30 tablet; Refill: 0    11. On statin therapy  - Comprehensive metabolic panel; Future          Return in about 3 months (around 12/8/2022) for pre-diabetes, lipids, HTN.    Brittney Coe NP  [facility]

## 2022-09-08 NOTE — NURSING NOTE
"Chief Complaint   Patient presents with     Hypertension     Lipids     chronic kidney disease        Initial /70 (BP Location: Left arm, Patient Position: Chair, Cuff Size: Adult Regular)   Pulse (!) 49   Temp 97  F (36.1  C) (Tympanic)   Resp 16   Ht 1.588 m (5' 2.5\")   Wt 60.3 kg (133 lb)   SpO2 98%   BMI 23.94 kg/m   Estimated body mass index is 23.94 kg/m  as calculated from the following:    Height as of this encounter: 1.588 m (5' 2.5\").    Weight as of this encounter: 60.3 kg (133 lb).  Medication Reconciliation: complete  Pamela M. Lechevalier, LPN    "

## 2022-09-20 ENCOUNTER — HOSPITAL ENCOUNTER (OUTPATIENT)
Dept: PHYSICAL THERAPY | Facility: OTHER | Age: 77
Discharge: HOME | End: 2022-09-20
Attending: NURSE PRACTITIONER | Admitting: NURSE PRACTITIONER
Payer: MEDICARE

## 2022-09-20 DIAGNOSIS — M47.12 CERVICAL ARTHRITIS WITH MYELOPATHY: ICD-10-CM

## 2022-09-20 DIAGNOSIS — M54.2 CERVICALGIA: ICD-10-CM

## 2022-09-20 PROCEDURE — 97110 THERAPEUTIC EXERCISES: CPT | Mod: GP

## 2022-09-20 PROCEDURE — 97162 PT EVAL MOD COMPLEX 30 MIN: CPT | Mod: GP

## 2022-09-20 PROCEDURE — 97140 MANUAL THERAPY 1/> REGIONS: CPT | Mod: GP

## 2022-09-20 NOTE — PROGRESS NOTES
09/20/22 1324   General Information   Type of Visit Initial OP Ortho PT Evaluation   Start of Care Date 09/20/22   Referring Physician Jameson Tavera NP   Patient/Family Goals Statement Decreased neck pain with improved sleep   Orders Evaluate and Treat   Date of Order 09/08/22   Certification Required? Yes   Medical Diagnosis Cervicalgia   Surgical/Medical history reviewed Yes   Precautions/Limitations no known precautions/limitations   General Information Comments Past medical history-see chart   Body Part(s)   Body Part(s) Cervical Spine   Presentation and Etiology   Pertinent history of current problem (include personal factors and/or comorbidities that impact the POC) Patient reports a somewhat longstanding history of neck pain for at least 5 years or more.  She denies any injuries or precipitating activities.  She states her neck pain has worsened especially within the last 2 years.  She has tried several different pillows, chiropractic care and massage with short-term relief.  She was seen by her primary care provider x-rays were taken of the neck and found to be significant for advanced degenerative changes of the cervical spine.  Patient is now referred to physical therapy.   Impairments A. Pain;D. Decreased ROM;E. Decreased flexibility;N. Headaches   Functional Limitations perform activities of daily living;perform desired leisure / sports activities   Symptom Location Chief complaint the pain is along the left neck/upper trapezius region   How/Where did it occur From Degenerative Joint Disease   Chronicity Chronic   Pain rating (0-10 point scale) Best (/10);Worst (/10)   Best (/10) 0   Worst (/10) 5   Pain quality C. Aching   Frequency of pain/symptoms B. Intermittent   Pain/symptoms are: Worse during the night   Pain/symptoms exacerbated by G. Certain positions;E. Rest;K. Home tasks;J. ADL;C. Lifting;M. Other   Pain exacerbation comment Sleeping, reading   Pain/symptoms eased by G. Heat;K. Other    Pain eased by comment Massage   Progression of symptoms since onset: Worsened   Current / Previous Interventions   Diagnostic Tests: X-ray   X-ray Results Results   X-ray results See report (advanced degenerative changes of the cervical spine.  Degenerative retrolisthesis of the C5 and C6   Current Level of Function   Patient role/employment history F. Retired   Fall Risk Screen   Fall screen completed by PT   Have you fallen 2 or more times in the past year? No   Have you fallen and had an injury in the past year? No   Is patient a fall risk? No   Abuse Screen (yes response referral indicated)   Feels Unsafe at Home or Work/School no   Feels Threatened by Someone no   Does Anyone Try to Keep You From Having Contact with Others or Doing Things Outside Your Home? no   Physical Signs of Abuse Present no   Patient needs abuse support services and resources No   Cervical Spine   Cervical Left Side Bending ROM 0 to 24 degrees with pain on left   Cervical Right Rotation ROM 75% from full rotation   Cervical Left Rotation ROM 50% from full rotation with pain on left neck   Cervical Flexion ROM 1 inch chin to chest   Cervical Extension ROM 0 to 44 degrees with general neck pain   Cervical Right Side Bending ROM 0 to 16 degrees with contralateral stretch   Thoracic Extension ROM Hypomobile   Shoulder AROM Screen Within functional limits bilaterally   Cervical/Thoracic/Shoulder ROM Comments Mild decreased upper cervical rotation to left when in flexion   Upper Trapezius Flexibility Hypomobile on left   Levator Scapula Flexibility Hypomobile on left   Scalene Flexibility Hypomobile bilaterally left more so than right   Pectoralis Minor Flexibility Hypomobile bilaterally   Vertebral Artery Test Negative   Alar Ligament Test Negative   Transverse Ligament Test Negative   Spurling Test Positive on left   Cervical Distraction Test Negative   Dykes Impingement Test Negative   Segmental Mobility-Thoracic ERS left T4   Palpation  Significant soft tissue tension and tenderness to palpation especially along the left upper trapezius, left levator scapula, left scalene and left cervical paraspinal muscles.  Soft tissue tension tenderness also along the left interscapular muscles   Observation No acute distress   Posture Forward head rounded shoulder posture, increased CT kyphosis, left shoulder was elevated as compared to the right   Planned Therapy Interventions   Planned Therapy Interventions ROM;strengthening;stretching;manual therapy;joint mobilization   Planned Modality Interventions   Planned Modality Interventions Comments Modalities as indicated   Clinical Impression   Criteria for Skilled Therapeutic Interventions Met yes, treatment indicated   PT Diagnosis Neck pain   Influenced by the following impairments Pain, decreased mobility, headaches   Functional limitations due to impairments Reading, sleeping, lifting, ADLs, household tasks, certain positions-looking up   Clinical Presentation Evolving/Changing   Clinical Presentation Rationale Clinical judgment-progression of pain and functional limitations   Clinical Decision Making (Complexity) Moderate complexity   Therapy Frequency 2 times/Week   Predicted Duration of Therapy Intervention (days/wks) Up to 6 weeks   Risk & Benefits of therapy have been explained Yes   Patient, Family & other staff in agreement with plan of care Yes   Clinical Impression Comments Patient presents with advanced degenerative changes in the cervical spine with objective findings significant for soft tissue restrictions especially in the left neck and mild mechanical dysfunction in the upper thoracic spine   Education Assessment   Barriers to Learning No barriers   ORTHO GOALS   PT Ortho Eval Goals 1;2;3   Ortho Goal 1   Goal Identifier STG 1   Goal Description Decreased pain when at its worst to a 4/10   Target Date 10/04/22   Ortho Goal 2   Goal Identifier LTG 1   Goal Description Patient will demonstrate  independence with home exercise program   Target Date 11/01/22   Ortho Goal 3   Goal Identifier LTG #2   Goal Description Patient will be able to sleep with mild disruption for up to 1 hour 5 out of 7 nights per week   Target Date 11/01/22   Total Evaluation Time   PT Eval, Moderate Complexity Minutes (36932) 25   Therapy Certification   Certification date from 09/20/22   Certification date to 12/20/22   Medical Diagnosis Cervicalgia       I certify the need for these services furnished under this plan of treatment and while under my care. (Physician co-signature of this document indicates review and certification of the therapy plan).

## 2022-09-28 ENCOUNTER — HOSPITAL ENCOUNTER (OUTPATIENT)
Dept: PHYSICAL THERAPY | Facility: OTHER | Age: 77
Discharge: HOME | End: 2022-09-28
Attending: NURSE PRACTITIONER | Admitting: NURSE PRACTITIONER
Payer: MEDICARE

## 2022-09-28 PROCEDURE — 97035 APP MDLTY 1+ULTRASOUND EA 15: CPT | Mod: GP

## 2022-09-28 PROCEDURE — 97140 MANUAL THERAPY 1/> REGIONS: CPT | Mod: GP

## 2022-09-30 ENCOUNTER — HOSPITAL ENCOUNTER (OUTPATIENT)
Dept: PHYSICAL THERAPY | Facility: OTHER | Age: 77
Discharge: HOME | End: 2022-09-30
Attending: NURSE PRACTITIONER | Admitting: NURSE PRACTITIONER
Payer: MEDICARE

## 2022-09-30 PROCEDURE — 97035 APP MDLTY 1+ULTRASOUND EA 15: CPT | Mod: GP

## 2022-09-30 PROCEDURE — 97140 MANUAL THERAPY 1/> REGIONS: CPT | Mod: GP

## 2022-10-05 ENCOUNTER — HOSPITAL ENCOUNTER (OUTPATIENT)
Dept: PHYSICAL THERAPY | Facility: OTHER | Age: 77
Discharge: HOME | End: 2022-10-05
Attending: NURSE PRACTITIONER | Admitting: NURSE PRACTITIONER
Payer: MEDICARE

## 2022-10-05 PROCEDURE — 97035 APP MDLTY 1+ULTRASOUND EA 15: CPT | Mod: GP

## 2022-10-05 PROCEDURE — 97140 MANUAL THERAPY 1/> REGIONS: CPT | Mod: GP

## 2022-10-07 ENCOUNTER — HOSPITAL ENCOUNTER (OUTPATIENT)
Dept: PHYSICAL THERAPY | Facility: OTHER | Age: 77
Discharge: HOME | End: 2022-10-07
Attending: NURSE PRACTITIONER | Admitting: NURSE PRACTITIONER
Payer: MEDICARE

## 2022-10-07 PROCEDURE — 97035 APP MDLTY 1+ULTRASOUND EA 15: CPT | Mod: GP

## 2022-10-07 PROCEDURE — 97140 MANUAL THERAPY 1/> REGIONS: CPT | Mod: GP

## 2022-10-14 ENCOUNTER — HOSPITAL ENCOUNTER (OUTPATIENT)
Dept: PHYSICAL THERAPY | Facility: OTHER | Age: 77
Discharge: HOME | End: 2022-10-14
Attending: NURSE PRACTITIONER | Admitting: NURSE PRACTITIONER
Payer: MEDICARE

## 2022-10-14 PROCEDURE — 97140 MANUAL THERAPY 1/> REGIONS: CPT | Mod: GP

## 2022-10-14 PROCEDURE — 97035 APP MDLTY 1+ULTRASOUND EA 15: CPT | Mod: GP

## 2022-10-18 ENCOUNTER — TELEPHONE (OUTPATIENT)
Dept: MAMMOGRAPHY | Facility: OTHER | Age: 77
End: 2022-10-18

## 2022-10-18 ENCOUNTER — ANCILLARY PROCEDURE (OUTPATIENT)
Dept: MAMMOGRAPHY | Facility: OTHER | Age: 77
End: 2022-10-18
Attending: NURSE PRACTITIONER
Payer: MEDICARE

## 2022-10-18 DIAGNOSIS — Z12.31 VISIT FOR SCREENING MAMMOGRAM: ICD-10-CM

## 2022-10-18 PROCEDURE — 77067 SCR MAMMO BI INCL CAD: CPT | Mod: TC

## 2022-10-20 ENCOUNTER — HOSPITAL ENCOUNTER (OUTPATIENT)
Dept: PHYSICAL THERAPY | Facility: OTHER | Age: 77
Discharge: HOME | End: 2022-10-20
Attending: NURSE PRACTITIONER | Admitting: NURSE PRACTITIONER
Payer: MEDICARE

## 2022-10-20 PROCEDURE — 97035 APP MDLTY 1+ULTRASOUND EA 15: CPT | Mod: GP

## 2022-10-20 PROCEDURE — 97140 MANUAL THERAPY 1/> REGIONS: CPT | Mod: GP

## 2022-10-25 ENCOUNTER — HOSPITAL ENCOUNTER (OUTPATIENT)
Dept: PHYSICAL THERAPY | Facility: OTHER | Age: 77
Discharge: HOME | End: 2022-10-25
Attending: NURSE PRACTITIONER | Admitting: NURSE PRACTITIONER
Payer: MEDICARE

## 2022-10-25 PROCEDURE — 97035 APP MDLTY 1+ULTRASOUND EA 15: CPT | Mod: GP

## 2022-10-25 PROCEDURE — 97140 MANUAL THERAPY 1/> REGIONS: CPT | Mod: GP

## 2022-11-01 ENCOUNTER — HOSPITAL ENCOUNTER (OUTPATIENT)
Dept: PHYSICAL THERAPY | Facility: OTHER | Age: 77
Discharge: HOME | End: 2022-11-01
Attending: NURSE PRACTITIONER | Admitting: NURSE PRACTITIONER
Payer: MEDICARE

## 2022-11-01 PROCEDURE — 97140 MANUAL THERAPY 1/> REGIONS: CPT | Mod: GP

## 2022-11-23 NOTE — PROGRESS NOTES
"[facility] Service    Outpatient Physical Therapy Discharge Note  Patient: Franca Angeles  : 1945    Beginning/End Dates of Reporting Period:  22 to 22    Referring Provider: Jameson Carrera NP    Therapy Diagnosis: Cervicalgia     Client Self Report: Pt reports her neck is better and for the most part is \"good\".    Objective Measurements: Minimal remaining soft tissue tension L neck - upper trap and levator scap mms                                          Outcome Measures (most recent score):      Goals:  Goal Identifier STG 1   Goal Description Decreased pain when at its worst to a 4/10   Target Date 10/04/22   Date Met  10/04/22   Progress (detail required for progress note):       Goal Identifier LTG 1   Goal Description Patient will demonstrate independence with home exercise program   Target Date 22   Date Met  22   Progress (detail required for progress note):       Goal Identifier LTG #2   Goal Description Patient will be able to sleep with mild disruption for up to 1 hour 5 out of 7 nights per week   Target Date 22   Date Met  22   Progress (detail required for progress note):       Goal Identifier     Goal Description     Target Date     Date Met      Progress (detail required for progress note):       Goal Identifier     Goal Description     Target Date     Date Met      Progress (detail required for progress note):       Goal Identifier     Goal Description     Target Date     Date Met      Progress (detail required for progress note):       Goal Identifier     Goal Description     Target Date     Date Met      Progress (detail required for progress note):       Goal Identifier     Goal Description     Target Date     Date Met      Progress (detail required for progress note):             Plan:  Discharge from therapy.    Discharge:    Reason for Discharge: " Patient has met all goals.    Equipment Issued: none    Discharge Plan: Patient to continue home program.

## 2022-12-09 ENCOUNTER — LAB (OUTPATIENT)
Dept: LAB | Facility: OTHER | Age: 77
End: 2022-12-09
Payer: MEDICARE

## 2022-12-09 DIAGNOSIS — Z79.899 ON STATIN THERAPY: ICD-10-CM

## 2022-12-09 DIAGNOSIS — I10 BENIGN ESSENTIAL HYPERTENSION: ICD-10-CM

## 2022-12-09 DIAGNOSIS — R73.03 PREDIABETES: ICD-10-CM

## 2022-12-09 DIAGNOSIS — E78.5 HYPERLIPIDEMIA LDL GOAL <100: ICD-10-CM

## 2022-12-09 DIAGNOSIS — E03.4 HYPOTHYROIDISM DUE TO ACQUIRED ATROPHY OF THYROID: ICD-10-CM

## 2022-12-09 LAB
ALBUMIN SERPL BCG-MCNC: 4.3 G/DL (ref 3.5–5.2)
ALP SERPL-CCNC: 80 U/L (ref 35–104)
ALT SERPL W P-5'-P-CCNC: 27 U/L (ref 10–35)
ANION GAP SERPL CALCULATED.3IONS-SCNC: 9 MMOL/L (ref 7–15)
AST SERPL W P-5'-P-CCNC: 38 U/L (ref 10–35)
BILIRUB SERPL-MCNC: 0.3 MG/DL
BUN SERPL-MCNC: 19.2 MG/DL (ref 8–23)
CALCIUM SERPL-MCNC: 9.8 MG/DL (ref 8.8–10.2)
CHLORIDE SERPL-SCNC: 103 MMOL/L (ref 98–107)
CHOLEST SERPL-MCNC: 195 MG/DL
CREAT SERPL-MCNC: 1 MG/DL (ref 0.51–0.95)
DEPRECATED HCO3 PLAS-SCNC: 27 MMOL/L (ref 22–29)
EST. AVERAGE GLUCOSE BLD GHB EST-MCNC: 114 MG/DL
GFR SERPL CREATININE-BSD FRML MDRD: 57 ML/MIN/1.73M2
GLUCOSE SERPL-MCNC: 95 MG/DL (ref 70–99)
HBA1C MFR BLD: 5.6 %
HDLC SERPL-MCNC: 56 MG/DL
LDLC SERPL CALC-MCNC: 114 MG/DL
NONHDLC SERPL-MCNC: 139 MG/DL
POTASSIUM SERPL-SCNC: 4.2 MMOL/L (ref 3.4–5.3)
PROT SERPL-MCNC: 6.9 G/DL (ref 6.4–8.3)
SODIUM SERPL-SCNC: 139 MMOL/L (ref 136–145)
T4 FREE SERPL-MCNC: 1.14 NG/DL (ref 0.9–1.7)
TRIGL SERPL-MCNC: 127 MG/DL
TSH SERPL DL<=0.005 MIU/L-ACNC: 4.36 UIU/ML (ref 0.3–4.2)

## 2022-12-09 PROCEDURE — 84443 ASSAY THYROID STIM HORMONE: CPT | Mod: ZL

## 2022-12-09 PROCEDURE — 83036 HEMOGLOBIN GLYCOSYLATED A1C: CPT | Mod: ZL

## 2022-12-09 PROCEDURE — 80053 COMPREHEN METABOLIC PANEL: CPT | Mod: ZL

## 2022-12-09 PROCEDURE — 36415 COLL VENOUS BLD VENIPUNCTURE: CPT | Mod: ZL

## 2022-12-09 PROCEDURE — 80061 LIPID PANEL: CPT | Mod: ZL

## 2022-12-09 PROCEDURE — 84439 ASSAY OF FREE THYROXINE: CPT | Mod: ZL

## 2022-12-12 ENCOUNTER — OFFICE VISIT (OUTPATIENT)
Dept: FAMILY MEDICINE | Facility: OTHER | Age: 77
End: 2022-12-12
Attending: NURSE PRACTITIONER
Payer: COMMERCIAL

## 2022-12-12 VITALS
HEART RATE: 53 BPM | OXYGEN SATURATION: 98 % | WEIGHT: 134.8 LBS | DIASTOLIC BLOOD PRESSURE: 77 MMHG | BODY MASS INDEX: 24.26 KG/M2 | SYSTOLIC BLOOD PRESSURE: 132 MMHG | TEMPERATURE: 97 F | RESPIRATION RATE: 16 BRPM

## 2022-12-12 DIAGNOSIS — E78.5 HYPERLIPIDEMIA LDL GOAL <100: ICD-10-CM

## 2022-12-12 DIAGNOSIS — F51.01 PRIMARY INSOMNIA: ICD-10-CM

## 2022-12-12 DIAGNOSIS — E78.2 MIXED HYPERLIPIDEMIA: ICD-10-CM

## 2022-12-12 DIAGNOSIS — N18.31 STAGE 3A CHRONIC KIDNEY DISEASE (H): ICD-10-CM

## 2022-12-12 DIAGNOSIS — E03.4 HYPOTHYROIDISM DUE TO ACQUIRED ATROPHY OF THYROID: ICD-10-CM

## 2022-12-12 DIAGNOSIS — F41.1 GENERALIZED ANXIETY DISORDER: ICD-10-CM

## 2022-12-12 DIAGNOSIS — Z79.899 ON STATIN THERAPY: Primary | ICD-10-CM

## 2022-12-12 DIAGNOSIS — I10 BENIGN ESSENTIAL HYPERTENSION: ICD-10-CM

## 2022-12-12 DIAGNOSIS — R73.03 PREDIABETES: ICD-10-CM

## 2022-12-12 LAB
CREAT UR-MCNC: 33.5 MG/DL
MICROALBUMIN UR-MCNC: <12 MG/L
MICROALBUMIN/CREAT UR: NORMAL MG/G{CREAT}

## 2022-12-12 PROCEDURE — G0463 HOSPITAL OUTPT CLINIC VISIT: HCPCS

## 2022-12-12 PROCEDURE — 82043 UR ALBUMIN QUANTITATIVE: CPT | Mod: ZL | Performed by: NURSE PRACTITIONER

## 2022-12-12 PROCEDURE — 99214 OFFICE O/P EST MOD 30 MIN: CPT | Performed by: NURSE PRACTITIONER

## 2022-12-12 RX ORDER — GABAPENTIN 300 MG/1
300 CAPSULE ORAL AT BEDTIME
Qty: 90 CAPSULE | Refills: 1 | Status: SHIPPED | OUTPATIENT
Start: 2022-12-12 | End: 2023-03-15

## 2022-12-12 RX ORDER — ATORVASTATIN CALCIUM 20 MG/1
30 TABLET, FILM COATED ORAL DAILY
Qty: 135 TABLET | Refills: 3 | Status: SHIPPED | OUTPATIENT
Start: 2022-12-12 | End: 2022-12-14 | Stop reason: ALTCHOICE

## 2022-12-12 RX ORDER — LORAZEPAM 0.5 MG/1
0.5 TABLET ORAL
Qty: 30 TABLET | Refills: 0 | Status: SHIPPED | OUTPATIENT
Start: 2022-12-12 | End: 2024-02-27

## 2022-12-12 ASSESSMENT — ANXIETY QUESTIONNAIRES
7. FEELING AFRAID AS IF SOMETHING AWFUL MIGHT HAPPEN: SEVERAL DAYS
3. WORRYING TOO MUCH ABOUT DIFFERENT THINGS: SEVERAL DAYS
6. BECOMING EASILY ANNOYED OR IRRITABLE: SEVERAL DAYS
2. NOT BEING ABLE TO STOP OR CONTROL WORRYING: SEVERAL DAYS
5. BEING SO RESTLESS THAT IT IS HARD TO SIT STILL: NOT AT ALL
4. TROUBLE RELAXING: NOT AT ALL
GAD7 TOTAL SCORE: 4
IF YOU CHECKED OFF ANY PROBLEMS ON THIS QUESTIONNAIRE, HOW DIFFICULT HAVE THESE PROBLEMS MADE IT FOR YOU TO DO YOUR WORK, TAKE CARE OF THINGS AT HOME, OR GET ALONG WITH OTHER PEOPLE: NOT DIFFICULT AT ALL
GAD7 TOTAL SCORE: 4
1. FEELING NERVOUS, ANXIOUS, OR ON EDGE: NOT AT ALL

## 2022-12-12 ASSESSMENT — PAIN SCALES - GENERAL: PAINLEVEL: NO PAIN (0)

## 2022-12-12 ASSESSMENT — PATIENT HEALTH QUESTIONNAIRE - PHQ9: SUM OF ALL RESPONSES TO PHQ QUESTIONS 1-9: 2

## 2022-12-12 NOTE — PROGRESS NOTES
Assessment & Plan     1. Prediabetes  - Albumin Random Urine Quantitative with Creat Ratio; Future  - Hemoglobin A1c; Future    2. Hyperlipidemia LDL goal <100  - Lipid Profile; Future    3. Benign essential hypertension  Well controlled   - Comprehensive metabolic panel; Future  - CBC with platelets; Future    4. Hypothyroidism due to acquired atrophy of thyroid  - TSH with free T4 reflex; Future    5. Generalized anxiety disorder  - LORazepam (ATIVAN) 0.5 MG tablet; Take 1 tablet (0.5 mg) by mouth nightly as needed for anxiety or sleep  Dispense: 30 tablet; Refill: 0  - gabapentin (NEURONTIN) 300 MG capsule; Take 1 capsule (300 mg) by mouth At Bedtime  Dispense: 90 capsule; Refill: 1    6. Primary insomnia  - LORazepam (ATIVAN) 0.5 MG tablet; Take 1 tablet (0.5 mg) by mouth nightly as needed for anxiety or sleep  Dispense: 30 tablet; Refill: 0  - gabapentin (NEURONTIN) 300 MG capsule; Take 1 capsule (300 mg) by mouth At Bedtime  Dispense: 90 capsule; Refill: 1    7. Stage 3a chronic kidney disease (H)  - Albumin Random Urine Quantitative with Creat Ratio; Future  - CBC with platelets; Future    8. On statin therapy  - Comprehensive metabolic panel; Future        Return in about 3 months (around 3/12/2023) for hypertension and lipids, thyroid.    Brittney Coe NP  [facility] [place] [facility]    Subjective   Franca is a 77 year old, presenting for the following health issues:  Chronic Disease Management      HPI     Hyperlipidemia Follow-Up    Are you regularly taking any medication or supplement to lower your cholesterol?   Yes- atorvastatin 30 mg daily    Are you having muscle aches or other side effects that you think could be caused by your cholesterol lowering medication?  No   The 10-year ASCVD risk score (Amarilys CROOK, et al., 2019) is: 29.9%    Values used to calculate the score:      Age: 77 years      Sex: Female      Is Non- : No      Diabetic: No      Tobacco  smoker: No      Systolic Blood Pressure: 132 mmHg      Is BP treated: Yes      HDL Cholesterol: 56 mg/dL        Total Cholesterol: 195 mg/dL        Hypertension Follow-up    Do you check your blood pressure regularly outside of the clinic? No     Are you following a low salt diet? Yes    Are your blood pressures ever more than 140 on the top number (systolic) OR more   than 90 on the bottom number (diastolic), for example 140/90? No    Chronic Kidney Disease Follow-up    Do you take any over the counter pain medicine?: Yes  What over the counter medicine are you taking for your pain?:  tylenol      How often do you take this medicine?:  A few times a week      Depression and Anxiety Follow-Up    How are you doing with your depression since your last visit? No change    How are you doing with your anxiety since your last visit?  No change    Are you having other symptoms that might be associated with depression or anxiety? No    Have you had a significant life event? No     Do you have any concerns with your use of alcohol or other drugs? No    Social History     Tobacco Use     Smoking status: Never     Smokeless tobacco: Never   Substance Use Topics     Alcohol use: Yes     Alcohol/week: 0.0 standard drinks     Comment: occasionally     Drug use: No     PHQ 11/29/2021 6/6/2022 12/12/2022   PHQ-9 Total Score 1 3 2   Q9: Thoughts of better off dead/self-harm past 2 weeks Not at all Not at all Not at all     MCKINLEY-7 SCORE 11/29/2021 6/6/2022 12/12/2022   Total Score 5 5 4     Last PHQ-9 12/12/2022   1.  Little interest or pleasure in doing things 0   2.  Feeling down, depressed, or hopeless 1   3.  Trouble falling or staying asleep, or sleeping too much 1   4.  Feeling tired or having little energy 0   5.  Poor appetite or overeating 0   6.  Feeling bad about yourself 0   7.  Trouble concentrating 0   8.  Moving slowly or restless 0   Q9: Thoughts of better off dead/self-harm past 2 weeks 0   PHQ-9 Total Score 2    Difficulty at work, home, or with people Not difficult at all     MCKINLEY-7  12/12/2022   1. Feeling nervous, anxious, or on edge 0   2. Not being able to stop or control worrying 1   3. Worrying too much about different things 1   4. Trouble relaxing 0   5. Being so restless that it is hard to sit still 0   6. Becoming easily annoyed or irritable 1   7. Feeling afraid, as if something awful might happen 1   MCKINLEY-7 Total Score 4   If you checked any problems, how difficult have they made it for you to do your work, take care of things at home, or get along with other people? Not difficult at all       Suicide Assessment Five-step Evaluation and Treatment (SAFE-T)      BP Readings from Last 3 Encounters:   12/12/22 132/77   09/08/22 126/70   06/24/22 138/72    Wt Readings from Last 3 Encounters:   12/12/22 61.1 kg (134 lb 12.8 oz)   09/08/22 60.3 kg (133 lb)   06/24/22 60.8 kg (134 lb)               Review of Systems   Constitutional, HEENT, cardiovascular, pulmonary, gi and gu systems are negative, except as otherwise noted.      Objective    /77 (BP Location: Left arm, Patient Position: Sitting, Cuff Size: Adult Regular)   Pulse 53   Temp 97  F (36.1  C) (Tympanic)   Resp 16   Wt 61.1 kg (134 lb 12.8 oz)   SpO2 98%   BMI 24.26 kg/m    Body mass index is 24.26 kg/m .  Physical Exam   GENERAL: healthy, alert and no distress  HENT: ear canals and TM's normal, nose and mouth without ulcers or lesions  NECK: no adenopathy, no asymmetry, masses, or scars and thyroid normal to palpation  RESP: lungs clear to auscultation - no rales, rhonchi or wheezes  CV: regular rate and rhythm, normal S1 S2, no S3 or S4, no murmur, click or rub, no peripheral edema and peripheral pulses strong  MS: no gross musculoskeletal defects noted, no edema  PSYCH: mentation appears normal, affect normal/bright    Lab on 12/09/2022   Component Date Value Ref Range Status     TSH 12/09/2022 4.36 (H)  0.30 - 4.20 uIU/mL Final     Sodium  12/09/2022 139  136 - 145 mmol/L Final     Potassium 12/09/2022 4.2  3.4 - 5.3 mmol/L Final     Chloride 12/09/2022 103  98 - 107 mmol/L Final     Carbon Dioxide (CO2) 12/09/2022 27  22 - 29 mmol/L Final     Anion Gap 12/09/2022 9  7 - 15 mmol/L Final     Urea Nitrogen 12/09/2022 19.2  8.0 - 23.0 mg/dL Final     Creatinine 12/09/2022 1.00 (H)  0.51 - 0.95 mg/dL Final     Calcium 12/09/2022 9.8  8.8 - 10.2 mg/dL Final     Glucose 12/09/2022 95  70 - 99 mg/dL Final     Alkaline Phosphatase 12/09/2022 80  35 - 104 U/L Final     AST 12/09/2022 38 (H)  10 - 35 U/L Final     ALT 12/09/2022 27  10 - 35 U/L Final     Protein Total 12/09/2022 6.9  6.4 - 8.3 g/dL Final     Albumin 12/09/2022 4.3  3.5 - 5.2 g/dL Final     Bilirubin Total 12/09/2022 0.3  <=1.2 mg/dL Final     GFR Estimate 12/09/2022 57 (L)  >60 mL/min/1.73m2 Final    Effective December 21, 2021 eGFRcr in adults is calculated using the 2021 CKD-EPI creatinine equation which includes age and gender (Kobi et al., NE, DOI: 10.1056/FEEWxq3040995)     Cholesterol 12/09/2022 195  <200 mg/dL Final     Triglycerides 12/09/2022 127  <150 mg/dL Final     Direct Measure HDL 12/09/2022 56  >=50 mg/dL Final     LDL Cholesterol Calculated 12/09/2022 114 (H)  <=100 mg/dL Final     Non HDL Cholesterol 12/09/2022 139 (H)  <130 mg/dL Final     Estimated Average Glucose 12/09/2022 114  mg/dL Final     Hemoglobin A1C 12/09/2022 5.6  <5.7 % Final    Normal <5.7%   Prediabetes 5.7-6.4%    Diabetes 6.5% or higher     Note: Adopted from ADA consensus guidelines.     Free T4 12/09/2022 1.14  0.90 - 1.70 ng/dL Final

## 2022-12-13 ENCOUNTER — TELEPHONE (OUTPATIENT)
Dept: FAMILY MEDICINE | Facility: OTHER | Age: 77
End: 2022-12-13

## 2022-12-13 DIAGNOSIS — E78.5 HYPERLIPIDEMIA LDL GOAL <100: Primary | ICD-10-CM

## 2022-12-13 NOTE — TELEPHONE ENCOUNTER
I spoke with the patient's wife who states that she believes he was calling to get an OV appointment, but that she will have him call us when he is not in the field.  BERTRANDL, CMA   Lipitor is not covered by pt's insurance.  Preferred meds are Crestor or Zocor.

## 2022-12-14 RX ORDER — ROSUVASTATIN CALCIUM 20 MG/1
30 TABLET, COATED ORAL DAILY
Qty: 135 TABLET | Refills: 1 | Status: SHIPPED | OUTPATIENT
Start: 2022-12-14 | End: 2022-12-16

## 2022-12-16 DIAGNOSIS — E78.5 HYPERLIPIDEMIA LDL GOAL <100: Primary | ICD-10-CM

## 2022-12-16 RX ORDER — ATORVASTATIN CALCIUM 20 MG/1
20 TABLET, FILM COATED ORAL DAILY
Qty: 90 TABLET | Refills: 1 | Status: SHIPPED | OUTPATIENT
Start: 2022-12-16 | End: 2023-03-15

## 2023-03-08 NOTE — NURSING NOTE
"Chief Complaint   Patient presents with     Lipids     due for labwork     Thyroid Problem     due for labwork     Headache     sporatic pains in head, seen by chiropractor with no relief he suspects pinch nerve       Initial /84 (BP Location: Left arm, Patient Position: Sitting, Cuff Size: Adult Regular)  Pulse 53  Temp 97.2  F (36.2  C) (Tympanic)  Resp 15  Ht 5' 4.5\" (1.638 m)  Wt 141 lb (64 kg)  SpO2 99%  BMI 23.83 kg/m2 Estimated body mass index is 23.83 kg/(m^2) as calculated from the following:    Height as of this encounter: 5' 4.5\" (1.638 m).    Weight as of this encounter: 141 lb (64 kg).  Medication Reconciliation: complete     Melinda Cat      " Patient just wanted to make sure prior to seeing Dr. Henao he didn't need further testing due to MRI results from 2/7/23 that showed \"partially imaged in the right kidney measuring at least 6cm.  Patient has appointment with Dr. Henao on 3/23/23 for his annual follow up for elevated PSA and BPH.  Informed patient I will send to Dr. Henao to review MRI and to advise.

## 2023-03-13 ENCOUNTER — LAB (OUTPATIENT)
Dept: LAB | Facility: OTHER | Age: 78
End: 2023-03-13
Payer: MEDICARE

## 2023-03-13 DIAGNOSIS — E78.5 HYPERLIPIDEMIA LDL GOAL <100: ICD-10-CM

## 2023-03-13 DIAGNOSIS — E03.4 HYPOTHYROIDISM DUE TO ACQUIRED ATROPHY OF THYROID: ICD-10-CM

## 2023-03-13 DIAGNOSIS — R73.03 PREDIABETES: ICD-10-CM

## 2023-03-13 DIAGNOSIS — N18.31 STAGE 3A CHRONIC KIDNEY DISEASE (H): ICD-10-CM

## 2023-03-13 DIAGNOSIS — I10 BENIGN ESSENTIAL HYPERTENSION: ICD-10-CM

## 2023-03-13 DIAGNOSIS — Z79.899 ON STATIN THERAPY: ICD-10-CM

## 2023-03-13 LAB
ALBUMIN SERPL BCG-MCNC: 4.2 G/DL (ref 3.5–5.2)
ALP SERPL-CCNC: 84 U/L (ref 35–104)
ALT SERPL W P-5'-P-CCNC: 35 U/L (ref 10–35)
ANION GAP SERPL CALCULATED.3IONS-SCNC: 11 MMOL/L (ref 7–15)
AST SERPL W P-5'-P-CCNC: 38 U/L (ref 10–35)
BILIRUB SERPL-MCNC: 0.5 MG/DL
BUN SERPL-MCNC: 19.8 MG/DL (ref 8–23)
CALCIUM SERPL-MCNC: 10.3 MG/DL (ref 8.8–10.2)
CHLORIDE SERPL-SCNC: 103 MMOL/L (ref 98–107)
CHOLEST SERPL-MCNC: 193 MG/DL
CREAT SERPL-MCNC: 1.06 MG/DL (ref 0.51–0.95)
DEPRECATED HCO3 PLAS-SCNC: 27 MMOL/L (ref 22–29)
ERYTHROCYTE [DISTWIDTH] IN BLOOD BY AUTOMATED COUNT: 14.7 % (ref 10–15)
EST. AVERAGE GLUCOSE BLD GHB EST-MCNC: 120 MG/DL
GFR SERPL CREATININE-BSD FRML MDRD: 54 ML/MIN/1.73M2
GLUCOSE SERPL-MCNC: 89 MG/DL (ref 70–99)
HBA1C MFR BLD: 5.8 %
HCT VFR BLD AUTO: 39.7 % (ref 35–47)
HDLC SERPL-MCNC: 56 MG/DL
HGB BLD-MCNC: 12.9 G/DL (ref 11.7–15.7)
LDLC SERPL CALC-MCNC: 109 MG/DL
MCH RBC QN AUTO: 28.5 PG (ref 26.5–33)
MCHC RBC AUTO-ENTMCNC: 32.5 G/DL (ref 31.5–36.5)
MCV RBC AUTO: 88 FL (ref 78–100)
NONHDLC SERPL-MCNC: 137 MG/DL
PLATELET # BLD AUTO: 299 10E3/UL (ref 150–450)
POTASSIUM SERPL-SCNC: 4.2 MMOL/L (ref 3.4–5.3)
PROT SERPL-MCNC: 6.9 G/DL (ref 6.4–8.3)
RBC # BLD AUTO: 4.53 10E6/UL (ref 3.8–5.2)
SODIUM SERPL-SCNC: 141 MMOL/L (ref 136–145)
T4 FREE SERPL-MCNC: 1.1 NG/DL (ref 0.9–1.7)
TRIGL SERPL-MCNC: 140 MG/DL
TSH SERPL DL<=0.005 MIU/L-ACNC: 4.64 UIU/ML (ref 0.3–4.2)
WBC # BLD AUTO: 6.1 10E3/UL (ref 4–11)

## 2023-03-13 PROCEDURE — 36415 COLL VENOUS BLD VENIPUNCTURE: CPT | Mod: ZL

## 2023-03-13 PROCEDURE — 85027 COMPLETE CBC AUTOMATED: CPT | Mod: ZL

## 2023-03-13 PROCEDURE — 80061 LIPID PANEL: CPT | Mod: ZL

## 2023-03-13 PROCEDURE — 84439 ASSAY OF FREE THYROXINE: CPT | Mod: ZL

## 2023-03-13 PROCEDURE — 80053 COMPREHEN METABOLIC PANEL: CPT | Mod: ZL

## 2023-03-13 PROCEDURE — 84443 ASSAY THYROID STIM HORMONE: CPT | Mod: ZL

## 2023-03-13 PROCEDURE — 83036 HEMOGLOBIN GLYCOSYLATED A1C: CPT | Mod: ZL

## 2023-03-13 NOTE — PROGRESS NOTES
Assessment & Plan     1. Prediabetes  Continue current plan   - Hemoglobin A1c; Future    2. Hyperlipidemia LDL goal <100  Continue lipitor 30mg daily   - atorvastatin (LIPITOR) 20 MG tablet; Take 1.5 tablets (30 mg) by mouth daily  Dispense: 135 tablet; Refill: 1  - Lipid Profile; Future    3. Benign essential hypertension  Well controlled   - lisinopril (ZESTRIL) 10 MG tablet; Take 1 tablet (10 mg) by mouth daily  Dispense: 90 tablet; Refill: 1  - Comprehensive metabolic panel; Future    4. Hypothyroidism due to acquired atrophy of thyroid  - levothyroxine (SYNTHROID/LEVOTHROID) 50 MCG tablet; TAKE 1 TABLET(50 MCG) BY MOUTH DAILY  Dispense: 90 tablet; Refill: 1  - TSH with free T4 reflex; Future    5. Stage 3a chronic kidney disease (H)  Do not use NSAIDS    6. Generalized anxiety disorder  - gabapentin (NEURONTIN) 300 MG capsule; Take 1 capsule (300 mg) by mouth At Bedtime  Dispense: 90 capsule; Refill: 1    7. Primary insomnia  - gabapentin (NEURONTIN) 300 MG capsule; Take 1 capsule (300 mg) by mouth At Bedtime  Dispense: 90 capsule; Refill: 1    8. On statin therapy  - Comprehensive metabolic panel; Future      Review of the result(s) of each unique test - labs below        Return in about 3 months (around 6/15/2023) for pre-diabetes, lipids, HTN.    Brittney Coe NP  [facility] [place] Schulte is a 77 year old, presenting for the following health issues:  Hypertension, Lipids, Thyroid Problem, and chronic kidney disease       HPI     Hyperlipidemia Follow-Up      Are you regularly taking any medication or supplement to lower your cholesterol?   Yes- Liptior 20 mg    Are you having muscle aches or other side effects that you think could be caused by your cholesterol lowering medication?  No    Hypertension Follow-up      Do you check your blood pressure regularly outside of the clinic? No     Are you following a low salt diet? Yes    Are your blood pressures ever more  "than 140 on the top number (systolic) OR more   than 90 on the bottom number (diastolic), for example 140/90? No    Chronic Kidney Disease Follow-up    Do you take any over the counter pain medicine?: Yes  What over the counter medicine are you taking for your pain?:  Tylenol      How often do you take this medicine?:  once in a while     Hypothyroidism Follow-up      Since last visit, patient describes the following symptoms: Weight stable, no hair loss, no skin changes, no constipation, no loose stools        She is feeling well, no new concerns.     Recent Labs   Lab Test 03/13/23  0800 12/09/22  0757 09/06/22  0805 08/20/21  0757 05/10/21  0801 02/05/21  0809   A1C 5.8* 5.6 5.7*   < > 5.6  --    * 114* 100   < > 105* 119*   HDL 56 56 62   < > 58 55   TRIG 140 127 111   < > 138 149   ALT 35 27 31   < > 46 36   CR 1.06* 1.00* 0.90   < > 1.04 1.01   GFRESTIMATED 54* 57* 65   < > 52* 54*   GFRESTBLACK  --   --   --   --  60* 62   POTASSIUM 4.2 4.2 4.2   < > 4.3 4.1   TSH 4.64* 4.36* 3.30   < > 3.79 4.17*    < > = values in this interval not displayed.      BP Readings from Last 3 Encounters:   03/15/23 122/66   12/12/22 132/77   09/08/22 126/70    Wt Readings from Last 3 Encounters:   03/15/23 60.8 kg (134 lb)   12/12/22 61.1 kg (134 lb 12.8 oz)   09/08/22 60.3 kg (133 lb)                      Review of Systems   Constitutional, HEENT, cardiovascular, pulmonary, gi and gu systems are negative, except as otherwise noted.      Objective    /66 (BP Location: Left arm, Patient Position: Chair, Cuff Size: Adult Regular)   Pulse 62   Temp 97.3  F (36.3  C) (Tympanic)   Resp 16   Ht 1.588 m (5' 2.5\")   Wt 60.8 kg (134 lb)   SpO2 97%   BMI 24.12 kg/m    Body mass index is 24.12 kg/m .  Physical Exam   GENERAL: healthy, alert and no distress  NECK: no adenopathy, no asymmetry, masses, or scars, thyroid normal to palpation and no carotid bruits  RESP: lungs clear to auscultation - no rales, rhonchi or " wheezes  CV: regular rate and rhythm, normal S1 S2, no S3 or S4, no murmur, click or rub, no peripheral edema and peripheral pulses strong  MS: no gross musculoskeletal defects noted, no edema  PSYCH: mentation appears normal, affect normal/bright    Lab on 03/13/2023   Component Date Value Ref Range Status     WBC Count 03/13/2023 6.1  4.0 - 11.0 10e3/uL Final     RBC Count 03/13/2023 4.53  3.80 - 5.20 10e6/uL Final     Hemoglobin 03/13/2023 12.9  11.7 - 15.7 g/dL Final     Hematocrit 03/13/2023 39.7  35.0 - 47.0 % Final     MCV 03/13/2023 88  78 - 100 fL Final     MCH 03/13/2023 28.5  26.5 - 33.0 pg Final     MCHC 03/13/2023 32.5  31.5 - 36.5 g/dL Final     RDW 03/13/2023 14.7  10.0 - 15.0 % Final     Platelet Count 03/13/2023 299  150 - 450 10e3/uL Final     TSH 03/13/2023 4.64 (H)  0.30 - 4.20 uIU/mL Final     Sodium 03/13/2023 141  136 - 145 mmol/L Final     Potassium 03/13/2023 4.2  3.4 - 5.3 mmol/L Final     Chloride 03/13/2023 103  98 - 107 mmol/L Final     Carbon Dioxide (CO2) 03/13/2023 27  22 - 29 mmol/L Final     Anion Gap 03/13/2023 11  7 - 15 mmol/L Final     Urea Nitrogen 03/13/2023 19.8  8.0 - 23.0 mg/dL Final     Creatinine 03/13/2023 1.06 (H)  0.51 - 0.95 mg/dL Final     Calcium 03/13/2023 10.3 (H)  8.8 - 10.2 mg/dL Final     Glucose 03/13/2023 89  70 - 99 mg/dL Final     Alkaline Phosphatase 03/13/2023 84  35 - 104 U/L Final     AST 03/13/2023 38 (H)  10 - 35 U/L Final     ALT 03/13/2023 35  10 - 35 U/L Final     Protein Total 03/13/2023 6.9  6.4 - 8.3 g/dL Final     Albumin 03/13/2023 4.2  3.5 - 5.2 g/dL Final     Bilirubin Total 03/13/2023 0.5  <=1.2 mg/dL Final     GFR Estimate 03/13/2023 54 (L)  >60 mL/min/1.73m2 Final    eGFR calculated using 2021 CKD-EPI equation.     Cholesterol 03/13/2023 193  <200 mg/dL Final     Triglycerides 03/13/2023 140  <150 mg/dL Final     Direct Measure HDL 03/13/2023 56  >=50 mg/dL Final     LDL Cholesterol Calculated 03/13/2023 109 (H)  <=100 mg/dL Final     Non  HDL Cholesterol 03/13/2023 137 (H)  <130 mg/dL Final     Estimated Average Glucose 03/13/2023 120  mg/dL Final     Hemoglobin A1C 03/13/2023 5.8 (H)  <5.7 % Final    Normal <5.7%   Prediabetes 5.7-6.4%    Diabetes 6.5% or higher     Note: Adopted from ADA consensus guidelines.     Free T4 03/13/2023 1.10  0.90 - 1.70 ng/dL Final

## 2023-03-15 ENCOUNTER — OFFICE VISIT (OUTPATIENT)
Dept: FAMILY MEDICINE | Facility: OTHER | Age: 78
End: 2023-03-15
Attending: NURSE PRACTITIONER
Payer: COMMERCIAL

## 2023-03-15 VITALS
WEIGHT: 134 LBS | HEART RATE: 62 BPM | HEIGHT: 63 IN | BODY MASS INDEX: 23.74 KG/M2 | OXYGEN SATURATION: 97 % | TEMPERATURE: 97.3 F | RESPIRATION RATE: 16 BRPM | SYSTOLIC BLOOD PRESSURE: 122 MMHG | DIASTOLIC BLOOD PRESSURE: 66 MMHG

## 2023-03-15 DIAGNOSIS — I10 BENIGN ESSENTIAL HYPERTENSION: ICD-10-CM

## 2023-03-15 DIAGNOSIS — E03.4 HYPOTHYROIDISM DUE TO ACQUIRED ATROPHY OF THYROID: ICD-10-CM

## 2023-03-15 DIAGNOSIS — F41.1 GENERALIZED ANXIETY DISORDER: ICD-10-CM

## 2023-03-15 DIAGNOSIS — F51.01 PRIMARY INSOMNIA: ICD-10-CM

## 2023-03-15 DIAGNOSIS — R73.03 PREDIABETES: Primary | ICD-10-CM

## 2023-03-15 DIAGNOSIS — E78.5 HYPERLIPIDEMIA LDL GOAL <100: ICD-10-CM

## 2023-03-15 DIAGNOSIS — Z79.899 ON STATIN THERAPY: ICD-10-CM

## 2023-03-15 DIAGNOSIS — N18.31 STAGE 3A CHRONIC KIDNEY DISEASE (H): ICD-10-CM

## 2023-03-15 PROCEDURE — 99214 OFFICE O/P EST MOD 30 MIN: CPT | Performed by: NURSE PRACTITIONER

## 2023-03-15 PROCEDURE — G0463 HOSPITAL OUTPT CLINIC VISIT: HCPCS

## 2023-03-15 RX ORDER — LISINOPRIL 10 MG/1
10 TABLET ORAL DAILY
Qty: 90 TABLET | Refills: 1 | Status: SHIPPED | OUTPATIENT
Start: 2023-03-15 | End: 2023-09-26

## 2023-03-15 RX ORDER — GABAPENTIN 300 MG/1
300 CAPSULE ORAL AT BEDTIME
Qty: 90 CAPSULE | Refills: 1 | Status: SHIPPED | OUTPATIENT
Start: 2023-03-15 | End: 2023-06-22

## 2023-03-15 RX ORDER — LEVOTHYROXINE SODIUM 50 UG/1
TABLET ORAL
Qty: 90 TABLET | Refills: 1 | Status: SHIPPED | OUTPATIENT
Start: 2023-03-15 | End: 2023-09-26

## 2023-03-15 RX ORDER — ATORVASTATIN CALCIUM 20 MG/1
30 TABLET, FILM COATED ORAL DAILY
Qty: 135 TABLET | Refills: 1 | Status: SHIPPED | OUTPATIENT
Start: 2023-03-15 | End: 2023-09-26

## 2023-03-15 ASSESSMENT — PAIN SCALES - GENERAL: PAINLEVEL: NO PAIN (0)

## 2023-03-15 NOTE — PATIENT INSTRUCTIONS
Assessment & Plan     1. Prediabetes  Continue current plan   - Hemoglobin A1c; Future    2. Hyperlipidemia LDL goal <100  Continue lipitor 30mg daily   - atorvastatin (LIPITOR) 20 MG tablet; Take 1.5 tablets (30 mg) by mouth daily  Dispense: 135 tablet; Refill: 1  - Lipid Profile; Future    3. Benign essential hypertension  Well controlled   - lisinopril (ZESTRIL) 10 MG tablet; Take 1 tablet (10 mg) by mouth daily  Dispense: 90 tablet; Refill: 1  - Comprehensive metabolic panel; Future    4. Hypothyroidism due to acquired atrophy of thyroid  - levothyroxine (SYNTHROID/LEVOTHROID) 50 MCG tablet; TAKE 1 TABLET(50 MCG) BY MOUTH DAILY  Dispense: 90 tablet; Refill: 1  - TSH with free T4 reflex; Future    5. Stage 3a chronic kidney disease (H)  Do not use NSAIDS    6. Generalized anxiety disorder  - gabapentin (NEURONTIN) 300 MG capsule; Take 1 capsule (300 mg) by mouth At Bedtime  Dispense: 90 capsule; Refill: 1    7. Primary insomnia  - gabapentin (NEURONTIN) 300 MG capsule; Take 1 capsule (300 mg) by mouth At Bedtime  Dispense: 90 capsule; Refill: 1    8. On statin therapy  - Comprehensive metabolic panel; Future      Review of the result(s) of each unique test - labs below        Return in about 3 months (around 6/15/2023) for pre-diabetes, lipids, HTN.    Brittney Coe NP  [facility]

## 2023-06-03 ENCOUNTER — HEALTH MAINTENANCE LETTER (OUTPATIENT)
Age: 78
End: 2023-06-03

## 2023-06-20 ENCOUNTER — LAB (OUTPATIENT)
Dept: LAB | Facility: OTHER | Age: 78
End: 2023-06-20
Payer: MEDICARE

## 2023-06-20 DIAGNOSIS — Z79.899 ON STATIN THERAPY: ICD-10-CM

## 2023-06-20 DIAGNOSIS — I10 BENIGN ESSENTIAL HYPERTENSION: ICD-10-CM

## 2023-06-20 DIAGNOSIS — E03.4 HYPOTHYROIDISM DUE TO ACQUIRED ATROPHY OF THYROID: ICD-10-CM

## 2023-06-20 DIAGNOSIS — R73.03 PREDIABETES: ICD-10-CM

## 2023-06-20 DIAGNOSIS — E78.5 HYPERLIPIDEMIA LDL GOAL <100: ICD-10-CM

## 2023-06-20 LAB
ALBUMIN SERPL BCG-MCNC: 4.2 G/DL (ref 3.5–5.2)
ALP SERPL-CCNC: 85 U/L (ref 35–104)
ALT SERPL W P-5'-P-CCNC: 28 U/L (ref 0–50)
ANION GAP SERPL CALCULATED.3IONS-SCNC: 13 MMOL/L (ref 7–15)
AST SERPL W P-5'-P-CCNC: 31 U/L (ref 0–45)
BILIRUB SERPL-MCNC: 0.3 MG/DL
BUN SERPL-MCNC: 20.1 MG/DL (ref 8–23)
CALCIUM SERPL-MCNC: 9.8 MG/DL (ref 8.8–10.2)
CHLORIDE SERPL-SCNC: 105 MMOL/L (ref 98–107)
CHOLEST SERPL-MCNC: 204 MG/DL
CREAT SERPL-MCNC: 0.95 MG/DL (ref 0.51–0.95)
DEPRECATED HCO3 PLAS-SCNC: 23 MMOL/L (ref 22–29)
EST. AVERAGE GLUCOSE BLD GHB EST-MCNC: 120 MG/DL
GFR SERPL CREATININE-BSD FRML MDRD: 61 ML/MIN/1.73M2
GLUCOSE SERPL-MCNC: 94 MG/DL (ref 70–99)
HBA1C MFR BLD: 5.8 %
HDLC SERPL-MCNC: 54 MG/DL
LDLC SERPL CALC-MCNC: 119 MG/DL
NONHDLC SERPL-MCNC: 150 MG/DL
POTASSIUM SERPL-SCNC: 4.3 MMOL/L (ref 3.4–5.3)
PROT SERPL-MCNC: 7 G/DL (ref 6.4–8.3)
SODIUM SERPL-SCNC: 141 MMOL/L (ref 136–145)
T4 FREE SERPL-MCNC: 1.16 NG/DL (ref 0.9–1.7)
TRIGL SERPL-MCNC: 154 MG/DL
TSH SERPL DL<=0.005 MIU/L-ACNC: 4.75 UIU/ML (ref 0.3–4.2)

## 2023-06-20 PROCEDURE — 84439 ASSAY OF FREE THYROXINE: CPT | Mod: ZL

## 2023-06-20 PROCEDURE — 36415 COLL VENOUS BLD VENIPUNCTURE: CPT | Mod: ZL

## 2023-06-20 PROCEDURE — 80053 COMPREHEN METABOLIC PANEL: CPT | Mod: ZL

## 2023-06-20 PROCEDURE — 84443 ASSAY THYROID STIM HORMONE: CPT | Mod: ZL

## 2023-06-20 PROCEDURE — 83036 HEMOGLOBIN GLYCOSYLATED A1C: CPT | Mod: ZL

## 2023-06-20 PROCEDURE — 80061 LIPID PANEL: CPT | Mod: ZL

## 2023-06-21 NOTE — PROGRESS NOTES
Assessment & Plan     1. Prediabetes  Continue current plan  - Hemoglobin A1c; Future    2. Hyperlipidemia LDL goal <100  Continue lipitor  - Lipid Profile; Future    3. Benign essential hypertension  stable  - Comprehensive metabolic panel; Future    4. Hypothyroidism due to acquired atrophy of thyroid  stable  - TSH with free T4 reflex; Future    5. Left foot pain  No fracture.   - XR FOOT LT G/E 3 VW (Clinic Performed); Future    6. On statin therapy  - Comprehensive metabolic panel; Future    7. Generalized anxiety disorder  - gabapentin (NEURONTIN) 300 MG capsule; Take 1 capsule (300 mg) by mouth At Bedtime  Dispense: 90 capsule; Refill: 1    8. Primary insomnia  - gabapentin (NEURONTIN) 300 MG capsule; Take 1 capsule (300 mg) by mouth At Bedtime  Dispense: 90 capsule; Refill: 1      Return in about 3 months (around 9/22/2023) for diabetes, lipids, HTN.    Brittney Coe, NP  [facility] - [place] MITRA Schulte is a 77 year old, presenting for the following health issues:  Hypertension, Lipids, and Thyroid Problem    HPI     Hyperlipidemia Follow-Up    Are you regularly taking any medication or supplement to lower your cholesterol?   Yes- atorvastatin 30 mg daily and fish oil    Are you having muscle aches or other side effects that you think could be caused by your cholesterol lowering medication?  No      Hypertension Follow-up    Do you check your blood pressure regularly outside of the clinic? Yes     Are you following a low salt diet? Yes    Are your blood pressures ever more than 140 on the top number (systolic) OR more   than 90 on the bottom number (diastolic), for example 140/90? No      Hypothyroidism Follow-up    Since last visit, patient describes the following symptoms: Weight stable, no hair loss, no skin changes, no constipation, no loose stools        She notes left foot pain, no injury.            Review of Systems   Constitutional, HEENT, cardiovascular,  pulmonary, gi and gu systems are negative, except as otherwise noted.      Objective    /64 (BP Location: Left arm, Patient Position: Sitting, Cuff Size: Adult Regular)   Pulse 55   Temp 97.4  F (36.3  C) (Tympanic)   Resp 16   Wt 61.2 kg (135 lb)   SpO2 96%   BMI 24.30 kg/m    Body mass index is 24.3 kg/m .  Physical Exam   GENERAL: healthy, alert and no distress  NECK: no adenopathy, no asymmetry, masses, or scars, thyroid normal to palpation and no carotid bruits  RESP: lungs clear to auscultation - no rales, rhonchi or wheezes  CV: regular rate and rhythm, normal S1 S2, no S3 or S4, no murmur, click or rub, no peripheral edema and peripheral pulses strong  MS: no gross musculoskeletal defects noted, no edema  PSYCH: mentation appears normal, affect normal/bright    Lab on 06/20/2023   Component Date Value Ref Range Status     TSH 06/20/2023 4.75 (H)  0.30 - 4.20 uIU/mL Final     Sodium 06/20/2023 141  136 - 145 mmol/L Final     Potassium 06/20/2023 4.3  3.4 - 5.3 mmol/L Final     Chloride 06/20/2023 105  98 - 107 mmol/L Final     Carbon Dioxide (CO2) 06/20/2023 23  22 - 29 mmol/L Final     Anion Gap 06/20/2023 13  7 - 15 mmol/L Final     Urea Nitrogen 06/20/2023 20.1  8.0 - 23.0 mg/dL Final     Creatinine 06/20/2023 0.95  0.51 - 0.95 mg/dL Final     Calcium 06/20/2023 9.8  8.8 - 10.2 mg/dL Final     Glucose 06/20/2023 94  70 - 99 mg/dL Final     Alkaline Phosphatase 06/20/2023 85  35 - 104 U/L Final     AST 06/20/2023 31  0 - 45 U/L Final    Reference intervals for this test were updated on 6/12/2023 to more accurately reflect our healthy population. There may be differences in the flagging of prior results with similar values performed with this method. Interpretation of those prior results can be made in the context of the updated reference intervals.     ALT 06/20/2023 28  0 - 50 U/L Final    Reference intervals for this test were updated on 6/12/2023 to more accurately reflect our healthy  population. There may be differences in the flagging of prior results with similar values performed with this method. Interpretation of those prior results can be made in the context of the updated reference intervals.       Protein Total 06/20/2023 7.0  6.4 - 8.3 g/dL Final     Albumin 06/20/2023 4.2  3.5 - 5.2 g/dL Final     Bilirubin Total 06/20/2023 0.3  <=1.2 mg/dL Final     GFR Estimate 06/20/2023 61  >60 mL/min/1.73m2 Final     Cholesterol 06/20/2023 204 (H)  <200 mg/dL Final     Triglycerides 06/20/2023 154 (H)  <150 mg/dL Final     Direct Measure HDL 06/20/2023 54  >=50 mg/dL Final     LDL Cholesterol Calculated 06/20/2023 119 (H)  <=100 mg/dL Final     Non HDL Cholesterol 06/20/2023 150 (H)  <130 mg/dL Final     Estimated Average Glucose 06/20/2023 120  mg/dL Final     Hemoglobin A1C 06/20/2023 5.8 (H)  <5.7 % Final    Normal <5.7%   Prediabetes 5.7-6.4%    Diabetes 6.5% or higher     Note: Adopted from ADA consensus guidelines.     Free T4 06/20/2023 1.16  0.90 - 1.70 ng/dL Final

## 2023-06-22 ENCOUNTER — OFFICE VISIT (OUTPATIENT)
Dept: FAMILY MEDICINE | Facility: OTHER | Age: 78
End: 2023-06-22
Attending: NURSE PRACTITIONER
Payer: COMMERCIAL

## 2023-06-22 ENCOUNTER — TELEPHONE (OUTPATIENT)
Dept: FAMILY MEDICINE | Facility: OTHER | Age: 78
End: 2023-06-22

## 2023-06-22 ENCOUNTER — ANCILLARY PROCEDURE (OUTPATIENT)
Dept: GENERAL RADIOLOGY | Facility: OTHER | Age: 78
End: 2023-06-22
Attending: NURSE PRACTITIONER
Payer: MEDICARE

## 2023-06-22 VITALS
SYSTOLIC BLOOD PRESSURE: 130 MMHG | OXYGEN SATURATION: 96 % | RESPIRATION RATE: 16 BRPM | HEART RATE: 55 BPM | DIASTOLIC BLOOD PRESSURE: 64 MMHG | BODY MASS INDEX: 24.3 KG/M2 | WEIGHT: 135 LBS | TEMPERATURE: 97.4 F

## 2023-06-22 DIAGNOSIS — F41.1 GENERALIZED ANXIETY DISORDER: ICD-10-CM

## 2023-06-22 DIAGNOSIS — E78.5 HYPERLIPIDEMIA LDL GOAL <100: ICD-10-CM

## 2023-06-22 DIAGNOSIS — I10 BENIGN ESSENTIAL HYPERTENSION: ICD-10-CM

## 2023-06-22 DIAGNOSIS — F51.01 PRIMARY INSOMNIA: ICD-10-CM

## 2023-06-22 DIAGNOSIS — Z79.899 ON STATIN THERAPY: ICD-10-CM

## 2023-06-22 DIAGNOSIS — E03.4 HYPOTHYROIDISM DUE TO ACQUIRED ATROPHY OF THYROID: ICD-10-CM

## 2023-06-22 DIAGNOSIS — R73.03 PREDIABETES: Primary | ICD-10-CM

## 2023-06-22 DIAGNOSIS — M79.672 LEFT FOOT PAIN: ICD-10-CM

## 2023-06-22 PROCEDURE — G0463 HOSPITAL OUTPT CLINIC VISIT: HCPCS

## 2023-06-22 PROCEDURE — 73630 X-RAY EXAM OF FOOT: CPT | Mod: TC,LT,FY

## 2023-06-22 PROCEDURE — 99214 OFFICE O/P EST MOD 30 MIN: CPT | Performed by: NURSE PRACTITIONER

## 2023-06-22 RX ORDER — EVOLOCUMAB 140 MG/ML
140 INJECTION, SOLUTION SUBCUTANEOUS
Qty: 3 ML | Refills: 1 | Status: SHIPPED | OUTPATIENT
Start: 2023-06-22 | End: 2023-06-22

## 2023-06-22 RX ORDER — GABAPENTIN 300 MG/1
300 CAPSULE ORAL AT BEDTIME
Qty: 90 CAPSULE | Refills: 1 | Status: SHIPPED | OUTPATIENT
Start: 2023-06-22 | End: 2023-12-27

## 2023-06-22 ASSESSMENT — ANXIETY QUESTIONNAIRES
4. TROUBLE RELAXING: NOT AT ALL
6. BECOMING EASILY ANNOYED OR IRRITABLE: NOT AT ALL
7. FEELING AFRAID AS IF SOMETHING AWFUL MIGHT HAPPEN: NOT AT ALL
1. FEELING NERVOUS, ANXIOUS, OR ON EDGE: NOT AT ALL
3. WORRYING TOO MUCH ABOUT DIFFERENT THINGS: NOT AT ALL
GAD7 TOTAL SCORE: 0
5. BEING SO RESTLESS THAT IT IS HARD TO SIT STILL: NOT AT ALL
2. NOT BEING ABLE TO STOP OR CONTROL WORRYING: NOT AT ALL
GAD7 TOTAL SCORE: 0

## 2023-06-22 ASSESSMENT — PAIN SCALES - GENERAL: PAINLEVEL: NO PAIN (0)

## 2023-06-22 ASSESSMENT — PATIENT HEALTH QUESTIONNAIRE - PHQ9: SUM OF ALL RESPONSES TO PHQ QUESTIONS 1-9: 0

## 2023-06-22 NOTE — TELEPHONE ENCOUNTER
Received a PA request from [employer] for evolocumab (REPATHA SURECLICK) 140 MG/ML prefilled autoinjector. Submitted on CMM. Waiting for a response.   Return Office Visit - Diabetes    CHIEF COMPLAINT:    DM   Dyslipidemia  Obesity    HISTORY OF PRESENT ILLNESS:   Garo Haskins is a 34year old female who presents for follow up for diabetes.     DM HISTORY  Diagnosed: at age 26    FH of DM: Parents have apply Misc Check BG 4 times a day Disp: 100 each Rfl: 3   Glucose Blood (ACCU-CHEK RYANN PLUS) In Vitro Strip Check BG 4 times a day Disp: 100 each Rfl: 3   aspirin 81 MG Oral Tab Take 81 mg by mouth daily.  Disp:  Rfl:    ibuprofen (MOTRIN) 200 MG Oral Tab rhythm, normal S1 and S2  ABDOMEN:  normal bowel sounds, soft, non-distended, non-tender  SKIN:  no bruising or bleeding, no rashes and no lesions  DIABETIC FOOT EXAM: normal monofilament sensation, normal pulses, no edema, no skin lesions      DATA:     B victoza. She has lost many pounds but has gained it back. Discussed appetite suppressants like qsymia/contrave. She will like to think about it. Encouraged to follow dietary recommendations.      She knows to call earlier if BG is < 70 / persistently >

## 2023-06-23 NOTE — TELEPHONE ENCOUNTER
Received an APPROVAL from [employer] for evolocumab (REPATHA SURECLICK) 140 MG/ML prefilled auto injector. Effective dates 3/24/23-6/22/24.

## 2023-09-20 ENCOUNTER — LAB (OUTPATIENT)
Dept: LAB | Facility: OTHER | Age: 78
End: 2023-09-20
Payer: MEDICARE

## 2023-09-20 DIAGNOSIS — I10 BENIGN ESSENTIAL HYPERTENSION: ICD-10-CM

## 2023-09-20 DIAGNOSIS — Z79.899 ON STATIN THERAPY: ICD-10-CM

## 2023-09-20 DIAGNOSIS — E03.4 HYPOTHYROIDISM DUE TO ACQUIRED ATROPHY OF THYROID: ICD-10-CM

## 2023-09-20 DIAGNOSIS — R73.03 PREDIABETES: ICD-10-CM

## 2023-09-20 DIAGNOSIS — E78.5 HYPERLIPIDEMIA LDL GOAL <100: ICD-10-CM

## 2023-09-20 LAB
ALBUMIN SERPL BCG-MCNC: 4.3 G/DL (ref 3.5–5.2)
ALP SERPL-CCNC: 82 U/L (ref 35–104)
ALT SERPL W P-5'-P-CCNC: 26 U/L (ref 0–50)
ANION GAP SERPL CALCULATED.3IONS-SCNC: 12 MMOL/L (ref 7–15)
AST SERPL W P-5'-P-CCNC: 36 U/L (ref 0–45)
BILIRUB SERPL-MCNC: 0.4 MG/DL
BUN SERPL-MCNC: 21.6 MG/DL (ref 8–23)
CALCIUM SERPL-MCNC: 10 MG/DL (ref 8.8–10.2)
CHLORIDE SERPL-SCNC: 104 MMOL/L (ref 98–107)
CHOLEST SERPL-MCNC: 194 MG/DL
CREAT SERPL-MCNC: 0.96 MG/DL (ref 0.51–0.95)
DEPRECATED HCO3 PLAS-SCNC: 25 MMOL/L (ref 22–29)
EGFRCR SERPLBLD CKD-EPI 2021: 60 ML/MIN/1.73M2
EST. AVERAGE GLUCOSE BLD GHB EST-MCNC: 114 MG/DL
GLUCOSE SERPL-MCNC: 94 MG/DL (ref 70–99)
HBA1C MFR BLD: 5.6 %
HDLC SERPL-MCNC: 58 MG/DL
LDLC SERPL CALC-MCNC: 117 MG/DL
NONHDLC SERPL-MCNC: 136 MG/DL
POTASSIUM SERPL-SCNC: 4.5 MMOL/L (ref 3.4–5.3)
PROT SERPL-MCNC: 6.7 G/DL (ref 6.4–8.3)
SODIUM SERPL-SCNC: 141 MMOL/L (ref 136–145)
TRIGL SERPL-MCNC: 94 MG/DL
TSH SERPL DL<=0.005 MIU/L-ACNC: 3.67 UIU/ML (ref 0.3–4.2)

## 2023-09-20 PROCEDURE — 80053 COMPREHEN METABOLIC PANEL: CPT | Mod: ZL

## 2023-09-20 PROCEDURE — 36415 COLL VENOUS BLD VENIPUNCTURE: CPT | Mod: ZL

## 2023-09-20 PROCEDURE — 84443 ASSAY THYROID STIM HORMONE: CPT | Mod: ZL

## 2023-09-20 PROCEDURE — 83036 HEMOGLOBIN GLYCOSYLATED A1C: CPT | Mod: ZL

## 2023-09-20 PROCEDURE — 80061 LIPID PANEL: CPT | Mod: ZL

## 2023-09-20 NOTE — PROGRESS NOTES
Assessment & Plan     1. Hyperlipidemia LDL goal <100  - Lipid Profile; Future  - atorvastatin (LIPITOR) 20 MG tablet; Take 1.5 tablets (30 mg) by mouth daily  Dispense: 135 tablet; Refill: 1    2. Benign essential hypertension  - Comprehensive metabolic panel; Future  - CBC with Platelets & Differential; Future  - lisinopril (ZESTRIL) 10 MG tablet; Take 1 tablet (10 mg) by mouth daily  Dispense: 90 tablet; Refill: 1    3. Hypothyroidism due to acquired atrophy of thyroid  - TSH with free T4 reflex; Future  - levothyroxine (SYNTHROID/LEVOTHROID) 50 MCG tablet; TAKE 1 TABLET(50 MCG) BY MOUTH DAILY  Dispense: 90 tablet; Refill: 1    4. Generalized anxiety disorder  Ativan as needed     5. Stage 3a chronic kidney disease (H)  stable    6. On statin therapy    7. Palpitations  - Adult Leadless EKG Monitor 8 to 14 Days; Future    8. Need for vaccination  - INFLUENZA VACCINE 65+ (FLUZONE HD)        Return in about 3 months (around 12/26/2023) for anxiety/depression, hypertension and lipids, thyroid.    Brittney Coe NP  [facility] - [place]    Subjective   Franca is a 78 year old, presenting for the following health issues:  Hypertension, Diabetes, Lipids, chronic kidney disease , and Thyroid Problem      HPI     Diabetes Follow-up    How often are you checking your blood sugar? Not at all  What concerns do you have today about your diabetes? None   Do you have any of these symptoms? (Select all that apply)  No numbness or tingling in feet.  No redness, sores or blisters on feet.  No complaints of excessive thirst.  No reports of blurry vision.  No significant changes to weight.          Hyperlipidemia Follow-Up    Are you regularly taking any medication or supplement to lower your cholesterol?   Yes- Lipitor 30 mg  Are you having muscle aches or other side effects that you think could be caused by your cholesterol lowering medication?  No    Hypertension Follow-up    Do you check your blood  "pressure regularly outside of the clinic? Yes   Are you following a low salt diet? Yes  Are your blood pressures ever more than 140 on the top number (systolic) OR more   than 90 on the bottom number (diastolic), for example 140/90? No    BP Readings from Last 2 Encounters:   09/26/23 128/68   06/22/23 130/64     Hemoglobin A1C (%)   Date Value   09/20/2023 5.6   06/20/2023 5.8 (H)   05/10/2021 5.6   11/11/2020 5.7 (H)     LDL Cholesterol Calculated (mg/dL)   Date Value   09/20/2023 117 (H)   06/20/2023 119 (H)   05/10/2021 105 (H)   02/05/2021 119 (H)         Chronic Kidney Disease Follow-up    Do you take any over the counter pain medicine?: Yes  What over the counter medicine are you taking for your pain?:  Tylenol Pm    How often do you take this medicine?:   Occasionally   Hypothyroidism Follow-up    Since last visit, patient describes the following symptoms: Weight stable, no hair loss, no skin changes, no constipation, no loose stools        BP Readings from Last 3 Encounters:   09/26/23 128/68   06/22/23 130/64   03/15/23 122/66    Wt Readings from Last 3 Encounters:   09/26/23 60.8 kg (134 lb)   06/22/23 61.2 kg (135 lb)   03/15/23 60.8 kg (134 lb)                        Review of Systems   Constitutional, HEENT, cardiovascular, pulmonary, gi and gu systems are negative, except as otherwise noted.      Objective    /68 (BP Location: Left arm, Patient Position: Chair, Cuff Size: Adult Regular)   Pulse 55   Temp 97.3  F (36.3  C) (Tympanic)   Resp 16   Ht 1.588 m (5' 2.5\")   Wt 60.8 kg (134 lb)   SpO2 96%   BMI 24.12 kg/m    Body mass index is 24.12 kg/m .  Physical Exam   GENERAL: healthy, alert and no distress  NECK: no adenopathy, no asymmetry, masses, or scars, thyroid normal to palpation, and no carotid bruits  RESP: lungs clear to auscultation - no rales, rhonchi or wheezes  CV: regular rate and rhythm, normal S1 S2, no S3 or S4, no murmur, click or rub, no peripheral edema and peripheral " pulses strong  MS: no gross musculoskeletal defects noted, no edema  PSYCH: mentation appears normal, affect normal/bright

## 2023-09-26 ENCOUNTER — OFFICE VISIT (OUTPATIENT)
Dept: FAMILY MEDICINE | Facility: OTHER | Age: 78
End: 2023-09-26
Attending: NURSE PRACTITIONER
Payer: COMMERCIAL

## 2023-09-26 VITALS
BODY MASS INDEX: 23.74 KG/M2 | OXYGEN SATURATION: 96 % | RESPIRATION RATE: 16 BRPM | SYSTOLIC BLOOD PRESSURE: 128 MMHG | DIASTOLIC BLOOD PRESSURE: 68 MMHG | HEART RATE: 55 BPM | HEIGHT: 63 IN | WEIGHT: 134 LBS | TEMPERATURE: 97.3 F

## 2023-09-26 DIAGNOSIS — N18.31 STAGE 3A CHRONIC KIDNEY DISEASE (H): ICD-10-CM

## 2023-09-26 DIAGNOSIS — Z12.31 ENCOUNTER FOR SCREENING MAMMOGRAM FOR BREAST CANCER: ICD-10-CM

## 2023-09-26 DIAGNOSIS — Z79.899 ON STATIN THERAPY: ICD-10-CM

## 2023-09-26 DIAGNOSIS — E78.5 HYPERLIPIDEMIA LDL GOAL <100: Primary | ICD-10-CM

## 2023-09-26 DIAGNOSIS — F41.1 GENERALIZED ANXIETY DISORDER: ICD-10-CM

## 2023-09-26 DIAGNOSIS — I10 BENIGN ESSENTIAL HYPERTENSION: ICD-10-CM

## 2023-09-26 DIAGNOSIS — R00.2 PALPITATIONS: ICD-10-CM

## 2023-09-26 DIAGNOSIS — Z23 NEED FOR VACCINATION: ICD-10-CM

## 2023-09-26 DIAGNOSIS — E03.4 HYPOTHYROIDISM DUE TO ACQUIRED ATROPHY OF THYROID: ICD-10-CM

## 2023-09-26 PROCEDURE — G0008 ADMIN INFLUENZA VIRUS VAC: HCPCS

## 2023-09-26 PROCEDURE — G0463 HOSPITAL OUTPT CLINIC VISIT: HCPCS | Mod: 25

## 2023-09-26 PROCEDURE — 99214 OFFICE O/P EST MOD 30 MIN: CPT | Performed by: NURSE PRACTITIONER

## 2023-09-26 RX ORDER — ATORVASTATIN CALCIUM 20 MG/1
30 TABLET, FILM COATED ORAL DAILY
Qty: 135 TABLET | Refills: 1 | Status: SHIPPED | OUTPATIENT
Start: 2023-09-26 | End: 2023-12-27

## 2023-09-26 RX ORDER — LEVOTHYROXINE SODIUM 50 UG/1
TABLET ORAL
Qty: 90 TABLET | Refills: 1 | Status: SHIPPED | OUTPATIENT
Start: 2023-09-26 | End: 2024-03-28

## 2023-09-26 RX ORDER — LISINOPRIL 10 MG/1
10 TABLET ORAL DAILY
Qty: 90 TABLET | Refills: 1 | Status: SHIPPED | OUTPATIENT
Start: 2023-09-26 | End: 2024-03-28

## 2023-09-26 ASSESSMENT — PAIN SCALES - GENERAL: PAINLEVEL: NO PAIN (0)

## 2023-09-26 NOTE — PATIENT INSTRUCTIONS
Assessment & Plan     1. Hyperlipidemia LDL goal <100  - Lipid Profile; Future  - atorvastatin (LIPITOR) 20 MG tablet; Take 1.5 tablets (30 mg) by mouth daily  Dispense: 135 tablet; Refill: 1    2. Benign essential hypertension  - Comprehensive metabolic panel; Future  - CBC with Platelets & Differential; Future  - lisinopril (ZESTRIL) 10 MG tablet; Take 1 tablet (10 mg) by mouth daily  Dispense: 90 tablet; Refill: 1    3. Hypothyroidism due to acquired atrophy of thyroid  - TSH with free T4 reflex; Future  - levothyroxine (SYNTHROID/LEVOTHROID) 50 MCG tablet; TAKE 1 TABLET(50 MCG) BY MOUTH DAILY  Dispense: 90 tablet; Refill: 1    4. Generalized anxiety disorder  Ativan as needed     5. Stage 3a chronic kidney disease (H)  stable    6. On statin therapy    7. Palpitations  - Adult Leadless EKG Monitor 8 to 14 Days; Future    8. Need for vaccination  - INFLUENZA VACCINE 65+ (FLUZONE HD)        Return in about 3 months (around 12/26/2023) for anxiety/depression, hypertension and lipids, thyroid.    Brittney Coe NP  [facility]

## 2023-09-27 ENCOUNTER — HOSPITAL ENCOUNTER (OUTPATIENT)
Dept: CARDIOLOGY | Facility: HOSPITAL | Age: 78
Discharge: HOME | End: 2023-09-27
Attending: NURSE PRACTITIONER | Admitting: INTERNAL MEDICINE
Payer: MEDICARE

## 2023-09-27 DIAGNOSIS — R00.2 PALPITATIONS: ICD-10-CM

## 2023-09-27 PROCEDURE — 93246 EXT ECG>7D<15D RECORDING: CPT

## 2023-09-27 PROCEDURE — 93248 EXT ECG>7D<15D REV&INTERPJ: CPT | Performed by: INTERNAL MEDICINE

## 2023-09-27 NOTE — PATIENT INSTRUCTIONS
Zio patch placed on patient in outpatient appointment today. Patient was instructed to wear patch for 14 days. Skin was prepped and patch was placed following [employer] guidelines.     Patient was instructed to push button when symptomatic and fill out diary. Patient was instructed not to submerse in water and no swimming, saunas, or hot tubs. Showers may be taken keeping back towards the water. If the area DOES become wet pat it dry with a cloth. If skin irritation occurs patient was instructed to remove patch and contact [employer].    Patient understands we do not receive results until they mail patch back inside the box. Patient was made aware phone number is required for enrollment which automatically enrolls patient into text messaging program and they may receive automated phone calls. Patient can opt out at anytime. Staff reminded patient of outside billing notice which was explained to patient during the scheduling process of this monitor. Patient also instructed to contact [employer] with any questions [phone].    Patient had no further questions and agreed with plan. Patient was sent home with the box, information pamphlet and booklet to miguel any incidents in.

## 2023-10-16 NOTE — PATIENT INSTRUCTIONS
Assessment & Plan     1. Hypothyroidism due to acquired atrophy of thyroid  Dose decrease due to dizziness on higher dose  - levothyroxine (SYNTHROID/LEVOTHROID) 50 MCG tablet; Take 1 tablet (50 mcg) by mouth daily  Dispense: 90 tablet; Refill: 1     Lab only in 6-8 weeks      Follow-up in August as scheduled    Brittney Coe NP  [facility]    
yes...

## 2023-10-23 DIAGNOSIS — I45.9 INTRA-VENTRICULAR CONDUCTION DELAY: ICD-10-CM

## 2023-10-23 DIAGNOSIS — I45.4 BBB (BUNDLE BRANCH BLOCK): ICD-10-CM

## 2023-10-23 DIAGNOSIS — R00.2 PALPITATIONS: Primary | ICD-10-CM

## 2023-10-23 DIAGNOSIS — I47.10 SVT (SUPRAVENTRICULAR TACHYCARDIA) (H): ICD-10-CM

## 2023-11-21 NOTE — PROGRESS NOTES
[facility] HEART CARE   CARDIOLOGY PROGRESS NOTE     Chief Complaint   Patient presents with    Heart Problem     New issue found on Zio    Consult          Diagnosis:  1.  Palpitations.  2.  LBBB.  3.  x67 SVT, SVE, and VE on 9/27/2023.  4.  MOSQUERA.  5.  Hypertension-controlled.  6.  Hyperlipidemia-uncontrolled.  7.  CKD-3.  8.  Prediabetes-controlled.  9.  Anxiety.      Assessment/Plan:    1.  SVT, SVE, and VE: Seen on her monitor on 9/27/2023.  Patient states she is asymptomatic.  Patient is not interested in medications.  Would not need medications for this abnormality.  Discussed these findings and patient reassured.  No treatment indicated at this time.  2.  Bradycardia: Heart rate drops to the 30s at night per her Apple watch.  For this reason, she was set up for a Zio patch.  Her Zio patch on 9/27/2023 showed a heart rate of 36 bpm at 6:15 AM on 10/9/2023.  Patient reassured.  To increase her heart rate, would require pacemaker but we do not treat slow heart rates while sleeping, unless severe.  Treatment would involve pacemaker.  No plans/indications for pacemaker at this time.  3.  Hyperlipidemia: Currently on atorvastatin 30 mg daily.  States if takes anymore, will have myalgias.  Discussed Zetia, Nexletol, and PCSK9 inhibitors.  Patient not interested in additional medications.  No changes.  4.  Follow-up in the future on as-needed basis.      Interval history:  Last seen 11/16/2020.  See above.      HPI:    Mrs. Angeles is being seen by cardiology for dyspnea on exertion.  She is dyspneic with climbing up and down stairs, nothing else.  She also has a history of left bundle branch block, hypertension, hyperlipidemia, hypothyroidism, CKD-3, anxiety/depression, and prediabetes.     For the last 2 months, she has described dyspnea exertion particularly walking up and down stairs.  She states her dyspnea has been inconsistent.  Around this time, she had some life stressors which were undisclosed today.  She also  admits she has significant anxiety.  She has been decreasingly less active.  Secondary to her symptoms, she was seen in the ER on 10/2/2020.     She was sent to the ER after seeing her primary via a telephone visit for dyspnea on exertion.  She was evaluated in the ER.  Denied Covid exposure.  At that time, she reported symptoms starting 3 to 4 days ago.  It only happened with climbing stairs, particularly carrying a laundry basket.  She did not have it every time climbing stairs.  No orthopnea with normal BNP at 83 on 10/2/2020.  Hypothyroidism with normal TSH on 10/2/2020.  Also, checked her cholesterol levels, liver function, BNP, CBC, and D-dimer which were all normal.  Kidney function was mildly reduced at 59.  Chest x-ray negative on 10/2/2020.  Echo on 10/7/2020 was with a normal EF of 60 to 65% without chamber or valvular abnormalities.  Normal stress test on 10/19/2020.  EKG with left bundle branch block.  Patient was referred to cardiology as a result.  Patient has no history of heart disease, stenting, or bypass.  She has not had anginal symptoms.     With her shortness of breath, she does endorse occasional/infrequent palpitations.  She describes her heart beating fast.  It is possible she could have a dysrhythmia causing intermittent shortness of breath when walking upstairs.  ZIO Patch ordered on 1/16/2020.      Relevant testing:  Zio patch on 9/27/2023:  Underlying rhythm was sinus rhythm with bundle branch block/intraventricular conduction delay was present.  In addition QRS morphology changes were present throughout.  Hrt rate ranged from 36 bpm, maximum heart rate of 200 bmp, averaging 63 bmp.  No significant pauses, Mobitz type II or 3rd degree heart block.  Bradycardia down to a heart rate of 36 was recorded however not triggered.  No atrial fibrillation on this study.  x 3 triggered events and x 0 diary entries.  These corresponded to sinus rhythm and SVE's.  x 0 runs of VT.    X 67 runs of SVT  longest lasting 18 beats with a maximum heart rate of 200 bmp.  Rare, <1% of PAC's, atrial couplets, atrial triplets, and PVC's.  No episodes of ventricular bigeminy.  No episodes of ventricular trigeminy.    Zio patch 11/23/2020:  Worn for 13 days and 20 hr's.  After removing artifact, total time was 13 days and 9 hr's. Placed on 11/23/2020 at 12:56 PM and completed on 12/7/2020 at 9:20 AM.  Underlying rhythm was sinus rhythm.  Hrt rate ranged from 36 bpm at 11:32 PM on 12/5/2020, maximum heart rate of 174 bmp, averaging 63 bmp.  No significant pauses, 2nd degree Mobitz type II or 3rd degree heart block.  No atrial fibrillation was identified on this study.  x9 triggered events and x9 diary entries.  These corresponded to SVE, VE, and sinus rhythm.  x0 runs of VT.    x90 runs of SVT lasting up to 18 beats with a maximum heart rate of 174 bmp.  Rare, less than 1% of PAC's, atrial couplets, atrial triplets, and PVC's.  0 episodes of ventricular bigeminy.  0 episodes of ventricular trigeminy.    Stress test on 10/19/2020:    The nuclear stress test is probably negative for inducible myocardial ischemia or infarction.    The left ventricular ejection fraction at rest is 84%.  The left ventricular ejection fraction at stress is 80%.    There is no prior study for comparison.    Echo on 10/7/2020:  The right ventricle is normal size.  Global right ventricular function is normal.  Both atria appear normal.  Trace mitral insufficiency is present.  Aortic valve is normal in structure and function.  Trace tricuspid insufficiency is present.  Right ventricular systolic pressure is 31mmHg above the right atrial pressure.  The pulmonic valve is normal.  The aorta root is normal.  No pericardial effusion is present.  Global and regional left ventricular function is normal with an EF of 60-65%.        ICD-10-CM    1. Palpitations  R00.2 EKG 12-lead complete w/read - (Clinic Performed)          Past Medical History:   Diagnosis  Date    Esophageal reflux 05/10/2011    Need for prophylactic hormone replacement therapy (postmenopausal) 1995    took for 1-2 years       Past Surgical History:   Procedure Laterality Date    ARTHRODESIS KNEE  236324    left knee, paritla medial meniscectomy, debridement medial femoral condyle and patellofemoral debridement    COLONOSCOPY  2004    nml    COLONOSCOPY  7/23/2014    Procedure: COLONOSCOPY;  Surgeon: Nghia Hernandez DO;  Location: [facility] OR    CYSTOSCOPY      micro hematuria neg    DILATION AND CURETTAGE      EXCISE LESION UPPER EXTREMITY Right 10/25/2017    Procedure: EXCISE LESION UPPER EXTREMITY;  EXCISIONAL BIOPSY RIGHT FOREARM TIMES 2;  Surgeon: Nghia Hernandez DO;  Location: [facility] OR    tubal sterilization      uterine fibroids      wrist fx RT         Allergies   Allergen Reactions    Pseudoephedrine Hcl Other (See Comments)     Heart racing  Sudafed    Guaifenesin Er Palpitations    Hydroxyzine Anxiety     jittery    Vilazodone Hcl Anxiety     Restless legs       Current Outpatient Medications   Medication Sig Dispense Refill    aspirin 81 MG tablet Take 81 mg by mouth daily      atorvastatin (LIPITOR) 20 MG tablet Take 1.5 tablets (30 mg) by mouth daily 135 tablet 1    Calcium-Magnesium-Vitamin D (CALCIUM 500 PO) Take by mouth daily      cinnamon 500 MG CAPS       co-enzyme Q-10 100 MG CAPS capsule Take 100 mg by mouth       gabapentin (NEURONTIN) 300 MG capsule Take 1 capsule (300 mg) by mouth At Bedtime 90 capsule 1    Garlic 2000 MG CAPS Take 2,000 mg by mouth daily      GLUCOSAMINE-CHONDROITIN PO Take  by mouth. GLUCOSAMINE HCL/CHONDR SUA NA  One daily      levothyroxine (SYNTHROID/LEVOTHROID) 50 MCG tablet TAKE 1 TABLET(50 MCG) BY MOUTH DAILY 90 tablet 1    lisinopril (ZESTRIL) 10 MG tablet Take 1 tablet (10 mg) by mouth daily 90 tablet 1    LORazepam (ATIVAN) 0.5 MG tablet Take 1 tablet (0.5 mg) by mouth nightly as needed for anxiety or sleep 30 tablet 0    mometasone (ELOCON) 0.1 %  external cream Apply a thin film of cream to the affected ear canal (s) twice a day for 2 weeks, then use sparingly as needed only. 15 g 1    Multiple Vitamins-Minerals (MULTIVITAMIN OR) Alive 1 tablet      Omega-3 Fatty Acids (FISH OIL) 1200 MG CAPS Take 1,080 mg by mouth daily       psyllium (METAMUCIL/KONSYL) capsule Take 2 capsules by mouth daily         Social History     Socioeconomic History    Marital status:      Spouse name: Not on file    Number of children: Not on file    Years of education: Not on file    Highest education level: Not on file   Occupational History    Occupation: retired manager   Tobacco Use    Smoking status: Never    Smokeless tobacco: Never   Vaping Use    Vaping Use: Never used   Substance and Sexual Activity    Alcohol use: Yes     Alcohol/week: 0.0 standard drinks of alcohol     Comment: occasionally    Drug use: No    Sexual activity: Yes     Partners: Male     Birth control/protection: None   Other Topics Concern     Service Not Asked    Blood Transfusions Not Asked    Caffeine Concern Yes     Comment: coffee - 5 cups    Occupational Exposure Not Asked    Hobby Hazards Not Asked    Sleep Concern Not Asked    Stress Concern Not Asked    Weight Concern Not Asked    Special Diet Not Asked    Back Care Not Asked    Exercise Not Asked    Bike Helmet Not Asked    Seat Belt Not Asked    Self-Exams Not Asked    Parent/sibling w/ CABG, MI or angioplasty before 65F 55M? No   Social History Narrative    Not on file     Social Determinants of Health     Financial Resource Strain: Low Risk  (9/26/2023)    Financial Resource Strain     Within the past 12 months, have you or your family members you live with been unable to get utilities (heat, electricity) when it was really needed?: No   Food Insecurity: Low Risk  (9/26/2023)    Food Insecurity     Within the past 12 months, did you worry that your food would run out before you got money to buy more?: No     Within the past 12  months, did the food you bought just not last and you didn t have money to get more?: No   Transportation Needs: Low Risk  (9/26/2023)    Transportation Needs     Within the past 12 months, has lack of transportation kept you from medical appointments, getting your medicines, non-medical meetings or appointments, work, or from getting things that you need?: No   Physical Activity: Not on file   Stress: Not on file   Social Connections: Not on file   Interpersonal Safety: Not on file   Housing Stability: Low Risk  (9/26/2023)    Housing Stability     Do you have housing? : Yes     Are you worried about losing your housing?: No       LAB RESULTS:   No visits with results within 2 Month(s) from this visit.   Latest known visit with results is:   Lab on 09/20/2023   Component Date Value Ref Range Status    Estimated Average Glucose 09/20/2023 114  mg/dL Final    Hemoglobin A1C 09/20/2023 5.6  <5.7 % Final    Cholesterol 09/20/2023 194  <200 mg/dL Final    Triglycerides 09/20/2023 94  <150 mg/dL Final    Direct Measure HDL 09/20/2023 58  >=50 mg/dL Final    LDL Cholesterol Calculated 09/20/2023 117 (H)  <=100 mg/dL Final    Non HDL Cholesterol 09/20/2023 136 (H)  <130 mg/dL Final    Sodium 09/20/2023 141  136 - 145 mmol/L Final    Potassium 09/20/2023 4.5  3.4 - 5.3 mmol/L Final    Chloride 09/20/2023 104  98 - 107 mmol/L Final    Carbon Dioxide (CO2) 09/20/2023 25  22 - 29 mmol/L Final    Anion Gap 09/20/2023 12  7 - 15 mmol/L Final    Urea Nitrogen 09/20/2023 21.6  8.0 - 23.0 mg/dL Final    Creatinine 09/20/2023 0.96 (H)  0.51 - 0.95 mg/dL Final    Calcium 09/20/2023 10.0  8.8 - 10.2 mg/dL Final    Glucose 09/20/2023 94  70 - 99 mg/dL Final    Alkaline Phosphatase 09/20/2023 82  35 - 104 U/L Final    AST 09/20/2023 36  0 - 45 U/L Final    ALT 09/20/2023 26  0 - 50 U/L Final    Protein Total 09/20/2023 6.7  6.4 - 8.3 g/dL Final    Albumin 09/20/2023 4.3  3.5 - 5.2 g/dL Final    Bilirubin Total 09/20/2023 0.4  <=1.2  "mg/dL Final    GFR Estimate 09/20/2023 60 (L)  >60 mL/min/1.73m2 Final    TSH 09/20/2023 3.67  0.30 - 4.20 uIU/mL Final        Review of systems: Negative except that which was noted in the HPI.    Physical examination:  BP (!) 145/88   Pulse 65   Resp 16   Ht 1.588 m (5' 2.5\")   Wt 61.4 kg (135 lb 6.4 oz)   SpO2 97%   BMI 24.37 kg/m      GENERAL APPEARANCE: healthy, alert and no distress  CHEST: lungs clear to auscultation.  CARDIOVASCULAR: regular rhythm, normal S1 with physiologic split S2, no S3 or S4 and no murmur, click or rub  EXTREMITIES: no clubbing, cyanosis or edema.    Total time spent on day of visit, including review of tests, obtaining/reviewing separately obtained history, ordering medications/tests/procedures, communicating with PCP/consultants, and documenting in electronic medical record: 30 minutes.              Thank you for allowing me to participate in the care of your patient. Please do not hesitate to contact me if you have any questions.     Efraín Mckee, DO          "

## 2023-11-22 ENCOUNTER — OFFICE VISIT (OUTPATIENT)
Dept: CARDIOLOGY | Facility: OTHER | Age: 78
End: 2023-11-22
Attending: NURSE PRACTITIONER
Payer: COMMERCIAL

## 2023-11-22 VITALS
BODY MASS INDEX: 23.99 KG/M2 | SYSTOLIC BLOOD PRESSURE: 145 MMHG | WEIGHT: 135.4 LBS | HEART RATE: 65 BPM | RESPIRATION RATE: 16 BRPM | OXYGEN SATURATION: 97 % | HEIGHT: 63 IN | DIASTOLIC BLOOD PRESSURE: 88 MMHG

## 2023-11-22 DIAGNOSIS — R00.2 PALPITATIONS: Primary | ICD-10-CM

## 2023-11-22 PROCEDURE — G0463 HOSPITAL OUTPT CLINIC VISIT: HCPCS

## 2023-11-22 PROCEDURE — 99203 OFFICE O/P NEW LOW 30 MIN: CPT | Performed by: INTERNAL MEDICINE

## 2023-11-22 PROCEDURE — 93005 ELECTROCARDIOGRAM TRACING: CPT | Performed by: INTERNAL MEDICINE

## 2023-11-22 PROCEDURE — 93010 ELECTROCARDIOGRAM REPORT: CPT | Mod: 77 | Performed by: INTERNAL MEDICINE

## 2023-11-22 ASSESSMENT — PAIN SCALES - GENERAL: PAINLEVEL: NO PAIN (0)

## 2023-11-22 NOTE — PATIENT INSTRUCTIONS
Thank you for allowing Dr CLAUDIA Mckee and our  team to participate in your care. Please call our office at [phone] with scheduling questions or if you need to cancel or change your appointment. With any other questions or concerns you may call cardiology nurse at  [phone].       If you experience chest pain, chest pressure, chest tightness, shortness of breath, fainting, lightheadedness, nausea, vomiting, or other concerning symptoms, please report to the Emergency Department or call 911. These symptoms may be emergent, and best treated in the Emergency Department.

## 2023-11-26 LAB
ATRIAL RATE - MUSE: 54 BPM
DIASTOLIC BLOOD PRESSURE - MUSE: NORMAL MMHG
INTERPRETATION ECG - MUSE: NORMAL
P AXIS - MUSE: 46 DEGREES
PR INTERVAL - MUSE: 160 MS
QRS DURATION - MUSE: 152 MS
QT - MUSE: 494 MS
QTC - MUSE: 468 MS
R AXIS - MUSE: -40 DEGREES
SYSTOLIC BLOOD PRESSURE - MUSE: NORMAL MMHG
T AXIS - MUSE: 115 DEGREES
VENTRICULAR RATE- MUSE: 54 BPM

## 2023-12-01 ENCOUNTER — TELEPHONE (OUTPATIENT)
Dept: MAMMOGRAPHY | Facility: OTHER | Age: 78
End: 2023-12-01

## 2023-12-01 ENCOUNTER — ANCILLARY PROCEDURE (OUTPATIENT)
Dept: MAMMOGRAPHY | Facility: OTHER | Age: 78
End: 2023-12-01
Attending: NURSE PRACTITIONER
Payer: MEDICARE

## 2023-12-01 DIAGNOSIS — Z12.31 ENCOUNTER FOR SCREENING MAMMOGRAM FOR BREAST CANCER: ICD-10-CM

## 2023-12-01 PROCEDURE — 77067 SCR MAMMO BI INCL CAD: CPT | Mod: TC

## 2023-12-22 NOTE — PROGRESS NOTES
Assessment & Plan     1. Prediabetes  Continue current plan  - Hemoglobin A1c; Future    2. Hyperlipidemia LDL goal <100  - Lipid Profile; Future  - atorvastatin (LIPITOR) 20 MG tablet; Take 1 tablet (20 mg) by mouth daily Take along with a 10mg tablet for a total dose of 30mg daily  Dispense: 90 tablet; Refill: 1  - atorvastatin (LIPITOR) 10 MG tablet; Take 1 tablet (10 mg) by mouth daily Take along with a 20mg tablet for a total dose of 30mg daily  Dispense: 90 tablet; Refill: 1    3. Benign essential hypertension  Well controlled   - Comprehensive metabolic panel; Future    4. Stage 3a chronic kidney disease (H)  Limit NSAID use  - Comprehensive metabolic panel; Future    5. Hypothyroidism due to acquired atrophy of thyroid  - TSH with free T4 reflex; Future    6. Generalized anxiety disorder  - gabapentin (NEURONTIN) 300 MG capsule; Take 1 capsule (300 mg) by mouth at bedtime  Dispense: 90 capsule; Refill: 1    7. On statin therapy  - Comprehensive metabolic panel; Future    8. Primary insomnia  - gabapentin (NEURONTIN) 300 MG capsule; Take 1 capsule (300 mg) by mouth at bedtime  Dispense: 90 capsule; Refill: 1      Return in about 3 months (around 3/27/2024) for pre-diabetes, lipids, HTN, thyroid, anxiety/depression.    Brittney Coe NP  [facility] [place] [facility]    Subjective   Franca is a 78 year old, presenting for the following health issues:  Hypertension, Diabetes, Lipids, Depression, Anxiety, Thyroid Problem, and chronic kidney disease         12/27/2023     9:08 AM   Additional Questions   Roomed by nicho   Accompanied by none       HPI     Diabetes Follow-up    How often are you checking your blood sugar? Not at all  What concerns do you have today about your diabetes? None   Do you have any of these symptoms? (Select all that apply)  No numbness or tingling in feet.  No redness, sores or blisters on feet.  No complaints of excessive thirst.  No reports of blurry vision.  No  significant changes to weight.      Hyperlipidemia Follow-Up    Are you regularly taking any medication or supplement to lower your cholesterol?   Yes- Lipitor 30 mg  Are you having muscle aches or other side effects that you think could be caused by your cholesterol lowering medication?  No    Hypertension Follow-up    Do you check your blood pressure regularly outside of the clinic? Yes   Are you following a low salt diet? Yes  Are your blood pressures ever more than 140 on the top number (systolic) OR more   than 90 on the bottom number (diastolic), for example 140/90? No    BP Readings from Last 2 Encounters:   12/27/23 130/68   11/22/23 (!) 145/88     Hemoglobin A1C (%)   Date Value   09/20/2023 5.6   06/20/2023 5.8 (H)   05/10/2021 5.6   11/11/2020 5.7 (H)     LDL Cholesterol Calculated (mg/dL)   Date Value   12/26/2023 99   09/20/2023 117 (H)   05/10/2021 105 (H)   02/05/2021 119 (H)         Depression and Anxiety Follow-Up  How are you doing with your depression since your last visit? Improved   How are you doing with your anxiety since your last visit?  Improved   Are you having other symptoms that might be associated with depression or anxiety? No  Have you had a significant life event? No   Do you have any concerns with your use of alcohol or other drugs? No    Social History     Tobacco Use    Smoking status: Never    Smokeless tobacco: Never   Vaping Use    Vaping Use: Never used   Substance Use Topics    Alcohol use: Yes     Alcohol/week: 0.0 standard drinks of alcohol     Comment: occasionally    Drug use: No         12/12/2022     9:30 AM 6/22/2023     9:09 AM 12/26/2023     1:04 PM   PHQ   PHQ-9 Total Score 2 0 2   Q9: Thoughts of better off dead/self-harm past 2 weeks Not at all Not at all Not at all         12/12/2022     9:31 AM 6/22/2023     9:09 AM 12/26/2023     1:06 PM   MCKINLEY-7 SCORE   Total Score   4 (minimal anxiety)   Total Score 4 0 4         12/26/2023     1:04 PM   Last PHQ-9   1.   Little interest or pleasure in doing things 0   2.  Feeling down, depressed, or hopeless 0   3.  Trouble falling or staying asleep, or sleeping too much 1   4.  Feeling tired or having little energy 1   5.  Poor appetite or overeating 0   6.  Feeling bad about yourself 0   7.  Trouble concentrating 0   8.  Moving slowly or restless 0   Q9: Thoughts of better off dead/self-harm past 2 weeks 0   PHQ-9 Total Score 2         12/26/2023     1:06 PM   MCKINLEY-7    1. Feeling nervous, anxious, or on edge 1   2. Not being able to stop or control worrying 1   3. Worrying too much about different things 1   4. Trouble relaxing 1   5. Being so restless that it is hard to sit still 0   6. Becoming easily annoyed or irritable 0   7. Feeling afraid, as if something awful might happen 0   MCKINLEY-7 Total Score 4   If you checked any problems, how difficult have they made it for you to do your work, take care of things at home, or get along with other people? Not difficult at all       Suicide Assessment Five-step Evaluation and Treatment (SAFE-T)    Chronic Kidney Disease Follow-up    Do you take any over the counter pain medicine?: Yes  What over the counter medicine are you taking for your pain?:  Tylenol PM    How often do you take this medicine?:   Occasionally   Hypothyroidism Follow-up    Since last visit, patient describes the following symptoms: Weight stable, no hair loss, no skin changes, no constipation, no loose stools        BP Readings from Last 3 Encounters:   12/27/23 130/68   11/22/23 (!) 145/88   09/26/23 128/68    Wt Readings from Last 3 Encounters:   12/27/23 60.7 kg (133 lb 12.8 oz)   11/22/23 61.4 kg (135 lb 6.4 oz)   09/26/23 60.8 kg (134 lb)                        Review of Systems   Constitutional, HEENT, cardiovascular, pulmonary, gi and gu systems are negative, except as otherwise noted.      Objective    /68 (BP Location: Right arm, Patient Position: Chair, Cuff Size: Adult Regular)   Pulse 57   Temp  "97.3  F (36.3  C) (Tympanic)   Resp 16   Ht 1.588 m (5' 2.5\")   Wt 60.7 kg (133 lb 12.8 oz)   SpO2 98%   BMI 24.08 kg/m    Body mass index is 24.08 kg/m .  Physical Exam   GENERAL: healthy, alert and no distress  NECK: no adenopathy, no asymmetry, masses, or scars and thyroid normal to palpation  RESP: lungs clear to auscultation - no rales, rhonchi or wheezes  CV: regular rate and rhythm, normal S1 S2, no S3 or S4, no murmur, click or rub, no peripheral edema and peripheral pulses strong  MS: no gross musculoskeletal defects noted, no edema  PSYCH: mentation appears normal, affect normal/bright    Lab on 12/26/2023   Component Date Value Ref Range Status    Cholesterol 12/26/2023 172  <200 mg/dL Final    Triglycerides 12/26/2023 126  <150 mg/dL Final    Direct Measure HDL 12/26/2023 48 (L)  >=50 mg/dL Final    LDL Cholesterol Calculated 12/26/2023 99  <=100 mg/dL Final    Non HDL Cholesterol 12/26/2023 124  <130 mg/dL Final    Patient Fasting > 8hrs? 12/26/2023 Yes   Final    Sodium 12/26/2023 140  135 - 145 mmol/L Final    Reference intervals for this test were updated on 09/26/2023 to more accurately reflect our healthy population. There may be differences in the flagging of prior results with similar values performed with this method. Interpretation of those prior results can be made in the context of the updated reference intervals.     Potassium 12/26/2023 4.5  3.4 - 5.3 mmol/L Final    Carbon Dioxide (CO2) 12/26/2023 26  22 - 29 mmol/L Final    Anion Gap 12/26/2023 11  7 - 15 mmol/L Final    Urea Nitrogen 12/26/2023 20.0  8.0 - 23.0 mg/dL Final    Creatinine 12/26/2023 0.97 (H)  0.51 - 0.95 mg/dL Final    GFR Estimate 12/26/2023 59 (L)  >60 mL/min/1.73m2 Final    Calcium 12/26/2023 9.7  8.8 - 10.2 mg/dL Final    Chloride 12/26/2023 103  98 - 107 mmol/L Final    Glucose 12/26/2023 96  70 - 99 mg/dL Final    Alkaline Phosphatase 12/26/2023 81  40 - 150 U/L Final    Reference intervals for this test were " updated on 11/14/2023 to more accurately reflect our healthy population. There may be differences in the flagging of prior results with similar values performed with this method. Interpretation of those prior results can be made in the context of the updated reference intervals.    AST 12/26/2023 29  0 - 45 U/L Final    Reference intervals for this test were updated on 6/12/2023 to more accurately reflect our healthy population. There may be differences in the flagging of prior results with similar values performed with this method. Interpretation of those prior results can be made in the context of the updated reference intervals.    ALT 12/26/2023 26  0 - 50 U/L Final    Reference intervals for this test were updated on 6/12/2023 to more accurately reflect our healthy population. There may be differences in the flagging of prior results with similar values performed with this method. Interpretation of those prior results can be made in the context of the updated reference intervals.      Protein Total 12/26/2023 7.3  6.4 - 8.3 g/dL Final    Albumin 12/26/2023 4.3  3.5 - 5.2 g/dL Final    Bilirubin Total 12/26/2023 0.2  <=1.2 mg/dL Final    TSH 12/26/2023 6.70 (H)  0.30 - 4.20 uIU/mL Final    WBC Count 12/26/2023 6.0  4.0 - 11.0 10e3/uL Final    RBC Count 12/26/2023 4.74  3.80 - 5.20 10e6/uL Final    Hemoglobin 12/26/2023 13.2  11.7 - 15.7 g/dL Final    Hematocrit 12/26/2023 40.1  35.0 - 47.0 % Final    MCV 12/26/2023 85  78 - 100 fL Final    MCH 12/26/2023 27.8  26.5 - 33.0 pg Final    MCHC 12/26/2023 32.9  31.5 - 36.5 g/dL Final    RDW 12/26/2023 13.8  10.0 - 15.0 % Final    Platelet Count 12/26/2023 329  150 - 450 10e3/uL Final    % Neutrophils 12/26/2023 38  % Final    % Lymphocytes 12/26/2023 49  % Final    % Monocytes 12/26/2023 8  % Final    % Eosinophils 12/26/2023 5  % Final    % Basophils 12/26/2023 0  % Final    % Immature Granulocytes 12/26/2023 0  % Final    Absolute Neutrophils 12/26/2023 2.3  1.6 -  8.3 10e3/uL Final    Absolute Lymphocytes 12/26/2023 3.0  0.8 - 5.3 10e3/uL Final    Absolute Monocytes 12/26/2023 0.5  0.0 - 1.3 10e3/uL Final    Absolute Eosinophils 12/26/2023 0.3  0.0 - 0.7 10e3/uL Final    Absolute Basophils 12/26/2023 0.0  0.0 - 0.2 10e3/uL Final    Absolute Immature Granulocytes 12/26/2023 0.0  <=0.4 10e3/uL Final    Creatinine Urine mg/dL 12/26/2023 199.8  mg/dL Final    The reference ranges have not been established in urine creatinine. The results should be integrated into the clinical context for interpretation.    Albumin Urine mg/L 12/26/2023 <12.0  mg/L Final    The reference ranges have not been established in urine albumin. The results should be integrated into the clinical context for interpretation.    Albumin Urine mg/g Cr 12/26/2023    Final    Unable to calculate, urine albumin and/or urine creatinine is outside detectable limits.  Microalbuminuria is defined as an albumin:creatinine ratio of 17 to 299 for males and 25 to 299 for females. A ratio of albumin:creatinine of 300 or higher is indicative of overt proteinuria.  Due to biologic variability, positive results should be confirmed by a second, first-morning random or 24-hour timed urine specimen. If there is discrepancy, a third specimen is recommended. When 2 out of 3 results are in the microalbuminuria range, this is evidence for incipient nephropathy and warrants increased efforts at glucose control, blood pressure control, and institution of therapy with an angiotensin-converting-enzyme (ACE) inhibitor (if the patient can tolerate it).      Free T4 12/26/2023 1.05  0.90 - 1.70 ng/dL Final                   Answers submitted by the patient for this visit:  Patient Health Questionnaire (Submitted on 12/26/2023)  If you checked off any problems, how difficult have these problems made it for you to do your work, take care of things at home, or get along with other people?: Not difficult at all  PHQ9 TOTAL SCORE: 2  MCKINLEY-7  (Submitted on 12/26/2023)  MCKINLEY 7 TOTAL SCORE: 4

## 2023-12-26 ENCOUNTER — LAB (OUTPATIENT)
Dept: LAB | Facility: OTHER | Age: 78
End: 2023-12-26
Payer: MEDICARE

## 2023-12-26 DIAGNOSIS — I10 BENIGN ESSENTIAL HYPERTENSION: ICD-10-CM

## 2023-12-26 DIAGNOSIS — E03.4 HYPOTHYROIDISM DUE TO ACQUIRED ATROPHY OF THYROID: ICD-10-CM

## 2023-12-26 DIAGNOSIS — E78.5 HYPERLIPIDEMIA LDL GOAL <100: ICD-10-CM

## 2023-12-26 DIAGNOSIS — N18.31 STAGE 3A CHRONIC KIDNEY DISEASE (H): ICD-10-CM

## 2023-12-26 LAB
ALBUMIN SERPL BCG-MCNC: 4.3 G/DL (ref 3.5–5.2)
ALP SERPL-CCNC: 81 U/L (ref 40–150)
ALT SERPL W P-5'-P-CCNC: 26 U/L (ref 0–50)
ANION GAP SERPL CALCULATED.3IONS-SCNC: 11 MMOL/L (ref 7–15)
AST SERPL W P-5'-P-CCNC: 29 U/L (ref 0–45)
BASOPHILS # BLD AUTO: 0 10E3/UL (ref 0–0.2)
BASOPHILS NFR BLD AUTO: 0 %
BILIRUB SERPL-MCNC: 0.2 MG/DL
BUN SERPL-MCNC: 20 MG/DL (ref 8–23)
CALCIUM SERPL-MCNC: 9.7 MG/DL (ref 8.8–10.2)
CHLORIDE SERPL-SCNC: 103 MMOL/L (ref 98–107)
CHOLEST SERPL-MCNC: 172 MG/DL
CREAT SERPL-MCNC: 0.97 MG/DL (ref 0.51–0.95)
CREAT UR-MCNC: 199.8 MG/DL
DEPRECATED HCO3 PLAS-SCNC: 26 MMOL/L (ref 22–29)
EGFRCR SERPLBLD CKD-EPI 2021: 59 ML/MIN/1.73M2
EOSINOPHIL # BLD AUTO: 0.3 10E3/UL (ref 0–0.7)
EOSINOPHIL NFR BLD AUTO: 5 %
ERYTHROCYTE [DISTWIDTH] IN BLOOD BY AUTOMATED COUNT: 13.8 % (ref 10–15)
FASTING STATUS PATIENT QL REPORTED: YES
GLUCOSE SERPL-MCNC: 96 MG/DL (ref 70–99)
HCT VFR BLD AUTO: 40.1 % (ref 35–47)
HDLC SERPL-MCNC: 48 MG/DL
HGB BLD-MCNC: 13.2 G/DL (ref 11.7–15.7)
IMM GRANULOCYTES # BLD: 0 10E3/UL
IMM GRANULOCYTES NFR BLD: 0 %
LDLC SERPL CALC-MCNC: 99 MG/DL
LYMPHOCYTES # BLD AUTO: 3 10E3/UL (ref 0.8–5.3)
LYMPHOCYTES NFR BLD AUTO: 49 %
MCH RBC QN AUTO: 27.8 PG (ref 26.5–33)
MCHC RBC AUTO-ENTMCNC: 32.9 G/DL (ref 31.5–36.5)
MCV RBC AUTO: 85 FL (ref 78–100)
MICROALBUMIN UR-MCNC: <12 MG/L
MICROALBUMIN/CREAT UR: NORMAL MG/G{CREAT}
MONOCYTES # BLD AUTO: 0.5 10E3/UL (ref 0–1.3)
MONOCYTES NFR BLD AUTO: 8 %
NEUTROPHILS # BLD AUTO: 2.3 10E3/UL (ref 1.6–8.3)
NEUTROPHILS NFR BLD AUTO: 38 %
NONHDLC SERPL-MCNC: 124 MG/DL
PLATELET # BLD AUTO: 329 10E3/UL (ref 150–450)
POTASSIUM SERPL-SCNC: 4.5 MMOL/L (ref 3.4–5.3)
PROT SERPL-MCNC: 7.3 G/DL (ref 6.4–8.3)
RBC # BLD AUTO: 4.74 10E6/UL (ref 3.8–5.2)
SODIUM SERPL-SCNC: 140 MMOL/L (ref 135–145)
T4 FREE SERPL-MCNC: 1.05 NG/DL (ref 0.9–1.7)
TRIGL SERPL-MCNC: 126 MG/DL
TSH SERPL DL<=0.005 MIU/L-ACNC: 6.7 UIU/ML (ref 0.3–4.2)
WBC # BLD AUTO: 6 10E3/UL (ref 4–11)

## 2023-12-26 PROCEDURE — 82570 ASSAY OF URINE CREATININE: CPT | Mod: ZL

## 2023-12-26 PROCEDURE — 84439 ASSAY OF FREE THYROXINE: CPT | Mod: ZL

## 2023-12-26 PROCEDURE — 84443 ASSAY THYROID STIM HORMONE: CPT | Mod: ZL

## 2023-12-26 PROCEDURE — 84155 ASSAY OF PROTEIN SERUM: CPT | Mod: ZL

## 2023-12-26 PROCEDURE — 36415 COLL VENOUS BLD VENIPUNCTURE: CPT | Mod: ZL

## 2023-12-26 PROCEDURE — 82310 ASSAY OF CALCIUM: CPT | Mod: ZL

## 2023-12-26 PROCEDURE — 85025 COMPLETE CBC W/AUTO DIFF WBC: CPT | Mod: ZL

## 2023-12-26 PROCEDURE — 80061 LIPID PANEL: CPT | Mod: ZL

## 2023-12-26 ASSESSMENT — ANXIETY QUESTIONNAIRES
GAD7 TOTAL SCORE: 4
IF YOU CHECKED OFF ANY PROBLEMS ON THIS QUESTIONNAIRE, HOW DIFFICULT HAVE THESE PROBLEMS MADE IT FOR YOU TO DO YOUR WORK, TAKE CARE OF THINGS AT HOME, OR GET ALONG WITH OTHER PEOPLE: NOT DIFFICULT AT ALL
6. BECOMING EASILY ANNOYED OR IRRITABLE: NOT AT ALL
3. WORRYING TOO MUCH ABOUT DIFFERENT THINGS: SEVERAL DAYS
GAD7 TOTAL SCORE: 4
7. FEELING AFRAID AS IF SOMETHING AWFUL MIGHT HAPPEN: NOT AT ALL
5. BEING SO RESTLESS THAT IT IS HARD TO SIT STILL: NOT AT ALL
2. NOT BEING ABLE TO STOP OR CONTROL WORRYING: SEVERAL DAYS
1. FEELING NERVOUS, ANXIOUS, OR ON EDGE: SEVERAL DAYS
4. TROUBLE RELAXING: SEVERAL DAYS

## 2023-12-26 ASSESSMENT — PATIENT HEALTH QUESTIONNAIRE - PHQ9
SUM OF ALL RESPONSES TO PHQ QUESTIONS 1-9: 2
SUM OF ALL RESPONSES TO PHQ QUESTIONS 1-9: 2
10. IF YOU CHECKED OFF ANY PROBLEMS, HOW DIFFICULT HAVE THESE PROBLEMS MADE IT FOR YOU TO DO YOUR WORK, TAKE CARE OF THINGS AT HOME, OR GET ALONG WITH OTHER PEOPLE: NOT DIFFICULT AT ALL

## 2023-12-27 ENCOUNTER — OFFICE VISIT (OUTPATIENT)
Dept: FAMILY MEDICINE | Facility: OTHER | Age: 78
End: 2023-12-27
Attending: NURSE PRACTITIONER
Payer: COMMERCIAL

## 2023-12-27 VITALS
RESPIRATION RATE: 16 BRPM | BODY MASS INDEX: 23.71 KG/M2 | TEMPERATURE: 97.3 F | WEIGHT: 133.8 LBS | HEART RATE: 57 BPM | OXYGEN SATURATION: 98 % | SYSTOLIC BLOOD PRESSURE: 130 MMHG | HEIGHT: 63 IN | DIASTOLIC BLOOD PRESSURE: 68 MMHG

## 2023-12-27 DIAGNOSIS — N18.31 STAGE 3A CHRONIC KIDNEY DISEASE (H): ICD-10-CM

## 2023-12-27 DIAGNOSIS — E03.4 HYPOTHYROIDISM DUE TO ACQUIRED ATROPHY OF THYROID: ICD-10-CM

## 2023-12-27 DIAGNOSIS — E78.5 HYPERLIPIDEMIA LDL GOAL <100: ICD-10-CM

## 2023-12-27 DIAGNOSIS — F51.01 PRIMARY INSOMNIA: ICD-10-CM

## 2023-12-27 DIAGNOSIS — F41.1 GENERALIZED ANXIETY DISORDER: ICD-10-CM

## 2023-12-27 DIAGNOSIS — R73.03 PREDIABETES: Primary | ICD-10-CM

## 2023-12-27 DIAGNOSIS — I10 BENIGN ESSENTIAL HYPERTENSION: ICD-10-CM

## 2023-12-27 DIAGNOSIS — Z79.899 ON STATIN THERAPY: ICD-10-CM

## 2023-12-27 PROCEDURE — 99214 OFFICE O/P EST MOD 30 MIN: CPT | Performed by: NURSE PRACTITIONER

## 2023-12-27 PROCEDURE — G0463 HOSPITAL OUTPT CLINIC VISIT: HCPCS

## 2023-12-27 RX ORDER — ATORVASTATIN CALCIUM 20 MG/1
20 TABLET, FILM COATED ORAL DAILY
Qty: 90 TABLET | Refills: 1 | Status: SHIPPED | OUTPATIENT
Start: 2023-12-27 | End: 2024-03-28

## 2023-12-27 RX ORDER — ATORVASTATIN CALCIUM 10 MG/1
10 TABLET, FILM COATED ORAL DAILY
Qty: 90 TABLET | Refills: 1 | Status: SHIPPED | OUTPATIENT
Start: 2023-12-27 | End: 2024-03-28

## 2023-12-27 RX ORDER — GABAPENTIN 300 MG/1
300 CAPSULE ORAL AT BEDTIME
Qty: 90 CAPSULE | Refills: 1 | Status: SHIPPED | OUTPATIENT
Start: 2023-12-27 | End: 2024-03-28

## 2023-12-27 ASSESSMENT — PAIN SCALES - GENERAL: PAINLEVEL: NO PAIN (0)

## 2023-12-27 NOTE — PATIENT INSTRUCTIONS
Assessment & Plan     1. Prediabetes  Continue current plan  - Hemoglobin A1c; Future    2. Hyperlipidemia LDL goal <100  - Lipid Profile; Future  - atorvastatin (LIPITOR) 20 MG tablet; Take 1 tablet (20 mg) by mouth daily Take along with a 10mg tablet for a total dose of 30mg daily  Dispense: 90 tablet; Refill: 1  - atorvastatin (LIPITOR) 10 MG tablet; Take 1 tablet (10 mg) by mouth daily Take along with a 20mg tablet for a total dose of 30mg daily  Dispense: 90 tablet; Refill: 1    3. Benign essential hypertension  Well controlled   - Comprehensive metabolic panel; Future    4. Stage 3a chronic kidney disease (H)  Limit NSAID use  - Comprehensive metabolic panel; Future    5. Hypothyroidism due to acquired atrophy of thyroid  - TSH with free T4 reflex; Future    6. Generalized anxiety disorder  - gabapentin (NEURONTIN) 300 MG capsule; Take 1 capsule (300 mg) by mouth at bedtime  Dispense: 90 capsule; Refill: 1    7. On statin therapy  - Comprehensive metabolic panel; Future    8. Primary insomnia  - gabapentin (NEURONTIN) 300 MG capsule; Take 1 capsule (300 mg) by mouth at bedtime  Dispense: 90 capsule; Refill: 1      Return in about 3 months (around 3/27/2024) for pre-diabetes, lipids, HTN, thyroid, anxiety/depression.    Brittney Coe NP  [facility]

## 2024-02-26 DIAGNOSIS — F41.1 GENERALIZED ANXIETY DISORDER: ICD-10-CM

## 2024-02-26 DIAGNOSIS — F51.01 PRIMARY INSOMNIA: ICD-10-CM

## 2024-02-26 NOTE — TELEPHONE ENCOUNTER
LORazepam (ATIVAN) 0.5 MG tablet 30 tablet 0 12/12/2022     Last Office Visit: 12/27/2023  Future Office visit:    Next 5 appointments (look out 90 days)      Mar 28, 2024 10:00 AM  (Arrive by 9:45 AM)  SHORT with Brittney Coe NP  [facility] ([facility] ) [address]  [facility] [address]  [phone]             Routing refill request to provider for review/approval because:

## 2024-02-27 ENCOUNTER — TELEPHONE (OUTPATIENT)
Dept: FAMILY MEDICINE | Facility: OTHER | Age: 79
End: 2024-02-27

## 2024-02-27 RX ORDER — LORAZEPAM 0.5 MG/1
TABLET ORAL
Qty: 30 TABLET | Refills: 0 | Status: SHIPPED | OUTPATIENT
Start: 2024-02-27

## 2024-02-27 NOTE — TELEPHONE ENCOUNTER
Routing refill request to provider for review/approval because:  Drug not on the [facility], [facility] or [facility] refill protocol or controlled substance

## 2024-02-27 NOTE — TELEPHONE ENCOUNTER
Received a PA request from [facility] for LORazepam (ATIVAN) 0.5 MG tablet. Submitted on CMM. Waiting for a response.

## 2024-02-28 NOTE — TELEPHONE ENCOUNTER
Received an APPROVAL from [facility] for LORazepam (ATIVAN) 0.5 MG tablet. Effective dates 1/1/24-2/28/25. [id number].

## 2024-03-25 NOTE — PROGRESS NOTES
Assessment & Plan     1. Prediabetes  - Hemoglobin A1c; Future    2. Hyperlipidemia LDL goal <100  - atorvastatin (LIPITOR) 10 MG tablet; Take 1 tablet (10 mg) by mouth daily Take along with a 20mg tablet for a total dose of 30mg daily  Dispense: 90 tablet; Refill: 1  - atorvastatin (LIPITOR) 20 MG tablet; Take 1 tablet (20 mg) by mouth daily Take along with a 10mg tablet for a total dose of 30mg daily  Dispense: 90 tablet; Refill: 1  - Lipid Profile; Future    3. Benign essential hypertension  - lisinopril (ZESTRIL) 10 MG tablet; Take 1 tablet (10 mg) by mouth daily  Dispense: 90 tablet; Refill: 1  - Comprehensive metabolic panel; Future    4. Hypothyroidism due to acquired atrophy of thyroid  - levothyroxine (SYNTHROID/LEVOTHROID) 50 MCG tablet; TAKE 1 TABLET(50 MCG) BY MOUTH DAILY  Dispense: 90 tablet; Refill: 1  - TSH with free T4 reflex; Future    5. Generalized anxiety disorder  - gabapentin (NEURONTIN) 300 MG capsule; Take 1 capsule (300 mg) by mouth at bedtime  Dispense: 90 capsule; Refill: 1    6. Primary insomnia  - gabapentin (NEURONTIN) 300 MG capsule; Take 1 capsule (300 mg) by mouth at bedtime  Dispense: 90 capsule; Refill: 1    7. Ear itching  - mometasone (ELOCON) 0.1 % external cream; Apply a thin film of cream to the affected ear canal (s) twice a day for 2 weeks, then use sparingly as needed only.  Dispense: 15 g; Refill: 1    8. On statin therapy  - Comprehensive metabolic panel; Future      Follow-up in three months    The longitudinal plan of care for the diagnosis(es)/condition(s) as documented were addressed during this visit. Due to the added complexity in care, I will continue to support Franca in the subsequent management and with ongoing continuity of care.    Brittney Coe,   Certified Adult Nurse Practitioner  [phone]      Raffaele Schulte is a 78 year old, presenting for the following health issues:  Chronic Disease Management    HPI     Hyperlipidemia Follow-Up    Are  you regularly taking any medication or supplement to lower your cholesterol?   Yes- Lipitor  Are you having muscle aches or other side effects that you think could be caused by your cholesterol lowering medication?  No    Hypertension Follow-up    Do you check your blood pressure regularly outside of the clinic? Yes   Are you following a low salt diet? Yes  Are your blood pressures ever more than 140 on the top number (systolic) OR more   than 90 on the bottom number (diastolic), for example 140/90? No    Chronic Kidney Disease Follow-up    Do you take any over the counter pain medicine?: Yes  What over the counter medicine are you taking for your pain?:  tylenol pm    How often do you take this medicine?:  A few times a month  Hypothyroidism Follow-up    Since last visit, patient describes the following symptoms: Weight stable, no hair loss, no skin changes, no constipation, no loose stools        Recent Labs   Lab Test 03/26/24  0802 12/26/23  0757 09/20/23  0757 06/20/23  0758 08/20/21  0757 05/10/21  0801 02/05/21  0809   A1C 5.7*  --  5.6 5.8*   < > 5.6  --    * 99 117* 119*   < > 105* 119*   HDL 59 48* 58 54   < > 58 55   TRIG 126 126 94 154*   < > 138 149   ALT 26 26 26 28   < > 46 36   CR 0.92 0.97* 0.96* 0.95   < > 1.04 1.01   GFRESTIMATED 63 59* 60* 61   < > 52* 54*   GFRESTBLACK  --   --   --   --   --  60* 62   POTASSIUM 4.4 4.5 4.5 4.3   < > 4.3 4.1   TSH 5.34* 6.70* 3.67 4.75*   < > 3.79 4.17*    < > = values in this interval not displayed.          BP Readings from Last 3 Encounters:   03/28/24 121/71   12/27/23 130/68   11/22/23 (!) 145/88    Wt Readings from Last 3 Encounters:   03/28/24 60.8 kg (134 lb)   12/27/23 60.7 kg (133 lb 12.8 oz)   11/22/23 61.4 kg (135 lb 6.4 oz)                Review of Systems  Constitutional, neuro, ENT, endocrine, pulmonary, cardiac, gastrointestinal, genitourinary, musculoskeletal, integument and psychiatric systems are negative, except as otherwise noted.       Objective    /71 (BP Location: Left arm, Patient Position: Sitting, Cuff Size: Adult Large)   Pulse 57   Temp 97.4  F (36.3  C) (Tympanic)   Resp 16   Wt 60.8 kg (134 lb)   SpO2 97%   Breastfeeding No   BMI 24.12 kg/m    Body mass index is 24.12 kg/m .  Physical Exam   GENERAL: alert and no distress  HENT: ear canals and TM's normal, nose and mouth without ulcers or lesions  NECK: no adenopathy, no asymmetry, masses, or scars  RESP: lungs clear to auscultation - no rales, rhonchi or wheezes  CV: regular rate and rhythm, normal S1 S2, no S3 or S4, no murmur  MS: no gross musculoskeletal defects noted, no edema  PSYCH: mentation appears normal, affect normal/bright            Signed Electronically by: Brittney Coe NP

## 2024-03-26 ENCOUNTER — LAB (OUTPATIENT)
Dept: LAB | Facility: OTHER | Age: 79
End: 2024-03-26
Payer: MEDICARE

## 2024-03-26 DIAGNOSIS — E03.4 HYPOTHYROIDISM DUE TO ACQUIRED ATROPHY OF THYROID: ICD-10-CM

## 2024-03-26 DIAGNOSIS — N18.31 STAGE 3A CHRONIC KIDNEY DISEASE (H): ICD-10-CM

## 2024-03-26 DIAGNOSIS — R73.03 PREDIABETES: ICD-10-CM

## 2024-03-26 DIAGNOSIS — Z79.899 ON STATIN THERAPY: ICD-10-CM

## 2024-03-26 DIAGNOSIS — E78.5 HYPERLIPIDEMIA LDL GOAL <100: ICD-10-CM

## 2024-03-26 DIAGNOSIS — I10 BENIGN ESSENTIAL HYPERTENSION: ICD-10-CM

## 2024-03-26 LAB
ALBUMIN SERPL BCG-MCNC: 4.2 G/DL (ref 3.5–5.2)
ALP SERPL-CCNC: 76 U/L (ref 40–150)
ALT SERPL W P-5'-P-CCNC: 26 U/L (ref 0–50)
ANION GAP SERPL CALCULATED.3IONS-SCNC: 10 MMOL/L (ref 7–15)
AST SERPL W P-5'-P-CCNC: 32 U/L (ref 0–45)
BILIRUB SERPL-MCNC: 0.4 MG/DL
BUN SERPL-MCNC: 15.6 MG/DL (ref 8–23)
CALCIUM SERPL-MCNC: 9.7 MG/DL (ref 8.8–10.2)
CHLORIDE SERPL-SCNC: 105 MMOL/L (ref 98–107)
CHOLEST SERPL-MCNC: 191 MG/DL
CREAT SERPL-MCNC: 0.92 MG/DL (ref 0.51–0.95)
DEPRECATED HCO3 PLAS-SCNC: 26 MMOL/L (ref 22–29)
EGFRCR SERPLBLD CKD-EPI 2021: 63 ML/MIN/1.73M2
EST. AVERAGE GLUCOSE BLD GHB EST-MCNC: 117 MG/DL
FASTING STATUS PATIENT QL REPORTED: YES
GLUCOSE SERPL-MCNC: 87 MG/DL (ref 70–99)
HBA1C MFR BLD: 5.7 %
HDLC SERPL-MCNC: 59 MG/DL
LDLC SERPL CALC-MCNC: 107 MG/DL
NONHDLC SERPL-MCNC: 132 MG/DL
POTASSIUM SERPL-SCNC: 4.4 MMOL/L (ref 3.4–5.3)
PROT SERPL-MCNC: 7.1 G/DL (ref 6.4–8.3)
SODIUM SERPL-SCNC: 141 MMOL/L (ref 135–145)
T4 FREE SERPL-MCNC: 0.89 NG/DL (ref 0.9–1.7)
TRIGL SERPL-MCNC: 126 MG/DL
TSH SERPL DL<=0.005 MIU/L-ACNC: 5.34 UIU/ML (ref 0.3–4.2)

## 2024-03-26 PROCEDURE — 84443 ASSAY THYROID STIM HORMONE: CPT | Mod: ZL

## 2024-03-26 PROCEDURE — 84439 ASSAY OF FREE THYROXINE: CPT | Mod: ZL

## 2024-03-26 PROCEDURE — 80053 COMPREHEN METABOLIC PANEL: CPT | Mod: ZL

## 2024-03-26 PROCEDURE — 83036 HEMOGLOBIN GLYCOSYLATED A1C: CPT | Mod: ZL

## 2024-03-26 PROCEDURE — 83718 ASSAY OF LIPOPROTEIN: CPT | Mod: ZL

## 2024-03-26 PROCEDURE — 36415 COLL VENOUS BLD VENIPUNCTURE: CPT | Mod: ZL

## 2024-03-28 ENCOUNTER — OFFICE VISIT (OUTPATIENT)
Dept: FAMILY MEDICINE | Facility: OTHER | Age: 79
End: 2024-03-28
Attending: NURSE PRACTITIONER
Payer: COMMERCIAL

## 2024-03-28 VITALS
TEMPERATURE: 97.4 F | RESPIRATION RATE: 16 BRPM | SYSTOLIC BLOOD PRESSURE: 121 MMHG | BODY MASS INDEX: 24.12 KG/M2 | DIASTOLIC BLOOD PRESSURE: 71 MMHG | WEIGHT: 134 LBS | HEART RATE: 57 BPM | OXYGEN SATURATION: 97 %

## 2024-03-28 DIAGNOSIS — E03.4 HYPOTHYROIDISM DUE TO ACQUIRED ATROPHY OF THYROID: ICD-10-CM

## 2024-03-28 DIAGNOSIS — F41.1 GENERALIZED ANXIETY DISORDER: ICD-10-CM

## 2024-03-28 DIAGNOSIS — L29.9 EAR ITCHING: ICD-10-CM

## 2024-03-28 DIAGNOSIS — E78.5 HYPERLIPIDEMIA LDL GOAL <100: ICD-10-CM

## 2024-03-28 DIAGNOSIS — Z79.899 ON STATIN THERAPY: ICD-10-CM

## 2024-03-28 DIAGNOSIS — F51.01 PRIMARY INSOMNIA: ICD-10-CM

## 2024-03-28 DIAGNOSIS — I10 BENIGN ESSENTIAL HYPERTENSION: ICD-10-CM

## 2024-03-28 DIAGNOSIS — R73.03 PREDIABETES: Primary | ICD-10-CM

## 2024-03-28 PROCEDURE — 99214 OFFICE O/P EST MOD 30 MIN: CPT | Performed by: NURSE PRACTITIONER

## 2024-03-28 PROCEDURE — G2211 COMPLEX E/M VISIT ADD ON: HCPCS | Performed by: NURSE PRACTITIONER

## 2024-03-28 PROCEDURE — G0463 HOSPITAL OUTPT CLINIC VISIT: HCPCS

## 2024-03-28 RX ORDER — LISINOPRIL 10 MG/1
10 TABLET ORAL DAILY
Qty: 90 TABLET | Refills: 1 | Status: SHIPPED | OUTPATIENT
Start: 2024-03-28

## 2024-03-28 RX ORDER — ATORVASTATIN CALCIUM 10 MG/1
10 TABLET, FILM COATED ORAL DAILY
Qty: 90 TABLET | Refills: 1 | Status: SHIPPED | OUTPATIENT
Start: 2024-03-28

## 2024-03-28 RX ORDER — GABAPENTIN 300 MG/1
300 CAPSULE ORAL AT BEDTIME
Qty: 90 CAPSULE | Refills: 1 | Status: SHIPPED | OUTPATIENT
Start: 2024-03-28

## 2024-03-28 RX ORDER — LEVOTHYROXINE SODIUM 50 UG/1
TABLET ORAL
Qty: 90 TABLET | Refills: 1 | Status: SHIPPED | OUTPATIENT
Start: 2024-03-28 | End: 2024-06-27

## 2024-03-28 RX ORDER — MOMETASONE FUROATE 1 MG/G
CREAM TOPICAL
Qty: 15 G | Refills: 1 | Status: SHIPPED | OUTPATIENT
Start: 2024-03-28

## 2024-03-28 RX ORDER — ATORVASTATIN CALCIUM 20 MG/1
20 TABLET, FILM COATED ORAL DAILY
Qty: 90 TABLET | Refills: 1 | Status: SHIPPED | OUTPATIENT
Start: 2024-03-28 | End: 2024-10-04

## 2024-03-28 RX ORDER — LORAZEPAM 0.5 MG/1
TABLET ORAL
Qty: 30 TABLET | Refills: 0 | Status: CANCELLED | OUTPATIENT
Start: 2024-03-28

## 2024-03-28 ASSESSMENT — PAIN SCALES - GENERAL: PAINLEVEL: NO PAIN (0)

## 2024-03-28 NOTE — PATIENT INSTRUCTIONS
Assessment & Plan     1. Prediabetes  - Hemoglobin A1c; Future    2. Hyperlipidemia LDL goal <100  - atorvastatin (LIPITOR) 10 MG tablet; Take 1 tablet (10 mg) by mouth daily Take along with a 20mg tablet for a total dose of 30mg daily  Dispense: 90 tablet; Refill: 1  - atorvastatin (LIPITOR) 20 MG tablet; Take 1 tablet (20 mg) by mouth daily Take along with a 10mg tablet for a total dose of 30mg daily  Dispense: 90 tablet; Refill: 1  - Lipid Profile; Future    3. Benign essential hypertension  - lisinopril (ZESTRIL) 10 MG tablet; Take 1 tablet (10 mg) by mouth daily  Dispense: 90 tablet; Refill: 1  - Comprehensive metabolic panel; Future    4. Hypothyroidism due to acquired atrophy of thyroid  - levothyroxine (SYNTHROID/LEVOTHROID) 50 MCG tablet; TAKE 1 TABLET(50 MCG) BY MOUTH DAILY  Dispense: 90 tablet; Refill: 1  - TSH with free T4 reflex; Future    5. Generalized anxiety disorder  - gabapentin (NEURONTIN) 300 MG capsule; Take 1 capsule (300 mg) by mouth at bedtime  Dispense: 90 capsule; Refill: 1    6. Primary insomnia  - gabapentin (NEURONTIN) 300 MG capsule; Take 1 capsule (300 mg) by mouth at bedtime  Dispense: 90 capsule; Refill: 1    7. Ear itching  - mometasone (ELOCON) 0.1 % external cream; Apply a thin film of cream to the affected ear canal (s) twice a day for 2 weeks, then use sparingly as needed only.  Dispense: 15 g; Refill: 1    8. On statin therapy  - Comprehensive metabolic panel; Future      Follow-up in three months    Brittnye Coe,   Certified Adult Nurse Practitioner  [phone]

## 2024-05-15 ENCOUNTER — OFFICE VISIT (OUTPATIENT)
Dept: FAMILY MEDICINE | Facility: OTHER | Age: 79
End: 2024-05-15
Attending: NURSE PRACTITIONER
Payer: MEDICARE

## 2024-05-15 ENCOUNTER — OFFICE VISIT (OUTPATIENT)
Dept: PODIATRY | Facility: OTHER | Age: 79
End: 2024-05-15
Attending: PODIATRIST
Payer: MEDICARE

## 2024-05-15 VITALS
BODY MASS INDEX: 24.3 KG/M2 | SYSTOLIC BLOOD PRESSURE: 110 MMHG | RESPIRATION RATE: 16 BRPM | HEART RATE: 51 BPM | OXYGEN SATURATION: 99 % | TEMPERATURE: 97.2 F | WEIGHT: 135 LBS | DIASTOLIC BLOOD PRESSURE: 70 MMHG

## 2024-05-15 VITALS
HEART RATE: 48 BPM | TEMPERATURE: 96.6 F | SYSTOLIC BLOOD PRESSURE: 110 MMHG | OXYGEN SATURATION: 99 % | DIASTOLIC BLOOD PRESSURE: 70 MMHG

## 2024-05-15 DIAGNOSIS — S70.362A TICK BITE OF LEFT THIGH, INITIAL ENCOUNTER: Primary | ICD-10-CM

## 2024-05-15 DIAGNOSIS — L84 CALLUS OF FOOT: Primary | ICD-10-CM

## 2024-05-15 DIAGNOSIS — M20.22 HALLUX RIGIDUS OF LEFT FOOT: ICD-10-CM

## 2024-05-15 DIAGNOSIS — M20.21 HALLUX RIGIDUS OF RIGHT FOOT: ICD-10-CM
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
## 2024-05-15 PROCEDURE — 99213 OFFICE O/P EST LOW 20 MIN: CPT | Mod: 25 | Performed by: PODIATRIST

## 2024-05-15 PROCEDURE — G0463 HOSPITAL OUTPT CLINIC VISIT: HCPCS

## 2024-05-15 PROCEDURE — 11056 PARNG/CUTG B9 HYPRKR LES 2-4: CPT | Mod: GZ | Performed by: PODIATRIST

## 2024-05-15 PROCEDURE — G2211 COMPLEX E/M VISIT ADD ON: HCPCS | Performed by: NURSE PRACTITIONER

## 2024-05-15 PROCEDURE — 99213 OFFICE O/P EST LOW 20 MIN: CPT | Performed by: NURSE PRACTITIONER

## 2024-05-15 RX ORDER — DOXYCYCLINE 100 MG/1
200 CAPSULE ORAL ONCE
Qty: 2 CAPSULE | Refills: 0 | Status: SHIPPED | OUTPATIENT
Start: 2024-05-15 | End: 2024-05-15

## 2024-05-15 ASSESSMENT — PAIN SCALES - GENERAL
PAINLEVEL: NO PAIN (0)
PAINLEVEL: NO PAIN (0)

## 2024-05-15 NOTE — PATIENT INSTRUCTIONS
Assessment & Plan     1. Tick bite of left thigh, initial encounter  Warm compress  - doxycycline hyclate (VIBRAMYCIN) 100 MG capsule; Take 2 capsules (200 mg) by mouth once for 1 dose  Dispense: 2 capsule; Refill: 0    Follow-up if no improvement or any worsening.      The longitudinal plan of care for the diagnosis(es)/condition(s) as documented were addressed during this visit. Due to the added complexity in care, I will continue to support Franca in the subsequent management and with ongoing continuity of care.    Brittney Coe,   Certified Adult Nurse Practitioner  [phone]

## 2024-05-15 NOTE — PROGRESS NOTES
Chief complaint: Patient presents with:  Musculoskeletal Problem: callus      History of Present Illness: This 78 year old female is seen at the request of No ref. provider found for evaluation and suggestions of management of painful calluses on the bilateral foot. The pain has been ongoing for 2-3 months. Pain is worse when she is walking. Barefooted is more painful. She tried corn pads, but it didn't help a lot. She would like them treated today.    Additionally, she has noticed a new bump on her RIGHT dorsal 1st MTPJ. It is not painful, but she has restricted motion of her big toe and she is not sure what the bump is.    No further pedal complaints today.       /70 (BP Location: Left arm, Patient Position: Sitting, Cuff Size: Adult Regular)   Pulse (!) 48   Temp (!) 96.6  F (35.9  C) (Tympanic)   SpO2 99%     Patient Active Problem List   Diagnosis    Menopause    Joint pain    Disorder of bone and cartilage    Advanced care planning/counseling discussion    Routine general medical examination at a health care facility (ANNUAL)    Colon cancer screening    Hypothyroidism due to acquired atrophy of thyroid    Benign essential hypertension    CKD (chronic kidney disease) stage 3, GFR 30-59 ml/min (H)    AK (actinic keratosis)    Mild recurrent major depression (H24)    Generalized anxiety disorder    Mixed hyperlipidemia    LBBB (left bundle branch block)    Dyspnea on exertion    Prediabetes       Past Surgical History:   Procedure Laterality Date    ARTHRODESIS KNEE  01/03/2013    left knee, paritla medial meniscectomy, debridement medial femoral condyle and patellofemoral debridement    COLONOSCOPY  2004    nml    COLONOSCOPY  07/23/2014    Procedure: COLONOSCOPY;  Surgeon: Nghia Hernandez DO;  Location: [facility]    CYSTOSCOPY      micro hematuria neg    DILATION AND CURETTAGE      EXCISE LESION UPPER EXTREMITY Right 10/25/2017    Procedure: EXCISE LESION UPPER EXTREMITY;  EXCISIONAL BIOPSY RIGHT  FOREARM TIMES 2;  Surgeon: Nghia Hernandez DO;  Location: [facility]    tubal sterilization      uterine fibroids      wrist fx RT         Current Outpatient Medications   Medication Sig Dispense Refill    aspirin 81 MG tablet Take 81 mg by mouth daily      atorvastatin (LIPITOR) 10 MG tablet Take 1 tablet (10 mg) by mouth daily Take along with a 20mg tablet for a total dose of 30mg daily 90 tablet 1    atorvastatin (LIPITOR) 20 MG tablet Take 1 tablet (20 mg) by mouth daily Take along with a 10mg tablet for a total dose of 30mg daily 90 tablet 1    Calcium-Magnesium-Vitamin D (CALCIUM 500 PO) Take by mouth daily      cinnamon 500 MG CAPS       co-enzyme Q-10 100 MG CAPS capsule Take 100 mg by mouth       gabapentin (NEURONTIN) 300 MG capsule Take 1 capsule (300 mg) by mouth at bedtime 90 capsule 1    Garlic 2000 MG CAPS Take 2,000 mg by mouth daily      GLUCOSAMINE-CHONDROITIN PO Take  by mouth. GLUCOSAMINE HCL/CHONDR SUA NA  One daily      levothyroxine (SYNTHROID/LEVOTHROID) 50 MCG tablet TAKE 1 TABLET(50 MCG) BY MOUTH DAILY 90 tablet 1    lisinopril (ZESTRIL) 10 MG tablet Take 1 tablet (10 mg) by mouth daily 90 tablet 1    LORazepam (ATIVAN) 0.5 MG tablet TAKE 1 TABLET BY MOUTH NIGHTLY AS NEEDED FOR ANXIETY OR  SLEEP 30 tablet 0    mometasone (ELOCON) 0.1 % external cream Apply a thin film of cream to the affected ear canal (s) twice a day for 2 weeks, then use sparingly as needed only. 15 g 1    Multiple Vitamins-Minerals (MULTIVITAMIN OR) Alive 1 tablet      Omega-3 Fatty Acids (FISH OIL) 1200 MG CAPS Take 1,080 mg by mouth daily        No current facility-administered medications for this visit.          Allergies   Allergen Reactions    Pseudoephedrine Hcl Other (See Comments)     Heart racing  Sudafed    Guaifenesin Er Palpitations    Hydroxyzine Anxiety     jittery    Vilazodone Hcl Anxiety     Restless legs       Family History   Problem Relation Age of Onset    C.A.D. Mother     Other - See Comments  Mother         high platelets    Hypertension Mother     C.A.D. Paternal Aunt     Diabetes Other         aunt    Cardiovascular Father         cardiovascular disease    Colon Cancer Other         family history-paternal side    Cerebrovascular Disease Maternal Grandmother     Cerebrovascular Disease Maternal Grandfather        Social History     Socioeconomic History    Marital status:      Spouse name: None    Number of children: None    Years of education: None    Highest education level: None   Occupational History    Occupation: retired manager   Tobacco Use    Smoking status: Never     Passive exposure: Never    Smokeless tobacco: Never   Vaping Use    Vaping status: Never Used   Substance and Sexual Activity    Alcohol use: Yes     Alcohol/week: 0.0 standard drinks of alcohol     Comment: occasionally    Drug use: No    Sexual activity: Yes     Partners: Male     Birth control/protection: None   Other Topics Concern    Caffeine Concern Yes     Comment: coffee - 5 cups    Parent/sibling w/ CABG, MI or angioplasty before 65F 55M? No     Social Determinants of Health     Financial Resource Strain: Low Risk  (12/26/2023)    Financial Resource Strain     Within the past 12 months, have you or your family members you live with been unable to get utilities (heat, electricity) when it was really needed?: No   Food Insecurity: Low Risk  (12/26/2023)    Food Insecurity     Within the past 12 months, did you worry that your food would run out before you got money to buy more?: No     Within the past 12 months, did the food you bought just not last and you didn t have money to get more?: No   Transportation Needs: Low Risk  (12/26/2023)    Transportation Needs     Within the past 12 months, has lack of transportation kept you from medical appointments, getting your medicines, non-medical meetings or appointments, work, or from getting things that you need?: No   Interpersonal Safety: Low Risk  (12/27/2023)     Interpersonal Safety     Do you feel physically and emotionally safe where you currently live?: Yes     Within the past 12 months, have you been hit, slapped, kicked or otherwise physically hurt by someone?: No     Within the past 12 months, have you been humiliated or emotionally abused in other ways by your partner or ex-partner?: No   Housing Stability: Low Risk  (12/26/2023)    Housing Stability     Do you have housing? : Yes     Are you worried about losing your housing?: No       ROS: 10 point ROS neg other than the symptoms noted above in the HPI.  EXAM  Constitutional: healthy, alert, and no distress    Psychiatric: mentation appears normal and affect normal/bright    VASCULAR:  -Dorsalis pedis pulse +1/4 b/l  -Posterior tibial pulse +1/4 b/l  -Capillary refill time < 3 seconds to b/l hallux  NEURO:  -Light touch sensation intact to b/l plantar forefoot  DERM:  -Skin temperature, texture and turgor WNL b/l  -Hyperkeratotic lesion on the bilateral plantar fifth metatarsal head  MSK:  -Pain on palpation to the bilateral plantar fifth metatarsal head hyperkeratotic lesion   -Muscle strength of ankles +5/5 for dorsiflexion, plantarflexion, ABDUction and ADDuction b/l  -Decreased ROM of 1st MTPJ with forefoot loading (less than ten degrees with forefoot loading)  ---Bilateral dorsal and medial 1st MTPJ prominence with moderate dorsal bony prominence on the LEFT dorsal 1st MTPJ   -Ankle joint passive ROM within normal limits except for dorsiflexion:    Dorsiflexion, RIGHT Straight knee 0 degrees    Dorsiflexion, LEFT Straight knee 0 degrees    ============================================================    ASSESSMENT:  (L84) Callus of foot  (primary encounter diagnosis)    (M20.22) Hallux rigidus of left foot    (M20.21) Hallux rigidus of right foot      PLAN:  -Patient evaluated and examined. Treatment options discussed with no educational barriers noted.  -Callus pared x 2 to the bilateral plantar fifth  metatarsal head without incident  ---Patient reminded that the callus will likely return due to the underlying, prominent bone causing the callus while the patient is walking.    Calluses on the bottom surface of the foot will continue to come back due to the pressure from the bone on the bottom of your foot. Below are some tips for keeping the callus(es) trimmed which often decreases the pain on the bottom of your foot.    -Callus care:  ---Do a daily epsom salt soak in lukewarm water for 20 minutes.   ---After the soak, the apply a moisturizing cream (ammonium lactate) to the callus and let it soak in for about ten minutes  ---Next, take an oriana board or nail file to or pumice stone the callus. This should be done daily (minimally lotion and callus paring) to keep the callus well pared.    -Patient has declined offloading orthotics and Ammonium Lactate at this time. She has had more than two years of pain relief from her last callus paring and she will call again if they start to bother her again.    ---------------------------------------------    -Discussed etiology and treatment options for hallux rigidus:  ---Discussed how this deformity can progress and what can be done to treat the deformity.  ---Conservative treatment options consist of wider shoe gear and orthotics +/- padding/splinting to accommodate the painful toe. There may be pain relief from a rodriguez's extension in an orthotic. This will not correct the deformity, but may help decrease pain. Patient may also benefit from an injection for more acute pain. This is does not usually provide long term pain relief without also addressing the biomechanics, but it may improve overall pain.   ---Discussed surgical treatment options including risks and benefits and post-op periods. The surgical procedure of choice is dependent on amount of joint space remaining and radiographic angles. Based on the bony prominence, she would likely require a 1st MTPJ fusion.  This would have a six week NWB post-op recovery.  ---At this time, patient does not have pain from the bone spurs or the lack of motion in the 1st MTPJ, so she will call the clinic if she starts to notice pain.  -This is an acute, uncomplicated illness/injury with OTC treatment options reviewed.      -Patient in agreement with the above treatment plan and all of patient's questions were answered.      Return to clinic as needed per patient request        Carol Pennington DPM

## 2024-05-15 NOTE — PROGRESS NOTES
Assessment & Plan     1. Tick bite of left thigh, initial encounter  Warm compress  - doxycycline hyclate (VIBRAMYCIN) 100 MG capsule; Take 2 capsules (200 mg) by mouth once for 1 dose  Dispense: 2 capsule; Refill: 0    Follow-up if no improvement or any worsening.      The longitudinal plan of care for the diagnosis(es)/condition(s) as documented were addressed during this visit. Due to the added complexity in care, I will continue to support Franca in the subsequent management and with ongoing continuity of care.    Brittney Coe,   Certified Adult Nurse Practitioner  [phone]      Subjective   Franca is a 78 year old, presenting for the following health issues:  Insect Bites        5/15/2024    10:02 AM   Additional Questions   Roomed by Vandana REILLY   Accompanied by None     HPI     Concern - Tick Bite  Onset: 5/12/24  Description: left upper leg inside [place]  Intensity: moderate  Progression of Symptoms:  worsening and constant  Accompanying Signs & Symptoms: achy hips  Previous history of similar problem: none  Precipitating factors:        Worsened by: none  Alleviating factors:        Improved by: none  Therapies tried and outcome: rubbing alcohol       Recent Labs   Lab Test 03/26/24  0802 12/26/23  0757 09/20/23  0757 06/20/23  0758 08/20/21  0757 05/10/21  0801 02/05/21  0809   A1C 5.7*  --  5.6 5.8*   < > 5.6  --    * 99 117* 119*   < > 105* 119*   HDL 59 48* 58 54   < > 58 55   TRIG 126 126 94 154*   < > 138 149   ALT 26 26 26 28   < > 46 36   CR 0.92 0.97* 0.96* 0.95   < > 1.04 1.01   GFRESTIMATED 63 59* 60* 61   < > 52* 54*   GFRESTBLACK  --   --   --   --   --  60* 62   POTASSIUM 4.4 4.5 4.5 4.3   < > 4.3 4.1   TSH 5.34* 6.70* 3.67 4.75*   < > 3.79 4.17*    < > = values in this interval not displayed.      BP Readings from Last 3 Encounters:   05/15/24 110/70   05/15/24 110/70   03/28/24 121/71    Wt Readings from Last 3 Encounters:   05/15/24 61.2 kg (135 lb)   03/28/24 60.8 kg  (134 lb)   12/27/23 60.7 kg (133 lb 12.8 oz)                        Review of Systems  Constitutional, neuro, ENT, endocrine, pulmonary, cardiac, gastrointestinal, genitourinary, musculoskeletal, integument and psychiatric systems are negative, except as otherwise noted.      Objective    /70 (BP Location: Left arm, Patient Position: Sitting, Cuff Size: Adult Regular)   Pulse 51   Temp 97.2  F (36.2  C) (Tympanic)   Resp 16   Wt 61.2 kg (135 lb)   SpO2 99%   Breastfeeding No   BMI 24.30 kg/m    Body mass index is 24.3 kg/m .  Physical Exam   GENERAL: alert and no distress  MS: no gross musculoskeletal defects noted, no edema  SKIN: insect bite of left upper thigh - erythematous circular area that is swollen, tender and with central bite miguel.    PSYCH: mentation appears normal, affect normal/bright            Signed Electronically by: Brittney Coe, NP

## 2024-05-18 ENCOUNTER — HOSPITAL ENCOUNTER (EMERGENCY)
Facility: HOSPITAL | Age: 79
Discharge: HOME | End: 2024-05-18
Attending: PHYSICIAN ASSISTANT | Admitting: PHYSICIAN ASSISTANT
Payer: MEDICARE

## 2024-05-18 VITALS
DIASTOLIC BLOOD PRESSURE: 73 MMHG | TEMPERATURE: 98.3 F | SYSTOLIC BLOOD PRESSURE: 170 MMHG | RESPIRATION RATE: 18 BRPM | OXYGEN SATURATION: 99 % | HEART RATE: 89 BPM

## 2024-05-18 DIAGNOSIS — A69.20 ERYTHEMA MIGRANS (LYME DISEASE): ICD-10-CM

## 2024-05-18 PROCEDURE — G0463 HOSPITAL OUTPT CLINIC VISIT: HCPCS

## 2024-05-18 PROCEDURE — 99213 OFFICE O/P EST LOW 20 MIN: CPT | Performed by: PHYSICIAN ASSISTANT

## 2024-05-18 RX ORDER — DOXYCYCLINE 100 MG/1
100 TABLET ORAL 2 TIMES DAILY
Qty: 56 TABLET | Refills: 0 | Status: SHIPPED | OUTPATIENT
Start: 2024-05-18 | End: 2024-06-15

## 2024-05-18 RX ORDER — SACCHAROMYCES BOULARDII 250 MG
250 CAPSULE ORAL 2 TIMES DAILY
Qty: 60 CAPSULE | Refills: 0 | Status: SHIPPED | OUTPATIENT
Start: 2024-05-18 | End: 2024-06-17

## 2024-05-18 ASSESSMENT — COLUMBIA-SUICIDE SEVERITY RATING SCALE - C-SSRS
1. IN THE PAST MONTH, HAVE YOU WISHED YOU WERE DEAD OR WISHED YOU COULD GO TO SLEEP AND NOT WAKE UP?: NO
2. HAVE YOU ACTUALLY HAD ANY THOUGHTS OF KILLING YOURSELF IN THE PAST MONTH?: NO
6. HAVE YOU EVER DONE ANYTHING, STARTED TO DO ANYTHING, OR PREPARED TO DO ANYTHING TO END YOUR LIFE?: NO

## 2024-05-18 NOTE — ED TRIAGE NOTES
Pt presents today with c/o tick bite. Saw her PCP on Tuesday and was given doxycycline one time dose.

## 2024-05-18 NOTE — DISCHARGE INSTRUCTIONS
Take the Doxycycline as prescribed for lymes.   Hold your calcium supplement while taking.   Follow up with your primary care provider in 1 week for re-check.   Return here sooner with any new or worsening symptoms.

## 2024-05-18 NOTE — ED PROVIDER NOTES
History     Chief Complaint   Patient presents with    Insect Bite     HPI  Franca Angeles is a 78 year old female who presents with target rash around a tick bite to her left upper thigh. The bite occurred 4 days ago and she did have a one time dose of Doxycycline 200mg her PCP prescribed her for appropriate lyme's prophylaxis. Unfortunately, she noticed the target rash yesterday and has felt that her chronic arthritis pain has worsened.     Allergies:  Allergies   Allergen Reactions    Pseudoephedrine Hcl Other (See Comments)     Heart racing  Sudafed    Guaifenesin Er Palpitations    Hydroxyzine Anxiety     jittery    Vilazodone Hcl Anxiety     Restless legs       Problem List:    Patient Active Problem List    Diagnosis Date Noted    Mixed hyperlipidemia 11/16/2020     Priority: Medium    LBBB (left bundle branch block) 11/16/2020     Priority: Medium    Dyspnea on exertion 11/16/2020     Priority: Medium    Prediabetes 11/16/2020     Priority: Medium    Mild recurrent major depression (H24) 11/13/2020     Priority: Medium    Generalized anxiety disorder 11/13/2020     Priority: Medium    CKD (chronic kidney disease) stage 3, GFR 30-59 ml/min (H) 08/07/2019     Priority: Medium    AK (actinic keratosis) 08/28/2018     Priority: Medium    Benign essential hypertension 06/14/2017     Priority: Medium    Hypothyroidism due to acquired atrophy of thyroid 05/21/2015     Priority: Medium    Routine general medical examination at a health care facility (ANNUAL) 04/30/2014     Priority: Medium    Colon cancer screening 04/30/2014     Priority: Medium     Done in July. Return is 10 years      Menopause 04/23/2013     Priority: Medium    Joint pain 04/23/2013     Priority: Medium    Advanced care planning/counseling discussion 10/09/2012     Priority: Medium    Disorder of bone and cartilage 05/10/2011     Priority: Medium        Past Medical History:    Past Medical History:   Diagnosis Date    Esophageal reflux  05/10/2011       Past Surgical History:    Past Surgical History:   Procedure Laterality Date    ARTHRODESIS KNEE  01/03/2013    left knee, paritla medial meniscectomy, debridement medial femoral condyle and patellofemoral debridement    COLONOSCOPY  2004    nml    COLONOSCOPY  07/23/2014    Procedure: COLONOSCOPY;  Surgeon: Nghia Hernandez DO;  Location: [facility]    CYSTOSCOPY      micro hematuria neg    DILATION AND CURETTAGE      EXCISE LESION UPPER EXTREMITY Right 10/25/2017    Procedure: EXCISE LESION UPPER EXTREMITY;  EXCISIONAL BIOPSY RIGHT FOREARM TIMES 2;  Surgeon: Nghia Hernandez DO;  Location: [facility]    tubal sterilization      uterine fibroids      wrist fx RT         Family History:    Family History   Problem Relation Age of Onset    C.A.D. Mother     Other - See Comments Mother         high platelets    Hypertension Mother     C.A.D. Paternal Aunt     Diabetes Other         aunt    Cardiovascular Father         cardiovascular disease    Colon Cancer Other         family history-paternal side    Cerebrovascular Disease Maternal Grandmother     Cerebrovascular Disease Maternal Grandfather        Social History:  Marital Status:   [2]  Social History     Tobacco Use    Smoking status: Never     Passive exposure: Never    Smokeless tobacco: Never   Vaping Use    Vaping status: Never Used   Substance Use Topics    Alcohol use: Yes     Alcohol/week: 0.0 standard drinks of alcohol     Comment: occasionally    Drug use: No        Medications:    doxycycline monohydrate (ADOXA) 100 MG tablet  aspirin 81 MG tablet  atorvastatin (LIPITOR) 10 MG tablet  atorvastatin (LIPITOR) 20 MG tablet  Calcium-Magnesium-Vitamin D (CALCIUM 500 PO)  cinnamon 500 MG CAPS  co-enzyme Q-10 100 MG CAPS capsule  gabapentin (NEURONTIN) 300 MG capsule  Garlic 2000 MG CAPS  GLUCOSAMINE-CHONDROITIN PO  levothyroxine (SYNTHROID/LEVOTHROID) 50 MCG tablet  lisinopril (ZESTRIL) 10 MG tablet  LORazepam (ATIVAN) 0.5 MG  tablet  mometasone (ELOCON) 0.1 % external cream  Multiple Vitamins-Minerals (MULTIVITAMIN OR)  Omega-3 Fatty Acids (FISH OIL) 1200 MG CAPS          Review of Systems   All other systems reviewed and are negative.      Physical Exam   BP: 170/73  Pulse: 89  Temp: 98.3  F (36.8  C)  Resp: 18  SpO2: 99 %      Physical Exam  Vitals and nursing note reviewed.   Constitutional:       General: She is not in acute distress.     Appearance: She is well-developed. She is not diaphoretic.   HENT:      Head: Normocephalic and atraumatic.      Right Ear: External ear normal.      Left Ear: External ear normal.      Nose: Nose normal.      Mouth/Throat:      Pharynx: No oropharyngeal exudate.   Eyes:      General: No scleral icterus.        Right eye: No discharge.         Left eye: No discharge.      Conjunctiva/sclera: Conjunctivae normal.      Pupils: Pupils are equal, round, and reactive to light.   Cardiovascular:      Rate and Rhythm: Normal rate and regular rhythm.      Heart sounds: Normal heart sounds. No murmur heard.     No friction rub. No gallop.   Pulmonary:      Effort: Pulmonary effort is normal. No respiratory distress.      Breath sounds: Normal breath sounds. No wheezing or rales.   Chest:      Chest wall: No tenderness.   Abdominal:      General: Bowel sounds are normal.      Palpations: Abdomen is soft.      Tenderness: There is no abdominal tenderness.   Musculoskeletal:      Cervical back: Normal range of motion and neck supple.   Lymphadenopathy:      Cervical: No cervical adenopathy.   Skin:     General: Skin is warm and dry.      Coloration: Skin is not pale.      Findings: Rash (Target rash with necrotic center to left upper thigh consistent with erythema migrans.) present. No erythema.   Neurological:      Mental Status: She is alert and oriented to person, place, and time.      Cranial Nerves: No cranial nerve deficit.      Coordination: Coordination normal.   Psychiatric:         Behavior: Behavior  normal.         Thought Content: Thought content normal.         Judgment: Judgment normal.         ED Course        Procedures         No results found for this or any previous visit (from the past 24 hour(s)).    Medications - No data to display    Assessments & Plan (with Medical Decision Making)   Pt is non-toxic appearing and in NAD. VS are WNL. Pt has target rash with necrotic center to left upper thigh consistent with erythema migrans. Given her increase in arthritis symptoms, will cover for lyme's arthritis with longer 28 day course of doxy. Probiotic was also prescribed. She was discharged home following in stable condition.     Plan:  Take the Doxycycline as prescribed for lymes.   Hold your calcium supplement while taking.   Follow up with your primary care provider in 1 week for re-check.   Return here sooner with any new or worsening symptoms.       I have reviewed the nursing notes.    I have reviewed the findings, diagnosis, plan and need for follow up with the patient.      Discharge Medication List as of 5/18/2024  2:49 PM        START taking these medications    Details   doxycycline monohydrate (ADOXA) 100 MG tablet Take 1 tablet (100 mg) by mouth 2 times daily for 28 days, Disp-56 tablet, R-0, E-Prescribe             Final diagnoses:   Erythema migrans (Lyme disease)       5/18/2024   [facility]

## 2024-05-21 ENCOUNTER — TELEPHONE (OUTPATIENT)
Dept: FAMILY MEDICINE | Facility: OTHER | Age: 79
End: 2024-05-21

## 2024-05-21 NOTE — TELEPHONE ENCOUNTER
Symptom or reason needing to speak to RN: ULISES follow up / [facility] / 5-18 / lymes     Best number to return call: [phone]     Best time to return call: after 2pm or before 10:45am

## 2024-05-22 ENCOUNTER — TELEPHONE (OUTPATIENT)
Dept: FAMILY MEDICINE | Facility: OTHER | Age: 79
End: 2024-05-22

## 2024-05-22 NOTE — TELEPHONE ENCOUNTER
Pt called and needed a ER follow-up for starting on ABX for lymes Disease.  Pt is scheduled with PCP as she requested.

## 2024-05-29 NOTE — PROGRESS NOTES
Assessment & Plan     1. Lyme disease  ED notes reviewed   Complete entire course of doxycycline.            MED REC REQUIRED  Post Medication Reconciliation Status: discharge medications reconciled, continue medications without change    Follow-up as scheduled or as needed    The longitudinal plan of care for the diagnosis(es)/condition(s) as documented were addressed during this visit. Due to the added complexity in care, I will continue to support Franca in the subsequent management and with ongoing continuity of care.    Brittney Coe,   Certified Adult Nurse Practitioner  [phone]      Subjective   Franca is a 78 year old, presenting for the following health issues:  Hospital F/U    HPI     ED/UC Followup:    Facility:  [facility]   Date of visit: 5/18/24  Reason for visit: Lyme disease   Current Status: thumbs and Left knee ache    She is taking doxycycline - wonder if she should complete the entire 21 day course.  With the rash she had and joint pain/myalgia I would complete entire course.  She is starting to feel a bit better, less joint pain.        Review of Systems  Constitutional, neuro, ENT, endocrine, pulmonary, cardiac, gastrointestinal, genitourinary, musculoskeletal, integument and psychiatric systems are negative, except as otherwise noted.      Objective    /63 (BP Location: Left arm, Patient Position: Sitting, Cuff Size: Adult Regular)   Pulse 50   Temp 98.2  F (36.8  C) (Tympanic)   Resp 16   Wt 61.2 kg (135 lb)   SpO2 97%   Breastfeeding No   BMI 24.30 kg/m    Body mass index is 24.3 kg/m .  Physical Exam   GENERAL: alert and no distress  MS: no gross musculoskeletal defects noted, no edema  PSYCH: mentation appears normal, affect normal/bright            Signed Electronically by: Brittney Coe, NP

## 2024-05-31 ENCOUNTER — OFFICE VISIT (OUTPATIENT)
Dept: FAMILY MEDICINE | Facility: OTHER | Age: 79
End: 2024-05-31
Attending: NURSE PRACTITIONER
Payer: COMMERCIAL

## 2024-05-31 VITALS
OXYGEN SATURATION: 97 % | SYSTOLIC BLOOD PRESSURE: 137 MMHG | HEART RATE: 50 BPM | BODY MASS INDEX: 24.3 KG/M2 | DIASTOLIC BLOOD PRESSURE: 63 MMHG | TEMPERATURE: 98.2 F | WEIGHT: 135 LBS | RESPIRATION RATE: 16 BRPM

## 2024-05-31 DIAGNOSIS — A69.20 LYME DISEASE: Primary | ICD-10-CM

## 2024-05-31 PROCEDURE — G0463 HOSPITAL OUTPT CLINIC VISIT: HCPCS

## 2024-05-31 PROCEDURE — 99213 OFFICE O/P EST LOW 20 MIN: CPT | Performed by: NURSE PRACTITIONER

## 2024-05-31 PROCEDURE — G2211 COMPLEX E/M VISIT ADD ON: HCPCS | Performed by: NURSE PRACTITIONER

## 2024-05-31 ASSESSMENT — PATIENT HEALTH QUESTIONNAIRE - PHQ9
SUM OF ALL RESPONSES TO PHQ QUESTIONS 1-9: 2
10. IF YOU CHECKED OFF ANY PROBLEMS, HOW DIFFICULT HAVE THESE PROBLEMS MADE IT FOR YOU TO DO YOUR WORK, TAKE CARE OF THINGS AT HOME, OR GET ALONG WITH OTHER PEOPLE: NOT DIFFICULT AT ALL
SUM OF ALL RESPONSES TO PHQ QUESTIONS 1-9: 2

## 2024-05-31 ASSESSMENT — PAIN SCALES - GENERAL: PAINLEVEL: NO PAIN (1)

## 2024-06-17 ENCOUNTER — OFFICE VISIT (OUTPATIENT)
Dept: FAMILY MEDICINE | Facility: OTHER | Age: 79
End: 2024-06-17
Attending: NURSE PRACTITIONER
Payer: COMMERCIAL

## 2024-06-17 VITALS
HEART RATE: 56 BPM | TEMPERATURE: 97.8 F | BODY MASS INDEX: 24.3 KG/M2 | OXYGEN SATURATION: 97 % | RESPIRATION RATE: 16 BRPM | WEIGHT: 135 LBS | SYSTOLIC BLOOD PRESSURE: 132 MMHG | DIASTOLIC BLOOD PRESSURE: 72 MMHG

## 2024-06-17 DIAGNOSIS — A69.20 ERYTHEMA MIGRANS (LYME DISEASE): Primary | ICD-10-CM

## 2024-06-17 LAB
BASOPHILS # BLD AUTO: 0 10E3/UL (ref 0–0.2)
BASOPHILS NFR BLD AUTO: 0 %
CRP SERPL-MCNC: <3 MG/L
EOSINOPHIL # BLD AUTO: 0.2 10E3/UL (ref 0–0.7)
EOSINOPHIL NFR BLD AUTO: 4 %
ERYTHROCYTE [DISTWIDTH] IN BLOOD BY AUTOMATED COUNT: 14.2 % (ref 10–15)
ERYTHROCYTE [SEDIMENTATION RATE] IN BLOOD BY WESTERGREN METHOD: 11 MM/HR (ref 0–30)
HCT VFR BLD AUTO: 38 % (ref 35–47)
HGB BLD-MCNC: 12.5 G/DL (ref 11.7–15.7)
IMM GRANULOCYTES # BLD: 0 10E3/UL
IMM GRANULOCYTES NFR BLD: 0 %
LYMPHOCYTES # BLD AUTO: 2.4 10E3/UL (ref 0.8–5.3)
LYMPHOCYTES NFR BLD AUTO: 35 %
MCH RBC QN AUTO: 28.2 PG (ref 26.5–33)
MCHC RBC AUTO-ENTMCNC: 32.9 G/DL (ref 31.5–36.5)
MCV RBC AUTO: 86 FL (ref 78–100)
MONOCYTES # BLD AUTO: 0.6 10E3/UL (ref 0–1.3)
MONOCYTES NFR BLD AUTO: 8 %
NEUTROPHILS # BLD AUTO: 3.6 10E3/UL (ref 1.6–8.3)
NEUTROPHILS NFR BLD AUTO: 53 %
PLATELET # BLD AUTO: 271 10E3/UL (ref 150–450)
RBC # BLD AUTO: 4.43 10E6/UL (ref 3.8–5.2)
WBC # BLD AUTO: 6.8 10E3/UL (ref 4–11)

## 2024-06-17 PROCEDURE — 85025 COMPLETE CBC W/AUTO DIFF WBC: CPT | Mod: ZL | Performed by: NURSE PRACTITIONER

## 2024-06-17 PROCEDURE — G0463 HOSPITAL OUTPT CLINIC VISIT: HCPCS

## 2024-06-17 PROCEDURE — 86618 LYME DISEASE ANTIBODY: CPT | Mod: ZL | Performed by: NURSE PRACTITIONER

## 2024-06-17 PROCEDURE — 36415 COLL VENOUS BLD VENIPUNCTURE: CPT | Mod: ZL | Performed by: NURSE PRACTITIONER

## 2024-06-17 PROCEDURE — 99214 OFFICE O/P EST MOD 30 MIN: CPT | Performed by: NURSE PRACTITIONER

## 2024-06-17 PROCEDURE — 86140 C-REACTIVE PROTEIN: CPT | Mod: ZL | Performed by: NURSE PRACTITIONER

## 2024-06-17 PROCEDURE — 85652 RBC SED RATE AUTOMATED: CPT | Mod: ZL | Performed by: NURSE PRACTITIONER

## 2024-06-17 RX ORDER — DOXYCYCLINE 100 MG/1
100 CAPSULE ORAL 2 TIMES DAILY
Qty: 42 CAPSULE | Refills: 0 | Status: SHIPPED | OUTPATIENT
Start: 2024-06-17 | End: 2024-06-27

## 2024-06-17 ASSESSMENT — ANXIETY QUESTIONNAIRES
GAD7 TOTAL SCORE: 2
8. IF YOU CHECKED OFF ANY PROBLEMS, HOW DIFFICULT HAVE THESE MADE IT FOR YOU TO DO YOUR WORK, TAKE CARE OF THINGS AT HOME, OR GET ALONG WITH OTHER PEOPLE?: NOT DIFFICULT AT ALL
2. NOT BEING ABLE TO STOP OR CONTROL WORRYING: NOT AT ALL
7. FEELING AFRAID AS IF SOMETHING AWFUL MIGHT HAPPEN: NOT AT ALL
1. FEELING NERVOUS, ANXIOUS, OR ON EDGE: SEVERAL DAYS
5. BEING SO RESTLESS THAT IT IS HARD TO SIT STILL: NOT AT ALL
4. TROUBLE RELAXING: NOT AT ALL
3. WORRYING TOO MUCH ABOUT DIFFERENT THINGS: SEVERAL DAYS
GAD7 TOTAL SCORE: 2
IF YOU CHECKED OFF ANY PROBLEMS ON THIS QUESTIONNAIRE, HOW DIFFICULT HAVE THESE PROBLEMS MADE IT FOR YOU TO DO YOUR WORK, TAKE CARE OF THINGS AT HOME, OR GET ALONG WITH OTHER PEOPLE: NOT DIFFICULT AT ALL
7. FEELING AFRAID AS IF SOMETHING AWFUL MIGHT HAPPEN: NOT AT ALL
6. BECOMING EASILY ANNOYED OR IRRITABLE: NOT AT ALL

## 2024-06-17 ASSESSMENT — PAIN SCALES - GENERAL: PAINLEVEL: NO PAIN (0)

## 2024-06-17 NOTE — PATIENT INSTRUCTIONS
I am suspecting that you are having a localized reaction to the bite itself and not lyme disease. However, I am checking you for lyme and restarting doxycyline. Continue at minimum 7 days. This will allow time for testing return. If negative, okay to stop at 7 days (from today).     Okay to put diphenhydramine cream on the area for the itching.

## 2024-06-17 NOTE — PROGRESS NOTES
Assessment & Plan   1. Erythema migrans (Lyme disease) -  I am suspecting that you are having a localized reaction to the bite itself and not lyme disease. However, I am checking you for lyme and restarting doxycyline. Continue at minimum 7 days. This will allow time for testing return. If negative, okay to stop at 7 days (from today).     Okay to put diphenhydramine cream on the area for the itching.     Reviewed 2 week timeframe from infection until seroconversion on testing. Reinforced importance of tick avoidance and accurate identification. Provided website from [facility] for identification.    - Lyme Disease Total Abs Bld with Reflex to Confirm CLIA; Future  - ESR: Erythrocyte sedimentation rate; Future  - CRP, inflammation; Future  - CBC with platelets and differential; Future  - doxycycline hyclate (VIBRAMYCIN) 100 MG capsule; Take 1 capsule (100 mg) by mouth 2 times daily for 21 days  Dispense: 42 capsule; Refill: 0  - Lyme Disease Total Abs Bld with Reflex to Confirm CLIA  - ESR: Erythrocyte sedimentation rate  - CRP, inflammation  - CBC with platelets and differential                Return if symptoms worsen or fail to improve.      Raffaele Schulte is a 78 year old, presenting for the following health issues:  Insect Bites        6/17/2024     2:15 PM   Additional Questions   Roomed by Vandana REILLY   Accompanied by None     HPI     Concern - 2nd  & 3rd Tick Bite    Seen on 5/15 for tick bite with red area and given single dose of doxycycline. Seen 5/18 at ER seen again for same rash and given 28 day course of doxycyline. Just finished this and was bitten twice more. Pulled tick off right upper inner thigh Friday 6/14/24. Two rashes have developed one to the area where the tick was removed and another to left upper inner thigh. None of the ticks have been engorged.     Has not had any lyme testing. I did show them a picture of ticks from [place] [facility] and they believe the ticks have all been  wood ticks    Onset: 6/14/24   Description: Right and Left inner thigh  Intensity: moderate  Progression of Symptoms:  worsening  Accompanying Signs & Symptoms:None  Previous history of similar problem: Yes  Precipitating factors:        Worsened by: none  Alleviating factors:        Improved by: none   Therapies tried and outcome: None          Objective    /72 (BP Location: Right arm, Patient Position: Sitting, Cuff Size: Adult Regular)   Pulse 56   Temp 97.8  F (36.6  C) (Tympanic)   Resp 16   Wt 61.2 kg (135 lb)   SpO2 97%   Breastfeeding No   BMI 24.30 kg/m    Body mass index is 24.3 kg/m .      Physical Exam   GENERAL: alert and no distress  SKIN: see images of right inner thigh and left upper thigh. No other rashes noted. No retained tick competents palpable.              Labs and/or imaging are pending at the end of this clinic visit. Please see communication attached to labs and/or imaging results.           Signed Electronically by: Sade Orozco, CNP

## 2024-06-18 LAB — B BURGDOR IGG+IGM SER QL: 0.08

## 2024-06-25 ENCOUNTER — LAB (OUTPATIENT)
Dept: LAB | Facility: OTHER | Age: 79
End: 2024-06-25
Payer: MEDICARE

## 2024-06-25 DIAGNOSIS — R73.03 PREDIABETES: ICD-10-CM

## 2024-06-25 DIAGNOSIS — E03.4 HYPOTHYROIDISM DUE TO ACQUIRED ATROPHY OF THYROID: ICD-10-CM

## 2024-06-25 DIAGNOSIS — Z79.899 ON STATIN THERAPY: ICD-10-CM

## 2024-06-25 DIAGNOSIS — E78.5 HYPERLIPIDEMIA LDL GOAL <100: ICD-10-CM

## 2024-06-25 DIAGNOSIS — I10 BENIGN ESSENTIAL HYPERTENSION: ICD-10-CM

## 2024-06-25 LAB
ALBUMIN SERPL BCG-MCNC: 4.2 G/DL (ref 3.5–5.2)
ALP SERPL-CCNC: 82 U/L (ref 40–150)
ALT SERPL W P-5'-P-CCNC: 29 U/L (ref 0–50)
ANION GAP SERPL CALCULATED.3IONS-SCNC: 12 MMOL/L (ref 7–15)
AST SERPL W P-5'-P-CCNC: 39 U/L (ref 0–45)
BILIRUB SERPL-MCNC: 0.4 MG/DL
BUN SERPL-MCNC: 26.4 MG/DL (ref 8–23)
CALCIUM SERPL-MCNC: 9.8 MG/DL (ref 8.8–10.2)
CHLORIDE SERPL-SCNC: 105 MMOL/L (ref 98–107)
CHOLEST SERPL-MCNC: 199 MG/DL
CREAT SERPL-MCNC: 1.04 MG/DL (ref 0.51–0.95)
DEPRECATED HCO3 PLAS-SCNC: 24 MMOL/L (ref 22–29)
EGFRCR SERPLBLD CKD-EPI 2021: 54 ML/MIN/1.73M2
EST. AVERAGE GLUCOSE BLD GHB EST-MCNC: 111 MG/DL
FASTING STATUS PATIENT QL REPORTED: YES
FASTING STATUS PATIENT QL REPORTED: YES
GLUCOSE SERPL-MCNC: 89 MG/DL (ref 70–99)
HBA1C MFR BLD: 5.5 %
HDLC SERPL-MCNC: 57 MG/DL
LDLC SERPL CALC-MCNC: 122 MG/DL
NONHDLC SERPL-MCNC: 142 MG/DL
POTASSIUM SERPL-SCNC: 4.5 MMOL/L (ref 3.4–5.3)
PROT SERPL-MCNC: 7.1 G/DL (ref 6.4–8.3)
SODIUM SERPL-SCNC: 141 MMOL/L (ref 135–145)
TRIGL SERPL-MCNC: 101 MG/DL
TSH SERPL DL<=0.005 MIU/L-ACNC: 3.66 UIU/ML (ref 0.3–4.2)

## 2024-06-25 PROCEDURE — 83036 HEMOGLOBIN GLYCOSYLATED A1C: CPT | Mod: ZL

## 2024-06-25 PROCEDURE — 80061 LIPID PANEL: CPT | Mod: ZL

## 2024-06-25 PROCEDURE — 80053 COMPREHEN METABOLIC PANEL: CPT | Mod: ZL

## 2024-06-25 PROCEDURE — 84443 ASSAY THYROID STIM HORMONE: CPT | Mod: ZL

## 2024-06-25 PROCEDURE — 36415 COLL VENOUS BLD VENIPUNCTURE: CPT | Mod: ZL

## 2024-06-25 NOTE — PROGRESS NOTES
Assessment & Plan     1. Prediabetes  Stable - all labs reviewed.    - Hemoglobin A1c; Future  - Albumin Random Urine Quantitative with Creat Ratio; Future    2. Hyperlipidemia LDL goal <100  Continue 30mg lipitor daily  - Lipid Profile; Future    3. Benign essential hypertension  Well controlled   - Comprehensive metabolic panel; Future    4. Hypothyroidism due to acquired atrophy of thyroid  - TSH with free T4 reflex; Future  - levothyroxine (SYNTHROID/LEVOTHROID) 50 MCG tablet; TAKE 1 TABLET(50 MCG) BY MOUTH DAILY  Dispense: 90 tablet; Refill: 1    5. Generalized anxiety disorder  Stable     6. Primary insomnia  Stable     7. Lyme disease  Improved     8. On statin therapy  - Comprehensive metabolic panel; Future      Return in about 3 months (around 9/27/2024) for pre-diabetes, lipids, HTN.    The longitudinal plan of care for the diagnosis(es)/condition(s) as documented were addressed during this visit. Due to the added complexity in care, I will continue to support Franca in the subsequent management and with ongoing continuity of care.      Brittney Coe,   Certified Adult Nurse Practitioner  [phone]      Raffaele Schulte is a 78 year old, presenting for the following health issues:  Chronic Disease Management    HPI     Diabetes Follow-up    How often are you checking your blood sugar? Not at all  What concerns do you have today about your diabetes? None   Do you have any of these symptoms? (Select all that apply)  No numbness or tingling in feet.  No redness, sores or blisters on feet.  No complaints of excessive thirst.  No reports of blurry vision.  No significant changes to weight.      Hyperlipidemia Follow-Up    Are you regularly taking any medication or supplement to lower your cholesterol?   Yes- atorvastatin 20 mg daily and fish oil daily  Are you having muscle aches or other side effects that you think could be caused by your cholesterol lowering medication?  No    Hypertension  Follow-up    Do you check your blood pressure regularly outside of the clinic? No   Are you following a low salt diet? Yes  Are your blood pressures ever more than 140 on the top number (systolic) OR more   than 90 on the bottom number (diastolic), for example 140/90? No    BP Readings from Last 2 Encounters:   06/27/24 120/72   06/17/24 132/72     Hemoglobin A1C (%)   Date Value   06/25/2024 5.5   03/26/2024 5.7 (H)   05/10/2021 5.6   11/11/2020 5.7 (H)     LDL Cholesterol Calculated (mg/dL)   Date Value   06/25/2024 122 (H)   03/26/2024 107 (H)   05/10/2021 105 (H)   02/05/2021 119 (H)         Depression and Anxiety   How are you doing with your depression since your last visit? Improved   How are you doing with your anxiety since your last visit?  Improved   Are you having other symptoms that might be associated with depression or anxiety? No  Have you had a significant life event? No   Do you have any concerns with your use of alcohol or other drugs? No    Social History     Tobacco Use    Smoking status: Never     Passive exposure: Never    Smokeless tobacco: Never   Vaping Use    Vaping status: Never Used   Substance Use Topics    Alcohol use: Yes     Alcohol/week: 0.0 standard drinks of alcohol     Comment: occasionally    Drug use: No         12/26/2023     1:04 PM 5/31/2024    12:53 PM 6/27/2024     8:03 AM   PHQ   PHQ-9 Total Score 2 2 1   Q9: Thoughts of better off dead/self-harm past 2 weeks Not at all Not at all Not at all         6/22/2023     9:09 AM 12/26/2023     1:06 PM 6/17/2024     2:15 PM   MCKINLEY-7 SCORE   Total Score  4 (minimal anxiety) 2 (minimal anxiety)   Total Score 0 4 2         6/27/2024     8:03 AM   Last PHQ-9   1.  Little interest or pleasure in doing things 0   2.  Feeling down, depressed, or hopeless 0   3.  Trouble falling or staying asleep, or sleeping too much 1   4.  Feeling tired or having little energy 0   5.  Poor appetite or overeating 0   6.  Feeling bad about yourself 0   7.   Trouble concentrating 0   8.  Moving slowly or restless 0   Q9: Thoughts of better off dead/self-harm past 2 weeks 0   PHQ-9 Total Score 1         6/17/2024     2:15 PM   MCKINLEY-7    1. Feeling nervous, anxious, or on edge 1   2. Not being able to stop or control worrying 0   3. Worrying too much about different things 1   4. Trouble relaxing 0   5. Being so restless that it is hard to sit still 0   6. Becoming easily annoyed or irritable 0   7. Feeling afraid, as if something awful might happen 0   MCKINLEY-7 Total Score 2   If you checked any problems, how difficult have they made it for you to do your work, take care of things at home, or get along with other people? Not difficult at all     [id number]}      Chronic Kidney Disease Follow-up    Do you take any over the counter pain medicine?: Yes  What over the counter medicine are you taking for your pain?:  tylenol pm    How often do you take this medicine?:   1-2 times a month      Hypothyroidism Follow-up    Since last visit, patient describes the following symptoms: Weight stable, no hair loss, no skin changes, no constipation, no loose stools    Tick bites:  completed all antibiotics.  Rash has resolved.  She continues to get tick bites, reacts more than usual with erythematous itchy reaction.      BP Readings from Last 3 Encounters:   06/27/24 120/72   06/17/24 132/72   05/31/24 137/63    Wt Readings from Last 3 Encounters:   06/27/24 60.3 kg (133 lb)   06/17/24 61.2 kg (135 lb)   05/31/24 61.2 kg (135 lb)              Review of Systems  Constitutional, neuro, ENT, endocrine, pulmonary, cardiac, gastrointestinal, genitourinary, musculoskeletal, integument and psychiatric systems are negative, except as otherwise noted.      Objective    /72 (BP Location: Left arm, Patient Position: Sitting, Cuff Size: Adult Regular)   Pulse 56   Temp 96.9  F (36.1  C) (Tympanic)   Resp 18   Wt 60.3 kg (133 lb)   SpO2 96%   BMI 23.94 kg/m    Body mass index is 23.94  kg/m .  Physical Exam   GENERAL: alert and no distress  NECK: no adenopathy, no asymmetry, masses, or scars, thyroid normal to palpation, and no carotid bruits  RESP: lungs clear to auscultation - no rales, rhonchi or wheezes  CV: regular rate and rhythm, normal S1 S2, no S3 or S4, no murmur  MS: no gross musculoskeletal defects noted, no edema  PSYCH: mentation appears normal, affect normal/bright    Lab on 06/25/2024   Component Date Value Ref Range Status    Cholesterol 06/25/2024 199  <200 mg/dL Final    Triglycerides 06/25/2024 101  <150 mg/dL Final    Direct Measure HDL 06/25/2024 57  >=50 mg/dL Final    LDL Cholesterol Calculated 06/25/2024 122 (H)  <=100 mg/dL Final    Non HDL Cholesterol 06/25/2024 142 (H)  <130 mg/dL Final    Patient Fasting > 8hrs? 06/25/2024 Yes   Final    Sodium 06/25/2024 141  135 - 145 mmol/L Final    Reference intervals for this test were updated on 09/26/2023 to more accurately reflect our healthy population. There may be differences in the flagging of prior results with similar values performed with this method. Interpretation of those prior results can be made in the context of the updated reference intervals.     Potassium 06/25/2024 4.5  3.4 - 5.3 mmol/L Final    Carbon Dioxide (CO2) 06/25/2024 24  22 - 29 mmol/L Final    Anion Gap 06/25/2024 12  7 - 15 mmol/L Final    Urea Nitrogen 06/25/2024 26.4 (H)  8.0 - 23.0 mg/dL Final    Creatinine 06/25/2024 1.04 (H)  0.51 - 0.95 mg/dL Final    GFR Estimate 06/25/2024 54 (L)  >60 mL/min/1.73m2 Final    eGFR calculated using 2021 CKD-EPI equation.    Calcium 06/25/2024 9.8  8.8 - 10.2 mg/dL Final    Chloride 06/25/2024 105  98 - 107 mmol/L Final    Glucose 06/25/2024 89  70 - 99 mg/dL Final    Alkaline Phosphatase 06/25/2024 82  40 - 150 U/L Final    AST 06/25/2024 39  0 - 45 U/L Final    Reference intervals for this test were updated on 6/12/2023 to more accurately reflect our healthy population. There may be differences in the flagging  of prior results with similar values performed with this method. Interpretation of those prior results can be made in the context of the updated reference intervals.    ALT 06/25/2024 29  0 - 50 U/L Final    Reference intervals for this test were updated on 6/12/2023 to more accurately reflect our healthy population. There may be differences in the flagging of prior results with similar values performed with this method. Interpretation of those prior results can be made in the context of the updated reference intervals.      Protein Total 06/25/2024 7.1  6.4 - 8.3 g/dL Final    Albumin 06/25/2024 4.2  3.5 - 5.2 g/dL Final    Bilirubin Total 06/25/2024 0.4  <=1.2 mg/dL Final    Patient Fasting > 8hrs? 06/25/2024 Yes   Final    TSH 06/25/2024 3.66  0.30 - 4.20 uIU/mL Final    Estimated Average Glucose 06/25/2024 111  mg/dL Final    Hemoglobin A1C 06/25/2024 5.5  <5.7 % Final    Normal <5.7%   Prediabetes 5.7-6.4%    Diabetes 6.5% or higher     Note: Adopted from ADA consensus guidelines.           Signed Electronically by: Brittney Coe NP

## 2024-06-27 ENCOUNTER — OFFICE VISIT (OUTPATIENT)
Dept: FAMILY MEDICINE | Facility: OTHER | Age: 79
End: 2024-06-27
Attending: NURSE PRACTITIONER
Payer: COMMERCIAL

## 2024-06-27 VITALS
BODY MASS INDEX: 23.94 KG/M2 | DIASTOLIC BLOOD PRESSURE: 72 MMHG | SYSTOLIC BLOOD PRESSURE: 120 MMHG | HEART RATE: 56 BPM | WEIGHT: 133 LBS | TEMPERATURE: 96.9 F | RESPIRATION RATE: 18 BRPM | OXYGEN SATURATION: 96 %

## 2024-06-27 DIAGNOSIS — E03.4 HYPOTHYROIDISM DUE TO ACQUIRED ATROPHY OF THYROID: ICD-10-CM

## 2024-06-27 DIAGNOSIS — Z79.899 ON STATIN THERAPY: ICD-10-CM

## 2024-06-27 DIAGNOSIS — E78.5 HYPERLIPIDEMIA LDL GOAL <100: ICD-10-CM

## 2024-06-27 DIAGNOSIS — I10 BENIGN ESSENTIAL HYPERTENSION: ICD-10-CM

## 2024-06-27 DIAGNOSIS — F51.01 PRIMARY INSOMNIA: ICD-10-CM

## 2024-06-27 DIAGNOSIS — F41.1 GENERALIZED ANXIETY DISORDER: ICD-10-CM

## 2024-06-27 DIAGNOSIS — A69.20 LYME DISEASE: ICD-10-CM

## 2024-06-27 DIAGNOSIS — R73.03 PREDIABETES: Primary | ICD-10-CM

## 2024-06-27 PROCEDURE — G2211 COMPLEX E/M VISIT ADD ON: HCPCS | Performed by: NURSE PRACTITIONER

## 2024-06-27 PROCEDURE — G0463 HOSPITAL OUTPT CLINIC VISIT: HCPCS

## 2024-06-27 PROCEDURE — 99214 OFFICE O/P EST MOD 30 MIN: CPT | Performed by: NURSE PRACTITIONER

## 2024-06-27 RX ORDER — LEVOTHYROXINE SODIUM 50 UG/1
TABLET ORAL
Qty: 90 TABLET | Refills: 1 | Status: SHIPPED | OUTPATIENT
Start: 2024-06-27 | End: 2024-10-03

## 2024-06-27 ASSESSMENT — PAIN SCALES - GENERAL: PAINLEVEL: NO PAIN (0)

## 2024-06-27 ASSESSMENT — PATIENT HEALTH QUESTIONNAIRE - PHQ9
10. IF YOU CHECKED OFF ANY PROBLEMS, HOW DIFFICULT HAVE THESE PROBLEMS MADE IT FOR YOU TO DO YOUR WORK, TAKE CARE OF THINGS AT HOME, OR GET ALONG WITH OTHER PEOPLE: NOT DIFFICULT AT ALL
SUM OF ALL RESPONSES TO PHQ QUESTIONS 1-9: 1
SUM OF ALL RESPONSES TO PHQ QUESTIONS 1-9: 1

## 2024-06-27 NOTE — PATIENT INSTRUCTIONS
Assessment & Plan     1. Prediabetes  stable  - Hemoglobin A1c; Future  - Albumin Random Urine Quantitative with Creat Ratio; Future    2. Hyperlipidemia LDL goal <100  Continue 30mg lipitor daily  - Lipid Profile; Future    3. Benign essential hypertension  Well controlled   - Comprehensive metabolic panel; Future    4. Hypothyroidism due to acquired atrophy of thyroid  - TSH with free T4 reflex; Future  - levothyroxine (SYNTHROID/LEVOTHROID) 50 MCG tablet; TAKE 1 TABLET(50 MCG) BY MOUTH DAILY  Dispense: 90 tablet; Refill: 1    5. Generalized anxiety disorder  Stable     6. Primary insomnia  Stable     7. Lyme disease  Improved     8. On statin therapy  - Comprehensive metabolic panel; Future      Return in about 3 months (around 9/27/2024) for pre-diabetes, lipids, HTN.    Brittney Coe,   Certified Adult Nurse Practitioner  [phone]

## 2024-07-06 ENCOUNTER — HEALTH MAINTENANCE LETTER (OUTPATIENT)
Age: 79
End: 2024-07-06

## 2024-09-30 NOTE — PROGRESS NOTES
Assessment & Plan     1. Prediabetes  normal  - Hemoglobin A1c; Future    2. Hyperlipidemia LDL goal <100  Continue 30mg lipitor  - Lipid Profile; Future    3. Benign essential hypertension  Well controlled   - Comprehensive metabolic panel; Future  - TSH with free T4 reflex; Future    4. Stage 3a chronic kidney disease (H)  Do not use NSAIDS    5. Hypothyroidism due to acquired atrophy of thyroid  Increase dose   - levothyroxine (SYNTHROID/LEVOTHROID) 75 MCG tablet; Take one tablet every other day  Dispense: 45 tablet; Refill: 1  - levothyroxine (SYNTHROID/LEVOTHROID) 50 MCG tablet; Take one tablet every other day.    6. Generalized anxiety disorder  stable    7. Primary insomnia  Stable     8. On statin therapy  - Comprehensive metabolic panel; Future    9. Breast cancer screening by mammogram  - MA Screen Bilateral w/Senthil; Future  - due in December     10. Need for vaccination  - INFLUENZA HIGH DOSE, TRIVALENT, PF (FLUZONE)    11. Bilateral hip pain  Suspect bursitis.   - XR Pelvis and Hip Bilateral 2 Views (Clinic Performed); Future        Return in about 3 months (around 1/3/2025) for pre-diabetes, lipids, HTN, anxiety/depression.    The longitudinal plan of care for the diagnosis(es)/condition(s) as documented were addressed during this visit. Due to the added complexity in care, I will continue to support Franca in the subsequent management and with ongoing continuity of care.    Brittney Coe,   Certified Adult Nurse Practitioner  [phone]      Raffaele Schulte is a 79 year old, presenting for the following health issues:  Hypertension and Lipids    HPI     Hyperlipidemia Follow-Up    Are you regularly taking any medication or supplement to lower your cholesterol?   Yes- Lipitor 10 mg  Are you having muscle aches or other side effects that you think could be caused by your cholesterol lowering medication?  No    Hypertension Follow-up    Do you check your blood pressure regularly outside of  "the clinic? No   Are you following a low salt diet? Yes  Are your blood pressures ever more than 140 on the top number (systolic) OR more   than 90 on the bottom number (diastolic), for example 140/90? No    Concern - bilateral hip pain  Onset: slowly worsening over last month  Description: burning/stabbing pain in hips  Intensity: mild, moderate  Progression of Symptoms:  worsening  Accompanying Signs & Symptoms: none  Previous history of similar problem: none  Precipitating factors:        Worsened by: dancing, walking  Alleviating factors:        Improved by: time, ice  Therapies tried and outcome: None    Recent Labs   Lab Test 10/01/24  0823 06/25/24  0804 03/26/24  0802 08/20/21  0757 05/10/21  0801 02/05/21  0809   A1C 5.6 5.5 5.7*   < > 5.6  --    * 122* 107*   < > 105* 119*   HDL 54 57 59   < > 58 55   TRIG 146 101 126   < > 138 149   ALT 23 29 26   < > 46 36   CR 1.02* 1.04* 0.92   < > 1.04 1.01   GFRESTIMATED 55* 54* 63   < > 52* 54*   GFRESTBLACK  --   --   --   --  60* 62   POTASSIUM 4.4 4.5 4.4   < > 4.3 4.1   TSH 4.49* 3.66 5.34*   < > 3.79 4.17*    < > = values in this interval not displayed.      BP Readings from Last 3 Encounters:   10/03/24 122/68   06/27/24 120/72   06/17/24 132/72    Wt Readings from Last 3 Encounters:   10/03/24 61.2 kg (135 lb)   06/27/24 60.3 kg (133 lb)   06/17/24 61.2 kg (135 lb)                 Review of Systems  Constitutional, neuro, ENT, endocrine, pulmonary, cardiac, gastrointestinal, genitourinary, musculoskeletal, integument and psychiatric systems are negative, except as otherwise noted.      Objective    /68 (BP Location: Left arm, Patient Position: Chair, Cuff Size: Adult Regular)   Pulse 55   Temp 96.8  F (36  C) (Tympanic)   Resp 16   Ht 1.588 m (5' 2.5\")   Wt 61.2 kg (135 lb)   SpO2 97%   BMI 24.30 kg/m    Body mass index is 24.3 kg/m .  Physical Exam   GENERAL: alert and no distress  NECK: no adenopathy, no asymmetry, masses, or scars, " thyroid normal to palpation, and no carotid bruits  RESP: lungs clear to auscultation - no rales, rhonchi or wheezes  CV: regular rate and rhythm, normal S1 S2, no S3 or S4, no murmur  MS: no gross musculoskeletal defects noted, tenderness over greater trachanter, right worse than left.   PSYCH: mentation appears normal, affect normal/bright    Lab on 10/01/2024   Component Date Value Ref Range Status    Estimated Average Glucose 10/01/2024 114  <117 mg/dL Final    Hemoglobin A1C 10/01/2024 5.6  <5.7 % Final    Normal <5.7%   Prediabetes 5.7-6.4%    Diabetes 6.5% or higher     Note: Adopted from ADA consensus guidelines.    Cholesterol 10/01/2024 196  <200 mg/dL Final    Triglycerides 10/01/2024 146  <150 mg/dL Final    Direct Measure HDL 10/01/2024 54  >=50 mg/dL Final    LDL Cholesterol Calculated 10/01/2024 113 (H)  <100 mg/dL Final    Non HDL Cholesterol 10/01/2024 142 (H)  <130 mg/dL Final    Patient Fasting > 8hrs? 10/01/2024 Yes   Final    Sodium 10/01/2024 140  135 - 145 mmol/L Final    Potassium 10/01/2024 4.4  3.4 - 5.3 mmol/L Final    Carbon Dioxide (CO2) 10/01/2024 24  22 - 29 mmol/L Final    Anion Gap 10/01/2024 12  7 - 15 mmol/L Final    Urea Nitrogen 10/01/2024 20.1  8.0 - 23.0 mg/dL Final    Creatinine 10/01/2024 1.02 (H)  0.51 - 0.95 mg/dL Final    GFR Estimate 10/01/2024 55 (L)  >60 mL/min/1.73m2 Final    eGFR calculated using 2021 CKD-EPI equation.    Calcium 10/01/2024 10.0  8.8 - 10.4 mg/dL Final    Reference intervals for this test were updated on 7/16/2024 to reflect our healthy population more accurately. There may be differences in the flagging of prior results with similar values performed with this method. Those prior results can be interpreted in the context of the updated reference intervals.    Chloride 10/01/2024 104  98 - 107 mmol/L Final    Glucose 10/01/2024 93  70 - 99 mg/dL Final    Alkaline Phosphatase 10/01/2024 82  40 - 150 U/L Final    AST 10/01/2024 33  0 - 45 U/L Final     ALT 10/01/2024 23  0 - 50 U/L Final    Protein Total 10/01/2024 7.1  6.4 - 8.3 g/dL Final    Albumin 10/01/2024 4.3  3.5 - 5.2 g/dL Final    Bilirubin Total 10/01/2024 0.3  <=1.2 mg/dL Final    Patient Fasting > 8hrs? 10/01/2024 Yes   Final    TSH 10/01/2024 4.49 (H)  0.30 - 4.20 uIU/mL Final    Creatinine Urine mg/dL 10/01/2024 82.5  mg/dL Final    The reference ranges have not been established in urine creatinine. The results should be integrated into the clinical context for interpretation.    Albumin Urine mg/L 10/01/2024 <12.0  mg/L Final    The reference ranges have not been established in urine albumin. The results should be integrated into the clinical context for interpretation.    Albumin Urine mg/g Cr 10/01/2024    Final    Unable to calculate, urine albumin and/or urine creatinine is outside detectable limits.  Microalbuminuria is defined as an albumin:creatinine ratio of 17 to 299 for males and 25 to 299 for females. A ratio of albumin:creatinine of 300 or higher is indicative of overt proteinuria.  Due to biologic variability, positive results should be confirmed by a second, first-morning random or 24-hour timed urine specimen. If there is discrepancy, a third specimen is recommended. When 2 out of 3 results are in the microalbuminuria range, this is evidence for incipient nephropathy and warrants increased efforts at glucose control, blood pressure control, and institution of therapy with an angiotensin-converting-enzyme (ACE) inhibitor (if the patient can tolerate it).      Free T4 10/01/2024 1.04  0.90 - 1.70 ng/dL Final         PROCEDURE: XR PELVIS AND HIP BILATERAL 2 VIEWS 10/3/2024 10:24 AM     HISTORY: Bilateral hip pain; Bilateral hip pain     COMPARISONS: None.     TECHNIQUE: Pelvis one view, right hip 2 views, left hip 2 views     FINDINGS: Pelvis: The pelvis is intact. The sacrum and sacroiliac  joints appear normal.     Right hip 2 views: The articular spaces are normal in height at  the  right hip. The acetabulum and right proximal femur appears normal.     Left hip 2 views: The articular spaces normal height. The acetabulum  and left proximal femur appears normal.                                                                        IMPRESSION: Normal pelvis and both hips     QUEENIE HANSON MD     Signed Electronically by: Brittney Coe NP

## 2024-10-01 ENCOUNTER — LAB (OUTPATIENT)
Dept: LAB | Facility: OTHER | Age: 79
End: 2024-10-01
Payer: MEDICARE

## 2024-10-01 DIAGNOSIS — I10 BENIGN ESSENTIAL HYPERTENSION: ICD-10-CM

## 2024-10-01 DIAGNOSIS — E03.4 HYPOTHYROIDISM DUE TO ACQUIRED ATROPHY OF THYROID: ICD-10-CM

## 2024-10-01 DIAGNOSIS — Z79.899 ON STATIN THERAPY: ICD-10-CM

## 2024-10-01 DIAGNOSIS — E78.5 HYPERLIPIDEMIA LDL GOAL <100: ICD-10-CM

## 2024-10-01 DIAGNOSIS — R73.03 PREDIABETES: ICD-10-CM

## 2024-10-01 LAB
ALBUMIN SERPL BCG-MCNC: 4.3 G/DL (ref 3.5–5.2)
ALP SERPL-CCNC: 82 U/L (ref 40–150)
ALT SERPL W P-5'-P-CCNC: 23 U/L (ref 0–50)
ANION GAP SERPL CALCULATED.3IONS-SCNC: 12 MMOL/L (ref 7–15)
AST SERPL W P-5'-P-CCNC: 33 U/L (ref 0–45)
BILIRUB SERPL-MCNC: 0.3 MG/DL
BUN SERPL-MCNC: 20.1 MG/DL (ref 8–23)
CALCIUM SERPL-MCNC: 10 MG/DL (ref 8.8–10.4)
CHLORIDE SERPL-SCNC: 104 MMOL/L (ref 98–107)
CHOLEST SERPL-MCNC: 196 MG/DL
CREAT SERPL-MCNC: 1.02 MG/DL (ref 0.51–0.95)
CREAT UR-MCNC: 82.5 MG/DL
EGFRCR SERPLBLD CKD-EPI 2021: 55 ML/MIN/1.73M2
EST. AVERAGE GLUCOSE BLD GHB EST-MCNC: 114 MG/DL
FASTING STATUS PATIENT QL REPORTED: YES
FASTING STATUS PATIENT QL REPORTED: YES
GLUCOSE SERPL-MCNC: 93 MG/DL (ref 70–99)
HBA1C MFR BLD: 5.6 %
HCO3 SERPL-SCNC: 24 MMOL/L (ref 22–29)
HDLC SERPL-MCNC: 54 MG/DL
LDLC SERPL CALC-MCNC: 113 MG/DL
MICROALBUMIN UR-MCNC: <12 MG/L
MICROALBUMIN/CREAT UR: NORMAL MG/G{CREAT}
NONHDLC SERPL-MCNC: 142 MG/DL
POTASSIUM SERPL-SCNC: 4.4 MMOL/L (ref 3.4–5.3)
PROT SERPL-MCNC: 7.1 G/DL (ref 6.4–8.3)
SODIUM SERPL-SCNC: 140 MMOL/L (ref 135–145)
T4 FREE SERPL-MCNC: 1.04 NG/DL (ref 0.9–1.7)
TRIGL SERPL-MCNC: 146 MG/DL
TSH SERPL DL<=0.005 MIU/L-ACNC: 4.49 UIU/ML (ref 0.3–4.2)

## 2024-10-01 PROCEDURE — 84439 ASSAY OF FREE THYROXINE: CPT | Mod: ZL

## 2024-10-01 PROCEDURE — 82947 ASSAY GLUCOSE BLOOD QUANT: CPT | Mod: ZL

## 2024-10-01 PROCEDURE — 36415 COLL VENOUS BLD VENIPUNCTURE: CPT | Mod: ZL

## 2024-10-01 PROCEDURE — 82043 UR ALBUMIN QUANTITATIVE: CPT | Mod: ZL

## 2024-10-01 PROCEDURE — 82465 ASSAY BLD/SERUM CHOLESTEROL: CPT | Mod: ZL

## 2024-10-01 PROCEDURE — 83036 HEMOGLOBIN GLYCOSYLATED A1C: CPT | Mod: ZL

## 2024-10-01 PROCEDURE — 84443 ASSAY THYROID STIM HORMONE: CPT | Mod: ZL

## 2024-10-03 ENCOUNTER — OFFICE VISIT (OUTPATIENT)
Dept: FAMILY MEDICINE | Facility: OTHER | Age: 79
End: 2024-10-03
Attending: NURSE PRACTITIONER
Payer: COMMERCIAL

## 2024-10-03 ENCOUNTER — ANCILLARY PROCEDURE (OUTPATIENT)
Dept: GENERAL RADIOLOGY | Facility: OTHER | Age: 79
End: 2024-10-03
Attending: NURSE PRACTITIONER
Payer: MEDICARE

## 2024-10-03 VITALS
DIASTOLIC BLOOD PRESSURE: 68 MMHG | RESPIRATION RATE: 16 BRPM | SYSTOLIC BLOOD PRESSURE: 122 MMHG | HEART RATE: 55 BPM | BODY MASS INDEX: 23.92 KG/M2 | OXYGEN SATURATION: 97 % | HEIGHT: 63 IN | WEIGHT: 135 LBS | TEMPERATURE: 96.8 F

## 2024-10-03 DIAGNOSIS — F41.1 GENERALIZED ANXIETY DISORDER: ICD-10-CM

## 2024-10-03 DIAGNOSIS — F51.01 PRIMARY INSOMNIA: ICD-10-CM

## 2024-10-03 DIAGNOSIS — M25.551 BILATERAL HIP PAIN: ICD-10-CM

## 2024-10-03 DIAGNOSIS — M25.552 BILATERAL HIP PAIN: ICD-10-CM

## 2024-10-03 DIAGNOSIS — E03.4 HYPOTHYROIDISM DUE TO ACQUIRED ATROPHY OF THYROID: ICD-10-CM

## 2024-10-03 DIAGNOSIS — Z23 NEED FOR VACCINATION: ICD-10-CM

## 2024-10-03 DIAGNOSIS — E78.5 HYPERLIPIDEMIA LDL GOAL <100: ICD-10-CM

## 2024-10-03 DIAGNOSIS — Z79.899 ON STATIN THERAPY: ICD-10-CM

## 2024-10-03 DIAGNOSIS — R73.03 PREDIABETES: Primary | ICD-10-CM

## 2024-10-03 DIAGNOSIS — Z12.31 BREAST CANCER SCREENING BY MAMMOGRAM: ICD-10-CM

## 2024-10-03 DIAGNOSIS — I10 BENIGN ESSENTIAL HYPERTENSION: ICD-10-CM

## 2024-10-03 DIAGNOSIS — N18.31 STAGE 3A CHRONIC KIDNEY DISEASE (H): ICD-10-CM

## 2024-10-03 PROCEDURE — G0008 ADMIN INFLUENZA VIRUS VAC: HCPCS

## 2024-10-03 PROCEDURE — 73522 X-RAY EXAM HIPS BI 3-4 VIEWS: CPT | Mod: TC

## 2024-10-03 PROCEDURE — G0463 HOSPITAL OUTPT CLINIC VISIT: HCPCS | Mod: 25

## 2024-10-03 PROCEDURE — 99214 OFFICE O/P EST MOD 30 MIN: CPT | Performed by: NURSE PRACTITIONER

## 2024-10-03 PROCEDURE — G2211 COMPLEX E/M VISIT ADD ON: HCPCS | Performed by: NURSE PRACTITIONER

## 2024-10-03 RX ORDER — LEVOTHYROXINE SODIUM 50 UG/1
TABLET ORAL
COMMUNITY
Start: 2024-10-03

## 2024-10-03 RX ORDER — LEVOTHYROXINE SODIUM 75 UG/1
TABLET ORAL
Qty: 45 TABLET | Refills: 1 | Status: SHIPPED | OUTPATIENT
Start: 2024-10-03

## 2024-10-03 ASSESSMENT — PAIN SCALES - GENERAL: PAINLEVEL: NO PAIN (0)

## 2024-10-03 NOTE — PATIENT INSTRUCTIONS
Assessment & Plan     1. Prediabetes  normal  - Hemoglobin A1c; Future    2. Hyperlipidemia LDL goal <100  Continue 30mg lipitor  - Lipid Profile; Future    3. Benign essential hypertension  Well controlled   - Comprehensive metabolic panel; Future  - TSH with free T4 reflex; Future    4. Stage 3a chronic kidney disease (H)  Do not use NSAIDS    5. Hypothyroidism due to acquired atrophy of thyroid  Increase dose   - levothyroxine (SYNTHROID/LEVOTHROID) 75 MCG tablet; Take one tablet every other day  Dispense: 45 tablet; Refill: 1  - levothyroxine (SYNTHROID/LEVOTHROID) 50 MCG tablet; Take one tablet every other day.    6. Generalized anxiety disorder  stable    7. Primary insomnia  Stable     8. On statin therapy  - Comprehensive metabolic panel; Future    9. Breast cancer screening by mammogram  - MA Screen Bilateral w/Senthil; Future  - due in December     10. Need for vaccination  - INFLUENZA HIGH DOSE, TRIVALENT, PF (FLUZONE)    11. Bilateral hip pain  Suspect bursitis.   - XR Pelvis and Hip Bilateral 2 Views (Clinic Performed); Future        Return in about 3 months (around 1/3/2025) for pre-diabetes, lipids, HTN, anxiety/depression.    Brittney Coe,   Certified Adult Nurse Practitioner  [phone]

## 2024-10-04 DIAGNOSIS — E78.5 HYPERLIPIDEMIA LDL GOAL <100: ICD-10-CM

## 2024-10-04 RX ORDER — ATORVASTATIN CALCIUM 20 MG/1
TABLET, FILM COATED ORAL
Qty: 90 TABLET | Refills: 3 | Status: SHIPPED | OUTPATIENT
Start: 2024-10-04

## 2024-10-04 NOTE — TELEPHONE ENCOUNTER
atorvastatin (LIPITOR) 20 MG tablet       Last Written Prescription Date:  3/28/2024  Last Fill Quantity: 90,   # refills: 1  Last Office Visit: 10/03/2024

## 2024-11-26 DIAGNOSIS — E78.5 HYPERLIPIDEMIA LDL GOAL <100: ICD-10-CM

## 2024-11-26 RX ORDER — ATORVASTATIN CALCIUM 10 MG/1
TABLET, FILM COATED ORAL
Qty: 90 TABLET | Refills: 2 | Status: SHIPPED | OUTPATIENT
Start: 2024-11-26

## 2025-01-03 ENCOUNTER — LAB (OUTPATIENT)
Dept: LAB | Facility: OTHER | Age: 80
End: 2025-01-03
Payer: MEDICARE

## 2025-01-03 DIAGNOSIS — E78.5 HYPERLIPIDEMIA LDL GOAL <100: ICD-10-CM

## 2025-01-03 DIAGNOSIS — I10 BENIGN ESSENTIAL HYPERTENSION: ICD-10-CM

## 2025-01-03 DIAGNOSIS — R73.03 PREDIABETES: ICD-10-CM

## 2025-01-03 DIAGNOSIS — Z79.899 ON STATIN THERAPY: ICD-10-CM

## 2025-01-03 LAB
ALBUMIN SERPL BCG-MCNC: 4.1 G/DL (ref 3.5–5.2)
ALP SERPL-CCNC: 87 U/L (ref 40–150)
ALT SERPL W P-5'-P-CCNC: 33 U/L (ref 0–50)
ANION GAP SERPL CALCULATED.3IONS-SCNC: 11 MMOL/L (ref 7–15)
AST SERPL W P-5'-P-CCNC: 32 U/L (ref 0–45)
BILIRUB SERPL-MCNC: 0.4 MG/DL
BUN SERPL-MCNC: 20.4 MG/DL (ref 8–23)
CALCIUM SERPL-MCNC: 9.6 MG/DL (ref 8.8–10.4)
CHLORIDE SERPL-SCNC: 104 MMOL/L (ref 98–107)
CHOLEST SERPL-MCNC: 195 MG/DL
CREAT SERPL-MCNC: 0.93 MG/DL (ref 0.51–0.95)
EGFRCR SERPLBLD CKD-EPI 2021: 62 ML/MIN/1.73M2
EST. AVERAGE GLUCOSE BLD GHB EST-MCNC: 111 MG/DL
FASTING STATUS PATIENT QL REPORTED: YES
FASTING STATUS PATIENT QL REPORTED: YES
GLUCOSE SERPL-MCNC: 93 MG/DL (ref 70–99)
HBA1C MFR BLD: 5.5 %
HCO3 SERPL-SCNC: 23 MMOL/L (ref 22–29)
HDLC SERPL-MCNC: 54 MG/DL
LDLC SERPL CALC-MCNC: 118 MG/DL
NONHDLC SERPL-MCNC: 141 MG/DL
POTASSIUM SERPL-SCNC: 4.4 MMOL/L (ref 3.4–5.3)
PROT SERPL-MCNC: 7 G/DL (ref 6.4–8.3)
SODIUM SERPL-SCNC: 138 MMOL/L (ref 135–145)
TRIGL SERPL-MCNC: 117 MG/DL
TSH SERPL DL<=0.005 MIU/L-ACNC: 3.08 UIU/ML (ref 0.3–4.2)

## 2025-01-03 PROCEDURE — 84155 ASSAY OF PROTEIN SERUM: CPT | Mod: ZL

## 2025-01-03 PROCEDURE — 84075 ASSAY ALKALINE PHOSPHATASE: CPT | Mod: ZL

## 2025-01-03 PROCEDURE — 36415 COLL VENOUS BLD VENIPUNCTURE: CPT | Mod: ZL

## 2025-01-03 PROCEDURE — 82465 ASSAY BLD/SERUM CHOLESTEROL: CPT | Mod: ZL

## 2025-01-03 PROCEDURE — 83036 HEMOGLOBIN GLYCOSYLATED A1C: CPT | Mod: ZL

## 2025-01-03 PROCEDURE — 84443 ASSAY THYROID STIM HORMONE: CPT | Mod: ZL

## 2025-01-06 ENCOUNTER — OFFICE VISIT (OUTPATIENT)
Dept: FAMILY MEDICINE | Facility: OTHER | Age: 80
End: 2025-01-06
Attending: NURSE PRACTITIONER
Payer: MEDICARE

## 2025-01-06 VITALS
OXYGEN SATURATION: 96 % | HEART RATE: 78 BPM | DIASTOLIC BLOOD PRESSURE: 77 MMHG | TEMPERATURE: 98.1 F | BODY MASS INDEX: 23.76 KG/M2 | WEIGHT: 132 LBS | SYSTOLIC BLOOD PRESSURE: 137 MMHG | RESPIRATION RATE: 16 BRPM

## 2025-01-06 DIAGNOSIS — H61.23 EXCESSIVE CERUMEN IN BOTH EAR CANALS: ICD-10-CM

## 2025-01-06 DIAGNOSIS — H91.93 DECREASED HEARING OF BOTH EARS: ICD-10-CM

## 2025-01-06 DIAGNOSIS — I10 BENIGN ESSENTIAL HYPERTENSION: ICD-10-CM

## 2025-01-06 DIAGNOSIS — Z79.899 ON STATIN THERAPY: ICD-10-CM

## 2025-01-06 DIAGNOSIS — N18.31 STAGE 3A CHRONIC KIDNEY DISEASE (H): ICD-10-CM

## 2025-01-06 DIAGNOSIS — E03.4 HYPOTHYROIDISM DUE TO ACQUIRED ATROPHY OF THYROID: ICD-10-CM

## 2025-01-06 DIAGNOSIS — F51.01 PRIMARY INSOMNIA: ICD-10-CM

## 2025-01-06 DIAGNOSIS — R73.03 PREDIABETES: ICD-10-CM

## 2025-01-06 DIAGNOSIS — F41.1 GENERALIZED ANXIETY DISORDER: ICD-10-CM

## 2025-01-06 DIAGNOSIS — R05.8 OTHER COUGH: ICD-10-CM

## 2025-01-06 DIAGNOSIS — Z00.00 ANNUAL PHYSICAL EXAM: Primary | ICD-10-CM

## 2025-01-06 DIAGNOSIS — E78.5 HYPERLIPIDEMIA LDL GOAL <100: ICD-10-CM

## 2025-01-06 PROCEDURE — G0463 HOSPITAL OUTPT CLINIC VISIT: HCPCS

## 2025-01-06 RX ORDER — ATORVASTATIN CALCIUM 20 MG/1
20 TABLET, FILM COATED ORAL DAILY
Qty: 90 TABLET | Refills: 3 | Status: SHIPPED | OUTPATIENT
Start: 2025-01-06

## 2025-01-06 RX ORDER — BENZONATATE 200 MG/1
200 CAPSULE ORAL 3 TIMES DAILY PRN
Qty: 30 CAPSULE | Refills: 2 | Status: SHIPPED | OUTPATIENT
Start: 2025-01-06

## 2025-01-06 SDOH — HEALTH STABILITY: PHYSICAL HEALTH: ON AVERAGE, HOW MANY DAYS PER WEEK DO YOU ENGAGE IN MODERATE TO STRENUOUS EXERCISE (LIKE A BRISK WALK)?: 4 DAYS

## 2025-01-06 ASSESSMENT — ANXIETY QUESTIONNAIRES
7. FEELING AFRAID AS IF SOMETHING AWFUL MIGHT HAPPEN: NOT AT ALL
GAD7 TOTAL SCORE: 1
1. FEELING NERVOUS, ANXIOUS, OR ON EDGE: NOT AT ALL
GAD7 TOTAL SCORE: 1
GAD7 TOTAL SCORE: 1
IF YOU CHECKED OFF ANY PROBLEMS ON THIS QUESTIONNAIRE, HOW DIFFICULT HAVE THESE PROBLEMS MADE IT FOR YOU TO DO YOUR WORK, TAKE CARE OF THINGS AT HOME, OR GET ALONG WITH OTHER PEOPLE: NOT DIFFICULT AT ALL
3. WORRYING TOO MUCH ABOUT DIFFERENT THINGS: NOT AT ALL
4. TROUBLE RELAXING: NOT AT ALL
8. IF YOU CHECKED OFF ANY PROBLEMS, HOW DIFFICULT HAVE THESE MADE IT FOR YOU TO DO YOUR WORK, TAKE CARE OF THINGS AT HOME, OR GET ALONG WITH OTHER PEOPLE?: NOT DIFFICULT AT ALL
6. BECOMING EASILY ANNOYED OR IRRITABLE: SEVERAL DAYS
5. BEING SO RESTLESS THAT IT IS HARD TO SIT STILL: NOT AT ALL
7. FEELING AFRAID AS IF SOMETHING AWFUL MIGHT HAPPEN: NOT AT ALL
2. NOT BEING ABLE TO STOP OR CONTROL WORRYING: NOT AT ALL

## 2025-01-06 ASSESSMENT — SOCIAL DETERMINANTS OF HEALTH (SDOH): HOW OFTEN DO YOU GET TOGETHER WITH FRIENDS OR RELATIVES?: THREE TIMES A WEEK

## 2025-01-06 ASSESSMENT — PAIN SCALES - GENERAL: PAINLEVEL_OUTOF10: NO PAIN (0)

## 2025-01-06 NOTE — PROGRESS NOTES
Preventive Care Visit  [facility] MITRA CASTILLO NEVAEH Coe, Family Medicine  Jan 6, 2025      Assessment & Plan     1. Annual physical exam (Primary)  Exam compelted     2. Prediabetes  - Hemoglobin A1c; Future    3. Hyperlipidemia LDL goal <100  - atorvastatin (LIPITOR) 20 MG tablet; Take 1 tablet (20 mg) by mouth daily.  Dispense: 90 tablet; Refill: 3  - Lipid Profile; Future    4. Benign essential hypertension  Well controlled   - Comprehensive metabolic panel; Future    5. Stage 3a chronic kidney disease (H)  - Comprehensive metabolic panel; Future    6. Hypothyroidism due to acquired atrophy of thyroid  - TSH with free T4 reflex; Future    7. Generalized anxiety disorder  Stable     8. Primary insomnia  Stable     9. On statin therapy  - Comprehensive metabolic panel; Future    10. Other cough  - benzonatate (TESSALON) 200 MG capsule; Take 1 capsule (200 mg) by mouth 3 times daily as needed for cough.  Dispense: 30 capsule; Refill: 2    11. Excessive cerumen in both ear canals  - Adult ENT  Referral; Future    12. Decreased hearing of both ears  - Adult ENT  Referral; Future      Patient has been advised of split billing requirements and indicates understanding: Yes    Discussed repeating cardiac echo to assess murmur, declined for now as asymptomatic.      Counseling  Appropriate preventive services were addressed with this patient via screening, questionnaire, or discussion as appropriate for fall prevention, nutrition, physical activity, Tobacco-use cessation, social engagement, weight loss and cognition.  Checklist reviewing preventive services available has been given to the patient.  Reviewed patient's diet, addressing concerns and/or questions.   Patient reported safety concerns were addressed today.The patient was provided with written information regarding signs of hearing loss.     Follow-up in 3 months or as needed    The longitudinal plan of care for the  diagnosis(es)/condition(s) as documented were addressed during this visit. Due to the added complexity in care, I will continue to support Franca in the subsequent management and with ongoing continuity of care.    Brittney Coe,   Certified Adult Nurse Practitioner  [phone]      Raffaele Schulte is a 79 year old, presenting for the following:  Physical        1/6/2025     2:02 PM   Additional Questions   Roomed by Vandana REILLY   Accompanied by None     HPI    Diabetes (PRE)Follow-up    How often are you checking your blood sugar? Not at all  What concerns do you have today about your diabetes? None   Do you have any of these symptoms? (Select all that apply)  No numbness or tingling in feet.  No redness, sores or blisters on feet.  No complaints of excessive thirst.  No reports of blurry vision.  No significant changes to weight.      BP Readings from Last 3 Encounters:   01/06/25 137/77   10/03/24 122/68   06/27/24 120/72    Wt Readings from Last 3 Encounters:   01/06/25 59.9 kg (132 lb)   10/03/24 61.2 kg (135 lb)   06/27/24 60.3 kg (133 lb)                    Hemoglobin A1C (%)   Date Value   01/03/2025 5.5   10/01/2024 5.6   05/10/2021 5.6   11/11/2020 5.7 (H)     LDL Cholesterol Calculated (mg/dL)   Date Value   01/03/2025 118 (H)   10/01/2024 113 (H)   05/10/2021 105 (H)   02/05/2021 119 (H)           Hyperlipidemia Follow-Up    Are you regularly taking any medication or supplement to lower your cholesterol?   Yes- Lipitor 30 mg   Are you having muscle aches or other side effects that you think could be caused by your cholesterol lowering medication?  No    Hypertension Follow-up    Do you check your blood pressure regularly outside of the clinic? Yes   Are you following a low salt diet? Yes  Are your blood pressures ever more than 140 on the top number (systolic) OR more   than 90 on the bottom number (diastolic), for example 140/90? No    Depression and Anxiety   How are you doing with your  depression since your last visit? No change  How are you doing with your anxiety since your last visit?  No change  Are you having other symptoms that might be associated with depression or anxiety? No  Have you had a significant life event? No   Do you have any concerns with your use of alcohol or other drugs? No    Social History     Tobacco Use    Smoking status: Never     Passive exposure: Never    Smokeless tobacco: Never   Vaping Use    Vaping status: Never Used   Substance Use Topics    Alcohol use: Yes     Alcohol/week: 0.0 standard drinks of alcohol     Comment: occasionally    Drug use: No         5/31/2024    12:53 PM 6/27/2024     8:03 AM 1/6/2025     1:43 PM   PHQ   PHQ-9 Total Score 2 1 2    Q9: Thoughts of better off dead/self-harm past 2 weeks Not at all Not at all Not at all       Patient-reported         12/26/2023     1:06 PM 6/17/2024     2:15 PM 1/6/2025     1:44 PM   MCKINLEY-7 SCORE   Total Score 4 (minimal anxiety) 2 (minimal anxiety) 1 (minimal anxiety)   Total Score 4 2 1        Patient-reported         1/6/2025     1:43 PM   Last PHQ-9   1.  Little interest or pleasure in doing things 0   2.  Feeling down, depressed, or hopeless 1   3.  Trouble falling or staying asleep, or sleeping too much 0   4.  Feeling tired or having little energy 0   5.  Poor appetite or overeating 0   6.  Feeling bad about yourself 1   7.  Trouble concentrating 0   8.  Moving slowly or restless 0   Q9: Thoughts of better off dead/self-harm past 2 weeks 0   PHQ-9 Total Score 2        Patient-reported         1/6/2025     1:44 PM   MCKINLEY-7    1. Feeling nervous, anxious, or on edge 0   2. Not being able to stop or control worrying 0   3. Worrying too much about different things 0   4. Trouble relaxing 0   5. Being so restless that it is hard to sit still 0   6. Becoming easily annoyed or irritable 1   7. Feeling afraid, as if something awful might happen 0   MCKINLEY-7 Total Score 1    If you checked any problems, how difficult  have they made it for you to do your work, take care of things at home, or get along with other people? Not difficult at all       Patient-reported       Suicide Assessment Five-step Evaluation and Treatment (SAFE-T)    Chronic Kidney Disease Follow-up    Do you take any over the counter pain medicine?: Yes  What over the counter medicine are you taking for your pain?:  Tyelnol PM    How often do you take this medicine?:  A few times a month  Hypothyroidism Follow-up    Since last visit, patient describes the following symptoms: Weight stable, no hair loss, no skin changes, no constipation, no loose stools        Health Care Directive  Patient has a Health Care Directive on file        1/6/2025   General Health   How would you rate your overall physical health? Good   Feel stress (tense, anxious, or unable to sleep) Only a little   (!) STRESS CONCERN      1/6/2025   Nutrition   Diet: Low salt         1/6/2025   Exercise   Days per week of moderate/strenous exercise 4 days         1/6/2025   Social Factors   Frequency of gathering with friends or relatives Three times a week   Worry food won't last until get money to buy more No   Food not last or not have enough money for food? No   Do you have housing? (Housing is defined as stable permanent housing and does not include staying ouside in a car, in a tent, in an abandoned building, in an overnight shelter, or couch-surfing.) Yes   Are you worried about losing your housing? No   Lack of transportation? No   Unable to get utilities (heat,electricity)? No         1/6/2025   Fall Risk   Fallen 2 or more times in the past year? No   Trouble with walking or balance? No          1/6/2025   Activities of Daily Living- Home Safety   Needs help with the following daily activites None of the above   Safety concerns in the home No handrails on the stairs         1/6/2025   Dental   Dentist two times every year? Yes         1/6/2025   Hearing Screening   Hearing concerns? (!) I  NEED TO ASK PEOPLE TO SPEAK UP OR REPEAT THEMSELVES.    (!) IT'S HARD TO FOLLOW A CONVERSATION IN A NOISY RESTAURANT OR CROWDED ROOM.    (!) TROUBLE UNDERSTANDING SOFT OR WHISPERED SPEECH.       Multiple values from one day are sorted in reverse-chronological order         1/6/2025   Driving Risk Screening   Patient/family members have concerns about driving No         1/6/2025   General Alertness/Fatigue Screening   Have you been more tired than usual lately? No         1/6/2025   Urinary Incontinence Screening   Bothered by leaking urine in past 6 months No         1/6/2025   TB Screening   Were you born outside of the [place]? No       Today's PHQ-9 Score:       1/6/2025     1:43 PM   PHQ-9 SCORE   PHQ-9 Total Score MyChart 2 (Minimal depression)   PHQ-9 Total Score 2        Patient-reported         1/6/2025   Substance Use   Alcohol more than 3/day or more than 7/wk No   Do you have a current opioid prescription? No   How severe/bad is pain from 1 to 10? 0/10 (No Pain)   Do you use any other substances recreationally? No     Social History     Tobacco Use    Smoking status: Never     Passive exposure: Never    Smokeless tobacco: Never   Vaping Use    Vaping status: Never Used   Substance Use Topics    Alcohol use: Yes     Alcohol/week: 0.0 standard drinks of alcohol     Comment: occasionally    Drug use: No           12/1/2023   LAST FHS-7 RESULTS   1st degree relative breast or ovarian cancer No   Any relative bilateral breast cancer No   Any male have breast cancer No   Any ONE woman have BOTH breast AND ovarian cancer No   Any woman with breast cancer before 50yrs No   2 or more relatives with breast AND/OR ovarian cancer No   2 or more relatives with breast AND/OR bowel cancer No     Annual mammogram - scheduled for 1/9/25.            Reviewed and updated as needed this visit by Provider                      Current providers sharing in care for this patient include:  Patient Care Team:  Brittney Coe,  NP as PCP - General (Family Practice)  Couture-Brittney Tavera, NP as Assigned PCP  Efraín Mckee, DO as Assigned Heart and Vascular Provider  Carol Pennington DPM as Assigned Musculoskeletal Provider    The following health maintenance items are reviewed in Epic and correct as of today:  Health Maintenance   Topic Date Due    MEDICARE ANNUAL WELLNESS VISIT  07/30/2019    HEMOGLOBIN  06/17/2025    MCKINLEY ASSESSMENT  07/06/2025    PHQ-9  07/06/2025    MICROALBUMIN  10/01/2025    BMP  01/03/2026    LIPID  01/03/2026    TSH W/FREE T4 REFLEX  01/03/2026    FALL RISK ASSESSMENT  01/06/2026    GLUCOSE  01/03/2028    ADVANCE CARE PLANNING  03/15/2028    DTAP/TDAP/TD IMMUNIZATION (4 - Td or Tdap) 06/27/2034    DEXA  11/20/2035    DEPRESSION ACTION PLAN  Completed    INFLUENZA VACCINE  Completed    Pneumococcal Vaccine: 50+ Years  Completed    URINALYSIS  Completed    ZOSTER IMMUNIZATION  Completed    RSV VACCINE  Completed    COVID-19 Vaccine  Completed    HPV IMMUNIZATION  Aged Out    MENINGITIS IMMUNIZATION  Aged Out    RSV MONOCLONAL ANTIBODY  Aged Out    MAMMO SCREENING  Discontinued    COLORECTAL CANCER SCREENING  Discontinued         Review of Systems  CONSTITUTIONAL: NEGATIVE for fever, chills, change in weight  INTEGUMENTARY/SKIN: NEGATIVE for worrisome rashes, moles or lesions  EYES: NEGATIVE for vision changes or irritation  ENT/MOUTH: decreased hearing  RESP: NEGATIVE for significant cough or SOB  BREAST: NEGATIVE for masses, tenderness or discharge  CV: NEGATIVE for chest pain, palpitations or peripheral edema  GI: NEGATIVE for nausea, abdominal pain, heartburn, or change in bowel habits  : NEGATIVE for frequency, dysuria, or hematuria  MUSCULOSKELETAL: NEGATIVE for significant arthralgias or myalgia  NEURO: NEGATIVE for weakness, dizziness or paresthesias  ENDOCRINE: NEGATIVE for temperature intolerance, skin/hair changes  HEME: NEGATIVE for bleeding problems  PSYCHIATRIC: NEGATIVE for changes in mood  "or affect     Objective    Exam  /77 (BP Location: Left arm, Patient Position: Sitting, Cuff Size: Adult Regular)   Pulse 78   Temp 98.1  F (36.7  C) (Tympanic)   Resp 16   Wt 59.9 kg (132 lb)   SpO2 96%   Breastfeeding No   BMI 23.76 kg/m     Estimated body mass index is 23.76 kg/m  as calculated from the following:    Height as of 10/3/24: 1.588 m (5' 2.5\").    Weight as of this encounter: 59.9 kg (132 lb).    Physical Exam  GENERAL: alert and no distress  EYES: Eyes grossly normal to inspection, PERRL and conjunctivae and sclerae normal  HENT: ear canals and TM's normal, nose and mouth without ulcers or lesions  NECK: no adenopathy, no asymmetry, masses, or scars, thyroid normal to palpation, and no carotid bruits  RESP: lungs clear to auscultation - no rales, rhonchi or wheezes  CV: regular rate and rhythm, 3/6 murmur - known  MS: no gross musculoskeletal defects noted, no edema  PSYCH: mentation appears normal, affect normal/bright         1/6/2025   Mini Cog   Clock Draw Score 2 Normal   3 Item Recall 2 objects recalled   Mini Cog Total Score 4              Signed Electronically by: Brittney Coe NP    Answers submitted by the patient for this visit:  Patient Health Questionnaire (Submitted on 1/6/2025)  If you checked off any problems, how difficult have these problems made it for you to do your work, take care of things at home, or get along with other people?: Not difficult at all  PHQ9 TOTAL SCORE: 2  Patient Health Questionnaire (G7) (Submitted on 1/6/2025)  MCKINLEY 7 TOTAL SCORE: 1    "

## 2025-01-06 NOTE — PATIENT INSTRUCTIONS
Assessment & Plan     1. Annual physical exam (Primary)  Exam compelted     2. Prediabetes  - Hemoglobin A1c; Future    3. Hyperlipidemia LDL goal <100  - atorvastatin (LIPITOR) 20 MG tablet; Take 1 tablet (20 mg) by mouth daily.  Dispense: 90 tablet; Refill: 3  - Lipid Profile; Future    4. Benign essential hypertension  Well controlled   - Comprehensive metabolic panel; Future    5. Stage 3a chronic kidney disease (H)  - Comprehensive metabolic panel; Future    6. Hypothyroidism due to acquired atrophy of thyroid  - TSH with free T4 reflex; Future    7. Generalized anxiety disorder  Stable     8. Primary insomnia  Stable     9. On statin therapy  - Comprehensive metabolic panel; Future    10. Other cough  - benzonatate (TESSALON) 200 MG capsule; Take 1 capsule (200 mg) by mouth 3 times daily as needed for cough.  Dispense: 30 capsule; Refill: 2    11. Excessive cerumen in both ear canals  - Adult ENT  Referral; Future    12. Decreased hearing of both ears  - Adult ENT  Referral; Future      Follow-up in 3 months or as needed    Brittney Coe,   Certified Adult Nurse Practitioner  [phone]

## 2025-01-09 ENCOUNTER — TELEPHONE (OUTPATIENT)
Dept: MAMMOGRAPHY | Facility: OTHER | Age: 80
End: 2025-01-09

## 2025-01-09 ENCOUNTER — ANCILLARY PROCEDURE (OUTPATIENT)
Dept: MAMMOGRAPHY | Facility: OTHER | Age: 80
End: 2025-01-09
Attending: NURSE PRACTITIONER
Payer: MEDICARE

## 2025-01-09 DIAGNOSIS — Z12.31 BREAST CANCER SCREENING BY MAMMOGRAM: ICD-10-CM

## 2025-01-09 PROCEDURE — 77067 SCR MAMMO BI INCL CAD: CPT | Mod: TC

## 2025-01-09 PROCEDURE — 77063 BREAST TOMOSYNTHESIS BI: CPT | Mod: TC

## 2025-01-15 DIAGNOSIS — I10 BENIGN ESSENTIAL HYPERTENSION: ICD-10-CM

## 2025-01-15 RX ORDER — LISINOPRIL 10 MG/1
10 TABLET ORAL DAILY
Qty: 90 TABLET | Refills: 3 | Status: SHIPPED | OUTPATIENT
Start: 2025-01-15

## 2025-01-24 ENCOUNTER — OFFICE VISIT (OUTPATIENT)
Dept: OTOLARYNGOLOGY | Facility: OTHER | Age: 80
End: 2025-01-24
Attending: NURSE PRACTITIONER
Payer: COMMERCIAL

## 2025-01-24 VITALS
HEIGHT: 63 IN | BODY MASS INDEX: 23.39 KG/M2 | RESPIRATION RATE: 16 BRPM | DIASTOLIC BLOOD PRESSURE: 75 MMHG | SYSTOLIC BLOOD PRESSURE: 120 MMHG | TEMPERATURE: 97.5 F | HEART RATE: 66 BPM | WEIGHT: 132 LBS | OXYGEN SATURATION: 97 %

## 2025-01-24 DIAGNOSIS — H61.23 BILATERAL IMPACTED CERUMEN: Primary | ICD-10-CM

## 2025-01-24 PROCEDURE — 69210 REMOVE IMPACTED EAR WAX UNI: CPT | Performed by: NURSE PRACTITIONER

## 2025-01-24 PROCEDURE — G0463 HOSPITAL OUTPT CLINIC VISIT: HCPCS

## 2025-01-24 PROCEDURE — 69210 REMOVE IMPACTED EAR WAX UNI: CPT | Mod: 50 | Performed by: NURSE PRACTITIONER

## 2025-01-24 ASSESSMENT — PAIN SCALES - GENERAL: PAINLEVEL_OUTOF10: NO PAIN (0)

## 2025-01-24 NOTE — LETTER
1/24/2025      Franca Angeles  [address]  [facility] [address]      Dear Colleague,    Thank you for referring your patient, Franca Angeles, to the [facility]. Please see a copy of my visit note below.    Otolaryngology Note         Chief Complaint:     Patient presents with:  Ent Problem: Excessive cerumen of bilateral ear canals           History of Present Illness:     Franca Angeles is a 79 year old female who presents today for ear cleaning.  She was last seen in ENT on 6/24/22 for ear cleaning.      No concerns with hearing.  No tinnitus, vertigo, facial numbness or weakness.      No otalgia, otorrhea.           Medications:     Current Outpatient Rx   Medication Sig Dispense Refill     aspirin 81 MG tablet Take 81 mg by mouth daily       atorvastatin (LIPITOR) 10 MG tablet TAKE 1 TABLET BY MOUTH EVERY DAY. TAKE ALONG WITH 20MG TABLET FOR TOTAL DOSE OF 30MG DAILY 90 tablet 2     atorvastatin (LIPITOR) 20 MG tablet Take 1 tablet (20 mg) by mouth daily. 90 tablet 3     benzonatate (TESSALON) 200 MG capsule Take 1 capsule (200 mg) by mouth 3 times daily as needed for cough. 30 capsule 2     Calcium-Magnesium-Vitamin D (CALCIUM 500 PO) Take by mouth daily       cinnamon 500 MG CAPS        co-enzyme Q-10 100 MG CAPS capsule Take 100 mg by mouth        gabapentin (NEURONTIN) 300 MG capsule Take 1 capsule (300 mg) by mouth at bedtime 90 capsule 1     Garlic 2000 MG CAPS Take 2,000 mg by mouth daily       GLUCOSAMINE-CHONDROITIN PO Take  by mouth. GLUCOSAMINE HCL/CHONDR SUA NA  One daily       levothyroxine (SYNTHROID/LEVOTHROID) 50 MCG tablet Take one tablet every other day.       levothyroxine (SYNTHROID/LEVOTHROID) 75 MCG tablet Take one tablet every other day 45 tablet 1     lisinopril (ZESTRIL) 10 MG tablet TAKE 1 TABLET(10 MG) BY MOUTH DAILY 90 tablet 3     LORazepam (ATIVAN) 0.5 MG tablet TAKE 1 TABLET BY MOUTH NIGHTLY AS NEEDED FOR ANXIETY OR  SLEEP 30 tablet 0      "mometasone (ELOCON) 0.1 % external cream Apply a thin film of cream to the affected ear canal (s) twice a day for 2 weeks, then use sparingly as needed only. 15 g 1     Multiple Vitamins-Minerals (MULTIVITAMIN OR) Alive 1 tablet       Omega-3 Fatty Acids (FISH OIL) 1200 MG CAPS Take 1,080 mg by mouth daily               Allergies:     Allergies: Pseudoephedrine hcl, Guaifenesin er, Hydroxyzine, and Vilazodone hcl          Past Medical History:     Past Medical History:   Diagnosis Date     Esophageal reflux 05/10/2011            Past Surgical History:     Past Surgical History:   Procedure Laterality Date     ARTHRODESIS KNEE  01/03/2013    left knee, paritla medial meniscectomy, debridement medial femoral condyle and patellofemoral debridement     COLONOSCOPY  2004    nml     COLONOSCOPY  07/23/2014    Procedure: COLONOSCOPY;  Surgeon: Nghia Hernandez DO;  Location: [facility] OR     CYSTOSCOPY      micro hematuria neg     DILATION AND CURETTAGE       EXCISE LESION UPPER EXTREMITY Right 10/25/2017    Procedure: EXCISE LESION UPPER EXTREMITY;  EXCISIONAL BIOPSY RIGHT FOREARM TIMES 2;  Surgeon: Nghia Hernandez DO;  Location: [facility] OR     tubal sterilization       uterine fibroids       wrist fx RT            Social History:     Social History     Tobacco Use     Smoking status: Never     Passive exposure: Never     Smokeless tobacco: Never   Vaping Use     Vaping status: Never Used   Substance Use Topics     Alcohol use: Yes     Alcohol/week: 0.0 standard drinks of alcohol     Comment: occasionally     Drug use: No            Review of Systems:     ROS: See HPI         Physical Exam:     /75 (BP Location: Left arm, Patient Position: Sitting, Cuff Size: Adult Regular)   Pulse 66   Temp 97.5  F (36.4  C) (Tympanic)   Resp 16   Ht 1.588 m (5' 2.52\")   Wt 59.9 kg (132 lb)   SpO2 97%   BMI 23.74 kg/m      General - The patient is well nourished and well developed, and appears to have good nutritional status.  " Alert and oriented to person and place, answers questions and cooperates with examination appropriately.   Head and Face - Normocephalic and atraumatic, with no gross asymmetry noted.  The facial nerve is intact, with strong symmetric movements.  Voice and Breathing - The patient was breathing comfortably without the use of accessory muscles. There was no wheezing, stridor. The patients voice was clear and strong, and had appropriate pitch and quality.  Ears - The ears were examined with binocular microscopy and with otoscope.  External ears normal. Right canal cerumen impacted.  Left canal cerumen impacted.  The right ear was cleaned with cerumen loop, cupped forceps.   Right tympanic membrane is intact without effusion, retraction or mass. The left ear was cleaned in the same manner.  Left tympanic membrane is intact without effusion, retraction or mass.  Eyes - Extraocular movements intact, sclera were not icteric or injected, conjunctiva were pink and moist.  Mouth - Examination of the oral cavity showed pink, healthy oral mucosa. Dentition in good condition. No lesions or ulcerations noted. The tongue was mobile and midline.   Throat - The walls of the oropharynx were smooth, pink, moist, symmetric, and had no lesions or ulcerations.  The tonsillar pillars and soft palate were symmetric. The uvula was midline on elevation.    Neck - Full range of motion on passive movement.  Palpation of the occipital, submental, submandibular, internal jugular chain, and supraclavicular nodes did not demonstrate any abnormal lymph nodes or masses.  Palpation of the thyroid was soft and smooth, with no nodules or goiter appreciated.  The trachea was mobile and midline.  Nose - External contour is symmetric, no gross deflection or scars.  Nasal mucosa is pink and moist with no abnormal mucus.  The septum and turbinates were evaluated with nasal speculum, no polyps, masses, or purulence noted on examination.         Assessment  and Plan:       ICD-10-CM    1. Bilateral impacted cerumen  H61.23         Follow up as needed for ear cleaning  Ears were cleaned, tolerated well    The ears were cleaned today.  The patient may return here as needed or with their primary physician.  Aural hygiene was discussed.  Avoidance of Q-tips was reiterated.  Avoid flushing the ear canal if there is a possibility of a hole or perforation in the tympanic membrane.   If there are any unresolved concerns regarding hearing loss an audiogram is recommended.      Mirtha MARCOS  [facility]    Again, thank you for allowing me to participate in the care of your patient.        Sincerely,        Mirtha Plascencia NP    Electronically signed

## 2025-01-24 NOTE — PROGRESS NOTES
Otolaryngology Note         Chief Complaint:     Patient presents with:  Ent Problem: Excessive cerumen of bilateral ear canals           History of Present Illness:     Franca Angeles is a 79 year old female who presents today for ear cleaning.  She was last seen in ENT on 6/24/22 for ear cleaning.      No concerns with hearing.  No tinnitus, vertigo, facial numbness or weakness.      No otalgia, otorrhea.           Medications:     Current Outpatient Rx   Medication Sig Dispense Refill    aspirin 81 MG tablet Take 81 mg by mouth daily      atorvastatin (LIPITOR) 10 MG tablet TAKE 1 TABLET BY MOUTH EVERY DAY. TAKE ALONG WITH 20MG TABLET FOR TOTAL DOSE OF 30MG DAILY 90 tablet 2    atorvastatin (LIPITOR) 20 MG tablet Take 1 tablet (20 mg) by mouth daily. 90 tablet 3    benzonatate (TESSALON) 200 MG capsule Take 1 capsule (200 mg) by mouth 3 times daily as needed for cough. 30 capsule 2    Calcium-Magnesium-Vitamin D (CALCIUM 500 PO) Take by mouth daily      cinnamon 500 MG CAPS       co-enzyme Q-10 100 MG CAPS capsule Take 100 mg by mouth       gabapentin (NEURONTIN) 300 MG capsule Take 1 capsule (300 mg) by mouth at bedtime 90 capsule 1    Garlic 2000 MG CAPS Take 2,000 mg by mouth daily      GLUCOSAMINE-CHONDROITIN PO Take  by mouth. GLUCOSAMINE HCL/CHONDR SUA NA  One daily      levothyroxine (SYNTHROID/LEVOTHROID) 50 MCG tablet Take one tablet every other day.      levothyroxine (SYNTHROID/LEVOTHROID) 75 MCG tablet Take one tablet every other day 45 tablet 1    lisinopril (ZESTRIL) 10 MG tablet TAKE 1 TABLET(10 MG) BY MOUTH DAILY 90 tablet 3    LORazepam (ATIVAN) 0.5 MG tablet TAKE 1 TABLET BY MOUTH NIGHTLY AS NEEDED FOR ANXIETY OR  SLEEP 30 tablet 0    mometasone (ELOCON) 0.1 % external cream Apply a thin film of cream to the affected ear canal (s) twice a day for 2 weeks, then use sparingly as needed only. 15 g 1    Multiple Vitamins-Minerals (MULTIVITAMIN OR) Alive 1 tablet      Omega-3 Fatty Acids (FISH OIL)  "1200 MG CAPS Take 1,080 mg by mouth daily               Allergies:     Allergies: Pseudoephedrine hcl, Guaifenesin er, Hydroxyzine, and Vilazodone hcl          Past Medical History:     Past Medical History:   Diagnosis Date    Esophageal reflux 05/10/2011            Past Surgical History:     Past Surgical History:   Procedure Laterality Date    ARTHRODESIS KNEE  01/03/2013    left knee, paritla medial meniscectomy, debridement medial femoral condyle and patellofemoral debridement    COLONOSCOPY  2004    nml    COLONOSCOPY  07/23/2014    Procedure: COLONOSCOPY;  Surgeon: Nghia Hernandez DO;  Location: [facility]    CYSTOSCOPY      micro hematuria neg    DILATION AND CURETTAGE      EXCISE LESION UPPER EXTREMITY Right 10/25/2017    Procedure: EXCISE LESION UPPER EXTREMITY;  EXCISIONAL BIOPSY RIGHT FOREARM TIMES 2;  Surgeon: Nghia Hernandez DO;  Location: [facility] OR    tubal sterilization      uterine fibroids      wrist fx RT            Social History:     Social History     Tobacco Use    Smoking status: Never     Passive exposure: Never    Smokeless tobacco: Never   Vaping Use    Vaping status: Never Used   Substance Use Topics    Alcohol use: Yes     Alcohol/week: 0.0 standard drinks of alcohol     Comment: occasionally    Drug use: No            Review of Systems:     ROS: See HPI         Physical Exam:     /75 (BP Location: Left arm, Patient Position: Sitting, Cuff Size: Adult Regular)   Pulse 66   Temp 97.5  F (36.4  C) (Tympanic)   Resp 16   Ht 1.588 m (5' 2.52\")   Wt 59.9 kg (132 lb)   SpO2 97%   BMI 23.74 kg/m      General - The patient is well nourished and well developed, and appears to have good nutritional status.  Alert and oriented to person and place, answers questions and cooperates with examination appropriately.   Head and Face - Normocephalic and atraumatic, with no gross asymmetry noted.  The facial nerve is intact, with strong symmetric movements.  Voice and Breathing - The patient " was breathing comfortably without the use of accessory muscles. There was no wheezing, stridor. The patients voice was clear and strong, and had appropriate pitch and quality.  Ears - The ears were examined with binocular microscopy and with otoscope.  External ears normal. Right canal cerumen impacted.  Left canal cerumen impacted.  The right ear was cleaned with cerumen loop, cupped forceps.   Right tympanic membrane is intact without effusion, retraction or mass. The left ear was cleaned in the same manner.  Left tympanic membrane is intact without effusion, retraction or mass.  Eyes - Extraocular movements intact, sclera were not icteric or injected, conjunctiva were pink and moist.  Mouth - Examination of the oral cavity showed pink, healthy oral mucosa. Dentition in good condition. No lesions or ulcerations noted. The tongue was mobile and midline.   Throat - The walls of the oropharynx were smooth, pink, moist, symmetric, and had no lesions or ulcerations.  The tonsillar pillars and soft palate were symmetric. The uvula was midline on elevation.    Neck - Full range of motion on passive movement.  Palpation of the occipital, submental, submandibular, internal jugular chain, and supraclavicular nodes did not demonstrate any abnormal lymph nodes or masses.  Palpation of the thyroid was soft and smooth, with no nodules or goiter appreciated.  The trachea was mobile and midline.  Nose - External contour is symmetric, no gross deflection or scars.  Nasal mucosa is pink and moist with no abnormal mucus.  The septum and turbinates were evaluated with nasal speculum, no polyps, masses, or purulence noted on examination.         Assessment and Plan:       ICD-10-CM    1. Bilateral impacted cerumen  H61.23         Follow up as needed for ear cleaning  Ears were cleaned, tolerated well    The ears were cleaned today.  The patient may return here as needed or with their primary physician.  Aural hygiene was discussed.   Avoidance of Q-tips was reiterated.  Avoid flushing the ear canal if there is a possibility of a hole or perforation in the tympanic membrane.   If there are any unresolved concerns regarding hearing loss an audiogram is recommended.      Mirtha MARCOS  [facility]

## 2025-02-13 ENCOUNTER — TRANSFERRED RECORDS (OUTPATIENT)
Dept: HEALTH INFORMATION MANAGEMENT | Facility: CLINIC | Age: 80
End: 2025-02-13

## 2025-03-13 ENCOUNTER — TRANSFERRED RECORDS (OUTPATIENT)
Dept: HEALTH INFORMATION MANAGEMENT | Facility: CLINIC | Age: 80
End: 2025-03-13

## 2025-03-18 DIAGNOSIS — F51.01 PRIMARY INSOMNIA: ICD-10-CM

## 2025-03-18 DIAGNOSIS — F41.1 GENERALIZED ANXIETY DISORDER: ICD-10-CM

## 2025-03-18 DIAGNOSIS — E03.4 HYPOTHYROIDISM DUE TO ACQUIRED ATROPHY OF THYROID: ICD-10-CM

## 2025-03-18 RX ORDER — GABAPENTIN 300 MG/1
300 CAPSULE ORAL AT BEDTIME
Qty: 90 CAPSULE | Refills: 1 | Status: SHIPPED | OUTPATIENT
Start: 2025-03-18

## 2025-03-18 RX ORDER — LEVOTHYROXINE SODIUM 75 UG/1
TABLET ORAL
Qty: 45 TABLET | Refills: 1 | Status: SHIPPED | OUTPATIENT
Start: 2025-03-18

## 2025-03-18 NOTE — TELEPHONE ENCOUNTER
LEVOTHYROXINE 0.075MG (75MCG) TABS       Last Written Prescription Date:  10/3/24  Last Fill Quantity: 45,   # refills: 1  Last Office Visit: 1/6/25  Future Office visit:    Next 5 appointments (look out 90 days)      Apr 08, 2025 10:30 AM  (Arrive by 10:15 AM)  Pre-Operative Physical with Brittney Coe NP  [facility] ([facility] ) [address]  [place] [address]  [phone]     Apr 09, 2025 9:30 AM  (Arrive by 9:15 AM)  Provider Visit with Brittney Coe NP  [facility] ([facility] ) [address]  [place] [address]  [phone]             Routing refill request to provider for review/approval because:    Thyroid Protocol [id number]/18/2025 11:03 AM   Protocol Details   Medication is active on med list and the sig matches. RN to manually verify dose and sig if red X/fail.          GABAPENTIN 300MG CAPSULES       Last Written Prescription Date:  3/28/24  Last Fill Quantity: 90,   # refills: 1  Last Office Visit: 1/6/25  Future Office visit:    Next 5 appointments (look out 90 days)      Apr 08, 2025 10:30 AM  (Arrive by 10:15 AM)  Pre-Operative Physical with Brittney Coe NP  [facility] ([facility] ) [address]  [place] [address]  [phone]     Apr 09, 2025 9:30 AM  (Arrive by 9:15 AM)  Provider Visit with Brittney Coe NP  [facility] ([facility] ) [address]  [place] [address]  [phone]             Routing refill request to provider for review/approval because:  Drug not on the [facility], [facility] or  Health refill protocol or controlled substance

## 2025-03-18 NOTE — TELEPHONE ENCOUNTER
Synthroid      Last Written Prescription Date:  10/3/24  Last Fill Quantity: 45,   # refills: 1  Last Office Visit: 1/6/25  Future Office visit:    Next 5 appointments (look out 90 days)      Apr 08, 2025 10:30 AM  (Arrive by 10:15 AM)  [facility] [address]  [place] [address]  [phone]     Apr 09, 2025 9:30 AM  (Arrive by 9:15 AM)  Provider Visit with Brittney Coe NP  [facility] ([facility] ) [address]  [place] [address]  [phone]             Routing refill request to provider for review/approval because:      Gabapentin      Last Written Prescription Date:  3/28/24  Last Fill Quantity: 90,   # refills: 1  Last Office Visit: 1/6/25  Future Office visit:    Next 5 appointments (look out 90 days)      Apr 08, 2025 10:30 AM  (Arrive by 10:15 AM)  Pre-Operative Physical with Brittney Coe NP  [facility] ([facility] ) [address]  [place] [address]  [phone]     Apr 09, 2025 9:30 AM  (Arrive by 9:15 AM)  Provider Visit with Brittney Coe NP  [facility] ([facility] ) [address]  [place] [address]  [phone]             Routing refill request to provider for review/approval because:

## 2025-03-20 ENCOUNTER — TELEPHONE (OUTPATIENT)
Dept: FAMILY MEDICINE | Facility: OTHER | Age: 80
End: 2025-03-20

## 2025-03-20 DIAGNOSIS — E03.4 HYPOTHYROIDISM DUE TO ACQUIRED ATROPHY OF THYROID: ICD-10-CM

## 2025-03-20 RX ORDER — LEVOTHYROXINE SODIUM 50 UG/1
TABLET ORAL
Qty: 45 TABLET | Refills: 1 | Status: SHIPPED | OUTPATIENT
Start: 2025-03-20

## 2025-03-20 NOTE — TELEPHONE ENCOUNTER
10:17 AM    Reason for Call: Phone Call    Description: Patient called in stating that the pharmacy told her that the levothyroxine 50 MG tablet had been cancelled by Jan and won't refill it until they hear back from Jan. Pharmacy: [facility]. Please call patient back.     Was an appointment offered for this call? No  If yes : Appointment type              Date    Preferred method for responding to this message: Telephone Call  What is your phone number ? [phone]     If we cannot reach you directly, may we leave a detailed response at the number you provided? Yes    Can this message wait until your PCP/provider returns, if available today? Not applicable    Martha Ulloa

## 2025-03-20 NOTE — TELEPHONE ENCOUNTER
Levothyroxine 50 is still on her medication list.  Script resent.   She takes 75 alternating with 50 every other day.

## 2025-04-07 ENCOUNTER — LAB (OUTPATIENT)
Dept: LAB | Facility: OTHER | Age: 80
End: 2025-04-07
Payer: MEDICARE

## 2025-04-07 DIAGNOSIS — I10 BENIGN ESSENTIAL HYPERTENSION: ICD-10-CM

## 2025-04-07 DIAGNOSIS — E78.5 HYPERLIPIDEMIA LDL GOAL <100: ICD-10-CM

## 2025-04-07 DIAGNOSIS — E03.4 HYPOTHYROIDISM DUE TO ACQUIRED ATROPHY OF THYROID: ICD-10-CM

## 2025-04-07 DIAGNOSIS — R73.03 PREDIABETES: ICD-10-CM

## 2025-04-07 DIAGNOSIS — N18.31 STAGE 3A CHRONIC KIDNEY DISEASE (H): ICD-10-CM

## 2025-04-07 DIAGNOSIS — Z79.899 ON STATIN THERAPY: ICD-10-CM

## 2025-04-07 LAB
ALBUMIN SERPL BCG-MCNC: 4.2 G/DL (ref 3.5–5.2)
ALP SERPL-CCNC: 87 U/L (ref 40–150)
ALT SERPL W P-5'-P-CCNC: 26 U/L (ref 0–50)
ANION GAP SERPL CALCULATED.3IONS-SCNC: 7 MMOL/L (ref 7–15)
AST SERPL W P-5'-P-CCNC: 34 U/L (ref 0–45)
BILIRUB SERPL-MCNC: 0.4 MG/DL
BUN SERPL-MCNC: 18 MG/DL (ref 8–23)
CALCIUM SERPL-MCNC: 10 MG/DL (ref 8.8–10.4)
CHLORIDE SERPL-SCNC: 106 MMOL/L (ref 98–107)
CHOLEST SERPL-MCNC: 173 MG/DL
CREAT SERPL-MCNC: 0.98 MG/DL (ref 0.51–0.95)
EGFRCR SERPLBLD CKD-EPI 2021: 58 ML/MIN/1.73M2
EST. AVERAGE GLUCOSE BLD GHB EST-MCNC: 120 MG/DL
FASTING STATUS PATIENT QL REPORTED: YES
FASTING STATUS PATIENT QL REPORTED: YES
GLUCOSE SERPL-MCNC: 95 MG/DL (ref 70–99)
HBA1C MFR BLD: 5.8 %
HCO3 SERPL-SCNC: 28 MMOL/L (ref 22–29)
HDLC SERPL-MCNC: 54 MG/DL
LDLC SERPL CALC-MCNC: 97 MG/DL
NONHDLC SERPL-MCNC: 119 MG/DL
POTASSIUM SERPL-SCNC: 4.4 MMOL/L (ref 3.4–5.3)
PROT SERPL-MCNC: 7.6 G/DL (ref 6.4–8.3)
SODIUM SERPL-SCNC: 141 MMOL/L (ref 135–145)
TRIGL SERPL-MCNC: 110 MG/DL
TSH SERPL DL<=0.005 MIU/L-ACNC: 1.84 UIU/ML (ref 0.3–4.2)

## 2025-04-07 PROCEDURE — 82374 ASSAY BLOOD CARBON DIOXIDE: CPT | Mod: ZL

## 2025-04-07 PROCEDURE — 83036 HEMOGLOBIN GLYCOSYLATED A1C: CPT | Mod: ZL

## 2025-04-07 PROCEDURE — 36415 COLL VENOUS BLD VENIPUNCTURE: CPT | Mod: ZL

## 2025-04-07 PROCEDURE — 82310 ASSAY OF CALCIUM: CPT | Mod: ZL

## 2025-04-07 PROCEDURE — 80061 LIPID PANEL: CPT | Mod: ZL

## 2025-04-07 PROCEDURE — 84443 ASSAY THYROID STIM HORMONE: CPT | Mod: ZL

## 2025-04-08 ENCOUNTER — OFFICE VISIT (OUTPATIENT)
Dept: FAMILY MEDICINE | Facility: OTHER | Age: 80
End: 2025-04-08
Attending: NURSE PRACTITIONER
Payer: MEDICARE

## 2025-04-08 VITALS
WEIGHT: 134 LBS | HEIGHT: 63 IN | BODY MASS INDEX: 23.74 KG/M2 | TEMPERATURE: 97.3 F | OXYGEN SATURATION: 98 % | DIASTOLIC BLOOD PRESSURE: 80 MMHG | RESPIRATION RATE: 18 BRPM | HEART RATE: 48 BPM | SYSTOLIC BLOOD PRESSURE: 144 MMHG

## 2025-04-08 DIAGNOSIS — R73.03 PREDIABETES: ICD-10-CM

## 2025-04-08 DIAGNOSIS — F41.1 GENERALIZED ANXIETY DISORDER: ICD-10-CM

## 2025-04-08 DIAGNOSIS — I10 BENIGN ESSENTIAL HYPERTENSION: ICD-10-CM

## 2025-04-08 DIAGNOSIS — N18.31 STAGE 3A CHRONIC KIDNEY DISEASE (H): ICD-10-CM

## 2025-04-08 DIAGNOSIS — H26.9 CATARACT OF BOTH EYES, UNSPECIFIED CATARACT TYPE: ICD-10-CM

## 2025-04-08 DIAGNOSIS — E78.5 HYPERLIPIDEMIA LDL GOAL <100: ICD-10-CM

## 2025-04-08 DIAGNOSIS — F51.01 PRIMARY INSOMNIA: ICD-10-CM

## 2025-04-08 DIAGNOSIS — E03.4 HYPOTHYROIDISM DUE TO ACQUIRED ATROPHY OF THYROID: ICD-10-CM

## 2025-04-08 DIAGNOSIS — Z01.818 PRE-OPERATIVE EXAMINATION: Primary | ICD-10-CM

## 2025-04-08 LAB
ALBUMIN UR-MCNC: NEGATIVE MG/DL
APPEARANCE UR: CLEAR
BASOPHILS # BLD AUTO: 0 10E3/UL (ref 0–0.2)
BASOPHILS NFR BLD AUTO: 0 %
BILIRUB UR QL STRIP: NEGATIVE
COLOR UR AUTO: YELLOW
EOSINOPHIL # BLD AUTO: 0.3 10E3/UL (ref 0–0.7)
EOSINOPHIL NFR BLD AUTO: 4 %
ERYTHROCYTE [DISTWIDTH] IN BLOOD BY AUTOMATED COUNT: 14.4 % (ref 10–15)
GLUCOSE UR STRIP-MCNC: NEGATIVE MG/DL
HCT VFR BLD AUTO: 40.9 % (ref 35–47)
HGB BLD-MCNC: 13.1 G/DL (ref 11.7–15.7)
HGB UR QL STRIP: NEGATIVE
IMM GRANULOCYTES # BLD: 0 10E3/UL
IMM GRANULOCYTES NFR BLD: 0 %
KETONES UR STRIP-MCNC: NEGATIVE MG/DL
LEUKOCYTE ESTERASE UR QL STRIP: NEGATIVE
LYMPHOCYTES # BLD AUTO: 2.8 10E3/UL (ref 0.8–5.3)
LYMPHOCYTES NFR BLD AUTO: 41 %
MCH RBC QN AUTO: 27.3 PG (ref 26.5–33)
MCHC RBC AUTO-ENTMCNC: 32 G/DL (ref 31.5–36.5)
MCV RBC AUTO: 85 FL (ref 78–100)
MONOCYTES # BLD AUTO: 0.6 10E3/UL (ref 0–1.3)
MONOCYTES NFR BLD AUTO: 9 %
NEUTROPHILS # BLD AUTO: 3.1 10E3/UL (ref 1.6–8.3)
NEUTROPHILS NFR BLD AUTO: 46 %
NITRATE UR QL: NEGATIVE
PH UR STRIP: 8 [PH] (ref 5–7)
PLATELET # BLD AUTO: 295 10E3/UL (ref 150–450)
RBC # BLD AUTO: 4.79 10E6/UL (ref 3.8–5.2)
SP GR UR STRIP: 1.01 (ref 1–1.03)
UROBILINOGEN UR STRIP-ACNC: 0.2 E.U./DL
WBC # BLD AUTO: 6.8 10E3/UL (ref 4–11)

## 2025-04-08 PROCEDURE — 85004 AUTOMATED DIFF WBC COUNT: CPT | Mod: ZL | Performed by: NURSE PRACTITIONER

## 2025-04-08 PROCEDURE — 93005 ELECTROCARDIOGRAM TRACING: CPT | Performed by: NURSE PRACTITIONER

## 2025-04-08 PROCEDURE — 81003 URINALYSIS AUTO W/O SCOPE: CPT | Mod: ZL | Performed by: NURSE PRACTITIONER

## 2025-04-08 PROCEDURE — 36415 COLL VENOUS BLD VENIPUNCTURE: CPT | Mod: ZL | Performed by: NURSE PRACTITIONER

## 2025-04-08 PROCEDURE — 85014 HEMATOCRIT: CPT | Mod: ZL | Performed by: NURSE PRACTITIONER

## 2025-04-08 ASSESSMENT — PAIN SCALES - GENERAL: PAINLEVEL_OUTOF10: NO PAIN (0)

## 2025-04-08 NOTE — PROGRESS NOTES
Preoperative Evaluation  [facility]  [address]  [facility] [address]  Phone: [phone]  Primary Provider: Brittney Coe NP  Pre-op Performing Provider: Brittney Coe NP  Apr 8, 2025 4/8/2025   Surgical Information   What procedure is being done? cataract surgery:     Facility or Hospital where procedure/surgery [facility] be performed[facility]   Who is doing the procedure / surgery? Dr Stone   Date of surgery / procedure: Left eye April 17.  Right eye May 1, 2025.     Time of surgery / procedure: unknown   Where do you plan to recover after surgery? at home with family     Fax number for surgical facility: Note does not need to be faxed, will be available electronically in Epic.    Assessment & Plan     The proposed surgical procedure is considered LOW risk.    1. Pre-operative examination (Primary)  Exam completed   - EKG 12-lead complete w/read - (Clinic Performed)  - CBC with platelets and differential  - UA Macroscopic with reflex to Microscopic and Culture    2. Cataract of both eyes, unspecified cataract type    3. Hyperlipidemia LDL goal <100  - Lipid Profile; Future    4. Benign essential hypertension  - Comprehensive metabolic panel; Future    5. Hypothyroidism due to acquired atrophy of thyroid  - TSH with free T4 reflex; Future    6. Generalized anxiety disorder    7. Primary insomnia    8. Prediabetes  - Hemoglobin A1c; Future    9. Stage 3a chronic kidney disease (H)  Limit NSAID use.          - No identified additional risk factors other than previously addressed    Antiplatelet or Anticoagulation Medication Instructions   - Bleeding risk is low for this procedure (e.g. dental, skin, cataract).    Additional Medication Instructions  We reviewed the medication list and there are no chronic medications that need to be adjusted for this procedure.    Recommendation  Approval given to proceed with proposed procedure, without  further diagnostic evaluation.      The longitudinal plan of care for the diagnosis(es)/condition(s) as documented were addressed during this visit. Due to the added complexity in care, I will continue to support Franca in the subsequent management and with ongoing continuity of care.    Brittney Coe,   Certified Adult Nurse Practitioner  [phone]      Raffaele Schulte is a 79 year old, presenting for the following:  Pre-Op Exam          4/8/2025    10:23 AM   Additional Questions   Roomed by Britney ALONZO RN   Accompanied by none     HPI: Pre-op          4/8/2025   Pre-Op Questionnaire   Have you ever had a heart attack or stroke? No   Have you ever had surgery on your heart or blood vessels, such as a stent placement, a coronary artery bypass, or surgery on an artery in your head, neck, heart, or legs? No   Do you have chest pain with activity? No   Do you have a history of heart failure? No   Do you currently have a cold, bronchitis or symptoms of other infection? No   Do you have a cough, shortness of breath, or wheezing? No   Do you or anyone in your family have previous history of blood clots? No   Do you or does anyone in your family have a serious bleeding problem such as prolonged bleeding following surgeries or cuts? No   Have you ever had problems with anemia or been told to take iron pills? No   Have you had any abnormal blood loss such as black, tarry or bloody stools, or abnormal vaginal bleeding? No   Have you ever had a blood transfusion? No   Are you willing to have a blood transfusion if it is medically needed before, during, or after your surgery? (!) NO    Have you or any of your relatives ever had problems with anesthesia? No   Do you have sleep apnea, excessive snoring or daytime drowsiness? (!) UNKNOWN   Do you have any artifical heart valves or other implanted medical devices like a pacemaker, defibrillator, or continuous glucose monitor? No   Do you have artificial joints? No   Are  you allergic to latex? No     Health Care Directive  Patient has a Health Care Directive on file      Preoperative Review of    reviewed - no record of controlled substances prescribed.      Status of Chronic Conditions:  HYPERLIPIDEMIA - Patient has a long history of significant Hyperlipidemia requiring medication for treatment with recent good control. Patient reports no problems or side effects with the medication.     The ASCVD Risk score (Amarilys CROOK, et al., 2019) failed to calculate for the following reasons:    The 2019 ASCVD risk score is only valid for ages 40 to 79      HYPERTENSION - Patient has longstanding history of HTN , currently denies any symptoms referable to elevated blood pressure. Specifically denies chest pain, palpitations, dyspnea, orthopnea, PND or peripheral edema. Blood pressure readings have been in normal range. Current medication regimen is as listed below. Patient denies any side effects of medication.     BP Readings from Last 3 Encounters:   04/08/25 (!) 144/80   01/24/25 120/75   01/06/25 137/77    Wt Readings from Last 3 Encounters:   04/08/25 60.8 kg (134 lb)   01/24/25 59.9 kg (132 lb)   01/06/25 59.9 kg (132 lb)              HYPOTHYROIDISM - Patient has a longstanding history of chronic Hypothyroidism. Patient has been doing well, noting no tremor, insomnia, hair loss or changes in skin texture. Continues to take medications as directed, without adverse reactions or side effects. Last TSH   Lab Results   Component Value Date    TSH 1.84 04/07/2025   .      Patient Active Problem List    Diagnosis Date Noted    Mixed hyperlipidemia 11/16/2020     Priority: Medium    LBBB (left bundle branch block) 11/16/2020     Priority: Medium    Dyspnea on exertion 11/16/2020     Priority: Medium    Prediabetes 11/16/2020     Priority: Medium    Mild recurrent major depression 11/13/2020     Priority: Medium    Generalized anxiety disorder 11/13/2020     Priority: Medium    CKD (chronic  kidney disease) stage 3, GFR 30-59 ml/min (H) 08/07/2019     Priority: Medium    AK (actinic keratosis) 08/28/2018     Priority: Medium    Benign essential hypertension 06/14/2017     Priority: Medium    Hypothyroidism due to acquired atrophy of thyroid 05/21/2015     Priority: Medium    Routine general medical examination at a health care facility (ANNUAL) 04/30/2014     Priority: Medium    Colon cancer screening 04/30/2014     Priority: Medium     Done in July. Return is 10 years      Menopause 04/23/2013     Priority: Medium    Joint pain 04/23/2013     Priority: Medium    Advanced care planning/counseling discussion 10/09/2012     Priority: Medium    Disorder of bone and cartilage 05/10/2011     Priority: Medium      Past Medical History:   Diagnosis Date    Esophageal reflux 05/10/2011     Past Surgical History:   Procedure Laterality Date    ARTHRODESIS KNEE  01/03/2013    left knee, paritla medial meniscectomy, debridement medial femoral condyle and patellofemoral debridement    COLONOSCOPY  2004    nml    COLONOSCOPY  07/23/2014    Procedure: COLONOSCOPY;  Surgeon: Nghia Hernandez DO;  Location: [facility] OR    CYSTOSCOPY      micro hematuria neg    DILATION AND CURETTAGE      EXCISE LESION UPPER EXTREMITY Right 10/25/2017    Procedure: EXCISE LESION UPPER EXTREMITY;  EXCISIONAL BIOPSY RIGHT FOREARM TIMES 2;  Surgeon: Nghia Hernandez DO;  Location: [facility] OR    tubal sterilization      uterine fibroids      wrist fx RT       Current Outpatient Medications   Medication Sig Dispense Refill    aspirin 81 MG tablet Take 81 mg by mouth daily      atorvastatin (LIPITOR) 10 MG tablet TAKE 1 TABLET BY MOUTH EVERY DAY. TAKE ALONG WITH 20MG TABLET FOR TOTAL DOSE OF 30MG DAILY 90 tablet 2    atorvastatin (LIPITOR) 20 MG tablet Take 1 tablet (20 mg) by mouth daily. 90 tablet 3    benzonatate (TESSALON) 200 MG capsule Take 1 capsule (200 mg) by mouth 3 times daily as needed for cough. 30 capsule 2     Calcium-Magnesium-Vitamin D (CALCIUM 500 PO) Take by mouth daily      cinnamon 500 MG CAPS       co-enzyme Q-10 100 MG CAPS capsule Take 100 mg by mouth       gabapentin (NEURONTIN) 300 MG capsule TAKE 1 CAPSULE(300 MG) BY MOUTH AT BEDTIME 90 capsule 1    Garlic 2000 MG CAPS Take 2,000 mg by mouth daily      GLUCOSAMINE-CHONDROITIN PO Take  by mouth. GLUCOSAMINE HCL/CHONDR SUA NA  One daily      levothyroxine (SYNTHROID/LEVOTHROID) 50 MCG tablet Take one tablet every other day. 45 tablet 1    levothyroxine (SYNTHROID/LEVOTHROID) 75 MCG tablet TAKE 1 TABLET BY MOUTH EVERY OTHER DAY 45 tablet 1    lisinopril (ZESTRIL) 10 MG tablet TAKE 1 TABLET(10 MG) BY MOUTH DAILY 90 tablet 3    LORazepam (ATIVAN) 0.5 MG tablet TAKE 1 TABLET BY MOUTH NIGHTLY AS NEEDED FOR ANXIETY OR  SLEEP 30 tablet 0    mometasone (ELOCON) 0.1 % external cream Apply a thin film of cream to the affected ear canal (s) twice a day for 2 weeks, then use sparingly as needed only. 15 g 1    Multiple Vitamins-Minerals (MULTIVITAMIN OR) Alive 1 tablet      Omega-3 Fatty Acids (FISH OIL) 1200 MG CAPS Take 1,080 mg by mouth daily          Allergies   Allergen Reactions    Pseudoephedrine Hcl Other (See Comments)     Heart racing  Sudafed    Guaifenesin Er Palpitations    Hydroxyzine Anxiety     jittery    Vilazodone Hcl Anxiety     Restless legs        Social History     Tobacco Use    Smoking status: Never     Passive exposure: Never    Smokeless tobacco: Never   Substance Use Topics    Alcohol use: Yes     Alcohol/week: 0.0 standard drinks of alcohol     Comment: occasionally       History   Drug Use No             Review of Systems  CONSTITUTIONAL: NEGATIVE for fever, chills, change in weight  INTEGUMENTARY/SKIN: NEGATIVE for worrisome rashes, moles or lesions  EYES: POSITIVE for vision changes   ENT/MOUTH: NEGATIVE for ear, mouth and throat problems  RESP: NEGATIVE for significant cough or SOB  BREAST: NEGATIVE for masses, tenderness or discharge  CV:  "NEGATIVE for chest pain, palpitations or peripheral edema  GI: NEGATIVE for nausea, abdominal pain, heartburn, or change in bowel habits  : NEGATIVE for frequency, dysuria, or hematuria  MUSCULOSKELETAL: NEGATIVE for significant arthralgias or myalgia  NEURO: NEGATIVE for weakness, dizziness or paresthesias  ENDOCRINE: NEGATIVE for temperature intolerance, skin/hair changes  HEME: NEGATIVE for bleeding problems  PSYCHIATRIC: NEGATIVE for changes in mood or affect    Objective    BP (!) 144/80 (BP Location: Left arm, Patient Position: Chair, Cuff Size: Adult Regular)   Pulse (!) 48   Temp 97.3  F (36.3  C) (Tympanic)   Resp 18   Ht 1.588 m (5' 2.52\")   Wt 60.8 kg (134 lb)   SpO2 98%   BMI 24.10 kg/m     Estimated body mass index is 24.1 kg/m  as calculated from the following:    Height as of this encounter: 1.588 m (5' 2.52\").    Weight as of this encounter: 60.8 kg (134 lb).  Physical Exam  GENERAL: alert and no distress  HENT: ear canals and TM's normal, nose and mouth without ulcers or lesions  NECK: no adenopathy, no asymmetry, masses, or scars, thyroid normal to palpation, and no carotid bruits  RESP: lungs clear to auscultation - no rales, rhonchi or wheezes  CV: regular rate and rhythm, normal S1 S2, no S3 or S4, no murmur, click or rub, no peripheral edema  MS: no gross musculoskeletal defects noted, no edema  PSYCH: mentation appears normal, affect normal/bright    Recent Labs   Lab Test 04/07/25  0759 01/03/25  0822 06/25/24  0804 06/17/24  1522   HGB  --   --   --  12.5   PLT  --   --   --  271    138   < >  --    POTASSIUM 4.4 4.4   < >  --    CR 0.98* 0.93   < >  --    A1C 5.8* 5.5   < >  --     < > = values in this interval not displayed.        Diagnostics  Recent Results (from the past week)   Hemoglobin A1c    Collection Time: 04/07/25  7:59 AM   Result Value Ref Range    Estimated Average Glucose 120 (H) <117 mg/dL    Hemoglobin A1C 5.8 (H) <5.7 %   Lipid Profile    Collection Time: " 04/07/25  7:59 AM   Result Value Ref Range    Cholesterol 173 <200 mg/dL    Triglycerides 110 <150 mg/dL    Direct Measure HDL 54 >=50 mg/dL    LDL Cholesterol Calculated 97 <100 mg/dL    Non HDL Cholesterol 119 <130 mg/dL    Patient Fasting > 8hrs? Yes    Comprehensive metabolic panel    Collection Time: 04/07/25  7:59 AM   Result Value Ref Range    Sodium 141 135 - 145 mmol/L    Potassium 4.4 3.4 - 5.3 mmol/L    Carbon Dioxide (CO2) 28 22 - 29 mmol/L    Anion Gap 7 7 - 15 mmol/L    Urea Nitrogen 18.0 8.0 - 23.0 mg/dL    Creatinine 0.98 (H) 0.51 - 0.95 mg/dL    GFR Estimate 58 (L) >60 mL/min/1.73m2    Calcium 10.0 8.8 - 10.4 mg/dL    Chloride 106 98 - 107 mmol/L    Glucose 95 70 - 99 mg/dL    Alkaline Phosphatase 87 40 - 150 U/L    AST 34 0 - 45 U/L    ALT 26 0 - 50 U/L    Protein Total 7.6 6.4 - 8.3 g/dL    Albumin 4.2 3.5 - 5.2 g/dL    Bilirubin Total 0.4 <=1.2 mg/dL    Patient Fasting > 8hrs? Yes    TSH with free T4 reflex    Collection Time: 04/07/25  7:59 AM   Result Value Ref Range    TSH 1.84 0.30 - 4.20 uIU/mL   EKG 12-lead complete w/read - (Clinic Performed)    Collection Time: 04/08/25 10:38 AM   Result Value Ref Range    Systolic Blood Pressure  mmHg    Diastolic Blood Pressure  mmHg    Ventricular Rate 43 BPM    Atrial Rate 43 BPM    VA Interval 150 ms    QRS Duration 168 ms     ms    QTc 454 ms    P Axis 51 degrees    R AXIS -36 degrees    T Axis 108 degrees    Interpretation ECG       Sinus bradycardia  Left axis deviation  Left bundle branch block  Abnormal ECG  When compared with ECG of 22-Nov-2023 13:01,  No significant change was found  Confirmed by MD Sylvia, [facility] Anayeli ([id number]) on 4/9/2025 9:58:23 AM     UA Macroscopic with reflex to Microscopic and Culture    Collection Time: 04/08/25 11:59 AM    Specimen: Urine, Midstream   Result Value Ref Range    Color Urine Yellow Colorless, Straw, Light Yellow, Yellow    Appearance Urine Clear Clear    Glucose Urine Negative Negative mg/dL    Bilirubin  Urine Negative Negative    Ketones Urine Negative Negative mg/dL    Specific Gravity Urine 1.015 1.003 - 1.035    Blood Urine Negative Negative    pH Urine 8.0 (H) 5.0 - 7.0    Protein Albumin Urine Negative Negative mg/dL    Urobilinogen Urine 0.2 0.2, 1.0 E.U./dL    Nitrite Urine Negative Negative    Leukocyte Esterase Urine Negative Negative   CBC with platelets and differential    Collection Time: 04/08/25 11:59 AM   Result Value Ref Range    WBC Count 6.8 4.0 - 11.0 10e3/uL    RBC Count 4.79 3.80 - 5.20 10e6/uL    Hemoglobin 13.1 11.7 - 15.7 g/dL    Hematocrit 40.9 35.0 - 47.0 %    MCV 85 78 - 100 fL    MCH 27.3 26.5 - 33.0 pg    MCHC 32.0 31.5 - 36.5 g/dL    RDW 14.4 10.0 - 15.0 %    Platelet Count 295 150 - 450 10e3/uL    % Neutrophils 46 %    % Lymphocytes 41 %    % Monocytes 9 %    % Eosinophils 4 %    % Basophils 0 %    % Immature Granulocytes 0 %    Absolute Neutrophils 3.1 1.6 - 8.3 10e3/uL    Absolute Lymphocytes 2.8 0.8 - 5.3 10e3/uL    Absolute Monocytes 0.6 0.0 - 1.3 10e3/uL    Absolute Eosinophils 0.3 0.0 - 0.7 10e3/uL    Absolute Basophils 0.0 0.0 - 0.2 10e3/uL    Absolute Immature Granulocytes 0.0 <=0.4 10e3/uL          Revised Cardiac Risk Index (RCRI)  The patient has the following serious cardiovascular risks for perioperative complications:   - No serious cardiac risks = 0 points     RCRI Interpretation: 0 points: Class I (very low risk - 0.4% complication rate)         Signed Electronically by: Brittney Coe NP  A copy of this evaluation report is provided to the requesting physician.

## 2025-04-08 NOTE — H&P (VIEW-ONLY)
Preoperative Evaluation  [facility]  [address]  [facility] [address]  Phone: [phone]  Primary Provider: Brittney Coe NP  Pre-op Performing Provider: Brittney Coe NP  Apr 8, 2025 4/8/2025   Surgical Information   What procedure is being done? cataract surgery:     Facility or Hospital where procedure/surgery will be performed[facility]   Who is doing the procedure / surgery? Dr Stone   Date of surgery / procedure: Left eye April 17.  Right eye May 1, 2025.     Time of surgery / procedure: unknown   Where do you plan to recover after surgery? at home with family     Fax number for surgical facility: Note does not need to be faxed, will be available electronically in Epic.    Assessment & Plan     The proposed surgical procedure is considered LOW risk.    1. Pre-operative examination (Primary)  Exam completed   - EKG 12-lead complete w/read - (Clinic Performed)  - CBC with platelets and differential  - UA Macroscopic with reflex to Microscopic and Culture    2. Cataract of both eyes, unspecified cataract type    3. Hyperlipidemia LDL goal <100  - Lipid Profile; Future    4. Benign essential hypertension  - Comprehensive metabolic panel; Future    5. Hypothyroidism due to acquired atrophy of thyroid  - TSH with free T4 reflex; Future    6. Generalized anxiety disorder    7. Primary insomnia    8. Prediabetes  - Hemoglobin A1c; Future    9. Stage 3a chronic kidney disease (H)  Limit NSAID use.          - No identified additional risk factors other than previously addressed    Antiplatelet or Anticoagulation Medication Instructions   - Bleeding risk is low for this procedure (e.g. dental, skin, cataract).    Additional Medication Instructions  We reviewed the medication list and there are no chronic medications that need to be adjusted for this procedure.    Recommendation  Approval given to proceed with proposed procedure, without  further diagnostic evaluation.      The longitudinal plan of care for the diagnosis(es)/condition(s) as documented were addressed during this visit. Due to the added complexity in care, I will continue to support Franca in the subsequent management and with ongoing continuity of care.    Brittney Coe,   Certified Adult Nurse Practitioner  [phone]      Raffaele Schulte is a 79 year old, presenting for the following:  Pre-Op Exam          4/8/2025    10:23 AM   Additional Questions   Roomed by Britney ALONZO RN   Accompanied by none     HPI: Pre-op          4/8/2025   Pre-Op Questionnaire   Have you ever had a heart attack or stroke? No   Have you ever had surgery on your heart or blood vessels, such as a stent placement, a coronary artery bypass, or surgery on an artery in your head, neck, heart, or legs? No   Do you have chest pain with activity? No   Do you have a history of heart failure? No   Do you currently have a cold, bronchitis or symptoms of other infection? No   Do you have a cough, shortness of breath, or wheezing? No   Do you or anyone in your family have previous history of blood clots? No   Do you or does anyone in your family have a serious bleeding problem such as prolonged bleeding following surgeries or cuts? No   Have you ever had problems with anemia or been told to take iron pills? No   Have you had any abnormal blood loss such as black, tarry or bloody stools, or abnormal vaginal bleeding? No   Have you ever had a blood transfusion? No   Are you willing to have a blood transfusion if it is medically needed before, during, or after your surgery? (!) NO    Have you or any of your relatives ever had problems with anesthesia? No   Do you have sleep apnea, excessive snoring or daytime drowsiness? (!) UNKNOWN   Do you have any artifical heart valves or other implanted medical devices like a pacemaker, defibrillator, or continuous glucose monitor? No   Do you have artificial joints? No   Are  you allergic to latex? No     Health Care Directive  Patient has a Health Care Directive on file      Preoperative Review of    reviewed - no record of controlled substances prescribed.      Status of Chronic Conditions:  HYPERLIPIDEMIA - Patient has a long history of significant Hyperlipidemia requiring medication for treatment with recent good control. Patient reports no problems or side effects with the medication.     The ASCVD Risk score (Amarilys CROOK, et al., 2019) failed to calculate for the following reasons:    The 2019 ASCVD risk score is only valid for ages 40 to 79      HYPERTENSION - Patient has longstanding history of HTN , currently denies any symptoms referable to elevated blood pressure. Specifically denies chest pain, palpitations, dyspnea, orthopnea, PND or peripheral edema. Blood pressure readings have been in normal range. Current medication regimen is as listed below. Patient denies any side effects of medication.     BP Readings from Last 3 Encounters:   04/08/25 (!) 144/80   01/24/25 120/75   01/06/25 137/77    Wt Readings from Last 3 Encounters:   04/08/25 60.8 kg (134 lb)   01/24/25 59.9 kg (132 lb)   01/06/25 59.9 kg (132 lb)              HYPOTHYROIDISM - Patient has a longstanding history of chronic Hypothyroidism. Patient has been doing well, noting no tremor, insomnia, hair loss or changes in skin texture. Continues to take medications as directed, without adverse reactions or side effects. Last TSH   Lab Results   Component Value Date    TSH 1.84 04/07/2025   .      Patient Active Problem List    Diagnosis Date Noted    Mixed hyperlipidemia 11/16/2020     Priority: Medium    LBBB (left bundle branch block) 11/16/2020     Priority: Medium    Dyspnea on exertion 11/16/2020     Priority: Medium    Prediabetes 11/16/2020     Priority: Medium    Mild recurrent major depression 11/13/2020     Priority: Medium    Generalized anxiety disorder 11/13/2020     Priority: Medium    CKD (chronic  kidney disease) stage 3, GFR 30-59 ml/min (H) 08/07/2019     Priority: Medium    AK (actinic keratosis) 08/28/2018     Priority: Medium    Benign essential hypertension 06/14/2017     Priority: Medium    Hypothyroidism due to acquired atrophy of thyroid 05/21/2015     Priority: Medium    Routine general medical examination at a health care facility (ANNUAL) 04/30/2014     Priority: Medium    Colon cancer screening 04/30/2014     Priority: Medium     Done in July. Return is 10 years      Menopause 04/23/2013     Priority: Medium    Joint pain 04/23/2013     Priority: Medium    Advanced care planning/counseling discussion 10/09/2012     Priority: Medium    Disorder of bone and cartilage 05/10/2011     Priority: Medium      Past Medical History:   Diagnosis Date    Esophageal reflux 05/10/2011     Past Surgical History:   Procedure Laterality Date    ARTHRODESIS KNEE  01/03/2013    left knee, paritla medial meniscectomy, debridement medial femoral condyle and patellofemoral debridement    COLONOSCOPY  2004    nml    COLONOSCOPY  07/23/2014    Procedure: COLONOSCOPY;  Surgeon: Nghia Hernandez DO;  Location: [facility] OR    CYSTOSCOPY      micro hematuria neg    DILATION AND CURETTAGE      EXCISE LESION UPPER EXTREMITY Right 10/25/2017    Procedure: EXCISE LESION UPPER EXTREMITY;  EXCISIONAL BIOPSY RIGHT FOREARM TIMES 2;  Surgeon: Nghia Hernandez DO;  Location: [facility] OR    tubal sterilization      uterine fibroids      wrist fx RT       Current Outpatient Medications   Medication Sig Dispense Refill    aspirin 81 MG tablet Take 81 mg by mouth daily      atorvastatin (LIPITOR) 10 MG tablet TAKE 1 TABLET BY MOUTH EVERY DAY. TAKE ALONG WITH 20MG TABLET FOR TOTAL DOSE OF 30MG DAILY 90 tablet 2    atorvastatin (LIPITOR) 20 MG tablet Take 1 tablet (20 mg) by mouth daily. 90 tablet 3    benzonatate (TESSALON) 200 MG capsule Take 1 capsule (200 mg) by mouth 3 times daily as needed for cough. 30 capsule 2     Calcium-Magnesium-Vitamin D (CALCIUM 500 PO) Take by mouth daily      cinnamon 500 MG CAPS       co-enzyme Q-10 100 MG CAPS capsule Take 100 mg by mouth       gabapentin (NEURONTIN) 300 MG capsule TAKE 1 CAPSULE(300 MG) BY MOUTH AT BEDTIME 90 capsule 1    Garlic 2000 MG CAPS Take 2,000 mg by mouth daily      GLUCOSAMINE-CHONDROITIN PO Take  by mouth. GLUCOSAMINE HCL/CHONDR SUA NA  One daily      levothyroxine (SYNTHROID/LEVOTHROID) 50 MCG tablet Take one tablet every other day. 45 tablet 1    levothyroxine (SYNTHROID/LEVOTHROID) 75 MCG tablet TAKE 1 TABLET BY MOUTH EVERY OTHER DAY 45 tablet 1    lisinopril (ZESTRIL) 10 MG tablet TAKE 1 TABLET(10 MG) BY MOUTH DAILY 90 tablet 3    LORazepam (ATIVAN) 0.5 MG tablet TAKE 1 TABLET BY MOUTH NIGHTLY AS NEEDED FOR ANXIETY OR  SLEEP 30 tablet 0    mometasone (ELOCON) 0.1 % external cream Apply a thin film of cream to the affected ear canal (s) twice a day for 2 weeks, then use sparingly as needed only. 15 g 1    Multiple Vitamins-Minerals (MULTIVITAMIN OR) Alive 1 tablet      Omega-3 Fatty Acids (FISH OIL) 1200 MG CAPS Take 1,080 mg by mouth daily          Allergies   Allergen Reactions    Pseudoephedrine Hcl Other (See Comments)     Heart racing  Sudafed    Guaifenesin Er Palpitations    Hydroxyzine Anxiety     jittery    Vilazodone Hcl Anxiety     Restless legs        Social History     Tobacco Use    Smoking status: Never     Passive exposure: Never    Smokeless tobacco: Never   Substance Use Topics    Alcohol use: Yes     Alcohol/week: 0.0 standard drinks of alcohol     Comment: occasionally       History   Drug Use No             Review of Systems  CONSTITUTIONAL: NEGATIVE for fever, chills, change in weight  INTEGUMENTARY/SKIN: NEGATIVE for worrisome rashes, moles or lesions  EYES: POSITIVE for vision changes   ENT/MOUTH: NEGATIVE for ear, mouth and throat problems  RESP: NEGATIVE for significant cough or SOB  BREAST: NEGATIVE for masses, tenderness or discharge  CV:  "NEGATIVE for chest pain, palpitations or peripheral edema  GI: NEGATIVE for nausea, abdominal pain, heartburn, or change in bowel habits  : NEGATIVE for frequency, dysuria, or hematuria  MUSCULOSKELETAL: NEGATIVE for significant arthralgias or myalgia  NEURO: NEGATIVE for weakness, dizziness or paresthesias  ENDOCRINE: NEGATIVE for temperature intolerance, skin/hair changes  HEME: NEGATIVE for bleeding problems  PSYCHIATRIC: NEGATIVE for changes in mood or affect    Objective    BP (!) 144/80 (BP Location: Left arm, Patient Position: Chair, Cuff Size: Adult Regular)   Pulse (!) 48   Temp 97.3  F (36.3  C) (Tympanic)   Resp 18   Ht 1.588 m (5' 2.52\")   Wt 60.8 kg (134 lb)   SpO2 98%   BMI 24.10 kg/m     Estimated body mass index is 24.1 kg/m  as calculated from the following:    Height as of this encounter: 1.588 m (5' 2.52\").    Weight as of this encounter: 60.8 kg (134 lb).  Physical Exam  GENERAL: alert and no distress  HENT: ear canals and TM's normal, nose and mouth without ulcers or lesions  NECK: no adenopathy, no asymmetry, masses, or scars, thyroid normal to palpation, and no carotid bruits  RESP: lungs clear to auscultation - no rales, rhonchi or wheezes  CV: regular rate and rhythm, normal S1 S2, no S3 or S4, no murmur, click or rub, no peripheral edema  MS: no gross musculoskeletal defects noted, no edema  PSYCH: mentation appears normal, affect normal/bright    Recent Labs   Lab Test 04/07/25  0759 01/03/25  0822 06/25/24  0804 06/17/24  1522   HGB  --   --   --  12.5   PLT  --   --   --  271    138   < >  --    POTASSIUM 4.4 4.4   < >  --    CR 0.98* 0.93   < >  --    A1C 5.8* 5.5   < >  --     < > = values in this interval not displayed.        Diagnostics  Recent Results (from the past week)   Hemoglobin A1c    Collection Time: 04/07/25  7:59 AM   Result Value Ref Range    Estimated Average Glucose 120 (H) <117 mg/dL    Hemoglobin A1C 5.8 (H) <5.7 %   Lipid Profile    Collection Time: " 04/07/25  7:59 AM   Result Value Ref Range    Cholesterol 173 <200 mg/dL    Triglycerides 110 <150 mg/dL    Direct Measure HDL 54 >=50 mg/dL    LDL Cholesterol Calculated 97 <100 mg/dL    Non HDL Cholesterol 119 <130 mg/dL    Patient Fasting > 8hrs? Yes    Comprehensive metabolic panel    Collection Time: 04/07/25  7:59 AM   Result Value Ref Range    Sodium 141 135 - 145 mmol/L    Potassium 4.4 3.4 - 5.3 mmol/L    Carbon Dioxide (CO2) 28 22 - 29 mmol/L    Anion Gap 7 7 - 15 mmol/L    Urea Nitrogen 18.0 8.0 - 23.0 mg/dL    Creatinine 0.98 (H) 0.51 - 0.95 mg/dL    GFR Estimate 58 (L) >60 mL/min/1.73m2    Calcium 10.0 8.8 - 10.4 mg/dL    Chloride 106 98 - 107 mmol/L    Glucose 95 70 - 99 mg/dL    Alkaline Phosphatase 87 40 - 150 U/L    AST 34 0 - 45 U/L    ALT 26 0 - 50 U/L    Protein Total 7.6 6.4 - 8.3 g/dL    Albumin 4.2 3.5 - 5.2 g/dL    Bilirubin Total 0.4 <=1.2 mg/dL    Patient Fasting > 8hrs? Yes    TSH with free T4 reflex    Collection Time: 04/07/25  7:59 AM   Result Value Ref Range    TSH 1.84 0.30 - 4.20 uIU/mL   EKG 12-lead complete w/read - (Clinic Performed)    Collection Time: 04/08/25 10:38 AM   Result Value Ref Range    Systolic Blood Pressure  mmHg    Diastolic Blood Pressure  mmHg    Ventricular Rate 43 BPM    Atrial Rate 43 BPM    AZ Interval 150 ms    QRS Duration 168 ms     ms    QTc 454 ms    P Axis 51 degrees    R AXIS -36 degrees    T Axis 108 degrees    Interpretation ECG       Sinus bradycardia  Left axis deviation  Left bundle branch block  Abnormal ECG  When compared with ECG of 22-Nov-2023 13:01,  No significant change was found  Confirmed by MD Sylvia, [facility] ([id number]) on 4/9/2025 9:58:23 AM     UA Macroscopic with reflex to Microscopic and Culture    Collection Time: 04/08/25 11:59 AM    Specimen: Urine, Midstream   Result Value Ref Range    Color Urine Yellow Colorless, Straw, Light Yellow, Yellow    Appearance Urine Clear Clear    Glucose Urine Negative Negative mg/dL    Bilirubin  Urine Negative Negative    Ketones Urine Negative Negative mg/dL    Specific Gravity Urine 1.015 1.003 - 1.035    Blood Urine Negative Negative    pH Urine 8.0 (H) 5.0 - 7.0    Protein Albumin Urine Negative Negative mg/dL    Urobilinogen Urine 0.2 0.2, 1.0 E.U./dL    Nitrite Urine Negative Negative    Leukocyte Esterase Urine Negative Negative   CBC with platelets and differential    Collection Time: 04/08/25 11:59 AM   Result Value Ref Range    WBC Count 6.8 4.0 - 11.0 10e3/uL    RBC Count 4.79 3.80 - 5.20 10e6/uL    Hemoglobin 13.1 11.7 - 15.7 g/dL    Hematocrit 40.9 35.0 - 47.0 %    MCV 85 78 - 100 fL    MCH 27.3 26.5 - 33.0 pg    MCHC 32.0 31.5 - 36.5 g/dL    RDW 14.4 10.0 - 15.0 %    Platelet Count 295 150 - 450 10e3/uL    % Neutrophils 46 %    % Lymphocytes 41 %    % Monocytes 9 %    % Eosinophils 4 %    % Basophils 0 %    % Immature Granulocytes 0 %    Absolute Neutrophils 3.1 1.6 - 8.3 10e3/uL    Absolute Lymphocytes 2.8 0.8 - 5.3 10e3/uL    Absolute Monocytes 0.6 0.0 - 1.3 10e3/uL    Absolute Eosinophils 0.3 0.0 - 0.7 10e3/uL    Absolute Basophils 0.0 0.0 - 0.2 10e3/uL    Absolute Immature Granulocytes 0.0 <=0.4 10e3/uL          Revised Cardiac Risk Index (RCRI)  The patient has the following serious cardiovascular risks for perioperative complications:   - No serious cardiac risks = 0 points     RCRI Interpretation: 0 points: Class I (very low risk - 0.4% complication rate)         Signed Electronically by: Brittney Coe NP  A copy of this evaluation report is provided to the requesting physician.

## 2025-04-08 NOTE — H&P (VIEW-ONLY)
Preoperative Evaluation  [facility]  [address]  [facility] [address]  Phone: [phone]  Primary Provider: Brittney Coe NP  Pre-op Performing Provider: Brittney Coe NP  Apr 8, 2025 4/8/2025   Surgical Information   What procedure is being done? cataract surgery:     Facility or Hospital where procedure/surgery [facility] be performed[facility]   Who is doing the procedure / surgery? Dr Stone   Date of surgery / procedure: Left eye April 17.  Right eye May 1, 2025.     Time of surgery / procedure: unknown   Where do you plan to recover after surgery? at home with family     Fax number for surgical facility: Note does not need to be faxed, will be available electronically in Epic.    Assessment & Plan     The proposed surgical procedure is considered LOW risk.    1. Pre-operative examination (Primary)  Exam completed   - EKG 12-lead complete w/read - (Clinic Performed)  - CBC with platelets and differential  - UA Macroscopic with reflex to Microscopic and Culture    2. Cataract of both eyes, unspecified cataract type    3. Hyperlipidemia LDL goal <100  - Lipid Profile; Future    4. Benign essential hypertension  - Comprehensive metabolic panel; Future    5. Hypothyroidism due to acquired atrophy of thyroid  - TSH with free T4 reflex; Future    6. Generalized anxiety disorder    7. Primary insomnia    8. Prediabetes  - Hemoglobin A1c; Future    9. Stage 3a chronic kidney disease (H)  Limit NSAID use.          - No identified additional risk factors other than previously addressed    Antiplatelet or Anticoagulation Medication Instructions   - Bleeding risk is low for this procedure (e.g. dental, skin, cataract).    Additional Medication Instructions  We reviewed the medication list and there are no chronic medications that need to be adjusted for this procedure.    Recommendation  Approval given to proceed with proposed procedure, without  further diagnostic evaluation.      The longitudinal plan of care for the diagnosis(es)/condition(s) as documented were addressed during this visit. Due to the added complexity in care, I will continue to support Franca in the subsequent management and with ongoing continuity of care.    Brittney Coe,   Certified Adult Nurse Practitioner  [phone]      Raffaele Schulte is a 79 year old, presenting for the following:  Pre-Op Exam          4/8/2025    10:23 AM   Additional Questions   Roomed by Britney ALONZO RN   Accompanied by none     HPI: Pre-op          4/8/2025   Pre-Op Questionnaire   Have you ever had a heart attack or stroke? No   Have you ever had surgery on your heart or blood vessels, such as a stent placement, a coronary artery bypass, or surgery on an artery in your head, neck, heart, or legs? No   Do you have chest pain with activity? No   Do you have a history of heart failure? No   Do you currently have a cold, bronchitis or symptoms of other infection? No   Do you have a cough, shortness of breath, or wheezing? No   Do you or anyone in your family have previous history of blood clots? No   Do you or does anyone in your family have a serious bleeding problem such as prolonged bleeding following surgeries or cuts? No   Have you ever had problems with anemia or been told to take iron pills? No   Have you had any abnormal blood loss such as black, tarry or bloody stools, or abnormal vaginal bleeding? No   Have you ever had a blood transfusion? No   Are you willing to have a blood transfusion if it is medically needed before, during, or after your surgery? (!) NO    Have you or any of your relatives ever had problems with anesthesia? No   Do you have sleep apnea, excessive snoring or daytime drowsiness? (!) UNKNOWN   Do you have any artifical heart valves or other implanted medical devices like a pacemaker, defibrillator, or continuous glucose monitor? No   Do you have artificial joints? No   Are  you allergic to latex? No     Health Care Directive  Patient has a Health Care Directive on file      Preoperative Review of    reviewed - no record of controlled substances prescribed.      Status of Chronic Conditions:  HYPERLIPIDEMIA - Patient has a long history of significant Hyperlipidemia requiring medication for treatment with recent good control. Patient reports no problems or side effects with the medication.     The ASCVD Risk score (Amarilys CROOK, et al., 2019) failed to calculate for the following reasons:    The 2019 ASCVD risk score is only valid for ages 40 to 79      HYPERTENSION - Patient has longstanding history of HTN , currently denies any symptoms referable to elevated blood pressure. Specifically denies chest pain, palpitations, dyspnea, orthopnea, PND or peripheral edema. Blood pressure readings have been in normal range. Current medication regimen is as listed below. Patient denies any side effects of medication.     BP Readings from Last 3 Encounters:   04/08/25 (!) 144/80   01/24/25 120/75   01/06/25 137/77    Wt Readings from Last 3 Encounters:   04/08/25 60.8 kg (134 lb)   01/24/25 59.9 kg (132 lb)   01/06/25 59.9 kg (132 lb)              HYPOTHYROIDISM - Patient has a longstanding history of chronic Hypothyroidism. Patient has been doing well, noting no tremor, insomnia, hair loss or changes in skin texture. Continues to take medications as directed, without adverse reactions or side effects. Last TSH   Lab Results   Component Value Date    TSH 1.84 04/07/2025   .      Patient Active Problem List    Diagnosis Date Noted    Mixed hyperlipidemia 11/16/2020     Priority: Medium    LBBB (left bundle branch block) 11/16/2020     Priority: Medium    Dyspnea on exertion 11/16/2020     Priority: Medium    Prediabetes 11/16/2020     Priority: Medium    Mild recurrent major depression 11/13/2020     Priority: Medium    Generalized anxiety disorder 11/13/2020     Priority: Medium    CKD (chronic  kidney disease) stage 3, GFR 30-59 ml/min (H) 08/07/2019     Priority: Medium    AK (actinic keratosis) 08/28/2018     Priority: Medium    Benign essential hypertension 06/14/2017     Priority: Medium    Hypothyroidism due to acquired atrophy of thyroid 05/21/2015     Priority: Medium    Routine general medical examination at a health care facility (ANNUAL) 04/30/2014     Priority: Medium    Colon cancer screening 04/30/2014     Priority: Medium     Done in July. Return is 10 years      Menopause 04/23/2013     Priority: Medium    Joint pain 04/23/2013     Priority: Medium    Advanced care planning/counseling discussion 10/09/2012     Priority: Medium    Disorder of bone and cartilage 05/10/2011     Priority: Medium      Past Medical History:   Diagnosis Date    Esophageal reflux 05/10/2011     Past Surgical History:   Procedure Laterality Date    ARTHRODESIS KNEE  01/03/2013    left knee, paritla medial meniscectomy, debridement medial femoral condyle and patellofemoral debridement    COLONOSCOPY  2004    nml    COLONOSCOPY  07/23/2014    Procedure: COLONOSCOPY;  Surgeon: Nghia Hernandez DO;  Location: [facility]    CYSTOSCOPY      micro hematuria neg    DILATION AND CURETTAGE      EXCISE LESION UPPER EXTREMITY Right 10/25/2017    Procedure: EXCISE LESION UPPER EXTREMITY;  EXCISIONAL BIOPSY RIGHT FOREARM TIMES 2;  Surgeon: Nghia Hernandez DO;  Location: [facility]    tubal sterilization      uterine fibroids      wrist fx RT       Current Outpatient Medications   Medication Sig Dispense Refill    aspirin 81 MG tablet Take 81 mg by mouth daily      atorvastatin (LIPITOR) 10 MG tablet TAKE 1 TABLET BY MOUTH EVERY DAY. TAKE ALONG WITH 20MG TABLET FOR TOTAL DOSE OF 30MG DAILY 90 tablet 2    atorvastatin (LIPITOR) 20 MG tablet Take 1 tablet (20 mg) by mouth daily. 90 tablet 3    benzonatate (TESSALON) 200 MG capsule Take 1 capsule (200 mg) by mouth 3 times daily as needed for cough. 30 capsule 2     Calcium-Magnesium-Vitamin D (CALCIUM 500 PO) Take by mouth daily      cinnamon 500 MG CAPS       co-enzyme Q-10 100 MG CAPS capsule Take 100 mg by mouth       gabapentin (NEURONTIN) 300 MG capsule TAKE 1 CAPSULE(300 MG) BY MOUTH AT BEDTIME 90 capsule 1    Garlic 2000 MG CAPS Take 2,000 mg by mouth daily      GLUCOSAMINE-CHONDROITIN PO Take  by mouth. GLUCOSAMINE HCL/CHONDR SUA NA  One daily      levothyroxine (SYNTHROID/LEVOTHROID) 50 MCG tablet Take one tablet every other day. 45 tablet 1    levothyroxine (SYNTHROID/LEVOTHROID) 75 MCG tablet TAKE 1 TABLET BY MOUTH EVERY OTHER DAY 45 tablet 1    lisinopril (ZESTRIL) 10 MG tablet TAKE 1 TABLET(10 MG) BY MOUTH DAILY 90 tablet 3    LORazepam (ATIVAN) 0.5 MG tablet TAKE 1 TABLET BY MOUTH NIGHTLY AS NEEDED FOR ANXIETY OR  SLEEP 30 tablet 0    mometasone (ELOCON) 0.1 % external cream Apply a thin film of cream to the affected ear canal (s) twice a day for 2 weeks, then use sparingly as needed only. 15 g 1    Multiple Vitamins-Minerals (MULTIVITAMIN OR) Alive 1 tablet      Omega-3 Fatty Acids (FISH OIL) 1200 MG CAPS Take 1,080 mg by mouth daily          Allergies   Allergen Reactions    Pseudoephedrine Hcl Other (See Comments)     Heart racing  Sudafed    Guaifenesin Er Palpitations    Hydroxyzine Anxiety     jittery    Vilazodone Hcl Anxiety     Restless legs        Social History     Tobacco Use    Smoking status: Never     Passive exposure: Never    Smokeless tobacco: Never   Substance Use Topics    Alcohol use: Yes     Alcohol/week: 0.0 standard drinks of alcohol     Comment: occasionally       History   Drug Use No             Review of Systems  CONSTITUTIONAL: NEGATIVE for fever, chills, change in weight  INTEGUMENTARY/SKIN: NEGATIVE for worrisome rashes, moles or lesions  EYES: POSITIVE for vision changes   ENT/MOUTH: NEGATIVE for ear, mouth and throat problems  RESP: NEGATIVE for significant cough or SOB  BREAST: NEGATIVE for masses, tenderness or discharge  CV:  "NEGATIVE for chest pain, palpitations or peripheral edema  GI: NEGATIVE for nausea, abdominal pain, heartburn, or change in bowel habits  : NEGATIVE for frequency, dysuria, or hematuria  MUSCULOSKELETAL: NEGATIVE for significant arthralgias or myalgia  NEURO: NEGATIVE for weakness, dizziness or paresthesias  ENDOCRINE: NEGATIVE for temperature intolerance, skin/hair changes  HEME: NEGATIVE for bleeding problems  PSYCHIATRIC: NEGATIVE for changes in mood or affect    Objective    BP (!) 144/80 (BP Location: Left arm, Patient Position: Chair, Cuff Size: Adult Regular)   Pulse (!) 48   Temp 97.3  F (36.3  C) (Tympanic)   Resp 18   Ht 1.588 m (5' 2.52\")   Wt 60.8 kg (134 lb)   SpO2 98%   BMI 24.10 kg/m     Estimated body mass index is 24.1 kg/m  as calculated from the following:    Height as of this encounter: 1.588 m (5' 2.52\").    Weight as of this encounter: 60.8 kg (134 lb).  Physical Exam  GENERAL: alert and no distress  HENT: ear canals and TM's normal, nose and mouth without ulcers or lesions  NECK: no adenopathy, no asymmetry, masses, or scars, thyroid normal to palpation, and no carotid bruits  RESP: lungs clear to auscultation - no rales, rhonchi or wheezes  CV: regular rate and rhythm, normal S1 S2, no S3 or S4, no murmur, click or rub, no peripheral edema  MS: no gross musculoskeletal defects noted, no edema  PSYCH: mentation appears normal, affect normal/bright    Recent Labs   Lab Test 04/07/25  0759 01/03/25  0822 06/25/24  0804 06/17/24  1522   HGB  --   --   --  12.5   PLT  --   --   --  271    138   < >  --    POTASSIUM 4.4 4.4   < >  --    CR 0.98* 0.93   < >  --    A1C 5.8* 5.5   < >  --     < > = values in this interval not displayed.        Diagnostics  Recent Results (from the past week)   Hemoglobin A1c    Collection Time: 04/07/25  7:59 AM   Result Value Ref Range    Estimated Average Glucose 120 (H) <117 mg/dL    Hemoglobin A1C 5.8 (H) <5.7 %   Lipid Profile    Collection Time: " 04/07/25  7:59 AM   Result Value Ref Range    Cholesterol 173 <200 mg/dL    Triglycerides 110 <150 mg/dL    Direct Measure HDL 54 >=50 mg/dL    LDL Cholesterol Calculated 97 <100 mg/dL    Non HDL Cholesterol 119 <130 mg/dL    Patient Fasting > 8hrs? Yes    Comprehensive metabolic panel    Collection Time: 04/07/25  7:59 AM   Result Value Ref Range    Sodium 141 135 - 145 mmol/L    Potassium 4.4 3.4 - 5.3 mmol/L    Carbon Dioxide (CO2) 28 22 - 29 mmol/L    Anion Gap 7 7 - 15 mmol/L    Urea Nitrogen 18.0 8.0 - 23.0 mg/dL    Creatinine 0.98 (H) 0.51 - 0.95 mg/dL    GFR Estimate 58 (L) >60 mL/min/1.73m2    Calcium 10.0 8.8 - 10.4 mg/dL    Chloride 106 98 - 107 mmol/L    Glucose 95 70 - 99 mg/dL    Alkaline Phosphatase 87 40 - 150 U/L    AST 34 0 - 45 U/L    ALT 26 0 - 50 U/L    Protein Total 7.6 6.4 - 8.3 g/dL    Albumin 4.2 3.5 - 5.2 g/dL    Bilirubin Total 0.4 <=1.2 mg/dL    Patient Fasting > 8hrs? Yes    TSH with free T4 reflex    Collection Time: 04/07/25  7:59 AM   Result Value Ref Range    TSH 1.84 0.30 - 4.20 uIU/mL   EKG 12-lead complete w/read - (Clinic Performed)    Collection Time: 04/08/25 10:38 AM   Result Value Ref Range    Systolic Blood Pressure  mmHg    Diastolic Blood Pressure  mmHg    Ventricular Rate 43 BPM    Atrial Rate 43 BPM    MA Interval 150 ms    QRS Duration 168 ms     ms    QTc 454 ms    P Axis 51 degrees    R AXIS -36 degrees    T Axis 108 degrees    Interpretation ECG       Sinus bradycardia  Left axis deviation  Left bundle branch block  Abnormal ECG  When compared with ECG of 22-Nov-2023 13:01,  No significant change was found  Confirmed by MD Sylvia, [facility] ([id number]) on 4/9/2025 9:58:23 AM     UA Macroscopic with reflex to Microscopic and Culture    Collection Time: 04/08/25 11:59 AM    Specimen: Urine, Midstream   Result Value Ref Range    Color Urine Yellow Colorless, Straw, Light Yellow, Yellow    Appearance Urine Clear Clear    Glucose Urine Negative Negative mg/dL    Bilirubin  Urine Negative Negative    Ketones Urine Negative Negative mg/dL    Specific Gravity Urine 1.015 1.003 - 1.035    Blood Urine Negative Negative    pH Urine 8.0 (H) 5.0 - 7.0    Protein Albumin Urine Negative Negative mg/dL    Urobilinogen Urine 0.2 0.2, 1.0 E.U./dL    Nitrite Urine Negative Negative    Leukocyte Esterase Urine Negative Negative   CBC with platelets and differential    Collection Time: 04/08/25 11:59 AM   Result Value Ref Range    WBC Count 6.8 4.0 - 11.0 10e3/uL    RBC Count 4.79 3.80 - 5.20 10e6/uL    Hemoglobin 13.1 11.7 - 15.7 g/dL    Hematocrit 40.9 35.0 - 47.0 %    MCV 85 78 - 100 fL    MCH 27.3 26.5 - 33.0 pg    MCHC 32.0 31.5 - 36.5 g/dL    RDW 14.4 10.0 - 15.0 %    Platelet Count 295 150 - 450 10e3/uL    % Neutrophils 46 %    % Lymphocytes 41 %    % Monocytes 9 %    % Eosinophils 4 %    % Basophils 0 %    % Immature Granulocytes 0 %    Absolute Neutrophils 3.1 1.6 - 8.3 10e3/uL    Absolute Lymphocytes 2.8 0.8 - 5.3 10e3/uL    Absolute Monocytes 0.6 0.0 - 1.3 10e3/uL    Absolute Eosinophils 0.3 0.0 - 0.7 10e3/uL    Absolute Basophils 0.0 0.0 - 0.2 10e3/uL    Absolute Immature Granulocytes 0.0 <=0.4 10e3/uL          Revised Cardiac Risk Index (RCRI)  The patient has the following serious cardiovascular risks for perioperative complications:   - No serious cardiac risks = 0 points     RCRI Interpretation: 0 points: Class I (very low risk - 0.4% complication rate)         Signed Electronically by: Brittney Coe NP  A copy of this evaluation report is provided to the requesting physician.

## 2025-04-09 LAB
ATRIAL RATE - MUSE: 43 BPM
DIASTOLIC BLOOD PRESSURE - MUSE: NORMAL MMHG
INTERPRETATION ECG - MUSE: NORMAL
P AXIS - MUSE: 51 DEGREES
PR INTERVAL - MUSE: 150 MS
QRS DURATION - MUSE: 168 MS
QT - MUSE: 538 MS
QTC - MUSE: 454 MS
R AXIS - MUSE: -36 DEGREES
SYSTOLIC BLOOD PRESSURE - MUSE: NORMAL MMHG
T AXIS - MUSE: 108 DEGREES
VENTRICULAR RATE- MUSE: 43 BPM

## 2025-04-10 RX ORDER — LORATADINE 10 MG/1
10 TABLET ORAL DAILY
Status: ON HOLD | COMMUNITY
End: 2025-04-17

## 2025-04-12 ENCOUNTER — ANESTHESIA EVENT (OUTPATIENT)
Dept: SURGERY | Facility: HOSPITAL | Age: 80
End: 2025-04-12
Payer: MEDICARE

## 2025-04-12 ASSESSMENT — ENCOUNTER SYMPTOMS: DYSRHYTHMIAS: 1

## 2025-04-12 NOTE — ANESTHESIA PREPROCEDURE EVALUATION
Anesthesia Pre-Procedure Evaluation    Patient: Franca Angeles   MRN: [id number] : 1945        Procedure : Procedure(s):  Phacoemulsification cataract extraction posterior chamber lens left eye          Past Medical History:   Diagnosis Date    Esophageal reflux 05/10/2011      Past Surgical History:   Procedure Laterality Date    ARTHRODESIS KNEE  2013    left knee, paritla medial meniscectomy, debridement medial femoral condyle and patellofemoral debridement    COLONOSCOPY  2004    nml    COLONOSCOPY  2014    Procedure: COLONOSCOPY;  Surgeon: Nghia Hernandez DO;  Location: [facility]    CYSTOSCOPY      micro hematuria neg    DILATION AND CURETTAGE      EXCISE LESION UPPER EXTREMITY Right 10/25/2017    Procedure: EXCISE LESION UPPER EXTREMITY;  EXCISIONAL BIOPSY RIGHT FOREARM TIMES 2;  Surgeon: Nghia Hernandez DO;  Location: [facility]    tubal sterilization      uterine fibroids      wrist fx RT        Allergies   Allergen Reactions    Pseudoephedrine Hcl Other (See Comments)     Heart racing  Sudafed    Guaifenesin Er Palpitations    Hydroxyzine Anxiety     jittery    Vilazodone Hcl Anxiety     Restless legs      Social History     Tobacco Use    Smoking status: Never     Passive exposure: Never    Smokeless tobacco: Never   Substance Use Topics    Alcohol use: Yes     Alcohol/week: 0.0 standard drinks of alcohol     Comment: occasionally      Wt Readings from Last 1 Encounters:   25 60.8 kg (134 lb)        Anesthesia Evaluation   Pt has had prior anesthetic. Type: MAC.    No history of anesthetic complications       ROS/MED HX  ENT/Pulmonary: Comment:  PFT's: moderate diffusion defect, pulmonary vascular    (+)     INDIA risk factors,  hypertension,                                 Neurologic: Comment: insomnia      Cardiovascular: Comment: LBBB    2023 Cardiology visit: SVT, SVE, VE - not interested in medications. Bradycardia to 30s at night - not going to treat slow HR's at  night. MOSQUERA with going up/down stairs only. Palpitations, LBBB. F/up PRN    BP Readings from Last 3 Encounters:  04/08/25 : (!) 144/80  01/24/25 : 120/75  01/06/25 : 137/77        (+) Dyslipidemia hypertension-range: lisinopril (144/80 on 4/8/25)/ -   -  - -   Taking blood thinners  Instructions Given to patient: asa 81.      MOSQUERA.             dysrhythmias, Other,        Previous cardiac testing   Echo: Date: 10/2020 Results:  The right ventricle is normal size.  Global right ventricular function is normal.  Both atria appear normal.  Trace mitral insufficiency is present.  Aortic valve is normal in structure and function.  Trace tricuspid insufficiency is present.  Right ventricular systolic pressure is 31mmHg above the right atrial pressure.  The pulmonic valve is normal.  The aorta root is normal.  No pericardial effusion is present.  Global and regional left ventricular function is normal with an EF of 60-65%.      Stress Test:  Date: 10/2020 Results:     The nuclear stress test is probably negative for inducible myocardial ischemia or infarction.     The left ventricular ejection fraction at rest is 84%.  The left ventricular ejection fraction at stress is 80%.  ECG Reviewed:  Date: 4/8/25 Results:  HR 43    Sinus bradycardia  Left axis deviation  Left bundle branch block  Abnormal ECG  When compared with ECG of 22-Nov-2023 13:01,  No significant change was found  Confirmed by MD Sylvia, [facility] ([id number]) on 4/9/2025 9:58:23 AM      Cath:  Date: Results:      METS/Exercise Tolerance: >4 METS    Hematologic:  - neg hematologic  ROS     Musculoskeletal:   (+)  arthritis,             GI/Hepatic:  - neg GI/hepatic ROS     Renal/Genitourinary:     (+) renal disease, type: CRI,            Endo: Comment: Prediabetes: A1C 5.8 on 4/7/25    (+)          thyroid problem, hypothyroidism TSH in range 4/7/25,           Psychiatric/Substance Use:     (+) psychiatric history anxiety and depression       Infectious Disease:  - neg  "infectious disease ROS     Malignancy:  - neg malignancy ROS     Other:  - neg other ROS          Physical Exam    Airway        Mallampati: III   TM distance: < 3 FB   Neck ROM: full   Mouth opening: < 3 cm    Respiratory Devices and Support         Dental       (+) Multiple crowns, permanant bridges      Cardiovascular          Rhythm and rate: regular and bradycardia   (+) murmur       Pulmonary   pulmonary exam normal        breath sounds clear to auscultation           OUTSIDE LABS:  CBC:   Lab Results   Component Value Date    WBC 6.8 04/08/2025    WBC 6.8 06/17/2024    HGB 13.1 04/08/2025    HGB 12.5 06/17/2024    HCT 40.9 04/08/2025    HCT 38.0 06/17/2024     04/08/2025     06/17/2024     BMP:   Lab Results   Component Value Date     04/07/2025     01/03/2025    POTASSIUM 4.4 04/07/2025    POTASSIUM 4.4 01/03/2025    CHLORIDE 106 04/07/2025    CHLORIDE 104 01/03/2025    CO2 28 04/07/2025    CO2 23 01/03/2025    BUN 18.0 04/07/2025    BUN 20.4 01/03/2025    CR 0.98 (H) 04/07/2025    CR 0.93 01/03/2025    GLC 95 04/07/2025    GLC 93 01/03/2025     COAGS:   Lab Results   Component Value Date    INR 0.86 10/02/2020     POC: No results found for: \"BGM\", \"HCG\", \"HCGS\"  HEPATIC:   Lab Results   Component Value Date    ALBUMIN 4.2 04/07/2025    PROTTOTAL 7.6 04/07/2025    ALT 26 04/07/2025    AST 34 04/07/2025    ALKPHOS 87 04/07/2025    BILITOTAL 0.4 04/07/2025     OTHER:   Lab Results   Component Value Date    A1C 5.8 (H) 04/07/2025    IDALIA 10.0 04/07/2025    TSH 1.84 04/07/2025    T4 1.04 10/01/2024    SED 11 06/17/2024       Anesthesia Plan    ASA Status:  3    NPO Status:  NPO Appropriate    Anesthesia Type: MAC.     - Reason for MAC: immobility needed   Induction: N/a.   Maintenance: N/A.        Consents    Anesthesia Plan(s) and associated risks, benefits, and realistic alternatives discussed. Questions answered and patient/representative(s) expressed understanding.     - Discussed: " Risks, Benefits and Alternatives for BOTH SEDATION and the PROCEDURE were discussed     - Discussed with:  Patient      - Extended Intubation/Ventilatory Support Discussed: No.      - Patient is DNR/DNI Status: No     Use of blood products discussed: No .     Postoperative Care            Comments:    Other Comments: Reviewed 4/8 Liang wolf    Risks and benefits of MAC anesthetic discussed including dental damage, aspiration, loss of airway, conversion to general anesthetic, CV complications, MI, stroke, death. Pt wishes to proceed.            WILLEM Driscoll CNP    Clinically Significant Risk Factors Present on Admission                   # Hypertension: Noted on problem list

## 2025-04-17 ENCOUNTER — ANESTHESIA (OUTPATIENT)
Dept: SURGERY | Facility: HOSPITAL | Age: 80
End: 2025-04-17
Payer: MEDICARE

## 2025-04-17 ENCOUNTER — HOSPITAL ENCOUNTER (OUTPATIENT)
Facility: HOSPITAL | Age: 80
Discharge: HOME | End: 2025-04-17
Attending: OPHTHALMOLOGY | Admitting: OPHTHALMOLOGY
Payer: MEDICARE

## 2025-04-17 VITALS
OXYGEN SATURATION: 96 % | SYSTOLIC BLOOD PRESSURE: 113 MMHG | DIASTOLIC BLOOD PRESSURE: 86 MMHG | RESPIRATION RATE: 16 BRPM | HEART RATE: 48 BPM | TEMPERATURE: 96.8 F

## 2025-04-17 PROCEDURE — 250N000011 HC RX IP 250 OP 636: Performed by: OPHTHALMOLOGY

## 2025-04-17 PROCEDURE — 710N000012 HC RECOVERY PHASE 2, PER MINUTE: Performed by: OPHTHALMOLOGY

## 2025-04-17 PROCEDURE — 360N000076 HC SURGERY LEVEL 3, PER MIN: Performed by: OPHTHALMOLOGY

## 2025-04-17 PROCEDURE — V2632 POST CHMBR INTRAOCULAR LENS: HCPCS | Performed by: OPHTHALMOLOGY

## 2025-04-17 PROCEDURE — 250N000011 HC RX IP 250 OP 636: Performed by: NURSE ANESTHETIST, CERTIFIED REGISTERED

## 2025-04-17 PROCEDURE — 272N000001 HC OR GENERAL SUPPLY STERILE: Performed by: OPHTHALMOLOGY

## 2025-04-17 PROCEDURE — 250N000009 HC RX 250: Performed by: OPHTHALMOLOGY

## 2025-04-17 PROCEDURE — 999N000141 HC STATISTIC PRE-PROCEDURE NURSING ASSESSMENT: Performed by: OPHTHALMOLOGY

## 2025-04-17 PROCEDURE — 370N000017 HC ANESTHESIA TECHNICAL FEE, PER MIN: Performed by: OPHTHALMOLOGY

## 2025-04-17 DEVICE — IMPLANTABLE DEVICE: Type: IMPLANTABLE DEVICE | Site: EYE | Status: FUNCTIONAL

## 2025-04-17 RX ORDER — FEXOFENADINE HCL 180 MG/1
180 TABLET ORAL DAILY
COMMUNITY

## 2025-04-17 RX ORDER — PHENYLEPHRINE HYDROCHLORIDE 100 MG/ML
1 SOLUTION/ DROPS OPHTHALMIC
Status: COMPLETED | OUTPATIENT
Start: 2025-04-17 | End: 2025-04-17

## 2025-04-17 RX ORDER — LIDOCAINE 40 MG/G
CREAM TOPICAL
Status: DISCONTINUED | OUTPATIENT
Start: 2025-04-17 | End: 2025-04-17 | Stop reason: HOSPADM

## 2025-04-17 RX ORDER — ONDANSETRON 2 MG/ML
4 INJECTION INTRAMUSCULAR; INTRAVENOUS EVERY 30 MIN PRN
Status: ACTIVE | OUTPATIENT
Start: 2025-04-17

## 2025-04-17 RX ORDER — DEXAMETHASONE SODIUM PHOSPHATE 10 MG/ML
4 INJECTION, SOLUTION INTRAMUSCULAR; INTRAVENOUS
Status: ACTIVE | OUTPATIENT
Start: 2025-04-17

## 2025-04-17 RX ORDER — ONDANSETRON 4 MG/1
4 TABLET, ORALLY DISINTEGRATING ORAL EVERY 30 MIN PRN
Status: ACTIVE | OUTPATIENT
Start: 2025-04-17

## 2025-04-17 RX ORDER — LIDOCAINE HYDROCHLORIDE 20 MG/ML
JELLY TOPICAL ONCE
Status: COMPLETED | OUTPATIENT
Start: 2025-04-17 | End: 2025-04-17

## 2025-04-17 RX ORDER — LIDOCAINE HYDROCHLORIDE 10 MG/ML
INJECTION, SOLUTION EPIDURAL; INFILTRATION; INTRACAUDAL; PERINEURAL PRN
Status: DISCONTINUED | OUTPATIENT
Start: 2025-04-17 | End: 2025-04-17 | Stop reason: HOSPADM

## 2025-04-17 RX ORDER — TETRACAINE HYDROCHLORIDE 5 MG/ML
SOLUTION OPHTHALMIC PRN
Status: DISCONTINUED | OUTPATIENT
Start: 2025-04-17 | End: 2025-04-17 | Stop reason: HOSPADM

## 2025-04-17 RX ORDER — LEVOBUNOLOL HYDROCHLORIDE 5 MG/ML
SOLUTION/ DROPS OPHTHALMIC PRN
Status: DISCONTINUED | OUTPATIENT
Start: 2025-04-17 | End: 2025-04-17 | Stop reason: HOSPADM

## 2025-04-17 RX ORDER — ACETAMINOPHEN 500 MG
500-1000 TABLET ORAL EVERY 6 HOURS PRN
COMMUNITY

## 2025-04-17 RX ORDER — SODIUM CHLORIDE, SODIUM LACTATE, POTASSIUM CHLORIDE, CALCIUM CHLORIDE 600; 310; 30; 20 MG/100ML; MG/100ML; MG/100ML; MG/100ML
INJECTION, SOLUTION INTRAVENOUS CONTINUOUS
Status: DISCONTINUED | OUTPATIENT
Start: 2025-04-17 | End: 2025-04-17 | Stop reason: HOSPADM

## 2025-04-17 RX ORDER — MOXIFLOXACIN IN NACL,ISO-OS/PF 1.6 MG/ML
SYRINGE (ML) INTRAOCULAR PRN
Status: DISCONTINUED | OUTPATIENT
Start: 2025-04-17 | End: 2025-04-17 | Stop reason: HOSPADM

## 2025-04-17 RX ORDER — NALOXONE HYDROCHLORIDE 0.4 MG/ML
0.1 INJECTION, SOLUTION INTRAMUSCULAR; INTRAVENOUS; SUBCUTANEOUS
Status: ACTIVE | OUTPATIENT
Start: 2025-04-17

## 2025-04-17 RX ORDER — MOXIFLOXACIN 5 MG/ML
1 SOLUTION/ DROPS OPHTHALMIC
Status: COMPLETED | OUTPATIENT
Start: 2025-04-17 | End: 2025-04-17

## 2025-04-17 RX ORDER — PROPARACAINE HYDROCHLORIDE 5 MG/ML
1 SOLUTION/ DROPS OPHTHALMIC
Status: COMPLETED | OUTPATIENT
Start: 2025-04-17 | End: 2025-04-17

## 2025-04-17 RX ORDER — ACETAMINOPHEN 325 MG/1
650 TABLET ORAL
Status: DISCONTINUED | OUTPATIENT
Start: 2025-04-17 | End: 2025-04-17 | Stop reason: HOSPADM

## 2025-04-17 RX ORDER — CYCLOPENTOLATE HYDROCHLORIDE 20 MG/ML
1 SOLUTION/ DROPS OPHTHALMIC ONCE
Status: COMPLETED | OUTPATIENT
Start: 2025-04-17 | End: 2025-04-17

## 2025-04-17 RX ORDER — PROPARACAINE HYDROCHLORIDE 5 MG/ML
1 SOLUTION/ DROPS OPHTHALMIC ONCE
Status: COMPLETED | OUTPATIENT
Start: 2025-04-17 | End: 2025-04-17

## 2025-04-17 RX ORDER — CYCLOPENTOLAT/TROPIC/PHENYLEPH 1%-1%-2.5%
1 DROPS (EA) OPHTHALMIC (EYE)
Status: COMPLETED | OUTPATIENT
Start: 2025-04-17 | End: 2025-04-17

## 2025-04-17 RX ORDER — PILOCARPINE HYDROCHLORIDE 10 MG/ML
SOLUTION/ DROPS OPHTHALMIC PRN
Status: DISCONTINUED | OUTPATIENT
Start: 2025-04-17 | End: 2025-04-17 | Stop reason: HOSPADM

## 2025-04-17 RX ADMIN — LIDOCAINE HYDROCHLORIDE: 20 JELLY TOPICAL at 08:53

## 2025-04-17 RX ADMIN — MOXIFLOXACIN 1 DROP: 5 SOLUTION/ DROPS OPHTHALMIC at 08:37

## 2025-04-17 RX ADMIN — MOXIFLOXACIN 1 DROP: 5 SOLUTION/ DROPS OPHTHALMIC at 08:32

## 2025-04-17 RX ADMIN — CYCLOPENTOLATE HYDROCHLORIDE 1 DROP: 20 SOLUTION/ DROPS OPHTHALMIC at 08:27

## 2025-04-17 RX ADMIN — PHENYLEPHRINE HYDROCHLORIDE 1 DROP: 100 SOLUTION/ DROPS OPHTHALMIC at 08:33

## 2025-04-17 RX ADMIN — MIDAZOLAM 1 MG: 1 INJECTION INTRAMUSCULAR; INTRAVENOUS at 09:31

## 2025-04-17 RX ADMIN — Medication 1 DROP: at 08:31

## 2025-04-17 RX ADMIN — MOXIFLOXACIN 1 DROP: 5 SOLUTION/ DROPS OPHTHALMIC at 08:47

## 2025-04-17 RX ADMIN — PROPARACAINE HYDROCHLORIDE 1 DROP: 5 SOLUTION/ DROPS OPHTHALMIC at 08:26

## 2025-04-17 RX ADMIN — Medication 1 DROP: at 08:53

## 2025-04-17 RX ADMIN — PROPARACAINE HYDROCHLORIDE 1 DROP: 5 SOLUTION/ DROPS OPHTHALMIC at 08:49

## 2025-04-17 ASSESSMENT — ACTIVITIES OF DAILY LIVING (ADL)
ADLS_ACUITY_SCORE: 21

## 2025-04-17 NOTE — INTERVAL H&P NOTE
I have reviewed the surgical (or preoperative) H&P that is linked to this encounter, and examined the patient. There are no significant changes. Ok with dilating drops including phenylephrine in the eye clinic.  Emil Stone MD      Clinical Conditions Present on Arrival:  Clinically Significant Risk Factors Present on Admission

## 2025-04-17 NOTE — OP NOTE
[facility]  Ophthalmology Full Operative Note    Pre-operative diagnosis: Combined form of age-related cataract, left eye [H25.812]   Post-operative diagnosis Same   Procedure: Procedure(s):  Phacoemulsification cataract extraction posterior chamber lens left eye   Surgeon: Emil Stone MD   Assistants(s):    Anesthesia: MAC with Local    Estimated blood loss: None    Total IV fluids: (See anesthesia record)   Specimens: None   Implants: 20 LI61AO   Findings:    Complications: None   Condition: Stable   Comments:       PROCEDURAL ANESTHESIA:     Topical/MAC.  Lidocaine 2% jelly topically and Lidocaine 1% preservative-free intracamerally.     IOL Model: SofPort LI61AO    IOL Serial Number: [id number]    IOL Power Performed: +20.00        PROCEDURE: The usual topical anesthetic and dilating drops were administered in the pre-op area, The patient was brought to the Operating Room and prepped and draped in standard sterile ophthalmic manner. A lid speculum was placed and the microscope was brought into the field keeping the microscope illumination as low as possible throughout the case and removing the microscope light from the field whenever feasible. A paracentesis was created and 1% lidocaine was instilled in the anterior chamber.  The anterior chamber was then reformed with viscoelastic. A clear cornea temporal wound was created using a 2.8 mm keratome. A cystitome was used to initiate a flap in the anterior capsule and an Utrata forceps was used to create a continuous tear capsulorrhexis. Hydrodissection was performed. The phacoemulsification tip was inserted into the eye and the nucleus and epinucleous were removed using a * 2-handed divide technique. The residual cortex was then removed using the I/A handpiece. The anterior chamber was then refilled with viscoelastic and the wound was enlarged with the keratome. The intraocular lens implant of appropriate power was loaded into the injector and  injected into the capsular bag. It was checked to make sure that it was central and stable. Residual viscoelastic was removed using the I/A. Then anterior chamber was refilled with BSS, and was noted to be watertight with no suture necessary.   Intracameral moxifloxacin was given for prophylaxis against the risk of endophthalmitis.  The pressure was then adjusted to the normal range by releasing some fluid from the paracentesis.  Topical Pilocarpine 1% and Levobunolol drops were instilled. A hard shield was placed. No unplanned rupture of the posterior capsule occurred during this procedure. The patient tolerated the procedure well and was sent to the Recovery Room in satisfactory condition.    Surgeon Comment: 4/17/25. PROCEDURE: PECE/PCL OS, topical/MAC with IV sedation. Used a capsule polisher. IC moxifloxacin. No complications. 1st eye, 20 LI61AO. Dx: Combined cataract    Physician:      JONATHAN Stone M.D.

## 2025-04-17 NOTE — ANESTHESIA POSTPROCEDURE EVALUATION
Patient: Franca Angeles    Procedure: Procedure(s):  Phacoemulsification cataract extraction posterior chamber lens left eye       Anesthesia Type:  MAC    Note:  Disposition: Outpatient   Postop Pain Control: Uneventful            Sign Out: Well controlled pain   PONV: No   Neuro/Psych: Uneventful            Sign Out: Acceptable/Baseline neuro status   Airway/Respiratory: Uneventful            Sign Out: Acceptable/Baseline resp. status   CV/Hemodynamics: Uneventful            Sign Out: Acceptable CV status; No obvious hypovolemia; No obvious fluid overload   Other NRE: NONE   DID A NON-ROUTINE EVENT OCCUR? No           Last vitals:  Vitals Value Taken Time   /76 04/17/25 1000   Temp 96.8  F (36  C) 04/17/25 1000   Pulse 48 04/17/25 1000   Resp 16 04/17/25 1000   SpO2 97 % 04/17/25 1007   Vitals shown include unfiled device data.    Electronically Signed By: WILLEM Garrison CRNA  April 17, 2025  10:08 AM

## 2025-04-17 NOTE — PLAN OF CARE
Patient and responsible adult given discharge instructions with no questions regarding instructions. Maisha score 19. Pain level 0/10.  Discharged from unit via ambulating. Patient discharged to home .

## 2025-04-17 NOTE — ANESTHESIA CARE TRANSFER NOTE
Patient: Franca Angeles    Procedure: Procedure(s):  Phacoemulsification cataract extraction posterior chamber lens left eye       Diagnosis: Combined form of age-related cataract, left eye [H25.812]  Diagnosis Additional Information: No value filed.    Anesthesia Type:   MAC     Note:    Oropharynx: oropharynx clear of all foreign objects  Level of Consciousness: awake  Oxygen Supplementation: room air    Independent Airway: airway patency satisfactory and stable  Dentition: dentition unchanged  Vital Signs Stable: post-procedure vital signs reviewed and stable  Report to RN Given: handoff report given  Patient transferred to: Phase II    Handoff Report: Identifed the Patient, Identified the Reponsible Provider, Reviewed the pertinent medical history, Discussed the surgical course, Reviewed Intra-OP anesthesia mangement and issues during anesthesia, Set expectations for post-procedure period and Allowed opportunity for questions and acknowledgement of understanding      Vitals:  Vitals Value Taken Time   BP     Temp     Pulse     Resp     SpO2         Electronically Signed By: WILLEM Cazares CRNA  April 17, 2025  9:58 AM

## 2025-04-18 ENCOUNTER — TRANSFERRED RECORDS (OUTPATIENT)
Dept: HEALTH INFORMATION MANAGEMENT | Facility: CLINIC | Age: 80
End: 2025-04-18

## 2025-04-20 ENCOUNTER — HEALTH MAINTENANCE LETTER (OUTPATIENT)
Age: 80
End: 2025-04-20

## 2025-04-23 ENCOUNTER — ANESTHESIA EVENT (OUTPATIENT)
Dept: SURGERY | Facility: HOSPITAL | Age: 80
End: 2025-04-23
Payer: MEDICARE

## 2025-04-23 ASSESSMENT — ENCOUNTER SYMPTOMS: DYSRHYTHMIAS: 1

## 2025-04-23 NOTE — ANESTHESIA PREPROCEDURE EVALUATION
Anesthesia Pre-Procedure Evaluation    Patient: Franca Angeles   MRN: [id number] : 1945        Procedure : Procedure(s):  Phacoemulsification cataract extraction posterior chamber lens right eye          Past Medical History:   Diagnosis Date     Esophageal reflux 05/10/2011      Past Surgical History:   Procedure Laterality Date     ARTHRODESIS KNEE  2013    left knee, paritla medial meniscectomy, debridement medial femoral condyle and patellofemoral debridement     COLONOSCOPY  2004    nml     COLONOSCOPY  2014    Procedure: COLONOSCOPY;  Surgeon: Nghia Hernandez DO;  Location: [facility] OR     CYSTOSCOPY      micro hematuria neg     DILATION AND CURETTAGE       EXCISE LESION UPPER EXTREMITY Right 10/25/2017    Procedure: EXCISE LESION UPPER EXTREMITY;  EXCISIONAL BIOPSY RIGHT FOREARM TIMES 2;  Surgeon: Nghia Hernandez DO;  Location: [facility] OR     PHACOEMULSIFICATION WITH STANDARD INTRAOCULAR LENS IMPLANT Left 2025    Procedure: Phacoemulsification cataract extraction posterior chamber lens left eye;  Surgeon: Emil Stone MD;  Location: [facility] OR     tubal sterilization       uterine fibroids       wrist fx RT        Allergies   Allergen Reactions     Pseudoephedrine Hcl Other (See Comments)     Heart racing  Sudafed     Guaifenesin Er Palpitations     Hydroxyzine Anxiety     jittery     Vilazodone Hcl Anxiety     Restless legs      Social History     Tobacco Use     Smoking status: Never     Passive exposure: Never     Smokeless tobacco: Never   Substance Use Topics     Alcohol use: Yes     Alcohol/week: 0.0 standard drinks of alcohol     Comment: occasionally      Wt Readings from Last 1 Encounters:   25 60.8 kg (134 lb)        Anesthesia Evaluation   Pt has had prior anesthetic. Type: MAC.    No history of anesthetic complications       ROS/MED HX  ENT/Pulmonary: Comment:  PFT's: moderate diffusion defect, pulmonary vascular    (+)     INDIA risk factors,  hypertension,                                  Neurologic: Comment: insomnia      Cardiovascular: Comment: LBBB    11/2023 Cardiology visit: SVT, SVE, VE - not interested in medications. Bradycardia to 30s at night - not going to treat slow HR's at night. MOSQUERA with going up/down stairs only. Palpitations, LBBB. F/up PRN    BP Readings from Last 3 Encounters:  04/08/25 : (!) 144/80  01/24/25 : 120/75  01/06/25 : 137/77        (+) Dyslipidemia hypertension-range: lisinopril (144/80 on 4/8/25)/ -   -  - -   Taking blood thinners  Instructions Given to patient: asa 81.      MOSQUERA.             dysrhythmias, Other,        Previous cardiac testing   Echo: Date: 10/2020 Results:  The right ventricle is normal size.  Global right ventricular function is normal.  Both atria appear normal.  Trace mitral insufficiency is present.  Aortic valve is normal in structure and function.  Trace tricuspid insufficiency is present.  Right ventricular systolic pressure is 31mmHg above the right atrial pressure.  The pulmonic valve is normal.  The aorta root is normal.  No pericardial effusion is present.  Global and regional left ventricular function is normal with an EF of 60-65%.      Stress Test:  Date: 10/2020 Results:     The nuclear stress test is probably negative for inducible myocardial ischemia or infarction.     The left ventricular ejection fraction at rest is 84%.  The left ventricular ejection fraction at stress is 80%.  ECG Reviewed:  Date: 4/8/25 Results:  HR 43    Sinus bradycardia  Left axis deviation  Left bundle branch block  Abnormal ECG  When compared with ECG of 22-Nov-2023 13:01,  No significant change was found  Confirmed by MD Sylvia, [facility] ([id number]) on 4/9/2025 9:58:23 AM      Cath:  Date: Results:      METS/Exercise Tolerance: >4 METS    Hematologic:  - neg hematologic  ROS     Musculoskeletal:   (+)  arthritis,             GI/Hepatic: Comment: Distant hx of GERD - noted in chart to be 'resolved' - neg GI/hepatic ROS    "  Renal/Genitourinary:     (+) renal disease, type: CRI,            Endo: Comment: Prediabetes: A1C 5.8 on 4/7/25    (+)          thyroid problem, hypothyroidism TSH in range 4/7/25,           Psychiatric/Substance Use:     (+) psychiatric history anxiety and depression       Infectious Disease:  - neg infectious disease ROS     Malignancy:  - neg malignancy ROS     Other:  - neg other ROS          Physical Exam    Airway        Mallampati: III   TM distance: < 3 FB   Neck ROM: full   Mouth opening: < 3 cm    Respiratory Devices and Support         Dental       (+) Multiple crowns, permanant bridges      Cardiovascular          Rhythm and rate: regular and normal     Pulmonary           breath sounds clear to auscultation       OUTSIDE LABS:  CBC:   Lab Results   Component Value Date    WBC 6.8 04/08/2025    WBC 6.8 06/17/2024    HGB 13.1 04/08/2025    HGB 12.5 06/17/2024    HCT 40.9 04/08/2025    HCT 38.0 06/17/2024     04/08/2025     06/17/2024     BMP:   Lab Results   Component Value Date     04/07/2025     01/03/2025    POTASSIUM 4.4 04/07/2025    POTASSIUM 4.4 01/03/2025    CHLORIDE 106 04/07/2025    CHLORIDE 104 01/03/2025    CO2 28 04/07/2025    CO2 23 01/03/2025    BUN 18.0 04/07/2025    BUN 20.4 01/03/2025    CR 0.98 (H) 04/07/2025    CR 0.93 01/03/2025    GLC 95 04/07/2025    GLC 93 01/03/2025     COAGS:   Lab Results   Component Value Date    INR 0.86 10/02/2020     POC: No results found for: \"BGM\", \"HCG\", \"HCGS\"  HEPATIC:   Lab Results   Component Value Date    ALBUMIN 4.2 04/07/2025    PROTTOTAL 7.6 04/07/2025    ALT 26 04/07/2025    AST 34 04/07/2025    ALKPHOS 87 04/07/2025    BILITOTAL 0.4 04/07/2025     OTHER:   Lab Results   Component Value Date    A1C 5.8 (H) 04/07/2025    IDALIA 10.0 04/07/2025    TSH 1.84 04/07/2025    T4 1.04 10/01/2024    SED 11 06/17/2024       Anesthesia Plan    ASA Status:  3    NPO Status:  NPO Appropriate    Anesthesia Type: MAC.     - Reason for " MAC: straight local not clinically adequate              Consents    Anesthesia Plan(s) and associated risks, benefits, and realistic alternatives discussed. Questions answered and patient/representative(s) expressed understanding.     - Discussed: Risks, Benefits and Alternatives for BOTH SEDATION and the PROCEDURE were discussed     - Discussed with:  Patient      - Specific Concerns: Risks and benefits of MAC anesthetic discussed including dental damage, aspiration, loss of airway, conversion to general anesthetic, CV complications, MI, stroke, death. Pt wishes to proceed..     - Extended Intubation/Ventilatory Support Discussed: No.      - Patient is DNR/DNI Status: No     Use of blood products discussed: No .     Postoperative Care            Comments:    Other Comments: Reviewed 4/8 [facility]    Previous eye done 4/17               WILLEM Driscoll CNP    Clinically Significant Risk Factors Present on Admission                   # Hypertension: Noted on problem list

## 2025-05-01 ENCOUNTER — ANESTHESIA (OUTPATIENT)
Dept: SURGERY | Facility: HOSPITAL | Age: 80
End: 2025-05-01
Payer: MEDICARE

## 2025-05-01 ENCOUNTER — HOSPITAL ENCOUNTER (OUTPATIENT)
Facility: HOSPITAL | Age: 80
Discharge: HOME | End: 2025-05-01
Attending: OPHTHALMOLOGY | Admitting: OPHTHALMOLOGY
Payer: MEDICARE

## 2025-05-01 VITALS
BODY MASS INDEX: 24.62 KG/M2 | TEMPERATURE: 97.3 F | HEIGHT: 62 IN | DIASTOLIC BLOOD PRESSURE: 79 MMHG | HEART RATE: 73 BPM | SYSTOLIC BLOOD PRESSURE: 123 MMHG | RESPIRATION RATE: 16 BRPM | WEIGHT: 133.8 LBS | OXYGEN SATURATION: 95 %

## 2025-05-01 PROCEDURE — 710N000012 HC RECOVERY PHASE 2, PER MINUTE: Performed by: OPHTHALMOLOGY

## 2025-05-01 PROCEDURE — V2632 POST CHMBR INTRAOCULAR LENS: HCPCS | Performed by: OPHTHALMOLOGY

## 2025-05-01 PROCEDURE — 999N000141 HC STATISTIC PRE-PROCEDURE NURSING ASSESSMENT: Performed by: OPHTHALMOLOGY

## 2025-05-01 PROCEDURE — 258N000003 HC RX IP 258 OP 636: Performed by: NURSE ANESTHETIST, CERTIFIED REGISTERED

## 2025-05-01 PROCEDURE — 360N000076 HC SURGERY LEVEL 3, PER MIN: Performed by: OPHTHALMOLOGY

## 2025-05-01 PROCEDURE — 370N000017 HC ANESTHESIA TECHNICAL FEE, PER MIN: Performed by: OPHTHALMOLOGY

## 2025-05-01 PROCEDURE — 250N000009 HC RX 250: Performed by: NURSE ANESTHETIST, CERTIFIED REGISTERED

## 2025-05-01 PROCEDURE — 250N000011 HC RX IP 250 OP 636: Performed by: OPHTHALMOLOGY

## 2025-05-01 PROCEDURE — 250N000011 HC RX IP 250 OP 636: Performed by: NURSE ANESTHETIST, CERTIFIED REGISTERED

## 2025-05-01 PROCEDURE — 250N000009 HC RX 250: Performed by: OPHTHALMOLOGY

## 2025-05-01 PROCEDURE — 272N000001 HC OR GENERAL SUPPLY STERILE: Performed by: OPHTHALMOLOGY

## 2025-05-01 DEVICE — IMPLANTABLE DEVICE: Type: IMPLANTABLE DEVICE | Site: EYE | Status: FUNCTIONAL

## 2025-05-01 RX ORDER — PROPARACAINE HYDROCHLORIDE 5 MG/ML
1 SOLUTION/ DROPS OPHTHALMIC ONCE
Status: COMPLETED | OUTPATIENT
Start: 2025-05-01 | End: 2025-05-01

## 2025-05-01 RX ORDER — NALOXONE HYDROCHLORIDE 0.4 MG/ML
0.1 INJECTION, SOLUTION INTRAMUSCULAR; INTRAVENOUS; SUBCUTANEOUS
Status: DISCONTINUED | OUTPATIENT
Start: 2025-05-01 | End: 2025-05-01 | Stop reason: HOSPADM

## 2025-05-01 RX ORDER — MOXIFLOXACIN 5 MG/ML
1 SOLUTION/ DROPS OPHTHALMIC
Status: COMPLETED | OUTPATIENT
Start: 2025-05-01 | End: 2025-05-01

## 2025-05-01 RX ORDER — DEXAMETHASONE SODIUM PHOSPHATE 10 MG/ML
4 INJECTION, SOLUTION INTRAMUSCULAR; INTRAVENOUS
Status: DISCONTINUED | OUTPATIENT
Start: 2025-05-01 | End: 2025-05-01 | Stop reason: HOSPADM

## 2025-05-01 RX ORDER — PROPARACAINE HYDROCHLORIDE 5 MG/ML
1 SOLUTION/ DROPS OPHTHALMIC
Status: COMPLETED | OUTPATIENT
Start: 2025-05-01 | End: 2025-05-01

## 2025-05-01 RX ORDER — PHENYLEPHRINE HYDROCHLORIDE 100 MG/ML
1 SOLUTION/ DROPS OPHTHALMIC
Status: DISCONTINUED | OUTPATIENT
Start: 2025-05-01 | End: 2025-05-01 | Stop reason: HOSPADM

## 2025-05-01 RX ORDER — LEVOBUNOLOL HYDROCHLORIDE 5 MG/ML
SOLUTION/ DROPS OPHTHALMIC PRN
Status: DISCONTINUED | OUTPATIENT
Start: 2025-05-01 | End: 2025-05-01 | Stop reason: HOSPADM

## 2025-05-01 RX ORDER — CYCLOPENTOLATE HYDROCHLORIDE 20 MG/ML
1 SOLUTION/ DROPS OPHTHALMIC ONCE
Status: COMPLETED | OUTPATIENT
Start: 2025-05-01 | End: 2025-05-01

## 2025-05-01 RX ORDER — CYCLOPENTOLAT/TROPIC/PHENYLEPH 1%-1%-2.5%
1 DROPS (EA) OPHTHALMIC (EYE)
Status: COMPLETED | OUTPATIENT
Start: 2025-05-01 | End: 2025-05-01

## 2025-05-01 RX ORDER — TETRACAINE HYDROCHLORIDE 5 MG/ML
SOLUTION OPHTHALMIC PRN
Status: DISCONTINUED | OUTPATIENT
Start: 2025-05-01 | End: 2025-05-01 | Stop reason: HOSPADM

## 2025-05-01 RX ORDER — GLYCOPYRROLATE 0.2 MG/ML
INJECTION, SOLUTION INTRAMUSCULAR; INTRAVENOUS PRN
Status: DISCONTINUED | OUTPATIENT
Start: 2025-05-01 | End: 2025-05-01

## 2025-05-01 RX ORDER — LIDOCAINE 40 MG/G
CREAM TOPICAL
Status: DISCONTINUED | OUTPATIENT
Start: 2025-05-01 | End: 2025-05-01 | Stop reason: HOSPADM

## 2025-05-01 RX ORDER — LIDOCAINE HYDROCHLORIDE 20 MG/ML
JELLY TOPICAL ONCE
Status: COMPLETED | OUTPATIENT
Start: 2025-05-01 | End: 2025-05-01

## 2025-05-01 RX ORDER — ACETAMINOPHEN 325 MG/1
650 TABLET ORAL
Status: DISCONTINUED | OUTPATIENT
Start: 2025-05-01 | End: 2025-05-01 | Stop reason: HOSPADM

## 2025-05-01 RX ORDER — SODIUM CHLORIDE, SODIUM LACTATE, POTASSIUM CHLORIDE, CALCIUM CHLORIDE 600; 310; 30; 20 MG/100ML; MG/100ML; MG/100ML; MG/100ML
INJECTION, SOLUTION INTRAVENOUS CONTINUOUS
Status: DISCONTINUED | OUTPATIENT
Start: 2025-05-01 | End: 2025-05-01 | Stop reason: HOSPADM

## 2025-05-01 RX ORDER — LIDOCAINE HYDROCHLORIDE 10 MG/ML
INJECTION, SOLUTION EPIDURAL; INFILTRATION; INTRACAUDAL; PERINEURAL PRN
Status: DISCONTINUED | OUTPATIENT
Start: 2025-05-01 | End: 2025-05-01 | Stop reason: HOSPADM

## 2025-05-01 RX ORDER — MOXIFLOXACIN IN NACL,ISO-OS/PF 1.6 MG/ML
SYRINGE (ML) INTRAOCULAR PRN
Status: DISCONTINUED | OUTPATIENT
Start: 2025-05-01 | End: 2025-05-01 | Stop reason: HOSPADM

## 2025-05-01 RX ORDER — ONDANSETRON 4 MG/1
4 TABLET, ORALLY DISINTEGRATING ORAL EVERY 30 MIN PRN
Status: DISCONTINUED | OUTPATIENT
Start: 2025-05-01 | End: 2025-05-01 | Stop reason: HOSPADM

## 2025-05-01 RX ORDER — PILOCARPINE HYDROCHLORIDE 10 MG/ML
SOLUTION/ DROPS OPHTHALMIC PRN
Status: DISCONTINUED | OUTPATIENT
Start: 2025-05-01 | End: 2025-05-01 | Stop reason: HOSPADM

## 2025-05-01 RX ORDER — ONDANSETRON 2 MG/ML
4 INJECTION INTRAMUSCULAR; INTRAVENOUS EVERY 30 MIN PRN
Status: DISCONTINUED | OUTPATIENT
Start: 2025-05-01 | End: 2025-05-01 | Stop reason: HOSPADM

## 2025-05-01 RX ADMIN — Medication 1 DROP: at 08:22

## 2025-05-01 RX ADMIN — PROPARACAINE HYDROCHLORIDE 1 DROP: 5 SOLUTION/ DROPS OPHTHALMIC at 08:34

## 2025-05-01 RX ADMIN — CYCLOPENTOLATE HYDROCHLORIDE 1 DROP: 20 SOLUTION/ DROPS OPHTHALMIC at 08:18

## 2025-05-01 RX ADMIN — DEXMEDETOMIDINE HYDROCHLORIDE 4 MCG: 100 INJECTION, SOLUTION INTRAVENOUS at 09:32

## 2025-05-01 RX ADMIN — LIDOCAINE HYDROCHLORIDE: 20 JELLY TOPICAL at 08:35

## 2025-05-01 RX ADMIN — PROPARACAINE HYDROCHLORIDE 1 DROP: 5 SOLUTION/ DROPS OPHTHALMIC at 08:17

## 2025-05-01 RX ADMIN — MOXIFLOXACIN 1 DROP: 5 SOLUTION/ DROPS OPHTHALMIC at 08:31

## 2025-05-01 RX ADMIN — MOXIFLOXACIN 1 DROP: 5 SOLUTION/ DROPS OPHTHALMIC at 08:28

## 2025-05-01 RX ADMIN — MOXIFLOXACIN 1 DROP: 5 SOLUTION/ DROPS OPHTHALMIC at 08:23

## 2025-05-01 RX ADMIN — Medication 1 DROP: at 08:35

## 2025-05-01 RX ADMIN — MIDAZOLAM 1 MG: 1 INJECTION INTRAMUSCULAR; INTRAVENOUS at 09:35

## 2025-05-01 RX ADMIN — GLYCOPYRROLATE 0.2 MG: 0.2 INJECTION, SOLUTION INTRAMUSCULAR; INTRAVENOUS at 09:36

## 2025-05-01 ASSESSMENT — ACTIVITIES OF DAILY LIVING (ADL)
ADLS_ACUITY_SCORE: 21

## 2025-05-01 NOTE — INTERVAL H&P NOTE
I have reviewed the surgical (or preoperative) H&P that is linked to this encounter, and examined the patient. There are no significant changes.  Emil Stone MD      Clinical Conditions Present on Arrival:  Clinically Significant Risk Factors Present on Admission

## 2025-05-01 NOTE — ANESTHESIA POSTPROCEDURE EVALUATION
Patient: Franca Angeles    Procedure: Procedure(s):  Phacoemulsification cataract extraction posterior chamber lens right eye       Anesthesia Type:  MAC    Note:  Disposition: Outpatient   Postop Pain Control: Uneventful            Sign Out: Well controlled pain   PONV: No   Neuro/Psych: Uneventful            Sign Out: Acceptable/Baseline neuro status   Airway/Respiratory: Uneventful            Sign Out: Acceptable/Baseline resp. status   CV/Hemodynamics: Uneventful            Sign Out: Acceptable CV status; No obvious hypovolemia; No obvious fluid overload   Other NRE: NONE   DID A NON-ROUTINE EVENT OCCUR? No       Last vitals:  Vitals Value Taken Time   /79 05/01/25 1015   Temp     Pulse 73 05/01/25 1015   Resp 16 05/01/25 1015   SpO2 94 % 05/01/25 1017   Vitals shown include unfiled device data.    Electronically Signed By: WILLEM Pollock CRNA  May 1, 2025  10:26 AM

## 2025-05-01 NOTE — OP NOTE
[facility]  Ophthalmology Full Operative Note    Pre-operative diagnosis: Combined form of age-related cataract, right eye [H25.811]   Post-operative diagnosis Same   Procedure: Procedure(s):  Phacoemulsification cataract extraction posterior chamber lens right eye   Surgeon: Emil Stone MD   Assistants(s):    Anesthesia: MAC with Local    Estimated blood loss: None    Total IV fluids: (See anesthesia record)   Specimens: None   Implants: 21 LI61AO   Findings:    Complications: None   Condition: Stable   Comments:       PROCEDURAL ANESTHESIA:     Topical/MAC.  Lidocaine 2% jelly topically and Lidocaine 1% preservative-free intracamerally.     IOL Model: SofPort LI61AO    IOL Serial Number: [id number]    IOL Power Performed: +21.00        PROCEDURE: The usual topical anesthetic and dilating drops were administered in the pre-op area, The patient was brought to the Operating Room and prepped and draped in standard sterile ophthalmic manner. A lid speculum was placed and the microscope was brought into the field keeping the microscope illumination as low as possible throughout the case and removing the microscope light from the field whenever feasible. A paracentesis was created and 1% lidocaine was instilled in the anterior chamber.  The anterior chamber was then reformed with viscoelastic. A clear cornea temporal wound was created using a 2.8 mm keratome. A cystitome was used to initiate a flap in the anterior capsule and an Utrata forceps was used to create a continuous tear capsulorrhexis. Hydrodissection was performed. The phacoemulsification tip was inserted into the eye and the nucleus and epinucleous were removed using a * 2-handed divide technique. The residual cortex was then removed using the I/A handpiece. The anterior chamber was then refilled with viscoelastic and the wound was enlarged with the keratome. The intraocular lens implant of appropriate power was loaded into the injector and  injected into the capsular bag. It was checked to make sure that it was central and stable. Residual viscoelastic was removed using the I/A. Then anterior chamber was refilled with BSS, and was noted to be watertight with no suture necessary.   Intracameral moxifloxacin was given for prophylaxis against the risk of endophthalmitis.  The pressure was then adjusted to the normal range by releasing some fluid from the paracentesis.  Topical Pilocarpine 1% and Levobunolol drops were instilled. A hard shield was placed. No unplanned rupture of the posterior capsule occurred during this procedure. The patient tolerated the procedure well and was sent to the Recovery Room in satisfactory condition.    Surgeon Comment: 5/1/25. PROCEDURE: PECE/PCL OD, topical/MAC with IV sedation (1 Versed). IC moxifloxacin. No complications. 2nd eye, 21 LI61AO. Dx: Combined cataract    Physician:      JONATHAN Stone M.D.

## 2025-05-01 NOTE — ANESTHESIA CARE TRANSFER NOTE
Patient: Franca Angeles    Procedure: Procedure(s):  Phacoemulsification cataract extraction posterior chamber lens right eye       Diagnosis: Combined form of age-related cataract, right eye [H25.811]  Diagnosis Additional Information: No value filed.    Anesthesia Type:   MAC     Note:    Oropharynx: spontaneously breathing  Level of Consciousness: awake  Oxygen Supplementation: room air    Independent Airway: airway patency satisfactory and stable  Dentition: dentition unchanged  Vital Signs Stable: post-procedure vital signs reviewed and stable  Report to RN Given: handoff report given  Patient transferred to: Phase II    Handoff Report: Identifed the Patient, Identified the Reponsible Provider, Reviewed the pertinent medical history, Discussed the surgical course, Reviewed Intra-OP anesthesia mangement and issues during anesthesia, Set expectations for post-procedure period and Allowed opportunity for questions and acknowledgement of understanding    Vitals:  Vitals Value Taken Time   BP     Temp     Pulse     Resp     SpO2         Electronically Signed By: WILLEM Pollock CRNA  May 1, 2025  9:56 AM

## 2025-05-01 NOTE — OR NURSING
Patient and responsible adult given discharge instructions with no questions regarding instructions. Maisha score 20. Pain level 0/10.  Discharged from unit via ambulation. Patient discharged to home.

## 2025-07-07 NOTE — PROGRESS NOTES
Assessment & Plan     1. Prediabetes (Primary)  Stable   - Hemoglobin A1c; Future    2. Hyperlipidemia LDL goal <100  Continue 30mg lipitor daily  - Lipid Profile; Future  - atorvastatin (LIPITOR) 10 MG tablet; Take 1 tablet (10 mg) by mouth daily. Take along with a 20mg for a total dose of 30mg daily  Dispense: 90 tablet; Refill: 1    3. Benign essential hypertension  Well controlled   - Comprehensive metabolic panel; Future    4. Hypothyroidism due to acquired atrophy of thyroid  - TSH with free T4 reflex; Future  - levothyroxine (SYNTHROID/LEVOTHROID) 50 MCG tablet; Take one tablet every other day.  Dispense: 45 tablet; Refill: 1    5. Generalized anxiety disorder  Stable     6. Primary insomnia  Ativan or antihistamine as needed     7. On statin therapy  - Comprehensive metabolic panel; Future      Follow-up   Return in about 3 months (around 10/10/2025) for pre-diabetes, lipids, HTN, anxiety.    The longitudinal plan of care for the diagnosis(es)/condition(s) as documented were addressed during this visit. Due to the added complexity in care, I will continue to support Franca in the subsequent management and with ongoing continuity of care.    Brittney Coe,   Certified Adult Nurse Practitioner  [phone]     Raffaele Schulte is a 79 year old, presenting for the following health issues:  Hypertension, Lipids, and Thyroid Problem        7/10/2025     9:42 AM   Additional Questions   Roomed by nicho   Accompanied by none     HPI      Pre-diabetes:  Stable, no medications.        Hyperlipidemia Follow-Up    Are you regularly taking any medication or supplement to lower your cholesterol?   Yes- Lipitor 30 mg  Are you having muscle aches or other side effects that you think could be caused by your cholesterol lowering medication?  No  The ASCVD Risk score (Amarilys CROOK, et al., 2019) failed to calculate for the following reasons:    The 2019 ASCVD risk score is only valid for ages 40 to  "79        Hypertension Follow-up    Do you check your blood pressure regularly outside of the clinic? Yes   Are you following a low salt diet? Yes  Are your blood pressures ever more than 140 on the top number (systolic) OR more   than 90 on the bottom number (diastolic), for example 140/90? No    Recent Labs   Lab Test 07/08/25  0750 04/07/25  0759 01/03/25  0822 08/20/21  0757 05/10/21  0801 02/05/21  0809   A1C 5.8* 5.8* 5.5   < > 5.6  --    * 97 118*   < > 105* 119*   HDL 53 54 54   < > 58 55   TRIG 104 110 117   < > 138 149   ALT 30 26 33   < > 46 36   CR 1.03* 0.98* 0.93   < > 1.04 1.01   GFRESTIMATED 55* 58* 62   < > 52* 54*   GFRESTBLACK  --   --   --   --  60* 62   POTASSIUM 4.9 4.4 4.4   < > 4.3 4.1   TSH 2.54 1.84 3.08   < > 3.79 4.17*    < > = values in this interval not displayed.      BP Readings from Last 3 Encounters:   07/10/25 136/70   05/01/25 123/79   04/17/25 113/86    Wt Readings from Last 3 Encounters:   07/10/25 60.3 kg (133 lb)   05/01/25 60.7 kg (133 lb 12.8 oz)   04/08/25 60.8 kg (134 lb)             Hypothyroidism Follow-up    Since last visit, patient describes the following symptoms: Weight stable, no hair loss, no skin changes, no constipation, no loose stools    She is overall feeling well, no new concerns.  Fluid in right ear has resolved.        Review of Systems  Constitutional, HEENT, cardiovascular, pulmonary, GI, , musculoskeletal, neuro, skin, endocrine and psych systems are negative, except as otherwise noted.      Objective    /70 (BP Location: Left arm, Patient Position: Chair, Cuff Size: Adult Regular)   Pulse 73   Temp 97.5  F (36.4  C) (Tympanic)   Resp 16   Ht 1.575 m (5' 2\")   Wt 60.3 kg (133 lb)   SpO2 97%   BMI 24.33 kg/m    Body mass index is 24.33 kg/m .  Physical Exam   GENERAL: alert and no distress  EYES: Eyes grossly normal to inspection, PERRL and conjunctivae and sclerae normal  HENT: ear canals and TM's normal, nose and mouth without " ulcers or lesions  NECK: no adenopathy, no asymmetry, masses, or scars, thyroid normal to palpation, and no carotid bruits  RESP: lungs clear to auscultation - no rales, rhonchi or wheezes  CV: regular rate and rhythm, normal S1 S2, no S3 or S4, no murmur  MS: no gross musculoskeletal defects noted, no edema  PSYCH: mentation appears normal, affect normal/bright    Lab on 07/08/2025   Component Date Value Ref Range Status    Estimated Average Glucose 07/08/2025 120 (H)  <117 mg/dL Final    Hemoglobin A1C 07/08/2025 5.8 (H)  <5.7 % Final    Normal <5.7%   Prediabetes 5.7-6.4%    Diabetes 6.5% or higher     Note: Adopted from ADA consensus guidelines.    Cholesterol 07/08/2025 195  <200 mg/dL Final    Triglycerides 07/08/2025 104  <150 mg/dL Final    Direct Measure HDL 07/08/2025 53  >=50 mg/dL Final    LDL Cholesterol Calculated 07/08/2025 121 (H)  <100 mg/dL Final    LDL calculated using the Friedewald equation.    Non HDL Cholesterol 07/08/2025 142 (H)  <130 mg/dL Final    Patient Fasting > 8hrs? 07/08/2025 Yes   Final    Sodium 07/08/2025 141  135 - 145 mmol/L Final    Potassium 07/08/2025 4.9  3.4 - 5.3 mmol/L Final    Carbon Dioxide (CO2) 07/08/2025 24  22 - 29 mmol/L Final    Anion Gap 07/08/2025 11  7 - 15 mmol/L Final    Urea Nitrogen 07/08/2025 24.7 (H)  8.0 - 23.0 mg/dL Final    Creatinine 07/08/2025 1.03 (H)  0.51 - 0.95 mg/dL Final    GFR Estimate 07/08/2025 55 (L)  >60 mL/min/1.73m2 Final    eGFR calculated using 2021 CKD-EPI equation.    Calcium 07/08/2025 10.1  8.8 - 10.4 mg/dL Final    Chloride 07/08/2025 106  98 - 107 mmol/L Final    Glucose 07/08/2025 90  70 - 99 mg/dL Final    Alkaline Phosphatase 07/08/2025 81  40 - 150 U/L Final    AST 07/08/2025 39  0 - 45 U/L Final    ALT 07/08/2025 30  0 - 50 U/L Final    Protein Total 07/08/2025 7.0  6.4 - 8.3 g/dL Final    Albumin 07/08/2025 4.1  3.5 - 5.2 g/dL Final    Bilirubin Total 07/08/2025 0.5  <=1.2 mg/dL Final    Patient Fasting > 8hrs? 07/08/2025  Yes   Final    TSH 07/08/2025 2.54  0.30 - 4.20 uIU/mL Final           Signed Electronically by: Brittney Coe, NP

## 2025-07-08 ENCOUNTER — LAB (OUTPATIENT)
Dept: LAB | Facility: OTHER | Age: 80
End: 2025-07-08
Payer: MEDICARE

## 2025-07-08 DIAGNOSIS — E78.5 HYPERLIPIDEMIA LDL GOAL <100: ICD-10-CM

## 2025-07-08 DIAGNOSIS — R73.03 PREDIABETES: ICD-10-CM

## 2025-07-08 DIAGNOSIS — I10 BENIGN ESSENTIAL HYPERTENSION: ICD-10-CM

## 2025-07-08 DIAGNOSIS — E03.4 HYPOTHYROIDISM DUE TO ACQUIRED ATROPHY OF THYROID: ICD-10-CM

## 2025-07-08 LAB
ALBUMIN SERPL BCG-MCNC: 4.1 G/DL (ref 3.5–5.2)
ALP SERPL-CCNC: 81 U/L (ref 40–150)
ALT SERPL W P-5'-P-CCNC: 30 U/L (ref 0–50)
ANION GAP SERPL CALCULATED.3IONS-SCNC: 11 MMOL/L (ref 7–15)
AST SERPL W P-5'-P-CCNC: 39 U/L (ref 0–45)
BILIRUB SERPL-MCNC: 0.5 MG/DL
BUN SERPL-MCNC: 24.7 MG/DL (ref 8–23)
CALCIUM SERPL-MCNC: 10.1 MG/DL (ref 8.8–10.4)
CHLORIDE SERPL-SCNC: 106 MMOL/L (ref 98–107)
CHOLEST SERPL-MCNC: 195 MG/DL
CREAT SERPL-MCNC: 1.03 MG/DL (ref 0.51–0.95)
EGFRCR SERPLBLD CKD-EPI 2021: 55 ML/MIN/1.73M2
EST. AVERAGE GLUCOSE BLD GHB EST-MCNC: 120 MG/DL
FASTING STATUS PATIENT QL REPORTED: YES
FASTING STATUS PATIENT QL REPORTED: YES
GLUCOSE SERPL-MCNC: 90 MG/DL (ref 70–99)
HBA1C MFR BLD: 5.8 %
HCO3 SERPL-SCNC: 24 MMOL/L (ref 22–29)
HDLC SERPL-MCNC: 53 MG/DL
LDLC SERPL CALC-MCNC: 121 MG/DL
NONHDLC SERPL-MCNC: 142 MG/DL
POTASSIUM SERPL-SCNC: 4.9 MMOL/L (ref 3.4–5.3)
PROT SERPL-MCNC: 7 G/DL (ref 6.4–8.3)
SODIUM SERPL-SCNC: 141 MMOL/L (ref 135–145)
TRIGL SERPL-MCNC: 104 MG/DL
TSH SERPL DL<=0.005 MIU/L-ACNC: 2.54 UIU/ML (ref 0.3–4.2)

## 2025-07-08 PROCEDURE — 84443 ASSAY THYROID STIM HORMONE: CPT | Mod: ZL

## 2025-07-08 PROCEDURE — 82465 ASSAY BLD/SERUM CHOLESTEROL: CPT | Mod: ZL

## 2025-07-08 PROCEDURE — 36415 COLL VENOUS BLD VENIPUNCTURE: CPT | Mod: ZL

## 2025-07-08 PROCEDURE — 84155 ASSAY OF PROTEIN SERUM: CPT | Mod: ZL

## 2025-07-08 PROCEDURE — 83036 HEMOGLOBIN GLYCOSYLATED A1C: CPT | Mod: ZL

## 2025-07-10 ENCOUNTER — OFFICE VISIT (OUTPATIENT)
Dept: FAMILY MEDICINE | Facility: OTHER | Age: 80
End: 2025-07-10
Attending: NURSE PRACTITIONER
Payer: MEDICARE

## 2025-07-10 VITALS
RESPIRATION RATE: 16 BRPM | SYSTOLIC BLOOD PRESSURE: 136 MMHG | OXYGEN SATURATION: 97 % | WEIGHT: 133 LBS | BODY MASS INDEX: 24.48 KG/M2 | HEART RATE: 73 BPM | TEMPERATURE: 97.5 F | DIASTOLIC BLOOD PRESSURE: 70 MMHG | HEIGHT: 62 IN

## 2025-07-10 DIAGNOSIS — Z79.899 ON STATIN THERAPY: ICD-10-CM

## 2025-07-10 DIAGNOSIS — F51.01 PRIMARY INSOMNIA: ICD-10-CM

## 2025-07-10 DIAGNOSIS — R73.03 PREDIABETES: Primary | ICD-10-CM

## 2025-07-10 DIAGNOSIS — I10 BENIGN ESSENTIAL HYPERTENSION: ICD-10-CM

## 2025-07-10 DIAGNOSIS — E03.4 HYPOTHYROIDISM DUE TO ACQUIRED ATROPHY OF THYROID: ICD-10-CM

## 2025-07-10 DIAGNOSIS — E78.5 HYPERLIPIDEMIA LDL GOAL <100: ICD-10-CM

## 2025-07-10 DIAGNOSIS — F41.1 GENERALIZED ANXIETY DISORDER: ICD-10-CM

## 2025-07-10 PROCEDURE — G0463 HOSPITAL OUTPT CLINIC VISIT: HCPCS

## 2025-07-10 RX ORDER — ATORVASTATIN CALCIUM 10 MG/1
10 TABLET, FILM COATED ORAL DAILY
Qty: 90 TABLET | Refills: 1 | Status: SHIPPED | OUTPATIENT
Start: 2025-07-10

## 2025-07-10 RX ORDER — LEVOTHYROXINE SODIUM 50 UG/1
TABLET ORAL
Qty: 45 TABLET | Refills: 1 | Status: SHIPPED | OUTPATIENT
Start: 2025-07-10

## 2025-07-10 ASSESSMENT — ANXIETY QUESTIONNAIRES
GAD7 TOTAL SCORE: 4
GAD7 TOTAL SCORE: 4
7. FEELING AFRAID AS IF SOMETHING AWFUL MIGHT HAPPEN: SEVERAL DAYS
4. TROUBLE RELAXING: NOT AT ALL
7. FEELING AFRAID AS IF SOMETHING AWFUL MIGHT HAPPEN: SEVERAL DAYS
6. BECOMING EASILY ANNOYED OR IRRITABLE: NOT AT ALL
1. FEELING NERVOUS, ANXIOUS, OR ON EDGE: SEVERAL DAYS
2. NOT BEING ABLE TO STOP OR CONTROL WORRYING: SEVERAL DAYS
5. BEING SO RESTLESS THAT IT IS HARD TO SIT STILL: NOT AT ALL
8. IF YOU CHECKED OFF ANY PROBLEMS, HOW DIFFICULT HAVE THESE MADE IT FOR YOU TO DO YOUR WORK, TAKE CARE OF THINGS AT HOME, OR GET ALONG WITH OTHER PEOPLE?: NOT DIFFICULT AT ALL
GAD7 TOTAL SCORE: 4
IF YOU CHECKED OFF ANY PROBLEMS ON THIS QUESTIONNAIRE, HOW DIFFICULT HAVE THESE PROBLEMS MADE IT FOR YOU TO DO YOUR WORK, TAKE CARE OF THINGS AT HOME, OR GET ALONG WITH OTHER PEOPLE: NOT DIFFICULT AT ALL
3. WORRYING TOO MUCH ABOUT DIFFERENT THINGS: SEVERAL DAYS

## 2025-07-10 ASSESSMENT — PAIN SCALES - GENERAL: PAINLEVEL_OUTOF10: NO PAIN (0)

## 2025-07-10 ASSESSMENT — PATIENT HEALTH QUESTIONNAIRE - PHQ9
10. IF YOU CHECKED OFF ANY PROBLEMS, HOW DIFFICULT HAVE THESE PROBLEMS MADE IT FOR YOU TO DO YOUR WORK, TAKE CARE OF THINGS AT HOME, OR GET ALONG WITH OTHER PEOPLE: NOT DIFFICULT AT ALL
SUM OF ALL RESPONSES TO PHQ QUESTIONS 1-9: 3
SUM OF ALL RESPONSES TO PHQ QUESTIONS 1-9: 3

## 2025-07-10 NOTE — PATIENT INSTRUCTIONS
Assessment & Plan     1. Prediabetes (Primary)  Stable   - Hemoglobin A1c; Future    2. Hyperlipidemia LDL goal <100  Continue 30mg lipitor daily  - Lipid Profile; Future  - atorvastatin (LIPITOR) 10 MG tablet; Take 1 tablet (10 mg) by mouth daily. Take along with a 20mg for a total dose of 30mg daily  Dispense: 90 tablet; Refill: 1    3. Benign essential hypertension  Well controlled   - Comprehensive metabolic panel; Future    4. Hypothyroidism due to acquired atrophy of thyroid  - TSH with free T4 reflex; Future  - levothyroxine (SYNTHROID/LEVOTHROID) 50 MCG tablet; Take one tablet every other day.  Dispense: 45 tablet; Refill: 1    5. Generalized anxiety disorder  Stable     6. Primary insomnia  Ativan or antihistamine as needed     7. On statin therapy  - Comprehensive metabolic panel; Future      Follow-up   Return in about 3 months (around 10/10/2025) for pre-diabetes, lipids, HTN, anxiety.    Brittney Coe,   Certified Adult Nurse Practitioner  [phone]

## (undated) DEVICE — INSTRUMENT WIPE VISIWIPE 581047

## (undated) DEVICE — SWABSTICK-BENZOIN

## (undated) DEVICE — POLISHER LENS OLIVE 1INX23GA BD VISITEC .6X2X1MM 585143

## (undated) DEVICE — DRAPE-EXTREMITY SHEET

## (undated) DEVICE — INJECTOR PORT FOR IOL LENSES SOFPORT EZ-28

## (undated) DEVICE — BIN-CATARACT BIN BN37

## (undated) DEVICE — DRSG-TEGADERM MEDIUM #1626

## (undated) DEVICE — PACK-SET UP-CUSTOM

## (undated) DEVICE — PACK EYE CUSTOM SEY32EPMBO

## (undated) DEVICE — EYE CANN IRR 25GA CYSTOTOME 581610

## (undated) DEVICE — LIGHT HANDLE COVER

## (undated) DEVICE — BIN-TECNIS DCB00 LENSES

## (undated) DEVICE — GLOVE PROTEXIS POWDER FREE 7.0 ORTHOPEDIC 2D73ET70

## (undated) DEVICE — BIN-LENS IMPLANT CART

## (undated) DEVICE — SUTURE-VICRYL 3-0 SH J416H

## (undated) DEVICE — EYE KNIFE SLIT XSTAR VISITEC 2.8MM 45DEG 373728

## (undated) DEVICE — BLADE-SCALPEL #15

## (undated) DEVICE — PACK PHACO STELLARIS VAC 1 AUX DRP 1 NDL WRNCH BL5110

## (undated) DEVICE — CAUTERY-MEGADYNE TIP

## (undated) DEVICE — GLOVE PROTEXIS POWDER FREE CLSC 7.5  2D72PL75X

## (undated) DEVICE — NDL-25G 1-1/2" NON-SAFETY

## (undated) DEVICE — GLV-7.5 PROTEXIS PI CLASSIC LF/PF

## (undated) DEVICE — EYE PREP BETADINE 5% SOLUTION 30ML 0065-0411-30

## (undated) DEVICE — IRRIGATION-H2O 1000ML

## (undated) DEVICE — CAUTERY PENCIL

## (undated) DEVICE — EYE CANN IRR 25GA HYDRODISSECTING 585037

## (undated) DEVICE — SUTURE-MONOCRYL 4-0 PS-2 Y496G

## (undated) DEVICE — DRAPE-THREE QUARTER (LARGE) SHEET

## (undated) DEVICE — EYE KNIFE MICRO 15DEG BEVEL 377516

## (undated) DEVICE — CANNULA IRR 7/8IN 30GA VSTC VSCFL 45D 9MM DISP 585046

## (undated) DEVICE — TOWEL-OR DISP 4PKS

## (undated) DEVICE — DRSG-SPONGE STERILE 4 X 4

## (undated) DEVICE — CAUTERY PAD-POLYHESIVE II ADULT

## (undated) DEVICE — SET HANDPIECE IRRIG/ASPIRATION 2.2-2.8X5.4" 45DEG TIP 85910S

## (undated) DEVICE — NDL-BLUNT FILL 18G 1.5"

## (undated) DEVICE — APPLICATOR-CHLORAPREP 26ML TINTED CHG 2%+ 70% IPA-SURGICAL

## (undated) DEVICE — DRSG-TEGADERM SMALL #1624

## (undated) DEVICE — GOWN-SURG XL LVL 3 REINFORCED

## (undated) DEVICE — LABEL-STERILE PREPRINTED FOR OR

## (undated) DEVICE — BDG-COBAN 4 INCH

## (undated) DEVICE — CANISTER-SUCTION 2000CC

## (undated) DEVICE — CANNULA IRR 7/8IN 25GA VSTC VSCFL 45D 9MM DISP 585045

## (undated) DEVICE — SYRINGE-10CC LUER LOCK

## (undated) DEVICE — GOWN-SURG XXL LVL 3 REINFORCED

## (undated) DEVICE — STOCKINETTE IMPERVIOUS-LARGE 9X48

## (undated) DEVICE — STERI-STRIP-1/2" X 4"

## (undated) RX ORDER — KETAMINE HCL IN 0.9 % NACL 20 MG/2 ML
SYRINGE (ML) INTRAVENOUS
Status: DISPENSED
Start: 2017-10-25

## (undated) RX ORDER — LIDOCAINE HYDROCHLORIDE 20 MG/ML
INJECTION, SOLUTION EPIDURAL; INFILTRATION; INTRACAUDAL; PERINEURAL
Status: DISPENSED
Start: 2017-10-25

## (undated) RX ORDER — GLYCOPYRROLATE 0.2 MG/ML
INJECTION, SOLUTION INTRAMUSCULAR; INTRAVENOUS
Status: DISPENSED
Start: 2025-05-01

## (undated) RX ORDER — GLYCOPYRROLATE 0.2 MG/ML
INJECTION, SOLUTION INTRAMUSCULAR; INTRAVENOUS
Status: DISPENSED
Start: 2017-10-25

## (undated) RX ORDER — FENTANYL CITRATE 50 UG/ML
INJECTION, SOLUTION INTRAMUSCULAR; INTRAVENOUS
Status: DISPENSED
Start: 2017-10-25